# Patient Record
Sex: FEMALE | Race: WHITE | NOT HISPANIC OR LATINO | Employment: UNEMPLOYED | RURAL
[De-identification: names, ages, dates, MRNs, and addresses within clinical notes are randomized per-mention and may not be internally consistent; named-entity substitution may affect disease eponyms.]

---

## 2021-03-03 ENCOUNTER — HISTORICAL (OUTPATIENT)
Dept: ADMINISTRATIVE | Facility: HOSPITAL | Age: 86
End: 2021-03-03

## 2021-08-18 ENCOUNTER — LAB REQUISITION (OUTPATIENT)
Dept: LAB | Facility: HOSPITAL | Age: 86
End: 2021-08-18
Attending: NURSE PRACTITIONER
Payer: COMMERCIAL

## 2021-08-18 DIAGNOSIS — R41.0 DISORIENTATION, UNSPECIFIED: ICD-10-CM

## 2021-08-18 DIAGNOSIS — R53.1 WEAKNESS: ICD-10-CM
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
## 2021-08-18 LAB
ALBUMIN SERPL BCP-MCNC: 4.1 G/DL (ref 3.5–5)
ALBUMIN/GLOB SERPL: 1.2 {RATIO}
ALP SERPL-CCNC: 58 U/L (ref 55–142)
ALT SERPL W P-5'-P-CCNC: 19 U/L (ref 13–56)
ANION GAP SERPL CALCULATED.3IONS-SCNC: 13 MMOL/L (ref 7–16)
AST SERPL W P-5'-P-CCNC: 20 U/L (ref 15–37)
BASOPHILS # BLD AUTO: 0.02 K/UL (ref 0–0.2)
BASOPHILS NFR BLD AUTO: 0.3 % (ref 0–1)
BILIRUB SERPL-MCNC: 0.5 MG/DL (ref 0–1.2)
BUN SERPL-MCNC: 11 MG/DL (ref 7–18)
BUN/CREAT SERPL: 11 (ref 6–20)
CALCIUM SERPL-MCNC: 9.5 MG/DL (ref 8.5–10.1)
CHLORIDE SERPL-SCNC: 93 MMOL/L (ref 98–107)
CO2 SERPL-SCNC: 27 MMOL/L (ref 21–32)
CREAT SERPL-MCNC: 0.98 MG/DL (ref 0.55–1.02)
DIFFERENTIAL METHOD BLD: ABNORMAL
EOSINOPHIL # BLD AUTO: 0.14 K/UL (ref 0–0.5)
EOSINOPHIL NFR BLD AUTO: 2.1 % (ref 1–4)
ERYTHROCYTE [DISTWIDTH] IN BLOOD BY AUTOMATED COUNT: 12.8 % (ref 11.5–14.5)
GLOBULIN SER-MCNC: 3.5 G/DL (ref 2–4)
GLUCOSE SERPL-MCNC: 131 MG/DL (ref 74–106)
HCT VFR BLD AUTO: 37.3 % (ref 38–47)
HGB BLD-MCNC: 12.4 G/DL (ref 12–16)
LYMPHOCYTES # BLD AUTO: 1.95 K/UL (ref 1–4.8)
LYMPHOCYTES NFR BLD AUTO: 29.4 % (ref 27–41)
MCH RBC QN AUTO: 29.5 PG (ref 27–31)
MCHC RBC AUTO-ENTMCNC: 33.2 G/DL (ref 32–36)
MCV RBC AUTO: 88.8 FL (ref 80–96)
MONOCYTES # BLD AUTO: 0.61 K/UL (ref 0–0.8)
MONOCYTES NFR BLD AUTO: 9.2 % (ref 2–6)
MPC BLD CALC-MCNC: 10.4 FL (ref 9.4–12.4)
NEUTROPHILS # BLD AUTO: 3.92 K/UL (ref 1.8–7.7)
NEUTROPHILS NFR BLD AUTO: 59 % (ref 53–65)
PLATELET # BLD AUTO: 281 K/UL (ref 150–400)
POTASSIUM SERPL-SCNC: 3.7 MMOL/L (ref 3.5–5.1)
PROT SERPL-MCNC: 7.6 G/DL (ref 6.4–8.2)
RBC # BLD AUTO: 4.2 M/UL (ref 4.2–5.4)
SODIUM SERPL-SCNC: 129 MMOL/L (ref 136–145)
WBC # BLD AUTO: 6.64 K/UL (ref 4.5–11)

## 2021-08-18 PROCEDURE — 85025 COMPLETE CBC W/AUTO DIFF WBC: CPT | Performed by: NURSE PRACTITIONER

## 2021-08-18 PROCEDURE — 80053 COMPREHEN METABOLIC PANEL: CPT | Performed by: NURSE PRACTITIONER

## 2021-08-29 ENCOUNTER — LAB REQUISITION (OUTPATIENT)
Dept: LAB | Facility: HOSPITAL | Age: 86
End: 2021-08-29
Attending: NURSE PRACTITIONER
Payer: COMMERCIAL

## 2021-08-29 DIAGNOSIS — I10 ESSENTIAL (PRIMARY) HYPERTENSION: ICD-10-CM

## 2021-08-29 LAB
ANION GAP SERPL CALCULATED.3IONS-SCNC: 13 MMOL/L (ref 7–16)
BUN SERPL-MCNC: 9 MG/DL (ref 7–18)
BUN/CREAT SERPL: 8 (ref 6–20)
CALCIUM SERPL-MCNC: 9.5 MG/DL (ref 8.5–10.1)
CHLORIDE SERPL-SCNC: 99 MMOL/L (ref 98–107)
CO2 SERPL-SCNC: 28 MMOL/L (ref 21–32)
CREAT SERPL-MCNC: 1.09 MG/DL (ref 0.55–1.02)
GLUCOSE SERPL-MCNC: 133 MG/DL (ref 74–106)
POTASSIUM SERPL-SCNC: 3.5 MMOL/L (ref 3.5–5.1)
SODIUM SERPL-SCNC: 136 MMOL/L (ref 136–145)

## 2021-08-29 PROCEDURE — 80048 BASIC METABOLIC PNL TOTAL CA: CPT

## 2021-10-19 ENCOUNTER — HOSPITAL ENCOUNTER (OUTPATIENT)
Dept: RADIOLOGY | Facility: HOSPITAL | Age: 86
Discharge: HOME | End: 2021-10-19
Attending: NURSE PRACTITIONER
Payer: MEDICAID

## 2021-10-19 DIAGNOSIS — R05.9 COUGH: ICD-10-CM

## 2021-10-19 DIAGNOSIS — R06.02 SHORTNESS OF BREATH: ICD-10-CM

## 2021-10-19 PROCEDURE — 71046 X-RAY EXAM CHEST 2 VIEWS: CPT | Mod: TC

## 2022-03-18 ENCOUNTER — HOSPITAL ENCOUNTER (OUTPATIENT)
Dept: RADIOLOGY | Facility: HOSPITAL | Age: 87
Discharge: HOME | End: 2022-03-18
Attending: NURSE PRACTITIONER
Payer: MEDICARE

## 2022-03-18 DIAGNOSIS — Z02.2 ENCOUNTER FOR EXAMINATION FOR ADMISSION TO NURSING HOME: ICD-10-CM

## 2022-03-18 PROCEDURE — 71046 X-RAY EXAM CHEST 2 VIEWS: CPT | Mod: TC

## 2022-04-02 ENCOUNTER — LAB REQUISITION (OUTPATIENT)
Dept: LAB | Facility: HOSPITAL | Age: 87
End: 2022-04-02
Attending: INTERNAL MEDICINE
Payer: MEDICARE

## 2022-04-02 DIAGNOSIS — E11.8 TYPE 2 DIABETES MELLITUS WITH UNSPECIFIED COMPLICATIONS: ICD-10-CM

## 2022-04-02 DIAGNOSIS — I11.9 HYPERTENSIVE HEART DISEASE WITHOUT HEART FAILURE: ICD-10-CM

## 2022-04-02 LAB
ALBUMIN SERPL BCP-MCNC: 3.4 G/DL (ref 3.5–5)
ALBUMIN/GLOB SERPL: 1.3 {RATIO}
ALP SERPL-CCNC: 48 U/L (ref 55–142)
ALT SERPL W P-5'-P-CCNC: 13 U/L (ref 13–56)
ANION GAP SERPL CALCULATED.3IONS-SCNC: 10 MMOL/L (ref 7–16)
AST SERPL W P-5'-P-CCNC: 16 U/L (ref 15–37)
BASOPHILS # BLD AUTO: 0.03 K/UL (ref 0–0.2)
BASOPHILS NFR BLD AUTO: 0.7 % (ref 0–1)
BILIRUB SERPL-MCNC: 0.3 MG/DL (ref 0–1.2)
BUN SERPL-MCNC: 11 MG/DL (ref 7–18)
BUN/CREAT SERPL: 13 (ref 6–20)
CALCIUM SERPL-MCNC: 9.1 MG/DL (ref 8.5–10.1)
CHLORIDE SERPL-SCNC: 98 MMOL/L (ref 98–107)
CO2 SERPL-SCNC: 30 MMOL/L (ref 21–32)
CREAT SERPL-MCNC: 0.86 MG/DL (ref 0.55–1.02)
DIFFERENTIAL METHOD BLD: ABNORMAL
EOSINOPHIL # BLD AUTO: 0.08 K/UL (ref 0–0.5)
EOSINOPHIL NFR BLD AUTO: 1.8 % (ref 1–4)
ERYTHROCYTE [DISTWIDTH] IN BLOOD BY AUTOMATED COUNT: 15 % (ref 11.5–14.5)
GLOBULIN SER-MCNC: 2.7 G/DL (ref 2–4)
GLUCOSE SERPL-MCNC: 111 MG/DL (ref 74–106)
HCT VFR BLD AUTO: 35 % (ref 38–47)
HGB BLD-MCNC: 11.7 G/DL (ref 12–16)
LYMPHOCYTES # BLD AUTO: 1.68 K/UL (ref 1–4.8)
LYMPHOCYTES NFR BLD AUTO: 38.3 % (ref 27–41)
MCH RBC QN AUTO: 30.6 PG (ref 27–31)
MCHC RBC AUTO-ENTMCNC: 33.4 G/DL (ref 32–36)
MCV RBC AUTO: 91.6 FL (ref 80–96)
MONOCYTES # BLD AUTO: 0.49 K/UL (ref 0–0.8)
MONOCYTES NFR BLD AUTO: 11.2 % (ref 2–6)
MPC BLD CALC-MCNC: 9.6 FL (ref 9.4–12.4)
NEUTROPHILS # BLD AUTO: 2.11 K/UL (ref 1.8–7.7)
NEUTROPHILS NFR BLD AUTO: 48 % (ref 53–65)
PLATELET # BLD AUTO: 285 K/UL (ref 150–400)
POTASSIUM SERPL-SCNC: 4.5 MMOL/L (ref 3.5–5.1)
PROT SERPL-MCNC: 6.1 G/DL (ref 6.4–8.2)
RBC # BLD AUTO: 3.82 M/UL (ref 4.2–5.4)
SODIUM SERPL-SCNC: 133 MMOL/L (ref 136–145)
WBC # BLD AUTO: 4.39 K/UL (ref 4.5–11)

## 2022-04-02 PROCEDURE — 85025 COMPLETE CBC W/AUTO DIFF WBC: CPT

## 2022-04-02 PROCEDURE — 80053 COMPREHEN METABOLIC PANEL: CPT

## 2022-04-11 ENCOUNTER — LAB REQUISITION (OUTPATIENT)
Dept: LAB | Facility: HOSPITAL | Age: 87
End: 2022-04-11
Attending: INTERNAL MEDICINE
Payer: MEDICARE

## 2022-04-11 DIAGNOSIS — E11.8 TYPE 2 DIABETES MELLITUS WITH UNSPECIFIED COMPLICATIONS: ICD-10-CM

## 2022-04-11 DIAGNOSIS — Z79.01 LONG TERM (CURRENT) USE OF ANTICOAGULANTS: ICD-10-CM

## 2022-04-11 DIAGNOSIS — E03.9 HYPOTHYROIDISM, UNSPECIFIED: ICD-10-CM

## 2022-04-11 DIAGNOSIS — Z79.899 OTHER LONG TERM (CURRENT) DRUG THERAPY: ICD-10-CM

## 2022-04-12 LAB
ALBUMIN SERPL BCP-MCNC: 3.5 G/DL (ref 3.5–5)
ALBUMIN/GLOB SERPL: 1.2 {RATIO}
ALP SERPL-CCNC: 46 U/L (ref 55–142)
ALT SERPL W P-5'-P-CCNC: 12 U/L (ref 13–56)
ANION GAP SERPL CALCULATED.3IONS-SCNC: 9 MMOL/L (ref 7–16)
AST SERPL W P-5'-P-CCNC: 16 U/L (ref 15–37)
BASOPHILS # BLD AUTO: 0.01 K/UL (ref 0–0.2)
BASOPHILS NFR BLD AUTO: 0.2 % (ref 0–1)
BILIRUB SERPL-MCNC: 0.4 MG/DL (ref 0–1.2)
BUN SERPL-MCNC: 12 MG/DL (ref 7–18)
BUN/CREAT SERPL: 14 (ref 6–20)
CALCIUM SERPL-MCNC: 9.3 MG/DL (ref 8.5–10.1)
CHLORIDE SERPL-SCNC: 91 MMOL/L (ref 98–107)
CO2 SERPL-SCNC: 29 MMOL/L (ref 21–32)
CREAT SERPL-MCNC: 0.87 MG/DL (ref 0.55–1.02)
DIFFERENTIAL METHOD BLD: ABNORMAL
EOSINOPHIL # BLD AUTO: 0.11 K/UL (ref 0–0.5)
EOSINOPHIL NFR BLD AUTO: 2.4 % (ref 1–4)
ERYTHROCYTE [DISTWIDTH] IN BLOOD BY AUTOMATED COUNT: 14.3 % (ref 11.5–14.5)
EST. AVERAGE GLUCOSE BLD GHB EST-MCNC: 137 MG/DL
GLOBULIN SER-MCNC: 3 G/DL (ref 2–4)
GLUCOSE SERPL-MCNC: 85 MG/DL (ref 74–106)
HBA1C MFR BLD HPLC: 6.7 % (ref 4.5–6.6)
HCT VFR BLD AUTO: 34.1 % (ref 38–47)
HGB BLD-MCNC: 11.4 G/DL (ref 12–16)
LYMPHOCYTES # BLD AUTO: 2.27 K/UL (ref 1–4.8)
LYMPHOCYTES NFR BLD AUTO: 50 % (ref 27–41)
MCH RBC QN AUTO: 30.6 PG (ref 27–31)
MCHC RBC AUTO-ENTMCNC: 33.4 G/DL (ref 32–36)
MCV RBC AUTO: 91.4 FL (ref 80–96)
MONOCYTES # BLD AUTO: 0.62 K/UL (ref 0–0.8)
MONOCYTES NFR BLD AUTO: 13.7 % (ref 2–6)
MPC BLD CALC-MCNC: 10.1 FL (ref 9.4–12.4)
NEUTROPHILS # BLD AUTO: 1.53 K/UL (ref 1.8–7.7)
NEUTROPHILS NFR BLD AUTO: 33.7 % (ref 53–65)
PLATELET # BLD AUTO: 313 K/UL (ref 150–400)
POTASSIUM SERPL-SCNC: 4.4 MMOL/L (ref 3.5–5.1)
PROT SERPL-MCNC: 6.5 G/DL (ref 6.4–8.2)
RBC # BLD AUTO: 3.73 M/UL (ref 4.2–5.4)
SODIUM SERPL-SCNC: 125 MMOL/L (ref 136–145)
TSH SERPL DL<=0.005 MIU/L-ACNC: 2.3 UIU/ML (ref 0.36–3.74)
WBC # BLD AUTO: 4.54 K/UL (ref 4.5–11)

## 2022-04-12 PROCEDURE — 80053 COMPREHEN METABOLIC PANEL: CPT | Performed by: INTERNAL MEDICINE

## 2022-04-12 PROCEDURE — 83036 HEMOGLOBIN GLYCOSYLATED A1C: CPT | Performed by: INTERNAL MEDICINE

## 2022-04-12 PROCEDURE — 84443 ASSAY THYROID STIM HORMONE: CPT | Performed by: INTERNAL MEDICINE

## 2022-04-12 PROCEDURE — 85025 COMPLETE CBC W/AUTO DIFF WBC: CPT | Performed by: INTERNAL MEDICINE

## 2022-05-16 ENCOUNTER — LAB REQUISITION (OUTPATIENT)
Dept: LAB | Facility: HOSPITAL | Age: 87
End: 2022-05-16
Attending: INTERNAL MEDICINE
Payer: MEDICARE

## 2022-05-16 DIAGNOSIS — E11.8 TYPE 2 DIABETES MELLITUS WITH UNSPECIFIED COMPLICATIONS: ICD-10-CM

## 2022-05-17 LAB — GLUCOSE SERPL-MCNC: 80 MG/DL (ref 74–106)

## 2022-05-17 PROCEDURE — 82947 ASSAY GLUCOSE BLOOD QUANT: CPT | Performed by: INTERNAL MEDICINE

## 2022-06-17 ENCOUNTER — LAB REQUISITION (OUTPATIENT)
Dept: LAB | Facility: HOSPITAL | Age: 87
End: 2022-06-17
Attending: INTERNAL MEDICINE
Payer: MEDICARE

## 2022-06-17 DIAGNOSIS — E11.8 TYPE 2 DIABETES MELLITUS WITH UNSPECIFIED COMPLICATIONS: ICD-10-CM

## 2022-06-21 LAB — GLUCOSE SERPL-MCNC: 81 MG/DL (ref 74–106)

## 2022-06-21 PROCEDURE — 82947 ASSAY GLUCOSE BLOOD QUANT: CPT | Performed by: INTERNAL MEDICINE

## 2022-07-18 ENCOUNTER — LAB REQUISITION (OUTPATIENT)
Dept: LAB | Facility: HOSPITAL | Age: 87
End: 2022-07-18
Attending: INTERNAL MEDICINE
Payer: MEDICARE

## 2022-07-18 DIAGNOSIS — Z79.01 LONG TERM (CURRENT) USE OF ANTICOAGULANTS: ICD-10-CM

## 2022-07-18 DIAGNOSIS — E11.8 TYPE 2 DIABETES MELLITUS WITH UNSPECIFIED COMPLICATIONS: ICD-10-CM

## 2022-07-20 LAB
ALBUMIN SERPL BCP-MCNC: 2.8 G/DL (ref 3.5–5)
ALBUMIN/GLOB SERPL: 0.9 {RATIO}
ALP SERPL-CCNC: 55 U/L (ref 55–142)
ALT SERPL W P-5'-P-CCNC: 6 U/L (ref 13–56)
ANION GAP SERPL CALCULATED.3IONS-SCNC: 11 MMOL/L (ref 7–16)
AST SERPL W P-5'-P-CCNC: 13 U/L (ref 15–37)
BASOPHILS # BLD AUTO: 0.02 K/UL (ref 0–0.2)
BASOPHILS NFR BLD AUTO: 0.4 % (ref 0–1)
BILIRUB SERPL-MCNC: 0.3 MG/DL (ref 0–1.2)
BUN SERPL-MCNC: 17 MG/DL (ref 7–18)
BUN/CREAT SERPL: 17 (ref 6–20)
CALCIUM SERPL-MCNC: 8.8 MG/DL (ref 8.5–10.1)
CHLORIDE SERPL-SCNC: 100 MMOL/L (ref 98–107)
CO2 SERPL-SCNC: 29 MMOL/L (ref 21–32)
CREAT SERPL-MCNC: 1.02 MG/DL (ref 0.55–1.02)
DIFFERENTIAL METHOD BLD: ABNORMAL
EOSINOPHIL # BLD AUTO: 0.58 K/UL (ref 0–0.5)
EOSINOPHIL NFR BLD AUTO: 11.2 % (ref 1–4)
ERYTHROCYTE [DISTWIDTH] IN BLOOD BY AUTOMATED COUNT: 13.6 % (ref 11.5–14.5)
EST. AVERAGE GLUCOSE BLD GHB EST-MCNC: 107 MG/DL
GLOBULIN SER-MCNC: 3.2 G/DL (ref 2–4)
GLUCOSE SERPL-MCNC: 79 MG/DL (ref 74–106)
HBA1C MFR BLD HPLC: 5.8 % (ref 4.5–6.6)
HCT VFR BLD AUTO: 29.7 % (ref 38–47)
HGB BLD-MCNC: 9.5 G/DL (ref 12–16)
LYMPHOCYTES # BLD AUTO: 2.39 K/UL (ref 1–4.8)
LYMPHOCYTES NFR BLD AUTO: 46.3 % (ref 27–41)
MCH RBC QN AUTO: 30.9 PG (ref 27–31)
MCHC RBC AUTO-ENTMCNC: 32 G/DL (ref 32–36)
MCV RBC AUTO: 96.7 FL (ref 80–96)
MONOCYTES # BLD AUTO: 0.44 K/UL (ref 0–0.8)
MONOCYTES NFR BLD AUTO: 8.5 % (ref 2–6)
MPC BLD CALC-MCNC: 10.5 FL (ref 9.4–12.4)
NEUTROPHILS # BLD AUTO: 1.73 K/UL (ref 1.8–7.7)
NEUTROPHILS NFR BLD AUTO: 33.6 % (ref 53–65)
PLATELET # BLD AUTO: 218 K/UL (ref 150–400)
POTASSIUM SERPL-SCNC: 4.1 MMOL/L (ref 3.5–5.1)
PROT SERPL-MCNC: 6 G/DL (ref 6.4–8.2)
RBC # BLD AUTO: 3.07 M/UL (ref 4.2–5.4)
SODIUM SERPL-SCNC: 136 MMOL/L (ref 136–145)
WBC # BLD AUTO: 5.16 K/UL (ref 4.5–11)

## 2022-07-20 PROCEDURE — 83036 HEMOGLOBIN GLYCOSYLATED A1C: CPT | Performed by: INTERNAL MEDICINE

## 2022-07-20 PROCEDURE — 80053 COMPREHEN METABOLIC PANEL: CPT | Performed by: INTERNAL MEDICINE

## 2022-07-20 PROCEDURE — 85025 COMPLETE CBC W/AUTO DIFF WBC: CPT | Performed by: INTERNAL MEDICINE

## 2022-08-17 ENCOUNTER — LAB REQUISITION (OUTPATIENT)
Dept: LAB | Facility: HOSPITAL | Age: 87
End: 2022-08-17
Attending: NURSE PRACTITIONER
Payer: MEDICARE

## 2022-08-17 DIAGNOSIS — N39.0 URINARY TRACT INFECTION, SITE NOT SPECIFIED: ICD-10-CM

## 2022-08-17 DIAGNOSIS — R30.0 DYSURIA: ICD-10-CM

## 2022-08-17 LAB
BACTERIA #/AREA URNS HPF: ABNORMAL /HPF
BILIRUB UR QL STRIP: NEGATIVE
CLARITY UR: CLEAR
COLOR UR: YELLOW
GLUCOSE UR STRIP-MCNC: NEGATIVE MG/DL
KETONES UR STRIP-SCNC: NEGATIVE MG/DL
LEUKOCYTE ESTERASE UR QL STRIP: ABNORMAL
NITRITE UR QL STRIP: POSITIVE
PH UR STRIP: 6 PH UNITS
PROT UR QL STRIP: 30
RBC # UR STRIP: ABNORMAL /UL
SP GR UR STRIP: 1.01
UROBILINOGEN UR STRIP-ACNC: 0.2 MG/DL
WBC #/AREA URNS HPF: ABNORMAL /HPF

## 2022-08-17 PROCEDURE — 87077 CULTURE AEROBIC IDENTIFY: CPT | Performed by: NURSE PRACTITIONER

## 2022-08-17 PROCEDURE — 81001 URINALYSIS AUTO W/SCOPE: CPT | Performed by: NURSE PRACTITIONER

## 2022-08-17 PROCEDURE — 87186 SC STD MICRODIL/AGAR DIL: CPT | Performed by: NURSE PRACTITIONER

## 2022-08-19 LAB — UA COMPLETE W REFLEX CULTURE PNL UR: ABNORMAL

## 2022-09-16 ENCOUNTER — LAB REQUISITION (OUTPATIENT)
Dept: LAB | Facility: HOSPITAL | Age: 87
End: 2022-09-16
Attending: STUDENT IN AN ORGANIZED HEALTH CARE EDUCATION/TRAINING PROGRAM
Payer: MEDICARE

## 2022-09-16 DIAGNOSIS — E11.8 TYPE 2 DIABETES MELLITUS WITH UNSPECIFIED COMPLICATIONS: ICD-10-CM

## 2022-09-21 LAB — GLUCOSE SERPL-MCNC: 81 MG/DL (ref 74–106)

## 2022-09-21 PROCEDURE — 82947 ASSAY GLUCOSE BLOOD QUANT: CPT | Performed by: INTERNAL MEDICINE

## 2022-10-17 ENCOUNTER — LAB REQUISITION (OUTPATIENT)
Dept: LAB | Facility: HOSPITAL | Age: 87
End: 2022-10-17
Attending: INTERNAL MEDICINE
Payer: MEDICARE

## 2022-10-17 DIAGNOSIS — E03.9 HYPOTHYROIDISM, UNSPECIFIED: ICD-10-CM

## 2022-10-17 DIAGNOSIS — Z79.01 LONG TERM (CURRENT) USE OF ANTICOAGULANTS: ICD-10-CM

## 2022-10-17 DIAGNOSIS — E11.8 TYPE 2 DIABETES MELLITUS WITH UNSPECIFIED COMPLICATIONS: ICD-10-CM

## 2022-10-19 LAB
ALBUMIN SERPL BCP-MCNC: 3 G/DL (ref 3.5–5)
ALBUMIN/GLOB SERPL: 1.1 {RATIO}
ALP SERPL-CCNC: 50 U/L (ref 55–142)
ALT SERPL W P-5'-P-CCNC: 11 U/L (ref 13–56)
ANION GAP SERPL CALCULATED.3IONS-SCNC: 10 MMOL/L (ref 7–16)
AST SERPL W P-5'-P-CCNC: 12 U/L (ref 15–37)
BASOPHILS # BLD AUTO: 0.03 K/UL (ref 0–0.2)
BASOPHILS NFR BLD AUTO: 0.6 % (ref 0–1)
BILIRUB SERPL-MCNC: 0.3 MG/DL (ref ?–1.2)
BUN SERPL-MCNC: 16 MG/DL (ref 7–18)
BUN/CREAT SERPL: 16 (ref 6–20)
CALCIUM SERPL-MCNC: 8.6 MG/DL (ref 8.5–10.1)
CHLORIDE SERPL-SCNC: 100 MMOL/L (ref 98–107)
CO2 SERPL-SCNC: 29 MMOL/L (ref 21–32)
CREAT SERPL-MCNC: 0.98 MG/DL (ref 0.55–1.02)
DIFFERENTIAL METHOD BLD: ABNORMAL
EGFR (NO RACE VARIABLE) (RUSH/TITUS): 55 ML/MIN/1.73M²
EOSINOPHIL # BLD AUTO: 0.22 K/UL (ref 0–0.5)
EOSINOPHIL NFR BLD AUTO: 4.6 % (ref 1–4)
ERYTHROCYTE [DISTWIDTH] IN BLOOD BY AUTOMATED COUNT: 12.8 % (ref 11.5–14.5)
EST. AVERAGE GLUCOSE BLD GHB EST-MCNC: 100 MG/DL
GLOBULIN SER-MCNC: 2.8 G/DL (ref 2–4)
GLUCOSE SERPL-MCNC: 76 MG/DL (ref 74–106)
HBA1C MFR BLD HPLC: 5.6 % (ref 4.5–6.6)
HCT VFR BLD AUTO: 31 % (ref 38–47)
HGB BLD-MCNC: 9.7 G/DL (ref 12–16)
LYMPHOCYTES # BLD AUTO: 2.9 K/UL (ref 1–4.8)
LYMPHOCYTES NFR BLD AUTO: 60.7 % (ref 27–41)
MCH RBC QN AUTO: 30.5 PG (ref 27–31)
MCHC RBC AUTO-ENTMCNC: 31.3 G/DL (ref 32–36)
MCV RBC AUTO: 97.5 FL (ref 80–96)
MONOCYTES # BLD AUTO: 0.38 K/UL (ref 0–0.8)
MONOCYTES NFR BLD AUTO: 7.9 % (ref 2–6)
MPC BLD CALC-MCNC: 10 FL (ref 9.4–12.4)
NEUTROPHILS # BLD AUTO: 1.25 K/UL (ref 1.8–7.7)
NEUTROPHILS NFR BLD AUTO: 26.2 % (ref 53–65)
PLATELET # BLD AUTO: 232 K/UL (ref 150–400)
POTASSIUM SERPL-SCNC: 4.2 MMOL/L (ref 3.5–5.1)
PROT SERPL-MCNC: 5.8 G/DL (ref 6.4–8.2)
RBC # BLD AUTO: 3.18 M/UL (ref 4.2–5.4)
SODIUM SERPL-SCNC: 135 MMOL/L (ref 136–145)
TSH SERPL DL<=0.005 MIU/L-ACNC: 2.29 UIU/ML (ref 0.36–3.74)
WBC # BLD AUTO: 4.78 K/UL (ref 4.5–11)

## 2022-10-19 PROCEDURE — 84443 ASSAY THYROID STIM HORMONE: CPT

## 2022-10-19 PROCEDURE — 83036 HEMOGLOBIN GLYCOSYLATED A1C: CPT

## 2022-10-19 PROCEDURE — 85025 COMPLETE CBC W/AUTO DIFF WBC: CPT

## 2022-10-19 PROCEDURE — 80053 COMPREHEN METABOLIC PANEL: CPT

## 2022-11-11 ENCOUNTER — LAB REQUISITION (OUTPATIENT)
Dept: LAB | Facility: HOSPITAL | Age: 87
End: 2022-11-11
Attending: INTERNAL MEDICINE
Payer: MEDICARE

## 2022-11-11 DIAGNOSIS — E11.8 TYPE 2 DIABETES MELLITUS WITH UNSPECIFIED COMPLICATIONS: ICD-10-CM

## 2022-11-16 LAB — GLUCOSE SERPL-MCNC: 75 MG/DL (ref 74–106)

## 2022-11-16 PROCEDURE — 82947 ASSAY GLUCOSE BLOOD QUANT: CPT | Performed by: INTERNAL MEDICINE

## 2022-12-19 ENCOUNTER — LAB REQUISITION (OUTPATIENT)
Dept: LAB | Facility: HOSPITAL | Age: 87
End: 2022-12-19
Attending: INTERNAL MEDICINE
Payer: MEDICARE

## 2022-12-19 DIAGNOSIS — E11.8 TYPE 2 DIABETES MELLITUS WITH UNSPECIFIED COMPLICATIONS: ICD-10-CM

## 2022-12-21 LAB — GLUCOSE SERPL-MCNC: 87 MG/DL (ref 74–106)

## 2022-12-21 PROCEDURE — 82947 ASSAY GLUCOSE BLOOD QUANT: CPT | Performed by: INTERNAL MEDICINE

## 2023-01-15 ENCOUNTER — LAB REQUISITION (OUTPATIENT)
Dept: LAB | Facility: HOSPITAL | Age: 88
End: 2023-01-15
Attending: INTERNAL MEDICINE
Payer: MEDICARE

## 2023-01-15 DIAGNOSIS — Z79.01 LONG TERM (CURRENT) USE OF ANTICOAGULANTS: ICD-10-CM

## 2023-01-15 DIAGNOSIS — E11.8 TYPE 2 DIABETES MELLITUS WITH UNSPECIFIED COMPLICATIONS: ICD-10-CM

## 2023-01-18 LAB
ALBUMIN SERPL BCP-MCNC: 3.8 G/DL (ref 3.5–5)
ALBUMIN/GLOB SERPL: 1.2 {RATIO}
ALP SERPL-CCNC: 56 U/L (ref 55–142)
ALT SERPL W P-5'-P-CCNC: 15 U/L (ref 13–56)
ANION GAP SERPL CALCULATED.3IONS-SCNC: 13 MMOL/L (ref 7–16)
AST SERPL W P-5'-P-CCNC: 16 U/L (ref 15–37)
BASOPHILS # BLD AUTO: 0.03 K/UL (ref 0–0.2)
BASOPHILS NFR BLD AUTO: 0.6 % (ref 0–1)
BILIRUB SERPL-MCNC: 0.4 MG/DL (ref ?–1.2)
BUN SERPL-MCNC: 13 MG/DL (ref 7–18)
BUN/CREAT SERPL: 17 (ref 6–20)
CALCIUM SERPL-MCNC: 9.4 MG/DL (ref 8.5–10.1)
CHLORIDE SERPL-SCNC: 97 MMOL/L (ref 98–107)
CO2 SERPL-SCNC: 29 MMOL/L (ref 21–32)
CREAT SERPL-MCNC: 0.75 MG/DL (ref 0.55–1.02)
DIFFERENTIAL METHOD BLD: ABNORMAL
EGFR (NO RACE VARIABLE) (RUSH/TITUS): 76 ML/MIN/1.73M²
EOSINOPHIL # BLD AUTO: 0.13 K/UL (ref 0–0.5)
EOSINOPHIL NFR BLD AUTO: 2.5 % (ref 1–4)
ERYTHROCYTE [DISTWIDTH] IN BLOOD BY AUTOMATED COUNT: 12.5 % (ref 11.5–14.5)
EST. AVERAGE GLUCOSE BLD GHB EST-MCNC: 330 MG/DL
GLOBULIN SER-MCNC: 3.2 G/DL (ref 2–4)
GLUCOSE SERPL-MCNC: 92 MG/DL (ref 74–106)
HBA1C MFR BLD HPLC: 12.5 % (ref 4.5–6.6)
HCT VFR BLD AUTO: 35.7 % (ref 38–47)
HGB BLD-MCNC: 11.6 G/DL (ref 12–16)
LYMPHOCYTES # BLD AUTO: 2.91 K/UL (ref 1–4.8)
LYMPHOCYTES NFR BLD AUTO: 55.6 % (ref 27–41)
MCH RBC QN AUTO: 30.9 PG (ref 27–31)
MCHC RBC AUTO-ENTMCNC: 32.5 G/DL (ref 32–36)
MCV RBC AUTO: 95.2 FL (ref 80–96)
MONOCYTES # BLD AUTO: 0.41 K/UL (ref 0–0.8)
MONOCYTES NFR BLD AUTO: 7.8 % (ref 2–6)
MPC BLD CALC-MCNC: 9.5 FL (ref 9.4–12.4)
NEUTROPHILS # BLD AUTO: 1.75 K/UL (ref 1.8–7.7)
NEUTROPHILS NFR BLD AUTO: 33.5 % (ref 53–65)
PLATELET # BLD AUTO: 299 K/UL (ref 150–400)
POTASSIUM SERPL-SCNC: 4.7 MMOL/L (ref 3.5–5.1)
PROT SERPL-MCNC: 7 G/DL (ref 6.4–8.2)
RBC # BLD AUTO: 3.75 M/UL (ref 4.2–5.4)
SODIUM SERPL-SCNC: 134 MMOL/L (ref 136–145)
WBC # BLD AUTO: 5.23 K/UL (ref 4.5–11)

## 2023-01-18 PROCEDURE — 83036 HEMOGLOBIN GLYCOSYLATED A1C: CPT | Performed by: INTERNAL MEDICINE

## 2023-01-18 PROCEDURE — 85025 COMPLETE CBC W/AUTO DIFF WBC: CPT | Performed by: INTERNAL MEDICINE

## 2023-01-18 PROCEDURE — 80053 COMPREHEN METABOLIC PANEL: CPT | Performed by: INTERNAL MEDICINE

## 2023-02-07 ENCOUNTER — LAB REQUISITION (OUTPATIENT)
Dept: LAB | Facility: HOSPITAL | Age: 88
End: 2023-02-07
Attending: NURSE PRACTITIONER
Payer: MEDICARE

## 2023-02-07 DIAGNOSIS — Z79.01 LONG TERM (CURRENT) USE OF ANTICOAGULANTS: ICD-10-CM

## 2023-02-07 DIAGNOSIS — E11.8 TYPE 2 DIABETES MELLITUS WITH UNSPECIFIED COMPLICATIONS: ICD-10-CM

## 2023-02-07 DIAGNOSIS — G47.51 CONFUSIONAL AROUSALS: ICD-10-CM

## 2023-02-07 LAB
ALBUMIN SERPL BCP-MCNC: 3.5 G/DL (ref 3.5–5)
ALBUMIN/GLOB SERPL: 0.9 {RATIO}
ALP SERPL-CCNC: 67 U/L (ref 55–142)
ALT SERPL W P-5'-P-CCNC: 13 U/L (ref 13–56)
ANION GAP SERPL CALCULATED.3IONS-SCNC: 11 MMOL/L (ref 7–16)
AST SERPL W P-5'-P-CCNC: 17 U/L (ref 15–37)
BASOPHILS # BLD AUTO: 0.03 K/UL (ref 0–0.2)
BASOPHILS NFR BLD AUTO: 0.4 % (ref 0–1)
BILIRUB SERPL-MCNC: 0.3 MG/DL (ref ?–1.2)
BILIRUB UR QL STRIP: NEGATIVE
BUN SERPL-MCNC: 24 MG/DL (ref 7–18)
BUN/CREAT SERPL: 18 (ref 6–20)
CALCIUM SERPL-MCNC: 9.1 MG/DL (ref 8.5–10.1)
CHLORIDE SERPL-SCNC: 94 MMOL/L (ref 98–107)
CLARITY UR: CLEAR
CO2 SERPL-SCNC: 29 MMOL/L (ref 21–32)
COLOR UR: YELLOW
CREAT SERPL-MCNC: 1.3 MG/DL (ref 0.55–1.02)
DIFFERENTIAL METHOD BLD: ABNORMAL
EGFR (NO RACE VARIABLE) (RUSH/TITUS): 39 ML/MIN/1.73M²
EOSINOPHIL # BLD AUTO: 0.21 K/UL (ref 0–0.5)
EOSINOPHIL NFR BLD AUTO: 3.1 % (ref 1–4)
ERYTHROCYTE [DISTWIDTH] IN BLOOD BY AUTOMATED COUNT: 12.9 % (ref 11.5–14.5)
GLOBULIN SER-MCNC: 3.8 G/DL (ref 2–4)
GLUCOSE SERPL-MCNC: 148 MG/DL (ref 74–106)
GLUCOSE UR STRIP-MCNC: NEGATIVE MG/DL
HCT VFR BLD AUTO: 34.7 % (ref 38–47)
HGB BLD-MCNC: 10.7 G/DL (ref 12–16)
KETONES UR STRIP-SCNC: NEGATIVE MG/DL
LEUKOCYTE ESTERASE UR QL STRIP: NEGATIVE
LYMPHOCYTES # BLD AUTO: 2.88 K/UL (ref 1–4.8)
LYMPHOCYTES NFR BLD AUTO: 42.9 % (ref 27–41)
MCH RBC QN AUTO: 30.4 PG (ref 27–31)
MCHC RBC AUTO-ENTMCNC: 30.8 G/DL (ref 32–36)
MCV RBC AUTO: 98.6 FL (ref 80–96)
MONOCYTES # BLD AUTO: 0.59 K/UL (ref 0–0.8)
MONOCYTES NFR BLD AUTO: 8.8 % (ref 2–6)
MPC BLD CALC-MCNC: 10.3 FL (ref 9.4–12.4)
NEUTROPHILS # BLD AUTO: 3.01 K/UL (ref 1.8–7.7)
NEUTROPHILS NFR BLD AUTO: 44.8 % (ref 53–65)
NITRITE UR QL STRIP: NEGATIVE
PH UR STRIP: 5 PH UNITS
PLATELET # BLD AUTO: 284 K/UL (ref 150–400)
POTASSIUM SERPL-SCNC: 5.1 MMOL/L (ref 3.5–5.1)
PROT SERPL-MCNC: 7.3 G/DL (ref 6.4–8.2)
PROT UR QL STRIP: NEGATIVE
RBC # BLD AUTO: 3.52 M/UL (ref 4.2–5.4)
RBC # UR STRIP: NEGATIVE /UL
SODIUM SERPL-SCNC: 129 MMOL/L (ref 136–145)
SP GR UR STRIP: 1.01
UROBILINOGEN UR STRIP-ACNC: 0.2 MG/DL
WBC # BLD AUTO: 6.72 K/UL (ref 4.5–11)

## 2023-02-07 PROCEDURE — 80053 COMPREHEN METABOLIC PANEL: CPT | Performed by: NURSE PRACTITIONER

## 2023-02-07 PROCEDURE — 81003 URINALYSIS AUTO W/O SCOPE: CPT | Performed by: NURSE PRACTITIONER

## 2023-02-07 PROCEDURE — 85025 COMPLETE CBC W/AUTO DIFF WBC: CPT | Performed by: NURSE PRACTITIONER

## 2023-02-08 ENCOUNTER — LAB REQUISITION (OUTPATIENT)
Dept: LAB | Facility: HOSPITAL | Age: 88
End: 2023-02-08
Attending: NURSE PRACTITIONER
Payer: MEDICARE

## 2023-02-08 DIAGNOSIS — G47.51 CONFUSIONAL AROUSALS: ICD-10-CM

## 2023-02-08 LAB
AMMONIA PLAS-SCNC: 14 ΜMOL/L (ref 11–32)
MAGNESIUM SERPL-MCNC: 1.2 MG/DL (ref 1.7–2.3)

## 2023-02-08 PROCEDURE — 83735 ASSAY OF MAGNESIUM: CPT

## 2023-02-08 PROCEDURE — 82140 ASSAY OF AMMONIA: CPT

## 2023-02-10 ENCOUNTER — LAB REQUISITION (OUTPATIENT)
Dept: LAB | Facility: HOSPITAL | Age: 88
End: 2023-02-10
Attending: NURSE PRACTITIONER
Payer: MEDICARE

## 2023-02-10 ENCOUNTER — LAB REQUISITION (OUTPATIENT)
Dept: LAB | Facility: HOSPITAL | Age: 88
End: 2023-02-10
Attending: INTERNAL MEDICINE
Payer: MEDICARE

## 2023-02-10 DIAGNOSIS — E11.8 TYPE 2 DIABETES MELLITUS WITH UNSPECIFIED COMPLICATIONS: ICD-10-CM

## 2023-02-10 DIAGNOSIS — S80.829S: ICD-10-CM

## 2023-02-10 PROCEDURE — 87070 CULTURE OTHR SPECIMN AEROBIC: CPT

## 2023-02-12 LAB — MICROORGANISM SPEC CULT: NORMAL

## 2023-02-15 LAB — GLUCOSE SERPL-MCNC: 95 MG/DL (ref 74–106)

## 2023-02-15 PROCEDURE — 82947 ASSAY GLUCOSE BLOOD QUANT: CPT | Performed by: INTERNAL MEDICINE

## 2023-03-08 ENCOUNTER — LAB REQUISITION (OUTPATIENT)
Dept: LAB | Facility: HOSPITAL | Age: 88
End: 2023-03-08
Attending: INTERNAL MEDICINE
Payer: MEDICARE

## 2023-03-08 DIAGNOSIS — E83.42 HYPOMAGNESEMIA: ICD-10-CM

## 2023-03-08 LAB — MAGNESIUM SERPL-MCNC: 1.9 MG/DL (ref 1.7–2.3)

## 2023-03-08 PROCEDURE — 83735 ASSAY OF MAGNESIUM: CPT

## 2023-03-14 ENCOUNTER — LAB REQUISITION (OUTPATIENT)
Dept: LAB | Facility: HOSPITAL | Age: 88
End: 2023-03-14
Attending: INTERNAL MEDICINE
Payer: MEDICARE

## 2023-03-14 DIAGNOSIS — E11.8 TYPE 2 DIABETES MELLITUS WITH UNSPECIFIED COMPLICATIONS: ICD-10-CM

## 2023-03-15 LAB — GLUCOSE SERPL-MCNC: 82 MG/DL (ref 74–106)

## 2023-03-15 PROCEDURE — 82947 ASSAY GLUCOSE BLOOD QUANT: CPT | Performed by: INTERNAL MEDICINE

## 2023-04-18 ENCOUNTER — LAB REQUISITION (OUTPATIENT)
Dept: LAB | Facility: HOSPITAL | Age: 88
End: 2023-04-18
Attending: INTERNAL MEDICINE
Payer: MEDICARE

## 2023-04-18 DIAGNOSIS — E11.8 TYPE 2 DIABETES MELLITUS WITH UNSPECIFIED COMPLICATIONS: ICD-10-CM

## 2023-04-18 DIAGNOSIS — Z79.01 LONG TERM (CURRENT) USE OF ANTICOAGULANTS: ICD-10-CM

## 2023-04-18 DIAGNOSIS — E03.9 HYPOTHYROIDISM, UNSPECIFIED: ICD-10-CM

## 2023-04-19 LAB
ALBUMIN SERPL BCP-MCNC: 3.1 G/DL (ref 3.5–5)
ALBUMIN/GLOB SERPL: 1.3 {RATIO}
ALP SERPL-CCNC: 68 U/L (ref 55–142)
ALT SERPL W P-5'-P-CCNC: 16 U/L (ref 13–56)
ANION GAP SERPL CALCULATED.3IONS-SCNC: 7 MMOL/L (ref 7–16)
AST SERPL W P-5'-P-CCNC: 13 U/L (ref 15–37)
BASOPHILS # BLD AUTO: 0.02 K/UL (ref 0–0.2)
BASOPHILS NFR BLD AUTO: 0.5 % (ref 0–1)
BILIRUB SERPL-MCNC: 0.3 MG/DL (ref ?–1.2)
BUN SERPL-MCNC: 14 MG/DL (ref 7–18)
BUN/CREAT SERPL: 18 (ref 6–20)
CALCIUM SERPL-MCNC: 8.6 MG/DL (ref 8.5–10.1)
CHLORIDE SERPL-SCNC: 96 MMOL/L (ref 98–107)
CO2 SERPL-SCNC: 32 MMOL/L (ref 21–32)
CREAT SERPL-MCNC: 0.77 MG/DL (ref 0.55–1.02)
DIFFERENTIAL METHOD BLD: ABNORMAL
EGFR (NO RACE VARIABLE) (RUSH/TITUS): 74 ML/MIN/1.73M²
EOSINOPHIL # BLD AUTO: 0.12 K/UL (ref 0–0.5)
EOSINOPHIL NFR BLD AUTO: 2.8 % (ref 1–4)
ERYTHROCYTE [DISTWIDTH] IN BLOOD BY AUTOMATED COUNT: 12.7 % (ref 11.5–14.5)
EST. AVERAGE GLUCOSE BLD GHB EST-MCNC: 104 MG/DL
GLOBULIN SER-MCNC: 2.4 G/DL (ref 2–4)
GLUCOSE SERPL-MCNC: 89 MG/DL (ref 74–106)
HBA1C MFR BLD HPLC: 5.7 % (ref 4.5–6.6)
HCT VFR BLD AUTO: 30.6 % (ref 38–47)
HGB BLD-MCNC: 9.9 G/DL (ref 12–16)
LYMPHOCYTES # BLD AUTO: 1.95 K/UL (ref 1–4.8)
LYMPHOCYTES NFR BLD AUTO: 45.7 % (ref 27–41)
MCH RBC QN AUTO: 31.3 PG (ref 27–31)
MCHC RBC AUTO-ENTMCNC: 32.4 G/DL (ref 32–36)
MCV RBC AUTO: 96.8 FL (ref 80–96)
MONOCYTES # BLD AUTO: 0.43 K/UL (ref 0–0.8)
MONOCYTES NFR BLD AUTO: 10.1 % (ref 2–6)
MPC BLD CALC-MCNC: 9.2 FL (ref 9.4–12.4)
NEUTROPHILS # BLD AUTO: 1.75 K/UL (ref 1.8–7.7)
NEUTROPHILS NFR BLD AUTO: 40.9 % (ref 53–65)
PLATELET # BLD AUTO: 281 K/UL (ref 150–400)
POTASSIUM SERPL-SCNC: 4.4 MMOL/L (ref 3.5–5.1)
PROT SERPL-MCNC: 5.5 G/DL (ref 6.4–8.2)
RBC # BLD AUTO: 3.16 M/UL (ref 4.2–5.4)
SODIUM SERPL-SCNC: 131 MMOL/L (ref 136–145)
TSH SERPL DL<=0.005 MIU/L-ACNC: 1.56 UIU/ML (ref 0.36–3.74)
WBC # BLD AUTO: 4.27 K/UL (ref 4.5–11)

## 2023-04-19 PROCEDURE — 80053 COMPREHEN METABOLIC PANEL: CPT | Performed by: INTERNAL MEDICINE

## 2023-04-19 PROCEDURE — 83036 HEMOGLOBIN GLYCOSYLATED A1C: CPT | Performed by: INTERNAL MEDICINE

## 2023-04-19 PROCEDURE — 85025 COMPLETE CBC W/AUTO DIFF WBC: CPT | Performed by: INTERNAL MEDICINE

## 2023-04-19 PROCEDURE — 84443 ASSAY THYROID STIM HORMONE: CPT | Performed by: INTERNAL MEDICINE

## 2023-05-08 ENCOUNTER — LAB REQUISITION (OUTPATIENT)
Dept: LAB | Facility: HOSPITAL | Age: 88
End: 2023-05-08
Attending: INTERNAL MEDICINE
Payer: MEDICARE

## 2023-05-08 DIAGNOSIS — Z79.01 LONG TERM (CURRENT) USE OF ANTICOAGULANTS: ICD-10-CM

## 2023-05-08 LAB
ANION GAP SERPL CALCULATED.3IONS-SCNC: 10 MMOL/L (ref 7–16)
BACTERIA #/AREA URNS HPF: ABNORMAL /HPF
BASOPHILS # BLD AUTO: 0.02 K/UL (ref 0–0.2)
BASOPHILS NFR BLD AUTO: 0.2 % (ref 0–1)
BILIRUB UR QL STRIP: NEGATIVE
BUN SERPL-MCNC: 23 MG/DL (ref 7–18)
BUN/CREAT SERPL: 21 (ref 6–20)
CALCIUM SERPL-MCNC: 8.6 MG/DL (ref 8.5–10.1)
CHLORIDE SERPL-SCNC: 91 MMOL/L (ref 98–107)
CLARITY UR: ABNORMAL
CO2 SERPL-SCNC: 30 MMOL/L (ref 21–32)
COLOR UR: YELLOW
CREAT SERPL-MCNC: 1.09 MG/DL (ref 0.55–1.02)
DIFFERENTIAL METHOD BLD: ABNORMAL
EGFR (NO RACE VARIABLE) (RUSH/TITUS): 49 ML/MIN/1.73M2
EOSINOPHIL # BLD AUTO: 0.02 K/UL (ref 0–0.5)
EOSINOPHIL NFR BLD AUTO: 0.2 % (ref 1–4)
ERYTHROCYTE [DISTWIDTH] IN BLOOD BY AUTOMATED COUNT: 12.6 % (ref 11.5–14.5)
GLUCOSE SERPL-MCNC: 120 MG/DL (ref 74–106)
GLUCOSE UR STRIP-MCNC: NEGATIVE MG/DL
HCT VFR BLD AUTO: 30.9 % (ref 38–47)
HGB BLD-MCNC: 10 G/DL (ref 12–16)
KETONES UR STRIP-SCNC: NEGATIVE MG/DL
LEUKOCYTE ESTERASE UR QL STRIP: ABNORMAL
LYMPHOCYTES # BLD AUTO: 1 K/UL (ref 1–4.8)
LYMPHOCYTES NFR BLD AUTO: 11.7 % (ref 27–41)
MCH RBC QN AUTO: 31.3 PG (ref 27–31)
MCHC RBC AUTO-ENTMCNC: 32.4 G/DL (ref 32–36)
MCV RBC AUTO: 96.6 FL (ref 80–96)
MONOCYTES # BLD AUTO: 0.97 K/UL (ref 0–0.8)
MONOCYTES NFR BLD AUTO: 11.4 % (ref 2–6)
MPC BLD CALC-MCNC: 9.6 FL (ref 9.4–12.4)
NEUTROPHILS # BLD AUTO: 6.52 K/UL (ref 1.8–7.7)
NEUTROPHILS NFR BLD AUTO: 76.5 % (ref 53–65)
NITRITE UR QL STRIP: NEGATIVE
PH UR STRIP: 7 PH UNITS
PLATELET # BLD AUTO: 249 K/UL (ref 150–400)
POTASSIUM SERPL-SCNC: 5.3 MMOL/L (ref 3.5–5.1)
PROT UR QL STRIP: NEGATIVE
RBC # BLD AUTO: 3.2 M/UL (ref 4.2–5.4)
RBC # UR STRIP: ABNORMAL /UL
RBC #/AREA URNS HPF: ABNORMAL /HPF
RENAL EPI CELLS #/AREA URNS LPF: ABNORMAL /LPF
SODIUM SERPL-SCNC: 126 MMOL/L (ref 136–145)
SP GR UR STRIP: 1.01
SQUAMOUS #/AREA URNS LPF: ABNORMAL /LPF
UROBILINOGEN UR STRIP-ACNC: 0.2 MG/DL
WBC # BLD AUTO: 8.53 K/UL (ref 4.5–11)
WBC #/AREA URNS HPF: ABNORMAL /HPF

## 2023-05-08 PROCEDURE — 87186 SC STD MICRODIL/AGAR DIL: CPT

## 2023-05-08 PROCEDURE — 87077 CULTURE AEROBIC IDENTIFY: CPT

## 2023-05-08 PROCEDURE — 80048 BASIC METABOLIC PNL TOTAL CA: CPT

## 2023-05-08 PROCEDURE — 81001 URINALYSIS AUTO W/SCOPE: CPT

## 2023-05-08 PROCEDURE — 85025 COMPLETE CBC W/AUTO DIFF WBC: CPT

## 2023-05-09 ENCOUNTER — HOSPITAL ENCOUNTER (INPATIENT)
Facility: HOSPITAL | Age: 88
LOS: 3 days | Discharge: NURSING, REHAB OR LONG-TERM CARE | DRG: 690 | End: 2023-05-12
Attending: HOSPITALIST | Admitting: HOSPITALIST
Payer: MEDICARE

## 2023-05-09 DIAGNOSIS — N17.9 AKI (ACUTE KIDNEY INJURY): ICD-10-CM

## 2023-05-09 DIAGNOSIS — E87.1 HYPONATREMIA: ICD-10-CM

## 2023-05-09 DIAGNOSIS — R11.2 NAUSEA AND VOMITING, UNSPECIFIED VOMITING TYPE: ICD-10-CM

## 2023-05-09 DIAGNOSIS — N30.01 ACUTE CYSTITIS WITH HEMATURIA: Primary | ICD-10-CM

## 2023-05-09 LAB
ALBUMIN SERPL BCP-MCNC: 3.1 G/DL (ref 3.5–5)
ALBUMIN/GLOB SERPL: 0.7 {RATIO}
ALP SERPL-CCNC: 100 U/L (ref 55–142)
ALT SERPL W P-5'-P-CCNC: 21 U/L (ref 13–56)
ANION GAP SERPL CALCULATED.3IONS-SCNC: 10 MMOL/L (ref 7–16)
AST SERPL W P-5'-P-CCNC: 26 U/L (ref 15–37)
BACTERIA #/AREA URNS HPF: ABNORMAL /HPF
BASOPHILS # BLD AUTO: 0.01 K/UL (ref 0–0.2)
BASOPHILS NFR BLD AUTO: 0.2 % (ref 0–1)
BILIRUB SERPL-MCNC: 0.2 MG/DL (ref ?–1.2)
BILIRUB UR QL STRIP: NEGATIVE
BUN SERPL-MCNC: 22 MG/DL (ref 7–18)
BUN/CREAT SERPL: 21 (ref 6–20)
CALCIUM SERPL-MCNC: 9.1 MG/DL (ref 8.5–10.1)
CHLORIDE SERPL-SCNC: 90 MMOL/L (ref 98–107)
CLARITY UR: CLEAR
CO2 SERPL-SCNC: 30 MMOL/L (ref 21–32)
COLOR UR: YELLOW
CREAT SERPL-MCNC: 1.06 MG/DL (ref 0.55–1.02)
DIFFERENTIAL METHOD BLD: ABNORMAL
EGFR (NO RACE VARIABLE) (RUSH/TITUS): 50 ML/MIN/1.73M2
EOSINOPHIL # BLD AUTO: 0.01 K/UL (ref 0–0.5)
EOSINOPHIL NFR BLD AUTO: 0.2 % (ref 1–4)
ERYTHROCYTE [DISTWIDTH] IN BLOOD BY AUTOMATED COUNT: 12.8 % (ref 11.5–14.5)
GLOBULIN SER-MCNC: 4.3 G/DL (ref 2–4)
GLUCOSE SERPL-MCNC: 130 MG/DL (ref 74–106)
GLUCOSE UR STRIP-MCNC: NEGATIVE MG/DL
HCT VFR BLD AUTO: 34.4 % (ref 38–47)
HGB BLD-MCNC: 11.3 G/DL (ref 12–16)
KETONES UR STRIP-SCNC: NEGATIVE MG/DL
LACTATE SERPL-SCNC: 0.5 MMOL/L (ref 0.4–2)
LEUKOCYTE ESTERASE UR QL STRIP: ABNORMAL
LYMPHOCYTES # BLD AUTO: 0.94 K/UL (ref 1–4.8)
LYMPHOCYTES NFR BLD AUTO: 16.5 % (ref 27–41)
MCH RBC QN AUTO: 31.5 PG (ref 27–31)
MCHC RBC AUTO-ENTMCNC: 32.8 G/DL (ref 32–36)
MCV RBC AUTO: 95.8 FL (ref 80–96)
MONOCYTES # BLD AUTO: 0.55 K/UL (ref 0–0.8)
MONOCYTES NFR BLD AUTO: 9.7 % (ref 2–6)
MPC BLD CALC-MCNC: 8.8 FL (ref 9.4–12.4)
NEUTROPHILS # BLD AUTO: 4.17 K/UL (ref 1.8–7.7)
NEUTROPHILS NFR BLD AUTO: 73.4 % (ref 53–65)
NITRITE UR QL STRIP: NEGATIVE
PH UR STRIP: 7 PH UNITS
PLATELET # BLD AUTO: 270 K/UL (ref 150–400)
POTASSIUM SERPL-SCNC: 4.5 MMOL/L (ref 3.5–5.1)
PROT SERPL-MCNC: 7.4 G/DL (ref 6.4–8.2)
PROT UR QL STRIP: 100
RBC # BLD AUTO: 3.59 M/UL (ref 4.2–5.4)
RBC # UR STRIP: ABNORMAL /UL
RBC #/AREA URNS HPF: ABNORMAL /HPF
SODIUM SERPL-SCNC: 125 MMOL/L (ref 136–145)
SP GR UR STRIP: 1.02
UROBILINOGEN UR STRIP-ACNC: 0.2 MG/DL
WBC # BLD AUTO: 5.68 K/UL (ref 4.5–11)
WBC #/AREA URNS HPF: ABNORMAL /HPF

## 2023-05-09 PROCEDURE — 25000003 PHARM REV CODE 250: Performed by: NURSE PRACTITIONER

## 2023-05-09 PROCEDURE — 80053 COMPREHEN METABOLIC PANEL: CPT | Performed by: NURSE PRACTITIONER

## 2023-05-09 PROCEDURE — 81001 URINALYSIS AUTO W/SCOPE: CPT | Performed by: NURSE PRACTITIONER

## 2023-05-09 PROCEDURE — 96361 HYDRATE IV INFUSION ADD-ON: CPT

## 2023-05-09 PROCEDURE — 99285 EMERGENCY DEPT VISIT HI MDM: CPT | Mod: 25

## 2023-05-09 PROCEDURE — 11000001 HC ACUTE MED/SURG PRIVATE ROOM

## 2023-05-09 PROCEDURE — 63600175 PHARM REV CODE 636 W HCPCS: Performed by: NURSE PRACTITIONER

## 2023-05-09 PROCEDURE — 87040 BLOOD CULTURE FOR BACTERIA: CPT | Performed by: NURSE PRACTITIONER

## 2023-05-09 PROCEDURE — 85025 COMPLETE CBC W/AUTO DIFF WBC: CPT | Performed by: NURSE PRACTITIONER

## 2023-05-09 PROCEDURE — 83605 ASSAY OF LACTIC ACID: CPT | Performed by: NURSE PRACTITIONER

## 2023-05-09 PROCEDURE — 99285 EMERGENCY DEPT VISIT HI MDM: CPT | Mod: GF

## 2023-05-09 PROCEDURE — 96365 THER/PROPH/DIAG IV INF INIT: CPT

## 2023-05-09 PROCEDURE — 87186 SC STD MICRODIL/AGAR DIL: CPT | Performed by: NURSE PRACTITIONER

## 2023-05-09 PROCEDURE — 87077 CULTURE AEROBIC IDENTIFY: CPT | Performed by: NURSE PRACTITIONER

## 2023-05-09 RX ORDER — ONDANSETRON 4 MG/1
4 TABLET, FILM COATED ORAL EVERY 4 HOURS PRN
COMMUNITY

## 2023-05-09 RX ORDER — ACETAMINOPHEN 500 MG
1000 TABLET ORAL EVERY 4 HOURS PRN
COMMUNITY

## 2023-05-09 RX ORDER — GABAPENTIN 100 MG/1
100 CAPSULE ORAL DAILY
COMMUNITY

## 2023-05-09 RX ORDER — ACETAMINOPHEN 325 MG/1
650 TABLET ORAL EVERY 8 HOURS PRN
Status: DISCONTINUED | OUTPATIENT
Start: 2023-05-09 | End: 2023-05-12 | Stop reason: HOSPADM

## 2023-05-09 RX ORDER — DEXTROSE 40 %
15 GEL (GRAM) ORAL
Status: DISCONTINUED | OUTPATIENT
Start: 2023-05-09 | End: 2023-05-12 | Stop reason: HOSPADM

## 2023-05-09 RX ORDER — ACETAMINOPHEN 650 MG/1
SUPPOSITORY RECTAL EVERY 4 HOURS PRN
COMMUNITY

## 2023-05-09 RX ORDER — MAGNESIUM 250 MG
500 TABLET ORAL 2 TIMES DAILY
COMMUNITY

## 2023-05-09 RX ORDER — METFORMIN HYDROCHLORIDE EXTENDED-RELEASE TABLETS 500 MG/1
1000 TABLET, FILM COATED, EXTENDED RELEASE ORAL
COMMUNITY

## 2023-05-09 RX ORDER — SIMETHICONE 125 MG
125 TABLET,CHEWABLE ORAL 4 TIMES DAILY
Status: DISCONTINUED | OUTPATIENT
Start: 2023-05-10 | End: 2023-05-12 | Stop reason: HOSPADM

## 2023-05-09 RX ORDER — ONDANSETRON 2 MG/ML
4 INJECTION INTRAMUSCULAR; INTRAVENOUS EVERY 8 HOURS PRN
Status: DISCONTINUED | OUTPATIENT
Start: 2023-05-09 | End: 2023-05-12 | Stop reason: HOSPADM

## 2023-05-09 RX ORDER — DULOXETIN HYDROCHLORIDE 30 MG/1
30 CAPSULE, DELAYED RELEASE ORAL DAILY
Status: DISCONTINUED | OUTPATIENT
Start: 2023-05-10 | End: 2023-05-12 | Stop reason: HOSPADM

## 2023-05-09 RX ORDER — SENNOSIDES 8.8 MG/5ML
10 LIQUID ORAL DAILY PRN
COMMUNITY

## 2023-05-09 RX ORDER — VALSARTAN 160 MG/1
320 TABLET ORAL DAILY
Status: DISCONTINUED | OUTPATIENT
Start: 2023-05-10 | End: 2023-05-12 | Stop reason: HOSPADM

## 2023-05-09 RX ORDER — SODIUM CHLORIDE 0.9 % (FLUSH) 0.9 %
10 SYRINGE (ML) INJECTION
Status: DISCONTINUED | OUTPATIENT
Start: 2023-05-09 | End: 2023-05-12 | Stop reason: HOSPADM

## 2023-05-09 RX ORDER — ADHESIVE BANDAGE
60 BANDAGE TOPICAL DAILY PRN
COMMUNITY

## 2023-05-09 RX ORDER — POLYETHYLENE GLYCOL 3350 17 G/17G
17 POWDER, FOR SOLUTION ORAL DAILY
COMMUNITY

## 2023-05-09 RX ORDER — PANTOPRAZOLE SODIUM 40 MG/1
40 TABLET, DELAYED RELEASE ORAL DAILY
COMMUNITY

## 2023-05-09 RX ORDER — SODIUM CHLORIDE 9 MG/ML
1000 INJECTION, SOLUTION INTRAVENOUS
Status: COMPLETED | OUTPATIENT
Start: 2023-05-09 | End: 2023-05-09

## 2023-05-09 RX ORDER — GLUCAGON 1 MG
1 KIT INJECTION
Status: DISCONTINUED | OUTPATIENT
Start: 2023-05-09 | End: 2023-05-12 | Stop reason: HOSPADM

## 2023-05-09 RX ORDER — OLMESARTAN MEDOXOMIL 40 MG/1
40 TABLET ORAL DAILY
COMMUNITY

## 2023-05-09 RX ORDER — MAG HYDROX/ALUMINUM HYD/SIMETH 200-200-20
10 SUSPENSION, ORAL (FINAL DOSE FORM) ORAL EVERY 4 HOURS PRN
Status: DISCONTINUED | OUTPATIENT
Start: 2023-05-09 | End: 2023-05-12 | Stop reason: HOSPADM

## 2023-05-09 RX ORDER — PANTOPRAZOLE SODIUM 40 MG/1
40 TABLET, DELAYED RELEASE ORAL DAILY
Status: DISCONTINUED | OUTPATIENT
Start: 2023-05-10 | End: 2023-05-12 | Stop reason: HOSPADM

## 2023-05-09 RX ORDER — INFANT FORMULA WITH IRON
POWDER (GRAM) ORAL
COMMUNITY

## 2023-05-09 RX ORDER — METFORMIN HYDROCHLORIDE 500 MG/1
500 TABLET ORAL NIGHTLY
COMMUNITY

## 2023-05-09 RX ORDER — SOD.CHLORID/POTASSIUM CHLORIDE 287-180-15
TABLET ORAL 3 TIMES DAILY
COMMUNITY

## 2023-05-09 RX ORDER — CETIRIZINE HYDROCHLORIDE 10 MG/1
10 TABLET ORAL DAILY
Status: DISCONTINUED | OUTPATIENT
Start: 2023-05-10 | End: 2023-05-12 | Stop reason: HOSPADM

## 2023-05-09 RX ORDER — ATORVASTATIN CALCIUM 20 MG/1
20 TABLET, FILM COATED ORAL DAILY
Status: DISCONTINUED | OUTPATIENT
Start: 2023-05-10 | End: 2023-05-10

## 2023-05-09 RX ORDER — BISACODYL 10 MG
10 SUPPOSITORY, RECTAL RECTAL DAILY PRN
Status: DISCONTINUED | OUTPATIENT
Start: 2023-05-09 | End: 2023-05-12 | Stop reason: HOSPADM

## 2023-05-09 RX ORDER — LANOLIN ALCOHOL/MO/W.PET/CERES
400 CREAM (GRAM) TOPICAL 2 TIMES DAILY
Status: DISCONTINUED | OUTPATIENT
Start: 2023-05-09 | End: 2023-05-12 | Stop reason: HOSPADM

## 2023-05-09 RX ORDER — MELOXICAM 7.5 MG/1
7.5 TABLET ORAL DAILY
COMMUNITY

## 2023-05-09 RX ORDER — TALC
6 POWDER (GRAM) TOPICAL NIGHTLY PRN
Status: DISCONTINUED | OUTPATIENT
Start: 2023-05-09 | End: 2023-05-12 | Stop reason: HOSPADM

## 2023-05-09 RX ORDER — ENOXAPARIN SODIUM 100 MG/ML
40 INJECTION SUBCUTANEOUS EVERY 24 HOURS
Status: DISCONTINUED | OUTPATIENT
Start: 2023-05-09 | End: 2023-05-12 | Stop reason: HOSPADM

## 2023-05-09 RX ORDER — GABAPENTIN 100 MG/1
100 CAPSULE ORAL DAILY
Status: DISCONTINUED | OUTPATIENT
Start: 2023-05-10 | End: 2023-05-12 | Stop reason: HOSPADM

## 2023-05-09 RX ORDER — CETIRIZINE HYDROCHLORIDE 10 MG/1
10 TABLET ORAL DAILY
COMMUNITY

## 2023-05-09 RX ORDER — SIMETHICONE 80 MG
160 TABLET,CHEWABLE ORAL 4 TIMES DAILY
Status: DISCONTINUED | OUTPATIENT
Start: 2023-05-10 | End: 2023-05-09

## 2023-05-09 RX ORDER — SODIUM CHLORIDE 9 MG/ML
INJECTION, SOLUTION INTRAVENOUS CONTINUOUS
Status: DISCONTINUED | OUTPATIENT
Start: 2023-05-09 | End: 2023-05-12

## 2023-05-09 RX ORDER — LEVOTHYROXINE SODIUM 50 UG/1
50 TABLET ORAL
Status: DISCONTINUED | OUTPATIENT
Start: 2023-05-10 | End: 2023-05-12 | Stop reason: HOSPADM

## 2023-05-09 RX ORDER — SIMETHICONE 80 MG
160 TABLET,CHEWABLE ORAL 4 TIMES DAILY
COMMUNITY

## 2023-05-09 RX ORDER — SERTRALINE HYDROCHLORIDE 25 MG/1
25 TABLET, FILM COATED ORAL DAILY
COMMUNITY

## 2023-05-09 RX ORDER — FLUTICASONE PROPIONATE 50 MCG
1 SPRAY, SUSPENSION (ML) NASAL DAILY
COMMUNITY

## 2023-05-09 RX ORDER — LEVOTHYROXINE SODIUM 50 UG/1
50 TABLET ORAL
COMMUNITY

## 2023-05-09 RX ORDER — DULOXETIN HYDROCHLORIDE 30 MG/1
30 CAPSULE, DELAYED RELEASE ORAL DAILY
COMMUNITY

## 2023-05-09 RX ORDER — SERTRALINE HYDROCHLORIDE 25 MG/1
25 TABLET, FILM COATED ORAL DAILY
Status: DISCONTINUED | OUTPATIENT
Start: 2023-05-10 | End: 2023-05-12 | Stop reason: HOSPADM

## 2023-05-09 RX ORDER — SIMVASTATIN 40 MG/1
40 TABLET, FILM COATED ORAL NIGHTLY
COMMUNITY

## 2023-05-09 RX ORDER — INSULIN ASPART 100 [IU]/ML
0-5 INJECTION, SOLUTION INTRAVENOUS; SUBCUTANEOUS
Status: DISCONTINUED | OUTPATIENT
Start: 2023-05-09 | End: 2023-05-12 | Stop reason: HOSPADM

## 2023-05-09 RX ORDER — DEXTROSE 40 %
30 GEL (GRAM) ORAL
Status: DISCONTINUED | OUTPATIENT
Start: 2023-05-09 | End: 2023-05-12 | Stop reason: HOSPADM

## 2023-05-09 RX ADMIN — ENOXAPARIN SODIUM 40 MG: 100 INJECTION SUBCUTANEOUS at 11:05

## 2023-05-09 RX ADMIN — SODIUM CHLORIDE 500 ML: 9 INJECTION, SOLUTION INTRAVENOUS at 08:05

## 2023-05-09 RX ADMIN — CEFTRIAXONE SODIUM 1 G: 1 INJECTION, POWDER, FOR SOLUTION INTRAMUSCULAR; INTRAVENOUS at 08:05

## 2023-05-09 RX ADMIN — Medication 400 MG: at 11:05

## 2023-05-09 RX ADMIN — SODIUM CHLORIDE: 9 INJECTION, SOLUTION INTRAVENOUS at 11:05

## 2023-05-09 RX ADMIN — MELATONIN 6 MG: at 11:05

## 2023-05-10 PROBLEM — K21.9 GERD (GASTROESOPHAGEAL REFLUX DISEASE): Status: ACTIVE | Noted: 2023-05-10

## 2023-05-10 PROBLEM — F34.1 PERSISTENT DEPRESSIVE DISORDER: Status: ACTIVE | Noted: 2023-05-10

## 2023-05-10 PROBLEM — I10 HTN (HYPERTENSION): Status: ACTIVE | Noted: 2023-05-10

## 2023-05-10 LAB
ANION GAP SERPL CALCULATED.3IONS-SCNC: 10 MMOL/L (ref 7–16)
BUN SERPL-MCNC: 18 MG/DL (ref 7–18)
BUN/CREAT SERPL: 20 (ref 6–20)
CALCIUM SERPL-MCNC: 8.5 MG/DL (ref 8.5–10.1)
CHLORIDE SERPL-SCNC: 92 MMOL/L (ref 98–107)
CO2 SERPL-SCNC: 29 MMOL/L (ref 21–32)
CREAT SERPL-MCNC: 0.91 MG/DL (ref 0.55–1.02)
EGFR (NO RACE VARIABLE) (RUSH/TITUS): 60 ML/MIN/1.73M2
GLUCOSE SERPL-MCNC: 112 MG/DL (ref 74–106)
GLUCOSE SERPL-MCNC: 113 MG/DL (ref 70–105)
GLUCOSE SERPL-MCNC: 116 MG/DL (ref 70–105)
GLUCOSE SERPL-MCNC: 138 MG/DL (ref 70–105)
GLUCOSE SERPL-MCNC: 230 MG/DL (ref 70–105)
POTASSIUM SERPL-SCNC: 4.4 MMOL/L (ref 3.5–5.1)
SODIUM SERPL-SCNC: 127 MMOL/L (ref 136–145)
UA COMPLETE W REFLEX CULTURE PNL UR: ABNORMAL

## 2023-05-10 PROCEDURE — 25000003 PHARM REV CODE 250: Performed by: NURSE PRACTITIONER

## 2023-05-10 PROCEDURE — 99222 1ST HOSP IP/OBS MODERATE 55: CPT | Mod: AI,,, | Performed by: HOSPITALIST

## 2023-05-10 PROCEDURE — 99222 PR INITIAL HOSPITAL CARE,LEVL II: ICD-10-PCS | Mod: AI,,, | Performed by: HOSPITALIST

## 2023-05-10 PROCEDURE — 82962 GLUCOSE BLOOD TEST: CPT

## 2023-05-10 PROCEDURE — 11000001 HC ACUTE MED/SURG PRIVATE ROOM

## 2023-05-10 PROCEDURE — 27000958

## 2023-05-10 PROCEDURE — 25000003 PHARM REV CODE 250: Performed by: HOSPITALIST

## 2023-05-10 PROCEDURE — 80048 BASIC METABOLIC PNL TOTAL CA: CPT | Performed by: NURSE PRACTITIONER

## 2023-05-10 PROCEDURE — 63600175 PHARM REV CODE 636 W HCPCS: Performed by: NURSE PRACTITIONER

## 2023-05-10 RX ADMIN — ONDANSETRON 4 MG: 2 INJECTION INTRAMUSCULAR; INTRAVENOUS at 08:05

## 2023-05-10 RX ADMIN — Medication 400 MG: at 08:05

## 2023-05-10 RX ADMIN — SIMETHICONE 125 MG: 125 TABLET, CHEWABLE ORAL at 08:05

## 2023-05-10 RX ADMIN — LEVOTHYROXINE SODIUM 50 MCG: 0.05 TABLET ORAL at 05:05

## 2023-05-10 RX ADMIN — PANTOPRAZOLE SODIUM 40 MG: 40 TABLET, DELAYED RELEASE ORAL at 08:05

## 2023-05-10 RX ADMIN — TRAZODONE HYDROCHLORIDE 25 MG: 50 TABLET ORAL at 08:05

## 2023-05-10 RX ADMIN — CEFTRIAXONE SODIUM 1 G: 1 INJECTION, POWDER, FOR SOLUTION INTRAMUSCULAR; INTRAVENOUS at 08:05

## 2023-05-10 RX ADMIN — DULOXETINE 30 MG: 30 CAPSULE, DELAYED RELEASE ORAL at 08:05

## 2023-05-10 RX ADMIN — SODIUM CHLORIDE: 9 INJECTION, SOLUTION INTRAVENOUS at 05:05

## 2023-05-10 RX ADMIN — SIMETHICONE 125 MG: 125 TABLET, CHEWABLE ORAL at 05:05

## 2023-05-10 RX ADMIN — GABAPENTIN 100 MG: 100 CAPSULE ORAL at 05:05

## 2023-05-10 RX ADMIN — SERTRALINE HYDROCHLORIDE 25 MG: 25 TABLET ORAL at 08:05

## 2023-05-10 RX ADMIN — CETIRIZINE HYDROCHLORIDE 10 MG: 10 TABLET, FILM COATED ORAL at 08:05

## 2023-05-10 RX ADMIN — INSULIN ASPART 1 UNITS: 100 INJECTION, SOLUTION INTRAVENOUS; SUBCUTANEOUS at 08:05

## 2023-05-10 RX ADMIN — VALSARTAN 320 MG: 160 TABLET, FILM COATED ORAL at 08:05

## 2023-05-10 RX ADMIN — ATORVASTATIN CALCIUM 20 MG: 20 TABLET, FILM COATED ORAL at 08:05

## 2023-05-10 RX ADMIN — ENOXAPARIN SODIUM 40 MG: 100 INJECTION SUBCUTANEOUS at 05:05

## 2023-05-10 NOTE — PLAN OF CARE
Problem: Skin Injury Risk Increased  Goal: Skin Health and Integrity  Outcome: Ongoing, Progressing     Problem: Adult Inpatient Plan of Care  Goal: Plan of Care Review  Outcome: Ongoing, Progressing  Goal: Patient-Specific Goal (Individualized)  Outcome: Ongoing, Progressing  Goal: Absence of Hospital-Acquired Illness or Injury  Outcome: Ongoing, Progressing  Goal: Optimal Comfort and Wellbeing  Outcome: Ongoing, Progressing  Goal: Readiness for Transition of Care  Outcome: Ongoing, Progressing     Problem: Fall Injury Risk  Goal: Absence of Fall and Fall-Related Injury  Outcome: Ongoing, Progressing     Problem: Fatigue  Goal: Improved Activity Tolerance  Outcome: Ongoing, Progressing     Problem: UTI (Urinary Tract Infection)  Goal: Improved Infection Symptoms  Outcome: Ongoing, Progressing     Problem: Diabetes Comorbidity  Goal: Blood Glucose Level Within Targeted Range  Outcome: Ongoing, Progressing

## 2023-05-10 NOTE — HPI
89 yo WF, NH patient, admitted from ED for UTI, hyponatremia and FARHAT. NH reported patient had temp just over 100 F and AMS yesterday. Today has had continued nausea and vomiting and unable to tolerate PO abx. Patient awake and alert and answers questions appropriately regarding her medical care.      Hx: Hypothyroidism  Hypomagnesemia  Hyponatremia  Hypertension  Hyperlipidemia  DM  Arthritis    Code status: DNR

## 2023-05-10 NOTE — ASSESSMENT & PLAN NOTE
Prelim culture growing 100k gram neg bacteria, continue Rocephin.  Follow up final.  Was unable to keep down PO at NH.

## 2023-05-10 NOTE — PLAN OF CARE
Problem: Skin Injury Risk Increased  Goal: Skin Health and Integrity  Outcome: Ongoing, Progressing     Problem: Adult Inpatient Plan of Care  Goal: Plan of Care Review  Outcome: Ongoing, Progressing  Goal: Patient-Specific Goal (Individualized)  Outcome: Ongoing, Progressing  Goal: Absence of Hospital-Acquired Illness or Injury  Outcome: Ongoing, Progressing  Goal: Optimal Comfort and Wellbeing  Outcome: Ongoing, Progressing  Goal: Readiness for Transition of Care  Outcome: Ongoing, Progressing     Problem: Fall Injury Risk  Goal: Absence of Fall and Fall-Related Injury  Outcome: Ongoing, Progressing     Problem: Fatigue  Goal: Improved Activity Tolerance  Outcome: Ongoing, Progressing     Problem: UTI (Urinary Tract Infection)  Goal: Improved Infection Symptoms  Outcome: Ongoing, Progressing     Problem: Diabetes Comorbidity  Goal: Blood Glucose Level Within Targeted Range  Outcome: Ongoing, Progressing     Problem: Fluid and Electrolyte Imbalance (Acute Kidney Injury/Impairment)  Goal: Fluid and Electrolyte Balance  Outcome: Ongoing, Progressing     Problem: Oral Intake Inadequate (Acute Kidney Injury/Impairment)  Goal: Optimal Nutrition Intake  Outcome: Ongoing, Progressing     Problem: Renal Function Impairment (Acute Kidney Injury/Impairment)  Goal: Effective Renal Function  Outcome: Ongoing, Progressing     Problem: Impaired Wound Healing  Goal: Optimal Wound Healing  Outcome: Ongoing, Progressing     Problem: Infection  Goal: Absence of Infection Signs and Symptoms  Outcome: Ongoing, Progressing

## 2023-05-10 NOTE — NURSING
Nurses Note -- 4 Eyes      5/9/2023   10:24 PM      Skin assessed during: Admit      [] No Altered Skin Integrity Present    []Prevention Measures Documented      [x] Yes- Altered Skin Integrity Present or Discovered   [] LDA Added if Not in Epic (Describe Wound)   [x] New Altered Skin Integrity was Present on Admit and Documented in LDA   [x] Wound Image Taken    Wound Care Consulted? No    Attending Nurse:  Gavin Kidd LPN     Second RN/Staff Member:  Larry Dixon RN

## 2023-05-10 NOTE — SUBJECTIVE & OBJECTIVE
Review of Systems   Constitutional:  Positive for appetite change, chills and fever.   Respiratory:  Negative for cough, shortness of breath and wheezing.    Gastrointestinal:  Positive for nausea and vomiting. Negative for constipation and diarrhea.   Genitourinary:  Positive for dysuria.   Musculoskeletal:  Positive for arthralgias and myalgias.   All other systems reviewed and are negative.  Objective:     Vital Signs (Most Recent):  Temp: 98.2 °F (36.8 °C) (05/09/23 2200)  Pulse: 100 (05/09/23 2200)  Resp: 18 (05/09/23 2200)  BP: (!) 162/77 (05/09/23 2200)  SpO2: (!) 94 % (05/09/23 2200) Vital Signs (24h Range):  Temp:  [98.2 °F (36.8 °C)-99.2 °F (37.3 °C)] 98.2 °F (36.8 °C)  Pulse:  [] 100  Resp:  [18] 18  SpO2:  [93 %-96 %] 94 %  BP: (116-162)/(68-77) 162/77     Weight: 67 kg (147 lb 11.3 oz)  Body mass index is 22.46 kg/m².  No intake or output data in the 24 hours ending 05/09/23 2251      Physical Exam  Vitals and nursing note reviewed.   Constitutional:       Appearance: Normal appearance.   HENT:      Head: Normocephalic and atraumatic.      Right Ear: Tympanic membrane, ear canal and external ear normal.      Left Ear: Tympanic membrane, ear canal and external ear normal.      Nose: Nose normal.      Mouth/Throat:      Mouth: Mucous membranes are dry.   Eyes:      Extraocular Movements: Extraocular movements intact.      Pupils: Pupils are equal, round, and reactive to light.   Cardiovascular:      Rate and Rhythm: Normal rate and regular rhythm.      Pulses: Normal pulses.      Heart sounds: Murmur heard.   Pulmonary:      Effort: Pulmonary effort is normal.      Breath sounds: Normal breath sounds.   Abdominal:      General: Abdomen is flat.   Musculoskeletal:      Cervical back: Normal range of motion and neck supple.   Skin:     General: Skin is warm.      Capillary Refill: Capillary refill takes 2 to 3 seconds.   Neurological:      General: No focal deficit present.      Mental Status: She  is alert.   Psychiatric:         Mood and Affect: Mood normal.         Behavior: Behavior normal.         Thought Content: Thought content normal.         Judgment: Judgment normal.           Significant Labs: All pertinent labs within the past 24 hours have been reviewed.  Recent Lab Results         05/09/23 1943        Albumin/Globulin Ratio 0.7       Albumin 3.1       Alkaline Phosphatase 100       ALT 21       Anion Gap 10       Appearance, UA Clear       AST 26       Bacteria, UA Loaded       Baso # 0.01       Basophil % 0.2       Bilirubin (UA) Negative       BILIRUBIN TOTAL 0.2       BUN 22       BUN/CREAT RATIO 21       Calcium 9.1       Chloride 90       CO2 30       Color, UA Yellow       Creatinine 1.06       Differential Type Auto       eGFR 50       Eos # 0.01       Eosinophil % 0.2       Globulin, Total 4.3       Glucose 130       Glucose, UA Negative       Hematocrit 34.4       Hemoglobin 11.3       Ketones, UA Negative       Lactate, Raphael 0.5       Leukocytes, UA Large       Lymph # 0.94       Lymph % 16.5       MCH 31.5       MCHC 32.8       MCV 95.8       Mono # 0.55       Mono % 9.7       MPV 8.8       Neutrophils, Abs 4.17       Neutrophils Relative 73.4       NITRITE UA Negative       Occult Blood UA Large       pH, UA 7.0       Platelets 270       Potassium 4.5       PROTEIN TOTAL 7.4       Protein,        RBC 3.59       RBC, UA 25-50       RDW 12.8       Sodium 125       Specific Gravity, UA 1.025       UROBILINOGEN UA 0.2       WBC, UA Too Numerous To Count       WBC 5.68               Significant Imaging: I have reviewed all pertinent imaging results/findings within the past 24 hours.

## 2023-05-10 NOTE — H&P
[facility] Medicine  History & Physical    Patient Name: Joyce Dow  MRN: [id number]  Patient Class: IP- Inpatient  Admission Date: 5/9/2023  Attending Physician: Darinel Kwok DO   Primary Care Provider: Walker Watkins MD         Patient information was obtained from patient, past medical records and ER records.     Subjective:     Principal Problem:Acute cystitis with hematuria    Chief Complaint:   Chief Complaint   Patient presents with    Urinary Tract Infection        HPI: 89 yo WF, NH patient, admitted from ED for UTI, hyponatremia and FARHAT. NH reported patient had temp just over 100 F and AMS yesterday. Today has had continued nausea and vomiting and unable to tolerate PO abx. Patient awake and alert and answers questions appropriately regarding her medical care.      Hx: Hypothyroidism  Hypomagnesemia  Hyponatremia  Hypertension  Hyperlipidemia  DM  Arthritis    Code status: DNR      Past Medical History:   Diagnosis Date    Allergic rhinitis     Anticoagulant long-term use     Anxiety disorder, unspecified     Arthritis     Chronic pain     Depression     GERD (gastroesophageal reflux disease)     hyperlipidemia     Hypertension     Hyponatremia     Hypoxemia     Insomnia     Neuropathy     Thyroid disease     Type 2 diabetes mellitus     Urinary tract infection, site not specified        History reviewed. No pertinent surgical history.    Review of patient's allergies indicates:   Allergen Reactions    Sulfa (sulfonamide antibiotics)        No current facility-administered medications on file prior to encounter.     Current Outpatient Medications on File Prior to Encounter   Medication Sig    acetaminophen (TYLENOL EXTRA STRENGTH) 500 MG tablet Take 1,000 mg by mouth every 4 (four) hours as needed for Pain. Temp or pain    acetaminophen (TYLENOL) 650 MG Supp Place rectally every 4 (four) hours as needed.    alpha-D-galactosidase  (BEANO ORAL) Take 2 tablets by mouth 3 (three) times daily.    calcium carb/magnesium hydrox (MYLANTA ORAL) Take 10 mLs by mouth every 4 (four) hours as needed.    cetirizine (ZYRTEC) 10 MG tablet Take 10 mg by mouth once daily.    DULCOLAX, BISACODYL, RECT Place rectally daily as needed. constipation    DULoxetine (CYMBALTA) 30 MG capsule Take 30 mg by mouth once daily. 9am    fluticasone propionate (FLONASE) 50 mcg/actuation nasal spray 1 spray by Each Nostril route once daily. 9am    gabapentin (NEURONTIN) 100 MG capsule Take 100 mg by mouth Daily.    levothyroxine (SYNTHROID) 50 MCG tablet Take 50 mcg by mouth before breakfast.    magnesium 250 mg Tab Take 500 mg by mouth 2 (two) times a day.    magnesium hydroxide 400 mg/5 ml (MILK OF MAGNESIA) 400 mg/5 mL Susp Take 60 mLs by mouth daily as needed.    meloxicam (MOBIC) 7.5 MG tablet Take 7.5 mg by mouth once daily.    metFORMIN (FORTAMET) 500 mg 24hr tablet Take 1,000 mg by mouth daily with breakfast.    metFORMIN (GLUCOPHAGE) 500 MG tablet Take 500 mg by mouth every evening.    olmesartan (BENICAR) 40 MG tablet Take 40 mg by mouth once daily.    ondansetron (ZOFRAN) 4 MG tablet Take 4 mg by mouth every 4 (four) hours as needed for Nausea.    pantoprazole (PROTONIX) 40 MG tablet Take 40 mg by mouth once daily.    polyethylene glycol (GLYCOLAX) 17 gram PwPk Take 17 g by mouth once daily. Mix in 4 oz H20    sennosides 8.8 mg/5 ml (SENOKOT) 8.8 mg/5 mL syrup Take 10 mLs by mouth daily as needed.    sertraline (ZOLOFT) 25 MG tablet Take 25 mg by mouth once daily. morning    simethicone (MYLICON) 80 MG chewable tablet Take 160 mg by mouth 4 (four) times daily. After meals and at HS     give 2 80 mg tabs= 160mg    simvastatin (ZOCOR) 40 MG tablet Take 40 mg by mouth every evening.    sod.chlorid-potassium chloride (THERMOTABS) 287-180-15 mg Tab Take by mouth 3 (three) times daily.    trazodone (DESYREL) 25 MG tablet Take by mouth every evening.     vits A and D-white pet-lanolin ointment Apply topically as needed for Dry Skin.    zinc oxide/cod liver oil (DESITIN TOP) Apply topically daily as needed. Desitin ointment     Family History    None       Tobacco Use    Smoking status: Unknown    Smokeless tobacco: Not on file   Substance and Sexual Activity    Alcohol use: Not Currently    Drug use: Not Currently    Sexual activity: Not Currently     Review of Systems   Reason unable to perform ROS: baseline confusion.   Objective:     Vital Signs (Most Recent):  Temp: 99.8 °F (37.7 °C) (05/10/23 0702)  Pulse: 74 (05/10/23 0702)  Resp: 18 (05/10/23 0702)  BP: 131/60 (05/10/23 0702)  SpO2: 96 % (05/10/23 0702) Vital Signs (24h Range):  Temp:  [98.1 °F (36.7 °C)-99.8 °F (37.7 °C)] 99.8 °F (37.7 °C)  Pulse:  [] 74  Resp:  [18-20] 18  SpO2:  [93 %-96 %] 96 %  BP: (116-162)/(59-77) 131/60     Weight: 67 kg (147 lb 11.3 oz)  Body mass index is 22.46 kg/m².     Physical Exam  Vitals reviewed.   Constitutional:       General: She is not in acute distress.     Appearance: Normal appearance.   HENT:      Head: Normocephalic and atraumatic.   Eyes:      General: No scleral icterus.     Extraocular Movements: Extraocular movements intact.      Conjunctiva/sclera: Conjunctivae normal.      Pupils: Pupils are equal, round, and reactive to light.   Cardiovascular:      Rate and Rhythm: Normal rate and regular rhythm.      Heart sounds: No murmur heard.    No friction rub. No gallop.   Pulmonary:      Effort: Pulmonary effort is normal. No respiratory distress.      Breath sounds: Normal breath sounds. No wheezing or rales.   Abdominal:      General: Abdomen is flat. Bowel sounds are normal. There is no distension.      Palpations: Abdomen is soft.      Tenderness: There is no abdominal tenderness. There is no guarding.   Musculoskeletal:         General: No swelling.      Right lower leg: No edema.      Left lower leg: No edema.   Skin:     General: Skin is warm  and dry.      Coloration: Skin is not jaundiced.      Findings: No rash.   Neurological:      General: No focal deficit present.      Mental Status: She is alert and oriented to person, place, and time.      Sensory: No sensory deficit.      Motor: Weakness present.   Psychiatric:         Mood and Affect: Mood normal.         Behavior: Behavior normal.            CRANIAL NERVES     CN III, IV, VI   Pupils are equal, round, and reactive to light.     Significant Labs: All pertinent labs within the past 24 hours have been reviewed.  Recent Lab Results         05/10/23  0527   05/10/23  0521 05/09/23 1943        Albumin/Globulin Ratio     0.7       Albumin     3.1       Alkaline Phosphatase     100       ALT     21       Anion Gap   10   10       Appearance, UA     Clear       AST     26       Bacteria, UA     Loaded       Baso #     0.01       Basophil %     0.2       Bilirubin (UA)     Negative       BILIRUBIN TOTAL     0.2       BUN   18   22       BUN/CREAT RATIO   20   21       Calcium   8.5   9.1       Chloride   92   90       CO2   29   30       Color, UA     Yellow       Creatinine   0.91   1.06       Differential Type     Auto       eGFR   60   50       Eos #     0.01       Eosinophil %     0.2       Globulin, Total     4.3       Glucose   112   130       Glucose, UA     Negative       Hematocrit     34.4       Hemoglobin     11.3       Ketones, UA     Negative       Lactate, Raphael     0.5       Leukocytes, UA     Large       Lymph #     0.94       Lymph %     16.5       MCH     31.5       MCHC     32.8       MCV     95.8       Mono #     0.55       Mono %     9.7       MPV     8.8       Neutrophils, Abs     4.17       Neutrophils Relative     73.4       NITRITE UA     Negative       Occult Blood UA     Large       pH, UA     7.0       Platelets     270       POC Glucose 116           Potassium   4.4   4.5       PROTEIN TOTAL     7.4       Protein, UA     100       RBC     3.59       RBC, UA     25-50        RDW     12.8       Sodium   127   125       Specific [place], UA     1.025       UROBILINOGEN UA     0.2       WBC, UA     Too Numerous To Count       WBC     5.68               Significant Imaging: I have reviewed all pertinent imaging results/findings within the past 24 hours.    Assessment/Plan:     * Acute cystitis with hematuria  Prelim culture growing 100k gram neg bacteria, continue Rocephin.  Follow up final.  Was unable to keep down PO at NH.       GERD (gastroesophageal reflux disease)  Stable no issues.       Persistent depressive disorder  Patient has persistent depression which is mild and is currently controlled. Will Continue anti-depressant medications. We will not consult psychiatry at this time. Patient does not display psychosis at this time. Continue to monitor closely and adjust plan of care as needed.        HTN (hypertension)  Continue home meds adjust as needed.      Hyponatremia  Continue IVFs, regular diet.  Follow labs.         VTE Risk Mitigation (From admission, onward)         Ordered     enoxaparin injection 40 mg  Daily         05/09/23 2210     IP VTE HIGH RISK PATIENT  Once         05/09/23 2210     Place MEHREEN hose  Until discontinued         05/09/23 2210                           Darinel Kwok DO  Department of Hospital Medicine  [facility]

## 2023-05-10 NOTE — ED TRIAGE NOTES
Pt presents to the ED via EMS from [facility] and EMS reports pt has a UTI and started on Cipro yesterday and today she started vomiting. Pt did receive zofran pta. [facility] reports pt did have some decreased loc yesterday and was running a temp. >100. Pt voided during triage for urine specimen and urine is a creamy white color with a foul odor noted. Temp at triage was 99.2 oral.

## 2023-05-10 NOTE — PROGRESS NOTES
[facility] Medicine  Progress Note    Patient Name: Joyce Dow  MRN: [id number]  Patient Class: IP- Inpatient   Admission Date: 5/9/2023  Length of Stay: 0 days  Attending Physician: Darinel Kwok DO  Primary Care Provider: Walker Watkins MD        Subjective:     Principal Problem:Acute cystitis with hematuria        HPI:  88 yo WF, NH patient, admitted from ED for UTI, hyponatremia and FARHAT. NH reported patient had temp just over 100 F and AMS yesterday. Today has had continued nausea and vomiting and unable to tolerate PO abx. Patient awake and alert and answers questions appropriately regarding her medical care.      Hx: Hypothyroidism  Hypomagnesemia  Hyponatremia  Hypertension  Hyperlipidemia  DM  Arthritis    Code status: DNR      Overview/Hospital Course:  No notes on file        Review of Systems   Constitutional:  Positive for appetite change, chills and fever.   Respiratory:  Negative for cough, shortness of breath and wheezing.    Gastrointestinal:  Positive for nausea and vomiting. Negative for constipation and diarrhea.   Genitourinary:  Positive for dysuria.   Musculoskeletal:  Positive for arthralgias and myalgias.   All other systems reviewed and are negative.  Objective:     Vital Signs (Most Recent):  Temp: 98.2 °F (36.8 °C) (05/09/23 2200)  Pulse: 100 (05/09/23 2200)  Resp: 18 (05/09/23 2200)  BP: (!) 162/77 (05/09/23 2200)  SpO2: (!) 94 % (05/09/23 2200) Vital Signs (24h Range):  Temp:  [98.2 °F (36.8 °C)-99.2 °F (37.3 °C)] 98.2 °F (36.8 °C)  Pulse:  [] 100  Resp:  [18] 18  SpO2:  [93 %-96 %] 94 %  BP: (116-162)/(68-77) 162/77     Weight: 67 kg (147 lb 11.3 oz)  Body mass index is 22.46 kg/m².  No intake or output data in the 24 hours ending 05/09/23 2251      Physical Exam  Vitals and nursing note reviewed.   Constitutional:       Appearance: Normal appearance.   HENT:      Head: Normocephalic and atraumatic.      Right Ear: Tympanic  membrane, ear canal and external ear normal.      Left Ear: Tympanic membrane, ear canal and external ear normal.      Nose: Nose normal.      Mouth/Throat:      Mouth: Mucous membranes are dry.   Eyes:      Extraocular Movements: Extraocular movements intact.      Pupils: Pupils are equal, round, and reactive to light.   Cardiovascular:      Rate and Rhythm: Normal rate and regular rhythm.      Pulses: Normal pulses.      Heart sounds: Murmur heard.   Pulmonary:      Effort: Pulmonary effort is normal.      Breath sounds: Normal breath sounds.   Abdominal:      General: Abdomen is flat.   Musculoskeletal:      Cervical back: Normal range of motion and neck supple.   Skin:     General: Skin is warm.      Capillary Refill: Capillary refill takes 2 to 3 seconds.   Neurological:      General: No focal deficit present.      Mental Status: She is alert.   Psychiatric:         Mood and Affect: Mood normal.         Behavior: Behavior normal.         Thought Content: Thought content normal.         Judgment: Judgment normal.           Significant Labs: All pertinent labs within the past 24 hours have been reviewed.  Recent Lab Results         05/09/23 1943        Albumin/Globulin Ratio 0.7       Albumin 3.1       Alkaline Phosphatase 100       ALT 21       Anion Gap 10       Appearance, UA Clear       AST 26       Bacteria, UA Loaded       Baso # 0.01       Basophil % 0.2       Bilirubin (UA) Negative       BILIRUBIN TOTAL 0.2       BUN 22       BUN/CREAT RATIO 21       Calcium 9.1       Chloride 90       CO2 30       Color, UA Yellow       Creatinine 1.06       Differential Type Auto       eGFR 50       Eos # 0.01       Eosinophil % 0.2       Globulin, Total 4.3       Glucose 130       Glucose, UA Negative       Hematocrit 34.4       Hemoglobin 11.3       Ketones, UA Negative       Lactate, Raphael 0.5       Leukocytes, UA Large       Lymph # 0.94       Lymph % 16.5       MCH 31.5       MCHC 32.8       MCV 95.8       Mono #  0.55       Mono % 9.7       MPV 8.8       Neutrophils, Abs 4.17       Neutrophils Relative 73.4       NITRITE UA Negative       Occult Blood UA Large       pH, UA 7.0       Platelets 270       Potassium 4.5       PROTEIN TOTAL 7.4       Protein,        RBC 3.59       RBC, UA 25-50       RDW 12.8       Sodium 125       Specific Gravity, UA 1.025       UROBILINOGEN UA 0.2       WBC, UA Too Numerous To Count       WBC 5.68               Significant Imaging: I have reviewed all pertinent imaging results/findings within the past 24 hours.      Assessment/Plan:      No notes have been filed under this hospital service.  Service: Hospital Medicine    VTE Risk Mitigation (From admission, onward)         Ordered     enoxaparin injection 40 mg  Daily         05/09/23 2210     IP VTE HIGH RISK PATIENT  Once         05/09/23 2210     Place MEHREEN hose  Until discontinued         05/09/23 2210                Discharge Planning   JUANPABLO:      Code Status: DNR   Is the patient medically ready for discharge?: No    Reason for patient still in hospital (select all that apply): Patient new problem, Patient trending condition, Laboratory test and Treatment           ROSEMARY Rodgers  Department of Hospital Medicine   [facility]

## 2023-05-10 NOTE — SUBJECTIVE & OBJECTIVE
Past Medical History:   Diagnosis Date    Allergic rhinitis     Anticoagulant long-term use     Anxiety disorder, unspecified     Arthritis     Chronic pain     Depression     GERD (gastroesophageal reflux disease)     hyperlipidemia     Hypertension     Hyponatremia     Hypoxemia     Insomnia     Neuropathy     Thyroid disease     Type 2 diabetes mellitus     Urinary tract infection, site not specified        History reviewed. No pertinent surgical history.    Review of patient's allergies indicates:   Allergen Reactions    Sulfa (sulfonamide antibiotics)        No current facility-administered medications on file prior to encounter.     Current Outpatient Medications on File Prior to Encounter   Medication Sig    acetaminophen (TYLENOL EXTRA STRENGTH) 500 MG tablet Take 1,000 mg by mouth every 4 (four) hours as needed for Pain. Temp or pain    acetaminophen (TYLENOL) 650 MG Supp Place rectally every 4 (four) hours as needed.    alpha-D-galactosidase (BEANO ORAL) Take 2 tablets by mouth 3 (three) times daily.    calcium carb/magnesium hydrox (MYLANTA ORAL) Take 10 mLs by mouth every 4 (four) hours as needed.    cetirizine (ZYRTEC) 10 MG tablet Take 10 mg by mouth once daily.    DULCOLAX, BISACODYL, RECT Place rectally daily as needed. constipation    DULoxetine (CYMBALTA) 30 MG capsule Take 30 mg by mouth once daily. 9am    fluticasone propionate (FLONASE) 50 mcg/actuation nasal spray 1 spray by Each Nostril route once daily. 9am    gabapentin (NEURONTIN) 100 MG capsule Take 100 mg by mouth Daily.    levothyroxine (SYNTHROID) 50 MCG tablet Take 50 mcg by mouth before breakfast.    magnesium 250 mg Tab Take 500 mg by mouth 2 (two) times a day.    magnesium hydroxide 400 mg/5 ml (MILK OF MAGNESIA) 400 mg/5 mL Susp Take 60 mLs by mouth daily as needed.    meloxicam (MOBIC) 7.5 MG tablet Take 7.5 mg by mouth once daily.    metFORMIN (FORTAMET) 500 mg 24hr tablet Take 1,000 mg by mouth daily with breakfast.    metFORMIN  (GLUCOPHAGE) 500 MG tablet Take 500 mg by mouth every evening.    olmesartan (BENICAR) 40 MG tablet Take 40 mg by mouth once daily.    ondansetron (ZOFRAN) 4 MG tablet Take 4 mg by mouth every 4 (four) hours as needed for Nausea.    pantoprazole (PROTONIX) 40 MG tablet Take 40 mg by mouth once daily.    polyethylene glycol (GLYCOLAX) 17 gram PwPk Take 17 g by mouth once daily. Mix in 4 oz H20    sennosides 8.8 mg/5 ml (SENOKOT) 8.8 mg/5 mL syrup Take 10 mLs by mouth daily as needed.    sertraline (ZOLOFT) 25 MG tablet Take 25 mg by mouth once daily. morning    simethicone (MYLICON) 80 MG chewable tablet Take 160 mg by mouth 4 (four) times daily. After meals and at HS     give 2 80 mg tabs= 160mg    simvastatin (ZOCOR) 40 MG tablet Take 40 mg by mouth every evening.    sod.chlorid-potassium chloride (THERMOTABS) 287-180-15 mg Tab Take by mouth 3 (three) times daily.    trazodone (DESYREL) 25 MG tablet Take by mouth every evening.    vits A and D-white pet-lanolin ointment Apply topically as needed for Dry Skin.    zinc oxide/cod liver oil (DESITIN TOP) Apply topically daily as needed. Desitin ointment     Family History    None       Tobacco Use    Smoking status: Unknown    Smokeless tobacco: Not on file   Substance and Sexual Activity    Alcohol use: Not Currently    Drug use: Not Currently    Sexual activity: Not Currently     Review of Systems   Reason unable to perform ROS: baseline confusion.   Objective:     Vital Signs (Most Recent):  Temp: 99.8 °F (37.7 °C) (05/10/23 0702)  Pulse: 74 (05/10/23 0702)  Resp: 18 (05/10/23 0702)  BP: 131/60 (05/10/23 0702)  SpO2: 96 % (05/10/23 0702) Vital Signs (24h Range):  Temp:  [98.1 °F (36.7 °C)-99.8 °F (37.7 °C)] 99.8 °F (37.7 °C)  Pulse:  [] 74  Resp:  [18-20] 18  SpO2:  [93 %-96 %] 96 %  BP: (116-162)/(59-77) 131/60     Weight: 67 kg (147 lb 11.3 oz)  Body mass index is 22.46 kg/m².     Physical Exam  Vitals reviewed.   Constitutional:       General: She is not in  acute distress.     Appearance: Normal appearance.   HENT:      Head: Normocephalic and atraumatic.   Eyes:      General: No scleral icterus.     Extraocular Movements: Extraocular movements intact.      Conjunctiva/sclera: Conjunctivae normal.      Pupils: Pupils are equal, round, and reactive to light.   Cardiovascular:      Rate and Rhythm: Normal rate and regular rhythm.      Heart sounds: No murmur heard.    No friction rub. No gallop.   Pulmonary:      Effort: Pulmonary effort is normal. No respiratory distress.      Breath sounds: Normal breath sounds. No wheezing or rales.   Abdominal:      General: Abdomen is flat. Bowel sounds are normal. There is no distension.      Palpations: Abdomen is soft.      Tenderness: There is no abdominal tenderness. There is no guarding.   Musculoskeletal:         General: No swelling.      Right lower leg: No edema.      Left lower leg: No edema.   Skin:     General: Skin is warm and dry.      Coloration: Skin is not jaundiced.      Findings: No rash.   Neurological:      General: No focal deficit present.      Mental Status: She is alert and oriented to person, place, and time.      Sensory: No sensory deficit.      Motor: Weakness present.   Psychiatric:         Mood and Affect: Mood normal.         Behavior: Behavior normal.            CRANIAL NERVES     CN III, IV, VI   Pupils are equal, round, and reactive to light.     Significant Labs: All pertinent labs within the past 24 hours have been reviewed.  Recent Lab Results         05/10/23  0527   05/10/23  0521   05/09/23 1943        Albumin/Globulin Ratio     0.7       Albumin     3.1       Alkaline Phosphatase     100       ALT     21       Anion Gap   10   10       Appearance, UA     Clear       AST     26       Bacteria, UA     Loaded       Baso #     0.01       Basophil %     0.2       Bilirubin (UA)     Negative       BILIRUBIN TOTAL     0.2       BUN   18   22       BUN/CREAT RATIO   20   21       Calcium   8.5    9.1       Chloride   92   90       CO2   29   30       Color, UA     Yellow       Creatinine   0.91   1.06       Differential Type     Auto       eGFR   60   50       Eos #     0.01       Eosinophil %     0.2       Globulin, Total     4.3       Glucose   112   130       Glucose, UA     Negative       Hematocrit     34.4       Hemoglobin     11.3       Ketones, UA     Negative       Lactate, Raphael     0.5       Leukocytes, UA     Large       Lymph #     0.94       Lymph %     16.5       MCH     31.5       MCHC     32.8       MCV     95.8       Mono #     0.55       Mono %     9.7       MPV     8.8       Neutrophils, Abs     4.17       Neutrophils Relative     73.4       NITRITE UA     Negative       Occult Blood UA     Large       pH, UA     7.0       Platelets     270       POC Glucose 116           Potassium   4.4   4.5       PROTEIN TOTAL     7.4       Protein, UA     100       RBC     3.59       RBC, UA     25-50       RDW     12.8       Sodium   127   125       Specific [place], UA     1.025       UROBILINOGEN UA     0.2       WBC, UA     Too Numerous To Count       WBC     5.68               Significant Imaging: I have reviewed all pertinent imaging results/findings within the past 24 hours.

## 2023-05-10 NOTE — ED PROVIDER NOTES
Encounter Date: 5/9/2023       History     Chief Complaint   Patient presents with    Urinary Tract Infection     89 yo WF presents via EMS from nursing home for UTI, FARHAT and hyponatremia. Temp yesterday just over 100 with altered mental status yesterday. Has had nausea and vomiting all day today and given Zofran, has been unable to keep down Cipro that was prescribed. Patient awake and alert and answers questions appropriately regarding her medical care.     Hx: Hypothyroidism  Hypomagnesemia  Hyponatremia  Hypertension  Hyperlipidemia  DM  Arthritis    The history is provided by the EMS personnel and the patient.   Review of patient's allergies indicates:   Allergen Reactions    Sulfa (sulfonamide antibiotics)      Past Medical History:   Diagnosis Date    Allergic rhinitis     Anticoagulant long-term use     Anxiety disorder, unspecified     Arthritis     Chronic pain     Depression     GERD (gastroesophageal reflux disease)     hyperlipidemia     Hypertension     Hyponatremia     Hypoxemia     Insomnia     Neuropathy     Thyroid disease     Type 2 diabetes mellitus     Urinary tract infection, site not specified      History reviewed. No pertinent surgical history.  History reviewed. No pertinent family history.  Social History     Tobacco Use    Smoking status: Unknown   Substance Use Topics    Alcohol use: Not Currently    Drug use: Not Currently     Review of Systems   Constitutional:  Positive for appetite change, chills and fever.   Respiratory:  Negative for cough, shortness of breath and wheezing.    Cardiovascular:  Negative for chest pain, palpitations and leg swelling.   Gastrointestinal:  Positive for nausea and vomiting. Negative for abdominal pain, constipation and diarrhea.   Genitourinary:  Positive for dysuria.   Musculoskeletal:  Positive for arthralgias and myalgias. Negative for neck pain and neck stiffness.   Neurological:  Negative for dizziness, weakness, light-headedness and headaches.    Psychiatric/Behavioral:  Negative for confusion.    All other systems reviewed and are negative.    Physical Exam     Initial Vitals [05/09/23 1906]   BP Pulse Resp Temp SpO2   116/68 103 18 99.2 °F (37.3 °C) (!) 93 %      MAP       --         Physical Exam    Constitutional: She appears well-developed and well-nourished.   HENT:   Head: Normocephalic and atraumatic.   Right Ear: External ear normal.   Left Ear: External ear normal.   Nose: Nose normal.   Mouth/Throat: Oropharynx is clear and moist.   Eyes: EOM are normal. Pupils are equal, round, and reactive to light.   Neck: Neck supple.   Normal range of motion.  Cardiovascular:  Normal rate and regular rhythm.           Murmur heard.  Pulmonary/Chest: Breath sounds normal.   Abdominal: Abdomen is soft. Bowel sounds are normal. She exhibits no distension. There is no abdominal tenderness.   Musculoskeletal:         General: Normal range of motion.      Cervical back: Normal range of motion and neck supple.     Neurological: She is alert. She has normal strength.   Skin: Skin is warm. Capillary refill takes 2 to 3 seconds.   Psychiatric: She has a normal mood and affect. Her behavior is normal. Judgment and thought content normal.       Medical Screening Exam   See Full Note    ED Course   Procedures  Labs Reviewed   URINALYSIS, REFLEX TO URINE CULTURE - Abnormal; Notable for the following components:       Result Value    Leukocytes, UA Large (*)     Protein,  (*)     Blood, UA Large (*)     All other components within normal limits   COMPREHENSIVE METABOLIC PANEL - Abnormal; Notable for the following components:    Sodium 125 (*)     Chloride 90 (*)     Glucose 130 (*)     BUN 22 (*)     Creatinine 1.06 (*)     BUN/Creatinine Ratio 21 (*)     Albumin 3.1 (*)     Globulin 4.3 (*)     eGFR 50 (*)     All other components within normal limits   CBC WITH DIFFERENTIAL - Abnormal; Notable for the following components:    RBC 3.59 (*)     Hemoglobin 11.3 (*)      Hematocrit 34.4 (*)     MCH 31.5 (*)     MPV 8.8 (*)     Neutrophils % 73.4 (*)     Lymphocytes % 16.5 (*)     Lymphocytes, Absolute 0.94 (*)     Monocytes % 9.7 (*)     Eosinophils % 0.2 (*)     All other components within normal limits   URINALYSIS, MICROSCOPIC - Abnormal; Notable for the following components:    WBC, UA Too Numerous To Count (*)     RBC, UA 25-50 (*)     Bacteria, UA Loaded (*)     All other components within normal limits   LACTIC ACID, PLASMA - Normal   CULTURE, BLOOD   CULTURE, BLOOD   CULTURE, URINE   CBC W/ AUTO DIFFERENTIAL    Narrative:     The following orders were created for panel order CBC auto differential.  Procedure                               Abnormality         Status                     ---------                               -----------         ------                     CBC with Differential[[id number]]        Abnormal            Final result                 Please view results for these tests on the individual orders.          Imaging Results    None          Medications   cefTRIAXone (ROCEPHIN) 1 g in dextrose 5 % in water (D5W) 5 % 50 mL IVPB (MB+) (0 g Intravenous Stopped 5/9/23 2035)   0.9%  NaCl infusion (500 mLs Intravenous New Bag 5/9/23 2006)     Medical Decision Making:   Initial Assessment:   88 yo WF presents via EMS from nursing home for UTI, FARHAT and hyponatremia. Temp yesterday just over 100 with altered mental status yesterday. Has had nausea and vomiting all day today and given Zofran. Has been unable to tolerate PO abx. Patient awake and alert and answers questions appropriately regarding her medical care.   Differential Diagnosis:   UTI  FARHAT  Urosepsis    Clinical Tests:   Lab Tests: Ordered and Reviewed       <> Summary of Lab:   WBC, UA(!): Too Numerous To Count   RBC, UA(!): 25-50   Bacteria, UA(!): Loaded   Leukocytes, UA(!): Large   Protein, UA(!): 100   Occult Blood UA(!): Large   Sodium(!): 125   Chloride(!): 90   Glucose(!): 130   BUN(!): 22    Creatinine(!): 1.06   BUN/CREAT RATIO(!): 21   ED Management:  Labs 5/8 from nursing home:  Urine with WBC TNTC, RBC 25-50, many bacteria, moderate renal epithelial cells, large leukocytes, large blood. No culture available.   Na+ 126, K+ 5.3, Chloride 91, BUN 23, Cr 1.09. No WBC noted in lab work    Labs repeated in ED, noted above.   Blood cultures obtained d/t reported fever - concern for urosepsis but could prove otherwise. Sepsis score low.   CrCl 36 mL/min      Other:   I have discussed this case with another health care provider.       <> Summary of the Discussion: Dr. Archer [facility] Hospitalist. Agreeable with acute care admission for IV fluids, abx, lab monitroing.            ED Course as of 05/09/23 2110   Tue May 09, 2023   2002 WBC, UA(!): Too Numerous To Count [MJ]   2002 RBC, UA(!): 25-50 [MJ]   2002 Bacteria, UA(!): Loaded [MJ]   2002 Leukocytes, UA(!): Large [MJ]   2002 Protein, UA(!): 100 [MJ]   2002 Occult Blood UA(!): Large [MJ]   2014 Sodium(!): 125 [MJ]   2014 Chloride(!): 90 [MJ]   2014 Glucose(!): 130 [MJ]   2014 BUN(!): 22 [MJ]   2014 Creatinine(!): 1.06 [MJ]   2014 BUN/CREAT RATIO(!): 21 [MJ]   2015 Discussed patient with Dr. Archer [facility] Hospitalist. Agreed with patient admission for IV fluids and abx.  [MJ]      ED Course User Index  [MJ] ROSEMARY Rodgers                Clinical Impression:   Final diagnoses:  [N30.01] Acute cystitis with hematuria (Primary)  [E87.1] Hyponatremia  [R11.2] Nausea and vomiting, unspecified vomiting type  [N17.9] FARHAT (acute kidney injury)        ED Disposition Condition    Admit Stable                ROSEMARY Rodgers  05/09/23 2110

## 2023-05-11 LAB
ANION GAP SERPL CALCULATED.3IONS-SCNC: 9 MMOL/L (ref 7–16)
BUN SERPL-MCNC: 13 MG/DL (ref 7–18)
BUN/CREAT SERPL: 17 (ref 6–20)
CALCIUM SERPL-MCNC: 8.3 MG/DL (ref 8.5–10.1)
CHLORIDE SERPL-SCNC: 91 MMOL/L (ref 98–107)
CO2 SERPL-SCNC: 30 MMOL/L (ref 21–32)
CREAT SERPL-MCNC: 0.77 MG/DL (ref 0.55–1.02)
EGFR (NO RACE VARIABLE) (RUSH/TITUS): 74 ML/MIN/1.73M2
GLUCOSE SERPL-MCNC: 118 MG/DL (ref 74–106)
GLUCOSE SERPL-MCNC: 123 MG/DL (ref 70–105)
GLUCOSE SERPL-MCNC: 136 MG/DL (ref 70–105)
GLUCOSE SERPL-MCNC: 154 MG/DL (ref 70–105)
GLUCOSE SERPL-MCNC: 213 MG/DL (ref 70–105)
POTASSIUM SERPL-SCNC: 4.3 MMOL/L (ref 3.5–5.1)
SODIUM SERPL-SCNC: 126 MMOL/L (ref 136–145)

## 2023-05-11 PROCEDURE — 27000958

## 2023-05-11 PROCEDURE — 63600175 PHARM REV CODE 636 W HCPCS: Performed by: HOSPITALIST

## 2023-05-11 PROCEDURE — 11000001 HC ACUTE MED/SURG PRIVATE ROOM

## 2023-05-11 PROCEDURE — 25000003 PHARM REV CODE 250: Performed by: NURSE PRACTITIONER

## 2023-05-11 PROCEDURE — 80048 BASIC METABOLIC PNL TOTAL CA: CPT | Performed by: NURSE PRACTITIONER

## 2023-05-11 PROCEDURE — 82962 GLUCOSE BLOOD TEST: CPT

## 2023-05-11 PROCEDURE — 25000003 PHARM REV CODE 250: Performed by: HOSPITALIST

## 2023-05-11 PROCEDURE — 63600175 PHARM REV CODE 636 W HCPCS: Performed by: NURSE PRACTITIONER

## 2023-05-11 PROCEDURE — 99232 PR SUBSEQUENT HOSPITAL CARE,LEVL II: ICD-10-PCS | Mod: ,,, | Performed by: HOSPITALIST

## 2023-05-11 PROCEDURE — 99232 SBSQ HOSP IP/OBS MODERATE 35: CPT | Mod: ,,, | Performed by: HOSPITALIST

## 2023-05-11 RX ADMIN — PANTOPRAZOLE SODIUM 40 MG: 40 TABLET, DELAYED RELEASE ORAL at 08:05

## 2023-05-11 RX ADMIN — SIMETHICONE 125 MG: 125 TABLET, CHEWABLE ORAL at 05:05

## 2023-05-11 RX ADMIN — Medication 400 MG: at 08:05

## 2023-05-11 RX ADMIN — SODIUM CHLORIDE: 9 INJECTION, SOLUTION INTRAVENOUS at 02:05

## 2023-05-11 RX ADMIN — PIPERACILLIN AND TAZOBACTAM 3.38 G: 3; .375 INJECTION, POWDER, LYOPHILIZED, FOR SOLUTION INTRAVENOUS at 02:05

## 2023-05-11 RX ADMIN — DULOXETINE 30 MG: 30 CAPSULE, DELAYED RELEASE ORAL at 08:05

## 2023-05-11 RX ADMIN — SIMETHICONE 125 MG: 125 TABLET, CHEWABLE ORAL at 08:05

## 2023-05-11 RX ADMIN — SIMETHICONE 125 MG: 125 TABLET, CHEWABLE ORAL at 02:05

## 2023-05-11 RX ADMIN — GABAPENTIN 100 MG: 100 CAPSULE ORAL at 05:05

## 2023-05-11 RX ADMIN — SERTRALINE HYDROCHLORIDE 25 MG: 25 TABLET ORAL at 08:05

## 2023-05-11 RX ADMIN — ACETAMINOPHEN 650 MG: 325 TABLET ORAL at 08:05

## 2023-05-11 RX ADMIN — TRAZODONE HYDROCHLORIDE 25 MG: 50 TABLET ORAL at 08:05

## 2023-05-11 RX ADMIN — CETIRIZINE HYDROCHLORIDE 10 MG: 10 TABLET, FILM COATED ORAL at 08:05

## 2023-05-11 RX ADMIN — VALSARTAN 320 MG: 160 TABLET, FILM COATED ORAL at 08:05

## 2023-05-11 RX ADMIN — LEVOTHYROXINE SODIUM 50 MCG: 0.05 TABLET ORAL at 05:05

## 2023-05-11 RX ADMIN — ENOXAPARIN SODIUM 40 MG: 100 INJECTION SUBCUTANEOUS at 05:05

## 2023-05-11 RX ADMIN — INSULIN ASPART 2 UNITS: 100 INJECTION, SOLUTION INTRAVENOUS; SUBCUTANEOUS at 05:05

## 2023-05-11 NOTE — SUBJECTIVE & OBJECTIVE
Interval History: seems to be doing well, Na level still at 126-127. Check TSH.  Changed ABX to Zosyn.     Review of Systems   Respiratory:  Negative for shortness of breath.    Cardiovascular:  Negative for chest pain.   Gastrointestinal:  Negative for abdominal pain, nausea and vomiting.   Psychiatric/Behavioral:  Positive for confusion. Negative for agitation.    All other systems reviewed and are negative.  Objective:     Vital Signs (Most Recent):  Temp: 97.6 °F (36.4 °C) (05/11/23 1215)  Pulse: 74 (05/11/23 1215)  Resp: 18 (05/11/23 1215)  BP: (!) 120/52 (nurse notify Azra Lin RN) (05/11/23 1215)  SpO2: 95 % (05/11/23 1215) Vital Signs (24h Range):  Temp:  [97.6 °F (36.4 °C)-99 °F (37.2 °C)] 97.6 °F (36.4 °C)  Pulse:  [74-95] 74  Resp:  [18-21] 18  SpO2:  [95 %-97 %] 95 %  BP: (120-139)/(52-67) 120/52     Weight: 67 kg (147 lb 11.3 oz)  Body mass index is 22.46 kg/m².    Intake/Output Summary (Last 24 hours) at 5/11/2023 1304  Last data filed at 5/11/2023 0525  Gross per 24 hour   Intake 2014.17 ml   Output 4 ml   Net 2010.17 ml         Physical Exam  Vitals reviewed.   Constitutional:       General: She is not in acute distress.     Appearance: Normal appearance.   HENT:      Head: Normocephalic and atraumatic.   Eyes:      General: No scleral icterus.     Extraocular Movements: Extraocular movements intact.      Conjunctiva/sclera: Conjunctivae normal.      Pupils: Pupils are equal, round, and reactive to light.   Cardiovascular:      Rate and Rhythm: Normal rate and regular rhythm.      Heart sounds: No murmur heard.    No friction rub. No gallop.   Pulmonary:      Effort: Pulmonary effort is normal. No respiratory distress.      Breath sounds: Normal breath sounds. No wheezing or rales.   Abdominal:      General: Abdomen is flat. Bowel sounds are normal. There is no distension.      Palpations: Abdomen is soft.      Tenderness: There is no abdominal tenderness. There is no guarding.   Musculoskeletal:          General: No swelling.      Right lower leg: No edema.      Left lower leg: No edema.   Skin:     General: Skin is warm and dry.      Coloration: Skin is not jaundiced.      Findings: No rash.   Neurological:      General: No focal deficit present.      Mental Status: She is alert and oriented to person, place, and time.      Sensory: No sensory deficit.      Motor: Weakness present.   Psychiatric:         Mood and Affect: Mood normal.         Behavior: Behavior normal.           Significant Labs: All pertinent labs within the past 24 hours have been reviewed.  Recent Lab Results  (Last 5 results in the past 24 hours)        05/11/23  1110   05/11/23  0644   05/11/23  0515   05/10/23  1956   05/10/23  1657        Anion Gap   9             BUN   13             BUN/CREAT RATIO   17             Calcium   8.3             Chloride   91             CO2   30             Creatinine   0.77             eGFR   74             Glucose   118             POC Glucose 123     136   230   138       Potassium   4.3             Sodium   126                                    Significant Imaging: I have reviewed all pertinent imaging results/findings within the past 24 hours.

## 2023-05-11 NOTE — PROGRESS NOTES
[facility] Medicine  Progress Note    Patient Name: Joyce Dow  MRN: [id number]  Patient Class: IP- Inpatient   Admission Date: 5/9/2023  Length of Stay: 2 days  Attending Physician: Darinel Kwok DO  Primary Care Provider: Walker Watkins MD        Subjective:     Principal Problem:Acute cystitis with hematuria        HPI:  89 yo WF, NH patient, admitted from ED for UTI, hyponatremia and FARHAT. NH reported patient had temp just over 100 F and AMS yesterday. Today has had continued nausea and vomiting and unable to tolerate PO abx. Patient awake and alert and answers questions appropriately regarding her medical care.      Hx: Hypothyroidism  Hypomagnesemia  Hyponatremia  Hypertension  Hyperlipidemia  DM  Arthritis    Code status: DNR      Overview/Hospital Course:  5/11 Urine culture back, ESBL with a lot of resistance.  S to Zosyn.  Changed to that.  She is eating good per nurses.  She also says she is eating good.  Will need at least a week of IV ABX for ESBL UTI.  May need SWB for that.       Interval History: seems to be doing well, Na level still at 126-127. Check TSH.  Changed ABX to Zosyn.     Review of Systems   Respiratory:  Negative for shortness of breath.    Cardiovascular:  Negative for chest pain.   Gastrointestinal:  Negative for abdominal pain, nausea and vomiting.   Psychiatric/Behavioral:  Positive for confusion. Negative for agitation.    All other systems reviewed and are negative.  Objective:     Vital Signs (Most Recent):  Temp: 97.6 °F (36.4 °C) (05/11/23 1215)  Pulse: 74 (05/11/23 1215)  Resp: 18 (05/11/23 1215)  BP: (!) 120/52 (nurse notify Azra Lin RN) (05/11/23 1215)  SpO2: 95 % (05/11/23 1215) Vital Signs (24h Range):  Temp:  [97.6 °F (36.4 °C)-99 °F (37.2 °C)] 97.6 °F (36.4 °C)  Pulse:  [74-95] 74  Resp:  [18-21] 18  SpO2:  [95 %-97 %] 95 %  BP: (120-139)/(52-67) 120/52     Weight: 67 kg (147 lb 11.3 oz)  Body mass index is 22.46  kg/m².    Intake/Output Summary (Last 24 hours) at 5/11/2023 1304  Last data filed at 5/11/2023 0525  Gross per 24 hour   Intake 2014.17 ml   Output 4 ml   Net 2010.17 ml         Physical Exam  Vitals reviewed.   Constitutional:       General: She is not in acute distress.     Appearance: Normal appearance.   HENT:      Head: Normocephalic and atraumatic.   Eyes:      General: No scleral icterus.     Extraocular Movements: Extraocular movements intact.      Conjunctiva/sclera: Conjunctivae normal.      Pupils: Pupils are equal, round, and reactive to light.   Cardiovascular:      Rate and Rhythm: Normal rate and regular rhythm.      Heart sounds: No murmur heard.    No friction rub. No gallop.   Pulmonary:      Effort: Pulmonary effort is normal. No respiratory distress.      Breath sounds: Normal breath sounds. No wheezing or rales.   Abdominal:      General: Abdomen is flat. Bowel sounds are normal. There is no distension.      Palpations: Abdomen is soft.      Tenderness: There is no abdominal tenderness. There is no guarding.   Musculoskeletal:         General: No swelling.      Right lower leg: No edema.      Left lower leg: No edema.   Skin:     General: Skin is warm and dry.      Coloration: Skin is not jaundiced.      Findings: No rash.   Neurological:      General: No focal deficit present.      Mental Status: She is alert and oriented to person, place, and time.      Sensory: No sensory deficit.      Motor: Weakness present.   Psychiatric:         Mood and Affect: Mood normal.         Behavior: Behavior normal.           Significant Labs: All pertinent labs within the past 24 hours have been reviewed.  Recent Lab Results  (Last 5 results in the past 24 hours)        05/11/23  1110   05/11/23  0644   05/11/23  0515   05/10/23  1956   05/10/23  1657        Anion Gap   9             BUN   13             BUN/CREAT RATIO   17             Calcium   8.3             Chloride   91             CO2   30              Creatinine   0.77             eGFR   74             Glucose   118             POC Glucose 123     136   230   138       Potassium   4.3             Sodium   126                                    Significant Imaging: I have reviewed all pertinent imaging results/findings within the past 24 hours.      Assessment/Plan:      * Acute cystitis with hematuria  Prelim culture growing 100k gram neg bacteria, continue Rocephin.  Follow up final.  Was unable to keep down PO at NH.       GERD (gastroesophageal reflux disease)  Stable no issues.       Persistent depressive disorder  Patient has persistent depression which is mild and is currently controlled. Will Continue anti-depressant medications. We will not consult psychiatry at this time. Patient does not display psychosis at this time. Continue to monitor closely and adjust plan of care as needed.        HTN (hypertension)  Continue home meds adjust as needed.      Hyponatremia  Continue IVFs, regular diet.  Follow labs. Add TSH to am labs.         VTE Risk Mitigation (From admission, onward)         Ordered     enoxaparin injection 40 mg  Daily         05/09/23 2210     IP VTE HIGH RISK PATIENT  Once         05/09/23 2210     Place MEHREEN hose  Until discontinued         05/09/23 2210                Discharge Planning   JUANPABLO:      Code Status: DNR   Is the patient medically ready for discharge?: No    Reason for patient still in hospital (select all that apply): Patient trending condition, Laboratory test and Treatment                     Darinel Kwok DO  Department of Hospital Medicine   [facility]

## 2023-05-11 NOTE — HOSPITAL COURSE
5/11 Urine culture back, ESBL with a lot of resistance.  S to Zosyn.  Changed to that.  She is eating good per nurses.  She also says she is eating good.  Will need at least a week of IV ABX for ESBL UTI.  May need SWB for that.     5/12 Plan was to swing today for IV ABX for ESBL UTI but have to get precert from Insurance.  Once approved will swing.  She has no complaints.  Doing well.     Approved for SWB will DC re admit today.

## 2023-05-11 NOTE — PROGRESS NOTES
"[facility]    Clinical Nutrition Assessment          Date: 5/11/2023 10:11 AM    Joyce Dow is a 89 y.o. female admitted from ED for UTI, FARHAT, and hyponatremia. PMH includes persistent depression that is mild and controlled with medication.      Active Hospital Problems    Diagnosis  POA    *Acute cystitis with hematuria [N30.01]  Yes     Rocephin Q24  Waiting on culture to result        HTN (hypertension) [I10]  Yes    Persistent depressive disorder [F34.1]  Yes    GERD (gastroesophageal reflux disease) [K21.9]  Yes    Hyponatremia [E87.1]  Yes     Chronic     IV fluids  Daily BMP        FARHAT (acute kidney injury) [N17.9]  Yes     IV fluids  Monitor labs          Resolved Hospital Problems   No resolved problems to display.       PMH:  has a past medical history of Allergic rhinitis, Anticoagulant long-term use, Anxiety disorder, unspecified, Arthritis, Chronic pain, Depression, GERD (gastroesophageal reflux disease), hyperlipidemia, Hypertension, Hyponatremia, Hypoxemia, Insomnia, Neuropathy, Thyroid disease, Type 2 diabetes mellitus, and Urinary tract infection, site not specified.    Interval History:   5/9: Nausea and vomiting, administered Zofran. Unable to tolerate Abx.   5/10: IVFs for hyponatremia, following labs      Diet Order: Diet Dysphagia Mechanical Soft  Oral Supplements: none     5/11: Independent with eating with tray set-up. Poor appetite per RN documentation. Eating ~25% at meals.     Anthropometrics:   Height: 5' 8" (172.7 cm)  Weight: 67 kg (147 lb 11.3 oz)  IBW: 140 lbs  BMI (Calculated): 22.5  BMI Classification: Borderline underweight per geriatric guidelines for BMI     Labs:   Recent Labs   Lab 05/09/23  1943 05/10/23  0521 05/11/23  0644   *   < > 126*   K 4.5   < > 4.3   BUN 22*   < > 13   CREATININE 1.06*   < > 0.77   *   < > 118*   CALCIUM 9.1   < > 8.3*   ALBUMIN 3.1*  --   --    CL 90*   < > 91*   ALT 21  --   --    AST 26  --   " "--     < > = values in this interval not displayed.     Last A1c:   Lab Results   Component Value Date    HGBA1C 5.7 2023     Lab Results   Component Value Date    RBC 3.59 (L) 2023    HGB 11.3 (L) 2023    HCT 34.4 (L) 2023    MCV 95.8 2023    MCH 31.5 (H) 2023    MCHC 32.8 2023       Meds:   Scheduled Meds:   cefTRIAXone (ROCEPHIN) IVPB  1 g Intravenous Q24H    cetirizine  10 mg Oral Daily    DULoxetine  30 mg Oral Daily    enoxaparin  40 mg Subcutaneous Daily    gabapentin  100 mg Oral Daily    levothyroxine  50 mcg Oral Before breakfast    magnesium oxide  400 mg Oral BID    pantoprazole  40 mg Oral Daily    sertraline  25 mg Oral Daily    simethicone  125 mg Oral QID    traZODone  25 mg Oral QHS    valsartan  320 mg Oral Daily     Continuous Infusions:   sodium chloride 0.9% 50 mL/hr at 05/10/23 1736     PRN Meds:.acetaminophen, acetaminophen, aluminum-magnesium hydroxide-simethicone, bisacodyL, dextrose, dextrose, dextrose 50%, dextrose 50%, glucagon (human recombinant), insulin aspart U-100, melatonin, ondansetron, sodium chloride 0.9%, zinc oxide-cod liver oil    Physical Review:  General: alert/ disoriented to place, noted "tooth/teeth missing"   Abd/GI: non-distended, +BSx4Q's   Last Bowel Movement: 05/10/23  Ext:  no edema noted   Skin:   Altered skin integrity to lower coccyx (incontinence associated dermatitis - skin intact)    Curtis Score:   Curtis Risk Assessment  Sensory Perception: 3-->slightly limited  Moisture: 3-->occasionally moist  Activity: 2-->chairfast  Mobility: 3-->slightly limited  Nutrition: 3-->adequate  Friction and Shear: 3-->no apparent problem  Curtis Score: 17    Malnutrition Screening Tool  Have you recently lost weight without trying?: No  Weight loss score: 0  Have you been eating poorly because of a decreased appetite?: No  Appetite score: 0  MST Score: 0    Estimated Nutrition Needs:   25-30 kcal/[id number] kcal/day  1-1.3 g pro/kg: " 67-80 g protein/day   0010-4776 ml fluid/day    Nutrition Diagnosis:  Altered nutrition-related lab values RT hyponatremia, FARHAT AEB increased BUN/ creatinine and decreased sodium   (FARHAT resolving per 5/11 labs)     Recommendations/ Intervention:  Continue current diet as tolerated    Offer Ensure or Boost (flavor of pt's choice) when <50% meal consumed    Encourage fluids    Weekly weights    RD to monitor po intake, wt, nutrition-related labs, skin changes, and meds   \  Nutrition Risk: moderate    Signed  Katerina Wynn MS, RD, LD  Available via [employer]

## 2023-05-11 NOTE — PLAN OF CARE
Problem: Skin Injury Risk Increased  Goal: Skin Health and Integrity  Outcome: Ongoing, Progressing  Intervention: Optimize Skin Protection  Flowsheets (Taken 5/11/2023 1750)  Pressure Reduction Techniques: frequent weight shift encouraged  Skin Protection: incontinence pads utilized  Head of Bed (HOB) Positioning: HOB elevated     Problem: UTI (Urinary Tract Infection)  Goal: Improved Infection Symptoms  Intervention: Prevent Infection Progression  Flowsheets (Taken 5/11/2023 1750)  Fever Reduction/Comfort Measures: fluid intake increased  Infection Management: aseptic technique maintained     Problem: Diabetes Comorbidity  Goal: Blood Glucose Level Within Targeted Range  Outcome: Ongoing, Progressing  Intervention: Monitor and Manage Glycemia  Flowsheets (Taken 5/11/2023 1750)  Glycemic Management:   blood glucose monitored   oral hydration promoted   supplemental insulin given     Problem: Infection  Goal: Absence of Infection Signs and Symptoms  Outcome: Ongoing, Progressing  Intervention: Prevent or Manage Infection  Flowsheets (Taken 5/11/2023 1750)  Fever Reduction/Comfort Measures: fluid intake increased  Infection Management: aseptic technique maintained  Isolation Precautions:   precautions maintained   contact

## 2023-05-12 ENCOUNTER — HOSPITAL ENCOUNTER (INPATIENT)
Facility: HOSPITAL | Age: 88
LOS: 7 days | Discharge: NURSING, REHAB OR LONG-TERM CARE | DRG: 690 | End: 2023-05-19
Attending: HOSPITALIST | Admitting: HOSPITALIST
Payer: MEDICARE

## 2023-05-12 VITALS
DIASTOLIC BLOOD PRESSURE: 61 MMHG | HEART RATE: 78 BPM | TEMPERATURE: 98 F | WEIGHT: 150.13 LBS | BODY MASS INDEX: 22.75 KG/M2 | HEIGHT: 68 IN | RESPIRATION RATE: 20 BRPM | SYSTOLIC BLOOD PRESSURE: 121 MMHG | OXYGEN SATURATION: 95 %

## 2023-05-12 DIAGNOSIS — N30.01 ACUTE CYSTITIS WITH HEMATURIA: ICD-10-CM

## 2023-05-12 DIAGNOSIS — B96.29 URINARY TRACT INFECTION DUE TO EXTENDED-SPECTRUM BETA LACTAMASE (ESBL) PRODUCING ESCHERICHIA COLI: Primary | ICD-10-CM

## 2023-05-12 DIAGNOSIS — Z16.12 URINARY TRACT INFECTION DUE TO EXTENDED-SPECTRUM BETA LACTAMASE (ESBL) PRODUCING ESCHERICHIA COLI: Primary | ICD-10-CM

## 2023-05-12 DIAGNOSIS — N39.0 URINARY TRACT INFECTION DUE TO EXTENDED-SPECTRUM BETA LACTAMASE (ESBL) PRODUCING ESCHERICHIA COLI: Primary | ICD-10-CM

## 2023-05-12 PROBLEM — N17.9 AKI (ACUTE KIDNEY INJURY): Status: RESOLVED | Noted: 2023-05-09 | Resolved: 2023-05-12

## 2023-05-12 LAB
ANION GAP SERPL CALCULATED.3IONS-SCNC: 14 MMOL/L (ref 7–16)
BUN SERPL-MCNC: 10 MG/DL (ref 7–18)
BUN/CREAT SERPL: 13 (ref 6–20)
CALCIUM SERPL-MCNC: 8.6 MG/DL (ref 8.5–10.1)
CHLORIDE SERPL-SCNC: 95 MMOL/L (ref 98–107)
CO2 SERPL-SCNC: 23 MMOL/L (ref 21–32)
CREAT SERPL-MCNC: 0.76 MG/DL (ref 0.55–1.02)
EGFR (NO RACE VARIABLE) (RUSH/TITUS): 75 ML/MIN/1.73M2
GLUCOSE SERPL-MCNC: 115 MG/DL (ref 70–105)
GLUCOSE SERPL-MCNC: 117 MG/DL (ref 70–105)
GLUCOSE SERPL-MCNC: 123 MG/DL (ref 74–106)
GLUCOSE SERPL-MCNC: 131 MG/DL (ref 70–105)
GLUCOSE SERPL-MCNC: 217 MG/DL (ref 70–105)
POTASSIUM SERPL-SCNC: 4.5 MMOL/L (ref 3.5–5.1)
SODIUM SERPL-SCNC: 127 MMOL/L (ref 136–145)
TSH SERPL DL<=0.005 MIU/L-ACNC: 1.96 UIU/ML (ref 0.36–3.74)
UA COMPLETE W REFLEX CULTURE PNL UR: ABNORMAL

## 2023-05-12 PROCEDURE — 80048 BASIC METABOLIC PNL TOTAL CA: CPT | Performed by: HOSPITALIST

## 2023-05-12 PROCEDURE — 25000003 PHARM REV CODE 250: Performed by: HOSPITALIST

## 2023-05-12 PROCEDURE — 27000958

## 2023-05-12 PROCEDURE — 84443 ASSAY THYROID STIM HORMONE: CPT | Performed by: HOSPITALIST

## 2023-05-12 PROCEDURE — 82962 GLUCOSE BLOOD TEST: CPT

## 2023-05-12 PROCEDURE — 1111F DSCHRG MED/CURRENT MED MERGE: CPT | Mod: CPTII,,, | Performed by: HOSPITALIST

## 2023-05-12 PROCEDURE — 1111F PR DISCHARGE MEDS RECONCILED W/ CURRENT OUTPATIENT MED LIST: ICD-10-PCS | Mod: CPTII,,, | Performed by: HOSPITALIST

## 2023-05-12 PROCEDURE — 99239 HOSP IP/OBS DSCHRG MGMT >30: CPT | Mod: ,,, | Performed by: HOSPITALIST

## 2023-05-12 PROCEDURE — 63600175 PHARM REV CODE 636 W HCPCS: Performed by: HOSPITALIST

## 2023-05-12 PROCEDURE — 25000003 PHARM REV CODE 250: Performed by: NURSE PRACTITIONER

## 2023-05-12 PROCEDURE — 11000004 HC SNF PRIVATE

## 2023-05-12 PROCEDURE — 97166 OT EVAL MOD COMPLEX 45 MIN: CPT

## 2023-05-12 PROCEDURE — 99239 PR HOSPITAL DISCHARGE DAY,>30 MIN: ICD-10-PCS | Mod: ,,, | Performed by: HOSPITALIST

## 2023-05-12 RX ORDER — GABAPENTIN 100 MG/1
100 CAPSULE ORAL DAILY
Status: DISCONTINUED | OUTPATIENT
Start: 2023-05-12 | End: 2023-05-19 | Stop reason: HOSPADM

## 2023-05-12 RX ORDER — ENOXAPARIN SODIUM 100 MG/ML
40 INJECTION SUBCUTANEOUS EVERY 24 HOURS
Status: CANCELLED | OUTPATIENT
Start: 2023-05-12

## 2023-05-12 RX ORDER — ACETAMINOPHEN 325 MG/1
650 TABLET ORAL EVERY 8 HOURS PRN
Status: CANCELLED | OUTPATIENT
Start: 2023-05-12

## 2023-05-12 RX ORDER — DEXTROSE 40 %
15 GEL (GRAM) ORAL
Status: DISCONTINUED | OUTPATIENT
Start: 2023-05-12 | End: 2023-05-19 | Stop reason: HOSPADM

## 2023-05-12 RX ORDER — TALC
6 POWDER (GRAM) TOPICAL NIGHTLY PRN
Status: CANCELLED | OUTPATIENT
Start: 2023-05-12

## 2023-05-12 RX ORDER — DEXTROSE 40 %
15 GEL (GRAM) ORAL
Status: CANCELLED | OUTPATIENT
Start: 2023-05-12

## 2023-05-12 RX ORDER — VALSARTAN 160 MG/1
320 TABLET ORAL DAILY
Status: CANCELLED | OUTPATIENT
Start: 2023-05-13

## 2023-05-12 RX ORDER — TALC
6 POWDER (GRAM) TOPICAL NIGHTLY PRN
Status: DISCONTINUED | OUTPATIENT
Start: 2023-05-12 | End: 2023-05-19 | Stop reason: HOSPADM

## 2023-05-12 RX ORDER — GLUCAGON 1 MG
1 KIT INJECTION
Status: DISCONTINUED | OUTPATIENT
Start: 2023-05-12 | End: 2023-05-19 | Stop reason: HOSPADM

## 2023-05-12 RX ORDER — CALCIUM CARBONATE 200(500)MG
500 TABLET,CHEWABLE ORAL 2 TIMES DAILY PRN
Status: DISCONTINUED | OUTPATIENT
Start: 2023-05-12 | End: 2023-05-19 | Stop reason: HOSPADM

## 2023-05-12 RX ORDER — ACETAMINOPHEN 325 MG/1
650 TABLET ORAL EVERY 6 HOURS PRN
Status: CANCELLED | OUTPATIENT
Start: 2023-05-12

## 2023-05-12 RX ORDER — BISACODYL 10 MG
10 SUPPOSITORY, RECTAL RECTAL DAILY PRN
Status: DISCONTINUED | OUTPATIENT
Start: 2023-05-12 | End: 2023-05-19 | Stop reason: HOSPADM

## 2023-05-12 RX ORDER — DULOXETIN HYDROCHLORIDE 30 MG/1
30 CAPSULE, DELAYED RELEASE ORAL DAILY
Status: CANCELLED | OUTPATIENT
Start: 2023-05-13

## 2023-05-12 RX ORDER — LANOLIN ALCOHOL/MO/W.PET/CERES
400 CREAM (GRAM) TOPICAL 2 TIMES DAILY
Status: CANCELLED | OUTPATIENT
Start: 2023-05-12

## 2023-05-12 RX ORDER — CETIRIZINE HYDROCHLORIDE 10 MG/1
10 TABLET ORAL DAILY
Status: DISCONTINUED | OUTPATIENT
Start: 2023-05-13 | End: 2023-05-19 | Stop reason: HOSPADM

## 2023-05-12 RX ORDER — PANTOPRAZOLE SODIUM 40 MG/1
40 TABLET, DELAYED RELEASE ORAL DAILY
Status: CANCELLED | OUTPATIENT
Start: 2023-05-13

## 2023-05-12 RX ORDER — LEVOTHYROXINE SODIUM 50 UG/1
50 TABLET ORAL
Status: DISCONTINUED | OUTPATIENT
Start: 2023-05-13 | End: 2023-05-19 | Stop reason: HOSPADM

## 2023-05-12 RX ORDER — SODIUM CHLORIDE 0.9 % (FLUSH) 0.9 %
10 SYRINGE (ML) INJECTION
Status: CANCELLED | OUTPATIENT
Start: 2023-05-12

## 2023-05-12 RX ORDER — INSULIN ASPART 100 [IU]/ML
0-5 INJECTION, SOLUTION INTRAVENOUS; SUBCUTANEOUS
Status: DISCONTINUED | OUTPATIENT
Start: 2023-05-12 | End: 2023-05-19 | Stop reason: HOSPADM

## 2023-05-12 RX ORDER — CETIRIZINE HYDROCHLORIDE 10 MG/1
10 TABLET ORAL DAILY
Status: CANCELLED | OUTPATIENT
Start: 2023-05-13

## 2023-05-12 RX ORDER — SIMETHICONE 125 MG
125 TABLET,CHEWABLE ORAL 4 TIMES DAILY
Status: DISCONTINUED | OUTPATIENT
Start: 2023-05-12 | End: 2023-05-19 | Stop reason: HOSPADM

## 2023-05-12 RX ORDER — SERTRALINE HYDROCHLORIDE 25 MG/1
25 TABLET, FILM COATED ORAL DAILY
Status: CANCELLED | OUTPATIENT
Start: 2023-05-13

## 2023-05-12 RX ORDER — SIMETHICONE 125 MG
125 TABLET,CHEWABLE ORAL 4 TIMES DAILY
Status: CANCELLED | OUTPATIENT
Start: 2023-05-12

## 2023-05-12 RX ORDER — VALSARTAN 160 MG/1
320 TABLET ORAL DAILY
Status: DISCONTINUED | OUTPATIENT
Start: 2023-05-13 | End: 2023-05-19 | Stop reason: HOSPADM

## 2023-05-12 RX ORDER — MAG HYDROX/ALUMINUM HYD/SIMETH 200-200-20
10 SUSPENSION, ORAL (FINAL DOSE FORM) ORAL EVERY 4 HOURS PRN
Status: DISCONTINUED | OUTPATIENT
Start: 2023-05-12 | End: 2023-05-19 | Stop reason: HOSPADM

## 2023-05-12 RX ORDER — SERTRALINE HYDROCHLORIDE 25 MG/1
25 TABLET, FILM COATED ORAL DAILY
Status: DISCONTINUED | OUTPATIENT
Start: 2023-05-13 | End: 2023-05-19 | Stop reason: HOSPADM

## 2023-05-12 RX ORDER — AMOXICILLIN 250 MG
1 CAPSULE ORAL 2 TIMES DAILY PRN
Status: DISCONTINUED | OUTPATIENT
Start: 2023-05-12 | End: 2023-05-19 | Stop reason: HOSPADM

## 2023-05-12 RX ORDER — GABAPENTIN 100 MG/1
100 CAPSULE ORAL DAILY
Status: CANCELLED | OUTPATIENT
Start: 2023-05-12

## 2023-05-12 RX ORDER — GLUCAGON 1 MG
1 KIT INJECTION
Status: CANCELLED | OUTPATIENT
Start: 2023-05-12

## 2023-05-12 RX ORDER — LANOLIN ALCOHOL/MO/W.PET/CERES
400 CREAM (GRAM) TOPICAL 2 TIMES DAILY
Status: DISCONTINUED | OUTPATIENT
Start: 2023-05-12 | End: 2023-05-19 | Stop reason: HOSPADM

## 2023-05-12 RX ORDER — ACETAMINOPHEN 325 MG/1
650 TABLET ORAL EVERY 6 HOURS PRN
Status: DISCONTINUED | OUTPATIENT
Start: 2023-05-12 | End: 2023-05-19 | Stop reason: HOSPADM

## 2023-05-12 RX ORDER — DULOXETIN HYDROCHLORIDE 30 MG/1
30 CAPSULE, DELAYED RELEASE ORAL DAILY
Status: DISCONTINUED | OUTPATIENT
Start: 2023-05-13 | End: 2023-05-19 | Stop reason: HOSPADM

## 2023-05-12 RX ORDER — MAG HYDROX/ALUMINUM HYD/SIMETH 200-200-20
10 SUSPENSION, ORAL (FINAL DOSE FORM) ORAL EVERY 4 HOURS PRN
Status: CANCELLED | OUTPATIENT
Start: 2023-05-12

## 2023-05-12 RX ORDER — PANTOPRAZOLE SODIUM 40 MG/1
40 TABLET, DELAYED RELEASE ORAL DAILY
Status: DISCONTINUED | OUTPATIENT
Start: 2023-05-13 | End: 2023-05-19 | Stop reason: HOSPADM

## 2023-05-12 RX ORDER — BISACODYL 10 MG
10 SUPPOSITORY, RECTAL RECTAL DAILY PRN
Status: CANCELLED | OUTPATIENT
Start: 2023-05-12

## 2023-05-12 RX ORDER — AMOXICILLIN 250 MG
1 CAPSULE ORAL 2 TIMES DAILY PRN
Status: CANCELLED | OUTPATIENT
Start: 2023-05-12

## 2023-05-12 RX ORDER — ONDANSETRON 2 MG/ML
4 INJECTION INTRAMUSCULAR; INTRAVENOUS EVERY 8 HOURS PRN
Status: CANCELLED | OUTPATIENT
Start: 2023-05-12

## 2023-05-12 RX ORDER — INSULIN ASPART 100 [IU]/ML
0-5 INJECTION, SOLUTION INTRAVENOUS; SUBCUTANEOUS
Status: CANCELLED | OUTPATIENT
Start: 2023-05-12

## 2023-05-12 RX ORDER — CALCIUM CARBONATE 200(500)MG
500 TABLET,CHEWABLE ORAL 2 TIMES DAILY PRN
Status: CANCELLED | OUTPATIENT
Start: 2023-05-12

## 2023-05-12 RX ORDER — LEVOTHYROXINE SODIUM 50 UG/1
50 TABLET ORAL
Status: CANCELLED | OUTPATIENT
Start: 2023-05-13

## 2023-05-12 RX ADMIN — ACETAMINOPHEN 650 MG: 325 TABLET ORAL at 09:05

## 2023-05-12 RX ADMIN — SERTRALINE HYDROCHLORIDE 25 MG: 25 TABLET ORAL at 09:05

## 2023-05-12 RX ADMIN — PANTOPRAZOLE SODIUM 40 MG: 40 TABLET, DELAYED RELEASE ORAL at 09:05

## 2023-05-12 RX ADMIN — SIMETHICONE 125 MG: 125 TABLET, CHEWABLE ORAL at 09:05

## 2023-05-12 RX ADMIN — GABAPENTIN 100 MG: 100 CAPSULE ORAL at 05:05

## 2023-05-12 RX ADMIN — INSULIN ASPART 2 UNITS: 100 INJECTION, SOLUTION INTRAVENOUS; SUBCUTANEOUS at 05:05

## 2023-05-12 RX ADMIN — DULOXETINE 30 MG: 30 CAPSULE, DELAYED RELEASE ORAL at 09:05

## 2023-05-12 RX ADMIN — TRAZODONE HYDROCHLORIDE 25 MG: 50 TABLET ORAL at 08:05

## 2023-05-12 RX ADMIN — Medication 400 MG: at 09:05

## 2023-05-12 RX ADMIN — VALSARTAN 320 MG: 160 TABLET, FILM COATED ORAL at 09:05

## 2023-05-12 RX ADMIN — CETIRIZINE HYDROCHLORIDE 10 MG: 10 TABLET, FILM COATED ORAL at 09:05

## 2023-05-12 RX ADMIN — PIPERACILLIN AND TAZOBACTAM 3.38 G: 3; .375 INJECTION, POWDER, LYOPHILIZED, FOR SOLUTION INTRAVENOUS at 02:05

## 2023-05-12 RX ADMIN — Medication 400 MG: at 08:05

## 2023-05-12 RX ADMIN — SIMETHICONE 125 MG: 125 TABLET, CHEWABLE ORAL at 05:05

## 2023-05-12 RX ADMIN — SIMETHICONE 125 MG: 125 TABLET, CHEWABLE ORAL at 01:05

## 2023-05-12 RX ADMIN — LEVOTHYROXINE SODIUM 50 MCG: 0.05 TABLET ORAL at 05:05

## 2023-05-12 NOTE — PLAN OF CARE
Problem: Occupational Therapy  Goal: Occupational Therapy Goal  Description: STG: within 1 week  Pt will perform grooming with setup at sinkside  Pt will bathe with min a at sinkside  Pt will perform UE dressing with svn at sinkside  Pt will perform LE dressing with min a bedside  Pt will sit EOB x 30 min with svn assistance  Pt will transfer bed/chair/bsc with sba  Pt will perform standing task x 3 min with svn assistance  Pt will tolerate 30 minutes of tx without fatigue      LT.Restore to max I with self care and mobility.     Outcome: Ongoing, Progressing

## 2023-05-12 NOTE — PROGRESS NOTES
[facility] Medicine  Progress Note    Patient Name: Joyce Dow  MRN: [id number]  Patient Class: IP- Inpatient   Admission Date: 5/9/2023  Length of Stay: 3 days  Attending Physician: Darinel Kwok DO  Primary Care Provider: Walker Watkins MD        Subjective:     Principal Problem:Acute cystitis with hematuria        HPI:  88 yo WF, NH patient, admitted from ED for UTI, hyponatremia and FARHAT. NH reported patient had temp just over 100 F and AMS yesterday. Today has had continued nausea and vomiting and unable to tolerate PO abx. Patient awake and alert and answers questions appropriately regarding her medical care.      Hx: Hypothyroidism  Hypomagnesemia  Hyponatremia  Hypertension  Hyperlipidemia  DM  Arthritis    Code status: DNR      Overview/Hospital Course:  5/11 Urine culture back, ESBL with a lot of resistance.  S to Zosyn.  Changed to that.  She is eating good per nurses.  She also says she is eating good.  Will need at least a week of IV ABX for ESBL UTI.  May need SWB for that.     5/12 Plan was to swing today for IV ABX for ESBL UTI but have to get precert from Insurance.  Once approved will swing.  She has no complaints.  Doing well.       Interval History: still getting IV ABX, changed due to resistance on culture.  No major issues.     Review of Systems   Respiratory:  Negative for shortness of breath.    Cardiovascular:  Negative for chest pain.   Gastrointestinal:  Negative for abdominal pain, nausea and vomiting.   Neurological:  Positive for weakness.   Psychiatric/Behavioral:  Positive for confusion. Negative for agitation.    All other systems reviewed and are negative.  Objective:     Vital Signs (Most Recent):  Temp: 98 °F (36.7 °C) (05/12/23 0751)  Pulse: 81 (05/12/23 0751)  Resp: 20 (05/12/23 0751)  BP: (!) 159/70 (05/12/23 0751)  SpO2: 95 % (05/12/23 0751) Vital Signs (24h Range):  Temp:  [97.6 °F (36.4 °C)-98.4 °F (36.9 °C)] 98 °F  (36.7 °C)  Pulse:  [74-81] 81  Resp:  [18-22] 20  SpO2:  [94 %-97 %] 95 %  BP: (112-159)/(52-70) 159/70     Weight: 68.1 kg (150 lb 2.1 oz)  Body mass index is 22.83 kg/m².    Intake/Output Summary (Last 24 hours) at 5/12/2023 1103  Last data filed at 5/12/2023 0514  Gross per 24 hour   Intake 877 ml   Output --   Net 877 ml         Physical Exam  Vitals reviewed.   Constitutional:       General: She is not in acute distress.     Appearance: Normal appearance.   HENT:      Head: Normocephalic and atraumatic.   Eyes:      General: No scleral icterus.     Extraocular Movements: Extraocular movements intact.      Conjunctiva/sclera: Conjunctivae normal.      Pupils: Pupils are equal, round, and reactive to light.   Cardiovascular:      Rate and Rhythm: Normal rate and regular rhythm.      Heart sounds: No murmur heard.    No friction rub. No gallop.   Pulmonary:      Effort: Pulmonary effort is normal. No respiratory distress.      Breath sounds: Normal breath sounds. No wheezing or rales.   Abdominal:      General: Abdomen is flat. Bowel sounds are normal. There is no distension.      Palpations: Abdomen is soft.      Tenderness: There is no abdominal tenderness. There is no guarding.   Musculoskeletal:         General: No swelling.      Right lower leg: No edema.      Left lower leg: No edema.   Skin:     General: Skin is warm and dry.      Coloration: Skin is not jaundiced.      Findings: No rash.   Neurological:      General: No focal deficit present.      Mental Status: She is alert and oriented to person, place, and time.      Sensory: No sensory deficit.      Motor: Weakness present.   Psychiatric:         Mood and Affect: Mood normal.         Behavior: Behavior normal.           Significant Labs: All pertinent labs within the past 24 hours have been reviewed.  Recent Lab Results  (Last 5 results in the past 24 hours)        05/12/23  0600   05/12/23  0559   05/11/23  2036   05/11/23  1702   05/11/23  1110         Anion Gap 14               BUN 10               BUN/CREAT RATIO 13               Calcium 8.6               Chloride 95               CO2 23               Creatinine 0.76               eGFR 75               Glucose 123               POC Glucose   131   154   213   123       Potassium 4.5               Sodium 127               TSH 1.956                                      Significant Imaging: I have reviewed all pertinent imaging results/findings within the past 24 hours.      Assessment/Plan:      * Acute cystitis with hematuria  ESBL + only S to IV ABX.  Changed to Zosyn.  Will need SWB for IV ABX.       GERD (gastroesophageal reflux disease)  Stable no issues.       Persistent depressive disorder  Patient has persistent depression which is mild and is currently controlled. Will Continue anti-depressant medications. We will not consult psychiatry at this time. Patient does not display psychosis at this time. Continue to monitor closely and adjust plan of care as needed.        HTN (hypertension)  Continue home meds adjust as needed.      Hyponatremia  Continue IVFs, regular diet.  Follow labs. Add TSH to am labs.         VTE Risk Mitigation (From admission, onward)         Ordered     enoxaparin injection 40 mg  Daily         05/09/23 2210     IP VTE HIGH RISK PATIENT  Once         05/09/23 2210     Place MEHREEN hose  Until discontinued         05/09/23 2210                Discharge Planning   JUANPABLO:      Code Status: DNR   Is the patient medically ready for discharge?: No    Reason for patient still in hospital (select all that apply): Patient trending condition, Laboratory test and Treatment                     Darinel Kwok DO  Department of Hospital Medicine   [facility]

## 2023-05-12 NOTE — PLAN OF CARE
Problem: Adult Inpatient Plan of Care  Goal: Plan of Care Review  Outcome: Ongoing, Progressing  Flowsheets (Taken 5/12/2023 1554)  Plan of Care Reviewed With: patient  Goal: Optimal Comfort and Wellbeing  Outcome: Ongoing, Progressing  Intervention: Monitor Pain and Promote Comfort  Flowsheets (Taken 5/12/2023 1554)  Pain Management Interventions:   medication offered   position adjusted   care clustered     Problem: Skin Injury Risk Increased  Goal: Skin Health and Integrity  Outcome: Ongoing, Progressing  Intervention: Optimize Skin Protection  Flowsheets (Taken 5/12/2023 1554)  Pressure Reduction Techniques: frequent weight shift encouraged  Skin Protection: incontinence pads utilized  Head of Bed (HOB) Positioning: HOB elevated     Problem: Fall Injury Risk  Goal: Absence of Fall and Fall-Related Injury  Outcome: Ongoing, Progressing  Intervention: Identify and Manage Contributors  Flowsheets (Taken 5/12/2023 1554)  Self-Care Promotion: BADL personal objects within reach  Medication Review/Management: medications reviewed

## 2023-05-12 NOTE — PT/OT/SLP EVAL
Occupational Therapy   Evaluation    Name: Joyce Dow  MRN: [id number]  Admitting Diagnosis: <principal problem not specified>  Recent Surgery: * No surgery found *      Recommendations:     Discharge Recommendations: nursing facility, skilled  Discharge Equipment Recommendations:  none  Barriers to discharge:  None    Assessment:     Joyce Dow is a 89 y.o. female with a medical diagnosis of UTI.  She presents with eagerness to get up and out of bed. Performance deficits affecting function: weakness, impaired endurance, impaired self care skills, impaired functional mobility, impaired balance.      Rehab Prognosis: Good; patient would benefit from acute skilled OT services to address these deficits and reach maximum level of function.       Plan:     Patient to be seen 5 x/week to address the above listed problems via self-care/home management, therapeutic activities, therapeutic exercises, wheelchair management/training  Plan of Care Expires: 06/02/23  Plan of Care Reviewed with: patient, other (see comments) (CNA and nursing; OT indicated to the nurses that pt needs assist to transfer but can sit up as tolerated this weekend to improve strength and attending to environment more effectively)    Subjective     Chief Complaint: weakness and pain when urinating  Patient/Family Comments/goals: return to nursing home where she resides to PLOF of what she reports to be mod I with RW and assist as needed with self care    Occupational Profile:  Living Environment: Nursing facility, skilled  Previous level of function: frequent falls in the past, uses RW and w/c as needed there in nursing facility, little information on previous PLOF on chart  Roles and Routines: retired, active in nursing home activities from w/c or with RW as needed  Equipment Used at Home: 3-in-1 commode, walker, rolling, wheelchair  Assistance upon Discharge: min a to svn overall with w/c or RW    Pain/Comfort:  Pain Rating 1:  0/10    Patients cultural, spiritual, Denominational conflicts given the current situation:      Objective:     Communicated with: pt, nursing, and CNA prior to session.  Patient found HOB elevated with peripheral IV upon OT entry to room.    General Precautions: Standard, fall  Orthopedic Precautions: Full weight bearing  Braces: N/A  Respiratory Status: Room air    Occupational Performance:    Bed Mobility:    Patient completed Rolling/Turning to Right with minimum assistance  Patient completed Scooting/Bridging with minimum assistance  Patient completed Supine to Sit with minimum assistance    Functional Mobility/Transfers:  Patient completed Sit <> Stand Transfer with minimum assistance  with  hand-held assist   Patient completed Bed <> Chair Transfer using Step Transfer technique with minimum assistance with hand-held assist  Functional Mobility: not tested today    Activities of Daily Living:  Feeding:  minimum assistance occasionally, setup mainly at tabletop  Grooming: minimum assistance bedside or sinkside  Bathing: moderate assistance overall from spongebath/sinkside  Upper Body Dressing: minimum assistance bedside  Lower Body Dressing: moderate assistance bedside  Toileting: minimum assistance from BSC    Cognitive/Visual Perceptual:  Cognitive/Psychosocial Skills:     -       Oriented to: Person, Place, and Situation   -       Follows Commands/attention:Follows two-step commands  -       Communication: clear/fluent  -       Memory: No Deficits noted and pt relays that she lived in the local nursing home; no family here to confirm other PLOF history but pt aware of these recent health changes and home environment  -       Safety awareness/insight to disability: intact   -       Mood/Affect/Coping skills/emotional control: Appropriate to situation, Cooperative, and Pleasant    Physical Exam:  Balance:    -       sitting upright on soft surface nonsupported=good, static standing=fair+, dynamic  standing=fair-  Dominant hand:    -       right  Upper Extremity Range of Motion:     -       Right Upper Extremity: WFL  -       Left Upper Extremity: WFL  Upper Extremity Strength:    -       Right Upper Extremity: WFL  -       Left Upper Extremity: WFL   Strength:    -       Right Upper Extremity: WFL  -       Left Upper Extremity: WFL  Fine Motor Coordination:    -       Intact  Gross motor coordination:   WFL    AMPAC 6 Click ADL:  AMPAC Total Score: 15    Treatment & Education:  Evaluation completed today, begin therapy this coming Monday for tx to progress to PLOF    Patient left up in chair with call button in reach, CNA notified, and CNA present    GOALS:   Multidisciplinary Problems       Occupational Therapy Goals          Problem: Occupational Therapy    Goal Priority Disciplines Outcome Interventions   Occupational Therapy Goal     OT, PT/OT Ongoing, Progressing    Description: STG: within 1 week  Pt will perform grooming with setup at sinkside  Pt will bathe with min a at sinkside  Pt will perform UE dressing with svn at sinkside  Pt will perform LE dressing with min a bedside  Pt will sit EOB x 30 min with svn assistance  Pt will transfer bed/chair/bsc with sba  Pt will perform standing task x 3 min with svn assistance  Pt will tolerate 30 minutes of tx without fatigue      LT.Restore to max I with self care and mobility.                          History:     Past Medical History:   Diagnosis Date    Allergic rhinitis     Anticoagulant long-term use     Anxiety disorder, unspecified     Arthritis     Chronic pain     Depression     GERD (gastroesophageal reflux disease)     hyperlipidemia     Hypertension     Hyponatremia     Hypoxemia     Insomnia     Neuropathy     Thyroid disease     Type 2 diabetes mellitus     Urinary tract infection, site not specified        No past surgical history on file.    Time Tracking:     OT Date of Treatment: 23  OT Start Time:   OT Stop Time:  1601  OT Total Time (min): 16 min    Billable Minutes:Evaluation 16    5/12/2023

## 2023-05-12 NOTE — DISCHARGE SUMMARY
[facility]  Hospital Medicine  Discharge Summary      Patient Name: Joyce Dow  MRN: [id number]  GUY: [id number]  Patient Class: IP- Inpatient  Admission Date: 5/9/2023  Hospital Length of Stay: 3 days  Discharge Date and Time:  05/12/2023 1:31 PM  Attending Physician: Darinel Kwok DO   Discharging Provider: Darinel Kwok DO  Primary Care Provider: Walker Watkins MD    Primary Care Team: Networked reference to record PCT     HPI:   88 yo WF, NH patient, admitted from ED for UTI, hyponatremia and FARHAT. NH reported patient had temp just over 100 F and AMS yesterday. Today has had continued nausea and vomiting and unable to tolerate PO abx. Patient awake and alert and answers questions appropriately regarding her medical care.      Hx: Hypothyroidism  Hypomagnesemia  Hyponatremia  Hypertension  Hyperlipidemia  DM  Arthritis    Code status: DNR      * No surgery found *      Hospital Course:   5/11 Urine culture back, ESBL with a lot of resistance.  S to Zosyn.  Changed to that.  She is eating good per nurses.  She also says she is eating good.  Will need at least a week of IV ABX for ESBL UTI.  May need SWB for that.     5/12 Plan was to swing today for IV ABX for ESBL UTI but have to get precert from Insurance.  Once approved will swing.  She has no complaints.  Doing well.     Approved for SWB will DC re admit today.       Goals of Care Treatment Preferences:  Code Status: DNR      Consults:     No new Assessment & Plan notes have been filed under this hospital service since the last note was generated.  Service: Hospital Medicine    Final Active Diagnoses:    Diagnosis Date Noted POA    PRINCIPAL PROBLEM:  Acute cystitis with hematuria [N30.01] 05/09/2023 Yes    HTN (hypertension) [I10] 05/10/2023 Yes    Persistent depressive disorder [F34.1] 05/10/2023 Yes    GERD (gastroesophageal reflux disease) [K21.9] 05/10/2023 Yes    Hyponatremia [E87.1] 05/09/2023 Yes      Chronic      Problems Resolved During this Admission:    Diagnosis Date Noted Date Resolved POA    FARHAT (acute kidney injury) [N17.9] 05/09/2023 05/12/2023 Yes       Discharged Condition: good    Disposition:     Follow Up:    Patient Instructions:   No discharge procedures on file.    Significant Diagnostic Studies: Labs:   BMP:   Recent Labs   Lab 05/11/23  0644 05/12/23  0600   * 123*   * 127*   K 4.3 4.5   CL 91* 95*   CO2 30 23   BUN 13 10   CREATININE 0.77 0.76   CALCIUM 8.3* 8.6       Pending Diagnostic Studies:     None         Medications:  Transfer Medications (for Discharge Readmit only):   Current Facility-Administered Medications   Medication Dose Route Frequency Provider Last Rate Last Admin    acetaminophen tablet 650 mg  650 mg Oral Q8H PRN Bertha BOB Cunningham, FNP        acetaminophen tablet 650 mg  650 mg Oral Q8H PRN Bertha BOB Cunningham, FNP   650 mg at 05/12/23 0939    aluminum-magnesium hydroxide-simethicone 200-200-20 mg/5 mL suspension 10 mL  10 mL Oral Q4H PRN Bertha Cunningham, FNP        bisacodyL suppository 10 mg  10 mg Rectal Daily PRN Bertha BOB Cunningham, FNP        cetirizine tablet 10 mg  10 mg Oral Daily Bertha BOB Octavio, FNP   10 mg at 05/12/23 0934    dextrose 40 % gel 15,000 mg  15 g Oral PRN Bertha ROJAS Octavio, FNP        dextrose 40 % gel 30,000 mg  30 g Oral PRN Bertha BOB Cunningham, FNP        dextrose 50% injection 12.5 g  12.5 g Intravenous PRN Darinel Kwok DO        dextrose 50% injection 25 g  25 g Intravenous PRN Darinel Kwok DO        DULoxetine DR capsule 30 mg  30 mg Oral Daily Bertha BOB Cunningham, FNP   30 mg at 05/12/23 0934    enoxaparin injection 40 mg  40 mg Subcutaneous Daily Bertha BOB Cunningham, FNP   40 mg at 05/11/23 1711    gabapentin capsule 100 mg  100 mg Oral Daily Bertha B Octavio, FNP   100 mg at 05/11/23 1711    glucagon (human recombinant) injection 1 mg  1 mg Intramuscular PRN Bertha BOB Cunningham, FNP        insulin aspart U-100  injection 0-5 Units  0-5 Units Subcutaneous QID (AC + HS) PRN Bertha Cunningham FNP   2 Units at 05/11/23 1718    levothyroxine tablet 50 mcg  50 mcg Oral Before breakfast Bertha Cunningham FNP   50 mcg at 05/12/23 0559    magnesium oxide tablet 400 mg  400 mg Oral BID Bertha Cunningham FNP   400 mg at 05/12/23 0934    melatonin tablet 6 mg  6 mg Oral Nightly PRN Bertha Cunningham FNP   6 mg at 05/09/23 2300    ondansetron injection 4 mg  4 mg Intravenous Q8H PRN Bertha Cunningham FNP   4 mg at 05/10/23 0839    pantoprazole EC tablet 40 mg  40 mg Oral Daily Bertha Cunningham FNP   40 mg at 05/12/23 0934    piperacillin-tazobactam (ZOSYN) 3.375 g in dextrose 5 % in water (D5W) 5 % 50 mL IVPB (MB+)  3.375 g Intravenous Q12H Darinel Kwok DO   Stopped at 05/12/23 0608    sertraline tablet 25 mg  25 mg Oral Daily Bertha Cunningham, FNP   25 mg at 05/12/23 0934    simethicone chewable tablet 125 mg  125 mg Oral QID Darinel Kwok DO   125 mg at 05/12/23 1323    sodium chloride 0.9% flush 10 mL  10 mL Intravenous PRN Bertha Cunningham, FNP        trazodone split tablet 25 mg  25 mg Oral QHS Bertha Cunningham FNP   25 mg at 05/11/23 2033    valsartan tablet 320 mg  320 mg Oral Daily Bertha Cunningham, FNP   320 mg at 05/12/23 0934    zinc oxide-cod liver oil 40 % paste   Topical (Top) Daily PRN Bertha Cunningham, FNP           Indwelling Lines/Drains at time of discharge:   Lines/Drains/Airways     None                 Time spent on the discharge of patient: 34 minutes         Darinel Kwok DO  Department of Hospital Medicine  [facility]

## 2023-05-12 NOTE — SUBJECTIVE & OBJECTIVE
Interval History: still getting IV ABX, changed due to resistance on culture.  No major issues.     Review of Systems   Respiratory:  Negative for shortness of breath.    Cardiovascular:  Negative for chest pain.   Gastrointestinal:  Negative for abdominal pain, nausea and vomiting.   Neurological:  Positive for weakness.   Psychiatric/Behavioral:  Positive for confusion. Negative for agitation.    All other systems reviewed and are negative.  Objective:     Vital Signs (Most Recent):  Temp: 98 °F (36.7 °C) (05/12/23 0751)  Pulse: 81 (05/12/23 0751)  Resp: 20 (05/12/23 0751)  BP: (!) 159/70 (05/12/23 0751)  SpO2: 95 % (05/12/23 0751) Vital Signs (24h Range):  Temp:  [97.6 °F (36.4 °C)-98.4 °F (36.9 °C)] 98 °F (36.7 °C)  Pulse:  [74-81] 81  Resp:  [18-22] 20  SpO2:  [94 %-97 %] 95 %  BP: (112-159)/(52-70) 159/70     Weight: 68.1 kg (150 lb 2.1 oz)  Body mass index is 22.83 kg/m².    Intake/Output Summary (Last 24 hours) at 5/12/2023 1103  Last data filed at 5/12/2023 0514  Gross per 24 hour   Intake 877 ml   Output --   Net 877 ml         Physical Exam  Vitals reviewed.   Constitutional:       General: She is not in acute distress.     Appearance: Normal appearance.   HENT:      Head: Normocephalic and atraumatic.   Eyes:      General: No scleral icterus.     Extraocular Movements: Extraocular movements intact.      Conjunctiva/sclera: Conjunctivae normal.      Pupils: Pupils are equal, round, and reactive to light.   Cardiovascular:      Rate and Rhythm: Normal rate and regular rhythm.      Heart sounds: No murmur heard.    No friction rub. No gallop.   Pulmonary:      Effort: Pulmonary effort is normal. No respiratory distress.      Breath sounds: Normal breath sounds. No wheezing or rales.   Abdominal:      General: Abdomen is flat. Bowel sounds are normal. There is no distension.      Palpations: Abdomen is soft.      Tenderness: There is no abdominal tenderness. There is no guarding.   Musculoskeletal:          General: No swelling.      Right lower leg: No edema.      Left lower leg: No edema.   Skin:     General: Skin is warm and dry.      Coloration: Skin is not jaundiced.      Findings: No rash.   Neurological:      General: No focal deficit present.      Mental Status: She is alert and oriented to person, place, and time.      Sensory: No sensory deficit.      Motor: Weakness present.   Psychiatric:         Mood and Affect: Mood normal.         Behavior: Behavior normal.           Significant Labs: All pertinent labs within the past 24 hours have been reviewed.  Recent Lab Results  (Last 5 results in the past 24 hours)        05/12/23  0600   05/12/23  0559   05/11/23  2036   05/11/23  1702   05/11/23  1110        Anion Gap 14               BUN 10               BUN/CREAT RATIO 13               Calcium 8.6               Chloride 95               CO2 23               Creatinine 0.76               eGFR 75               Glucose 123               POC Glucose   131   154   213   123       Potassium 4.5               Sodium 127               TSH 1.956                                      Significant Imaging: I have reviewed all pertinent imaging results/findings within the past 24 hours.

## 2023-05-13 LAB
ANION GAP SERPL CALCULATED.3IONS-SCNC: 10 MMOL/L (ref 7–16)
BASOPHILS # BLD AUTO: 0.02 K/UL (ref 0–0.2)
BASOPHILS NFR BLD AUTO: 0.4 % (ref 0–1)
BUN SERPL-MCNC: 10 MG/DL (ref 7–18)
BUN/CREAT SERPL: 11 (ref 6–20)
CALCIUM SERPL-MCNC: 8.7 MG/DL (ref 8.5–10.1)
CHLORIDE SERPL-SCNC: 95 MMOL/L (ref 98–107)
CO2 SERPL-SCNC: 29 MMOL/L (ref 21–32)
CREAT SERPL-MCNC: 0.89 MG/DL (ref 0.55–1.02)
DIFFERENTIAL METHOD BLD: ABNORMAL
EGFR (NO RACE VARIABLE) (RUSH/TITUS): 62 ML/MIN/1.73M2
EOSINOPHIL # BLD AUTO: 0.06 K/UL (ref 0–0.5)
EOSINOPHIL NFR BLD AUTO: 1.3 % (ref 1–4)
ERYTHROCYTE [DISTWIDTH] IN BLOOD BY AUTOMATED COUNT: 12.8 % (ref 11.5–14.5)
GLUCOSE SERPL-MCNC: 103 MG/DL (ref 74–106)
GLUCOSE SERPL-MCNC: 123 MG/DL (ref 70–105)
GLUCOSE SERPL-MCNC: 179 MG/DL (ref 70–105)
HCT VFR BLD AUTO: 30 % (ref 38–47)
HGB BLD-MCNC: 9.6 G/DL (ref 12–16)
LYMPHOCYTES # BLD AUTO: 1.68 K/UL (ref 1–4.8)
LYMPHOCYTES NFR BLD AUTO: 35.4 % (ref 27–41)
MCH RBC QN AUTO: 30.6 PG (ref 27–31)
MCHC RBC AUTO-ENTMCNC: 32 G/DL (ref 32–36)
MCV RBC AUTO: 95.5 FL (ref 80–96)
MONOCYTES # BLD AUTO: 0.43 K/UL (ref 0–0.8)
MONOCYTES NFR BLD AUTO: 9.1 % (ref 2–6)
MPC BLD CALC-MCNC: 8.8 FL (ref 9.4–12.4)
NEUTROPHILS # BLD AUTO: 2.56 K/UL (ref 1.8–7.7)
NEUTROPHILS NFR BLD AUTO: 53.8 % (ref 53–65)
PLATELET # BLD AUTO: 306 K/UL (ref 150–400)
POTASSIUM SERPL-SCNC: 4.3 MMOL/L (ref 3.5–5.1)
RBC # BLD AUTO: 3.14 M/UL (ref 4.2–5.4)
SODIUM SERPL-SCNC: 130 MMOL/L (ref 136–145)
WBC # BLD AUTO: 4.75 K/UL (ref 4.5–11)

## 2023-05-13 PROCEDURE — 80048 BASIC METABOLIC PNL TOTAL CA: CPT | Performed by: HOSPITALIST

## 2023-05-13 PROCEDURE — 11000004 HC SNF PRIVATE

## 2023-05-13 PROCEDURE — 25000003 PHARM REV CODE 250: Performed by: HOSPITALIST

## 2023-05-13 PROCEDURE — 82962 GLUCOSE BLOOD TEST: CPT

## 2023-05-13 PROCEDURE — 63600175 PHARM REV CODE 636 W HCPCS: Performed by: HOSPITALIST

## 2023-05-13 PROCEDURE — 27000958

## 2023-05-13 PROCEDURE — 85025 COMPLETE CBC W/AUTO DIFF WBC: CPT | Performed by: HOSPITALIST

## 2023-05-13 RX ADMIN — SIMETHICONE 125 MG: 125 TABLET, CHEWABLE ORAL at 05:05

## 2023-05-13 RX ADMIN — Medication 400 MG: at 09:05

## 2023-05-13 RX ADMIN — DULOXETINE 30 MG: 30 CAPSULE, DELAYED RELEASE ORAL at 09:05

## 2023-05-13 RX ADMIN — MELATONIN 6 MG: at 08:05

## 2023-05-13 RX ADMIN — PANTOPRAZOLE SODIUM 40 MG: 40 TABLET, DELAYED RELEASE ORAL at 09:05

## 2023-05-13 RX ADMIN — Medication 400 MG: at 08:05

## 2023-05-13 RX ADMIN — SIMETHICONE 125 MG: 125 TABLET, CHEWABLE ORAL at 08:05

## 2023-05-13 RX ADMIN — PIPERACILLIN AND TAZOBACTAM 3.38 G: 3; .375 INJECTION, POWDER, LYOPHILIZED, FOR SOLUTION INTRAVENOUS at 01:05

## 2023-05-13 RX ADMIN — GABAPENTIN 100 MG: 100 CAPSULE ORAL at 05:05

## 2023-05-13 RX ADMIN — ACETAMINOPHEN 650 MG: 325 TABLET ORAL at 09:05

## 2023-05-13 RX ADMIN — SIMETHICONE 125 MG: 125 TABLET, CHEWABLE ORAL at 09:05

## 2023-05-13 RX ADMIN — VALSARTAN 320 MG: 160 TABLET, FILM COATED ORAL at 09:05

## 2023-05-13 RX ADMIN — SIMETHICONE 125 MG: 125 TABLET, CHEWABLE ORAL at 01:05

## 2023-05-13 RX ADMIN — CETIRIZINE HYDROCHLORIDE 10 MG: 10 TABLET, FILM COATED ORAL at 09:05

## 2023-05-13 RX ADMIN — TRAZODONE HYDROCHLORIDE 25 MG: 50 TABLET ORAL at 08:05

## 2023-05-13 RX ADMIN — PIPERACILLIN AND TAZOBACTAM 3.38 G: 3; .375 INJECTION, POWDER, LYOPHILIZED, FOR SOLUTION INTRAVENOUS at 02:05

## 2023-05-13 RX ADMIN — LEVOTHYROXINE SODIUM 50 MCG: 0.05 TABLET ORAL at 05:05

## 2023-05-13 RX ADMIN — SERTRALINE HYDROCHLORIDE 25 MG: 25 TABLET ORAL at 09:05

## 2023-05-14 LAB
GLUCOSE SERPL-MCNC: 112 MG/DL (ref 70–105)
GLUCOSE SERPL-MCNC: 121 MG/DL (ref 70–105)
GLUCOSE SERPL-MCNC: 159 MG/DL (ref 70–105)

## 2023-05-14 PROCEDURE — 82962 GLUCOSE BLOOD TEST: CPT

## 2023-05-14 PROCEDURE — 63600175 PHARM REV CODE 636 W HCPCS: Performed by: HOSPITALIST

## 2023-05-14 PROCEDURE — 11000004 HC SNF PRIVATE

## 2023-05-14 PROCEDURE — 25000003 PHARM REV CODE 250: Performed by: HOSPITALIST

## 2023-05-14 PROCEDURE — 27000958

## 2023-05-14 RX ADMIN — SERTRALINE HYDROCHLORIDE 25 MG: 25 TABLET ORAL at 09:05

## 2023-05-14 RX ADMIN — Medication 400 MG: at 08:05

## 2023-05-14 RX ADMIN — PIPERACILLIN AND TAZOBACTAM 3.38 G: 3; .375 INJECTION, POWDER, LYOPHILIZED, FOR SOLUTION INTRAVENOUS at 02:05

## 2023-05-14 RX ADMIN — Medication 400 MG: at 09:05

## 2023-05-14 RX ADMIN — PANTOPRAZOLE SODIUM 40 MG: 40 TABLET, DELAYED RELEASE ORAL at 09:05

## 2023-05-14 RX ADMIN — DULOXETINE 30 MG: 30 CAPSULE, DELAYED RELEASE ORAL at 09:05

## 2023-05-14 RX ADMIN — VALSARTAN 320 MG: 160 TABLET, FILM COATED ORAL at 09:05

## 2023-05-14 RX ADMIN — SIMETHICONE 125 MG: 125 TABLET, CHEWABLE ORAL at 02:05

## 2023-05-14 RX ADMIN — GABAPENTIN 100 MG: 100 CAPSULE ORAL at 06:05

## 2023-05-14 RX ADMIN — LEVOTHYROXINE SODIUM 50 MCG: 0.05 TABLET ORAL at 05:05

## 2023-05-14 RX ADMIN — MELATONIN 6 MG: at 08:05

## 2023-05-14 RX ADMIN — SIMETHICONE 125 MG: 125 TABLET, CHEWABLE ORAL at 06:05

## 2023-05-14 RX ADMIN — SIMETHICONE 125 MG: 125 TABLET, CHEWABLE ORAL at 09:05

## 2023-05-14 RX ADMIN — PIPERACILLIN AND TAZOBACTAM 3.38 G: 3; .375 INJECTION, POWDER, LYOPHILIZED, FOR SOLUTION INTRAVENOUS at 01:05

## 2023-05-14 RX ADMIN — CETIRIZINE HYDROCHLORIDE 10 MG: 10 TABLET, FILM COATED ORAL at 09:05

## 2023-05-14 RX ADMIN — ACETAMINOPHEN 650 MG: 325 TABLET ORAL at 09:05

## 2023-05-14 RX ADMIN — SIMETHICONE 125 MG: 125 TABLET, CHEWABLE ORAL at 08:05

## 2023-05-14 RX ADMIN — TRAZODONE HYDROCHLORIDE 25 MG: 50 TABLET ORAL at 08:05

## 2023-05-14 NOTE — PLAN OF CARE
Problem: Adult Inpatient Plan of Care  Goal: Optimal Comfort and Wellbeing  5/14/2023 0446 by Jovita Pennington, RN  Outcome: Ongoing, Progressing  5/14/2023 0156 by Jovita Pennington, RN  Outcome: Ongoing, Progressing  Goal: Readiness for Transition of Care  Outcome: Ongoing, Progressing     Problem: Fall Injury Risk  Goal: Absence of Fall and Fall-Related Injury  Outcome: Ongoing, Progressing   Remind to call for assistance

## 2023-05-15 PROBLEM — N39.0 URINARY TRACT INFECTION DUE TO EXTENDED-SPECTRUM BETA LACTAMASE (ESBL) PRODUCING ESCHERICHIA COLI: Status: ACTIVE | Noted: 2023-05-15

## 2023-05-15 PROBLEM — Z16.12 INFECTION DUE TO EXTENDED SPECTRUM BETA LACTAMASE (ESBL) PRODUCING BACTERIA: Status: ACTIVE | Noted: 2023-05-15

## 2023-05-15 PROBLEM — A49.9 INFECTION DUE TO EXTENDED SPECTRUM BETA LACTAMASE (ESBL) PRODUCING BACTERIA: Status: ACTIVE | Noted: 2023-05-15

## 2023-05-15 PROBLEM — B96.29 URINARY TRACT INFECTION DUE TO EXTENDED-SPECTRUM BETA LACTAMASE (ESBL) PRODUCING ESCHERICHIA COLI: Status: ACTIVE | Noted: 2023-05-15

## 2023-05-15 LAB
GLUCOSE SERPL-MCNC: 103 MG/DL (ref 70–105)
GLUCOSE SERPL-MCNC: 105 MG/DL (ref 70–105)
GLUCOSE SERPL-MCNC: 160 MG/DL (ref 70–105)
GLUCOSE SERPL-MCNC: 168 MG/DL (ref 70–105)

## 2023-05-15 PROCEDURE — 63600175 PHARM REV CODE 636 W HCPCS: Performed by: HOSPITALIST

## 2023-05-15 PROCEDURE — 97163 PT EVAL HIGH COMPLEX 45 MIN: CPT

## 2023-05-15 PROCEDURE — 11000004 HC SNF PRIVATE

## 2023-05-15 PROCEDURE — 97110 THERAPEUTIC EXERCISES: CPT

## 2023-05-15 PROCEDURE — 97530 THERAPEUTIC ACTIVITIES: CPT

## 2023-05-15 PROCEDURE — 99305 PR NURSING FACILITY CARE, INIT, MOD SEVERITY: ICD-10-PCS | Mod: AI,,, | Performed by: HOSPITALIST

## 2023-05-15 PROCEDURE — 27000958

## 2023-05-15 PROCEDURE — 82962 GLUCOSE BLOOD TEST: CPT

## 2023-05-15 PROCEDURE — 25000003 PHARM REV CODE 250: Performed by: HOSPITALIST

## 2023-05-15 PROCEDURE — 99305 1ST NF CARE MODERATE MDM 35: CPT | Mod: AI,,, | Performed by: HOSPITALIST

## 2023-05-15 RX ADMIN — SERTRALINE HYDROCHLORIDE 25 MG: 25 TABLET ORAL at 08:05

## 2023-05-15 RX ADMIN — PIPERACILLIN AND TAZOBACTAM 3.38 G: 3; .375 INJECTION, POWDER, LYOPHILIZED, FOR SOLUTION INTRAVENOUS at 02:05

## 2023-05-15 RX ADMIN — Medication 400 MG: at 08:05

## 2023-05-15 RX ADMIN — PIPERACILLIN AND TAZOBACTAM 4.5 G: 4; .5 INJECTION, POWDER, LYOPHILIZED, FOR SOLUTION INTRAVENOUS at 01:05

## 2023-05-15 RX ADMIN — CETIRIZINE HYDROCHLORIDE 10 MG: 10 TABLET, FILM COATED ORAL at 08:05

## 2023-05-15 RX ADMIN — GABAPENTIN 100 MG: 100 CAPSULE ORAL at 04:05

## 2023-05-15 RX ADMIN — SIMETHICONE 125 MG: 125 TABLET, CHEWABLE ORAL at 08:05

## 2023-05-15 RX ADMIN — SIMETHICONE 125 MG: 125 TABLET, CHEWABLE ORAL at 01:05

## 2023-05-15 RX ADMIN — VALSARTAN 320 MG: 160 TABLET, FILM COATED ORAL at 08:05

## 2023-05-15 RX ADMIN — PIPERACILLIN AND TAZOBACTAM 4.5 G: 4; .5 INJECTION, POWDER, LYOPHILIZED, FOR SOLUTION INTRAVENOUS at 08:05

## 2023-05-15 RX ADMIN — ACETAMINOPHEN 650 MG: 325 TABLET ORAL at 08:05

## 2023-05-15 RX ADMIN — LEVOTHYROXINE SODIUM 50 MCG: 0.05 TABLET ORAL at 05:05

## 2023-05-15 RX ADMIN — SIMETHICONE 125 MG: 125 TABLET, CHEWABLE ORAL at 04:05

## 2023-05-15 RX ADMIN — MELATONIN 6 MG: at 08:05

## 2023-05-15 RX ADMIN — TRAZODONE HYDROCHLORIDE 25 MG: 50 TABLET ORAL at 08:05

## 2023-05-15 RX ADMIN — PANTOPRAZOLE SODIUM 40 MG: 40 TABLET, DELAYED RELEASE ORAL at 08:05

## 2023-05-15 RX ADMIN — DULOXETINE 30 MG: 30 CAPSULE, DELAYED RELEASE ORAL at 08:05

## 2023-05-15 NOTE — HPI
89yo WF with hx of Dementia, Depression, HTN, GERD admit from NH initially with UTI.  Culture grew out ESBL and S to Zosyn.  She is admitted to [facility] to continue with IV ABX and will finish on late Thursday.  She has no complaints today.  Eating her lunch. No SOB or CP.

## 2023-05-15 NOTE — PT/OT/SLP EVAL
"Physical Therapy Evaluation    Patient Name:  Joyce Dow   MRN:  [id number]    Recommendations:     Discharge Recommendations: intermediate care facility/nursing home   Discharge Equipment Recommendations: none   Barriers to discharge:  urinary tract infection    Assessment:     Joyce Dow is a 89 y.o. female admitted with a medical diagnosis of Urinary tract infection due to extended-spectrum beta lactamase (ESBL) producing Escherichia coli.  She presents with the following impairments/functional limitations: weakness, impaired endurance, impaired functional mobility, gait instability, impaired balance, decreased lower extremity function, pain, decreased ROM .    Pt admitted to Swing Bed for ABT for UTI. Pt is schedule to finished ABT late Thurs.        Rehab Prognosis: Fair; patient would benefit from acute skilled PT services to address these deficits and reach maximum level of function.    Recent Surgery: * No surgery found *      Plan:     During this hospitalization, patient to be seen 5 x/week to address the identified rehab impairments via gait training, therapeutic activities, therapeutic exercises and progress toward the following goals:    Plan of Care Expires:  05/26/23    Subjective     Chief Complaint: Pt c/o left knee buckling during amb. States left hip only hurts "when I move it."  Patient/Family Comments/goals: Pt to dc back to Nursing home when ABT completed which is scheduled to finish late Thurs 5/18/23.  Pain/Comfort:  Pain Rating 1: 10/10  Location - Side 1: Left  Location - Orientation 1: lower  Location 1: hip  Pain Addressed 1: Cessation of Activity  Pain Rating Post-Intervention 1: 0/10    Patients cultural, spiritual, Sabianism conflicts given the current situation: no    Living Environment:  Pt has lived at a local nursing home for the past two years.  Prior to admission, patients level of function was Modif indep/svn using RW. Pt will intermittently use MWC for mobility " when experiencing pain.  Equipment used at home: walker, rolling, wheelchair, bedside commode, rollator, hospital bed, shower chair.  DME owned (not currently used): none.  Upon discharge, patient will have assistance from nursing home staff.    Objective:     Communicated with Sangeeta Kaur RN prior to session. Wear gloves if around pt's urine. No need to use gown. Patient found HOB elevated with bed alarm, peripheral IV  upon PT entry to room.    General Precautions: Standard, fall, diabetic, vision impaired  Orthopedic Precautions:    Braces:    Respiratory Status: Room air    Exams:  RLE ROM: Deficits: tight calf  RLE Strength: Deficits: 3-/5 hip flex, 3/5 hip abd, 3+/5 knee ext, 4+/5 PF  LLE ROM: Deficits: tight calf  LLE Strength: Deficits: 3-/5 hip flex, 3/5 hip abd, 3/5 knee ext, 4+/5 PF    Functional Mobility:  Bed Mobility:     Rolling Right: stand by assistance  Scooting: stand by assistance  Supine to Sit: stand by assistance  Sit to Supine: stand by assistance  Transfers:     Sit to Stand:  moderate assistance with rolling walker  Bed to Chair: moderate assistance with  no AD  using  Squat Pivot  Gait: Pt amb'd x 2 ft using RW, but had to sit down due to left knee buckled. Pt was already fatigued having stood several minutes to don diaper after toileting      AM-PAC 6 CLICK MOBILITY  Total Score:15       Treatment & Education:  Eval completed. PT discussed with pt POC and treatment options.    Patient left HOB elevated with all lines intact, call button in reach, and bed alarm on.    GOALS:   Multidisciplinary Problems       Physical Therapy Goals          Problem: Physical Therapy    Goal Priority Disciplines Outcome Goal Variances Interventions   Physical Therapy Goal     PT, PT/OT Ongoing, Progressing     Description: LTG - 2 weeks  1. Pt's ROM kaveh ankle DF will be WFL.  2. Pt will transfer with CGA.  3. Pt will amb using RW x 50 ft with CGA.                       History:     Past Medical History:    Diagnosis Date    Allergic rhinitis     Anticoagulant long-term use     Anxiety disorder, unspecified     Arthritis     Chronic pain     Depression     GERD (gastroesophageal reflux disease)     hyperlipidemia     Hypertension     Hyponatremia     Hypoxemia     Insomnia     Neuropathy     Thyroid disease     Type 2 diabetes mellitus     Urinary tract infection, site not specified        No past surgical history on file.    Time Tracking:     PT Received On: 05/15/23  PT Start Time: 1351     PT Stop Time: 1414  PT Total Time (min): 23 min     Billable Minutes: Evaluation 23 min      05/15/2023

## 2023-05-15 NOTE — ASSESSMENT & PLAN NOTE
Patient has single episode of depression which is mild and is currently controlled. Will Continue anti-depressant medications. We will not consult psychiatry at this time. Patient does not display psychosis at this time. Continue to monitor closely and adjust plan of care as needed.

## 2023-05-15 NOTE — CONSULTS
"[facility]    Clinical Nutrition Assessment          Date: 5/15/2023 10:11 AM    MD consult received re: Assess/educate regarding nutritional needs     Joyce Dow is a 89 y.o. female admitted from ED for UTI, FARHAT, and hyponatremia. PMH includes persistent depression that is mild and controlled with medication.      There are no hospital problems to display for this patient.      PMH:  has a past medical history of Allergic rhinitis, Anticoagulant long-term use, Anxiety disorder, unspecified, Arthritis, Chronic pain, Depression, GERD (gastroesophageal reflux disease), hyperlipidemia, Hypertension, Hyponatremia, Hypoxemia, Insomnia, Neuropathy, Thyroid disease, Type 2 diabetes mellitus, and Urinary tract infection, site not specified.    Interval History:   5/9: Nausea and vomiting, administered Zofran. Unable to tolerate Abx.   5/10: IVFs for hyponatremia, following labs  5/12: Discharged from acute services and readmitted to swing bed for IV abx for ESBL UTI.   5/15: Noted plans to return to NH after completion of IV abx therapy    Diet Order: Diet Dysphagia Mechanical Soft  Oral Supplements: none     5/11: Independent with eating with tray set-up. Poor appetite per RN documentation. Eating ~25% at meals.   5/15: Good appetite, eating ~75% meals     Anthropometrics:   Height: 5' 8" (172.7 cm)  Weight: 65.9 kg (145 lb 4.5 oz)  IBW: 140 lbs  BMI (Calculated): 22.1  BMI Classification: Borderline underweight per geriatric guidelines for BMI     Labs:   Recent Labs   Lab 05/13/23  0515   *   K 4.3   BUN 10   CREATININE 0.89      CALCIUM 8.7   CL 95*       Last A1c:   Lab Results   Component Value Date    HGBA1C 5.7 04/19/2023     Lab Results   Component Value Date    RBC 3.14 (L) 05/13/2023    HGB 9.6 (L) 05/13/2023    HCT 30.0 (L) 05/13/2023    MCV 95.5 05/13/2023    MCH 30.6 05/13/2023    MCHC 32.0 05/13/2023       Meds:   Scheduled Meds:   cetirizine  10 mg " "Oral Daily    DULoxetine  30 mg Oral Daily    gabapentin  100 mg Oral Daily    levothyroxine  50 mcg Oral Before breakfast    magnesium oxide  400 mg Oral BID    pantoprazole  40 mg Oral Daily    piperacillin-tazobactam (ZOSYN) IVPB  3.375 g Intravenous Q12H    sertraline  25 mg Oral Daily    simethicone  125 mg Oral QID    traZODone  25 mg Oral QHS    valsartan  320 mg Oral Daily       PRN Meds:.acetaminophen, aluminum-magnesium hydroxide-simethicone, bisacodyL, calcium carbonate, dextrose, glucagon (human recombinant), insulin aspart U-100, melatonin, senna-docusate 8.6-50 mg, zinc oxide-cod liver oil    Physical Review:  General: alert/ disoriented to place, noted "tooth/teeth missing"   Abd/GI: non-distended, +BSx4Q's   Last Bowel Movement: 23  Ext:  no edema noted   Skin:   Altered skin integrity to lower coccyx (incontinence associated dermatitis - skin intact)    Curtis Score:   Curtis Risk Assessment  Sensory Perception: 4-->no impairment  Moisture: 3-->occasionally moist  Activity: 3-->walks occasionally  Mobility: 3-->slightly limited  Nutrition: 3-->adequate  Friction and Shear: 3-->no apparent problem  Curtis Score: 19    Malnutrition Screening Tool  Have you recently lost weight without trying?: Unsure  Weight loss score: 2  Have you been eating poorly because of a decreased appetite?: No  Appetite score: 0  MST Score: 2    Estimated Nutrition Needs:   25-30 kcal/[id number] kcal/day  1-1.3 g pro/k-80 g protein/day   0560-4194 ml fluid/day    Nutrition Diagnosis:  Altered nutrition-related lab values RT hyponatremia AEB decreased sodium (improving)       Recommendations/ Intervention:  Continue current diet as tolerated    Offer Ensure or Boost (flavor of pt's choice) when <50% meal consumed    Encourage fluids    Weekly weights    RD to monitor po intake, wt, nutrition-related labs, skin changes, and meds     Nutrition Risk: moderate    Signed  Katerina Wynn, MS, RD, LD  Available via " SecureChat

## 2023-05-15 NOTE — PT/OT/SLP EVAL
SLP Screen    Date:5/15/23    SLP Screen completed this date. No skilled ST services warranted at this time. Reconsult ST upon change in status.     Ranjana Becerra M.S. [facility]-SLP

## 2023-05-15 NOTE — PLAN OF CARE
05/15/23 0817   Discharge Assessment   Assessment Type Discharge Planning Assessment   Confirmed/corrected address, phone number and insurance Yes   Source of Information patient   Reason For Admission IV abx   Facility Arrived From: [facility]   Do you expect to return to your current living situation? Yes   Discharge Plan A Return to nursing home     Patient admitted to [facility] for IV abx therapy due to ESBL in urine and NH is unable to provide IV medication. Plan to return to NH after completion. I have spoke with Fidelia at the NH, dc plan explained. She reports NH may dc patient but 'she will have to see' will continue to monitor for dc needs

## 2023-05-15 NOTE — PT/OT/SLP PROGRESS
"Occupational Therapy  Treatment    Joyce Dow   MRN: [id number]   Admitting Diagnosis: <principal problem not specified>    OT Date of Treatment: 05/15/23   OT Start Time: 1018  OT Stop Time: 1045  OT Total Time (min): 27 min    Billable Minutes:  Therapeutic Activity 10 and Therapeutic Exercise 17               General Precautions: Standard, fall  Orthopedic Precautions: Full weight bearing  Braces: N/A  Respiratory Status: Room air         Subjective:  Communicated with pt and nursing prior to session.  No new complaints; new precautions for urine bacteria, CONTACT PRECAUTIONS in place       Objective:        Functional Mobility:  Bed Mobility: MIN A       Transfers: MOD/JORGE hand held assist from bed to w/c, modified stand  pivot        Functional Ambulation: see PT    Activities of Daily Living:     Feeding adaptive equipment:  none needed     UE adaptive equipment: none needed     LE adaptive equipment: TBD                    Bathing adaptive equipment: TBD    Balance:   Static Sit: FAIR+: Able to take MINIMAL challenges from all directions  Dynamic Sit: FAIR+: Maintains balance through MINIMAL excursions of active trunk motion  Static Stand: POOR+: Needs MINIMAL assist to maintain  Dynamic stand: POOR: Needs MOD (moderate) assist during gait    Therapeutic Activities and Exercises:  OT engaged pt in UBE x 10 min at low resist for endurance training with good results with encouragement to come to completion.  Then, pt completed tabletop towel scrub x 6 min in multi direction nonweighted.  She performed this well, reaching side to side and forward for engaging trunk to promote strength for transfers.  Gripper also completed by pt at level 2 resist x 10 reps x 3 sets each hand before pt returning to her room for rest.      Pt asked OT to call her son on her phone for her because "I don't know how to use that thing."  OT called son and phone ringing for pt as OT left room.    AM-PAC 6 CLICK ADL   How much " "help from another person does this patient currently need?   1 = Unable, Total/Dependent Assistance  2 = A lot, Maximum/Moderate Assistance  3 = A little, Minimum/Contact Guard/Supervision  4 = None, Modified [place]/Independent    Putting on and taking off regular lower body clothing? : 2  Bathing (including washing, rinsing, drying)?: 2  Toileting, which includes using toilet, bedpan, or urinal? : 2  Putting on and taking off regular upper body clothing?: 3  Taking care of personal grooming such as brushing teeth?: 3  Eating meals?: 3  Daily Activity Total Score: 15     AM-PAC Raw Score CMS "G-Code Modifier Level of Impairment Assistance   6 % Total / Unable   7 - 8 CM 80 - 100% Maximal Assist   9-13 CL 60 - 80% Moderate Assist   14 - 19 CK 40 - 60% Moderate Assist   20 - 22 CJ 20 - 40% Minimal Assist   23 CI 1-20% SBA / CGA   24 CH 0% Independent/ Mod I       Patient left up in chair with call button in reach, chair alarm on, and nursing notified    ASSESSMENT:  Joyce Dow is a 89 y.o. female with a medical diagnosis of UTI and presents with no new complaints.    Rehab identified problem list/impairments:  weakness, impaired endurance, impaired self care skills, impaired functional mobility, impaired balance    Rehab potential is good.    Activity tolerance: Good    Discharge recommendations: nursing facility, skilled   Barriers to discharge: Barriers to Discharge: None    Equipment recommendations: none    GOALS:   Multidisciplinary Problems       Occupational Therapy Goals          Problem: Occupational Therapy    Goal Priority Disciplines Outcome Interventions   Occupational Therapy Goal     OT, PT/OT Ongoing, Progressing    Description: STG: within 1 week  Pt will perform grooming with setup at sinkside MET  Pt will bathe with min a at sinkside ONGOING/PROGRESSING  Pt will perform UE dressing with svn at sinkside ONGOING/PROGRESSING  Pt will perform LE dressing with min a bedside " ONGOING/PROGRESSING  Pt will sit EOB x 30 min with svn assistance ONGOING/PROGRESSING  Pt will transfer bed/chair/bsc with sba ONGOING/PROGRESSING  Pt will perform standing task x 3 min with svn assistance ONGOING/PROGRESSING  Pt will tolerate 30 minutes of tx without fatigue MET      LT.Restore to max I with self care and mobility.                          Plan:  Patient to be seen 5 x/week to address the above listed problems via self-care/home management, therapeutic activities, therapeutic exercises, wheelchair management/training  Plan of Care expires: 23  Plan of Care reviewed with: patient, other (see comments) (CNA and nursing; OT indicated to the nurses that pt needs assist to transfer but can sit up as tolerated this weekend to improve strength and attending to environment more effectively)         05/15/2023

## 2023-05-15 NOTE — PLAN OF CARE
Problem: Physical Therapy  Goal: Physical Therapy Goal  Description: LTG - 2 weeks  1. Pt's ROM kaveh ankle DF will be WFL.  2. Pt will transfer with CGA.  3. Pt will amb using RW x 50 ft with CGA.  Outcome: Ongoing, Progressing

## 2023-05-15 NOTE — PLAN OF CARE
Problem: Adult Inpatient Plan of Care  Goal: Plan of Care Review  Outcome: Ongoing, Progressing  Flowsheets (Taken 5/15/2023 1755)  Plan of Care Reviewed With: patient  Goal: Optimal Comfort and Wellbeing  Outcome: Ongoing, Progressing  Intervention: Monitor Pain and Promote Comfort  Flowsheets (Taken 5/15/2023 1755)  Pain Management Interventions:   care clustered   pain management plan reviewed with patient/caregiver   position adjusted   warm blanket provided  Intervention: Provide Person-Centered Care  Flowsheets (Taken 5/15/2023 1755)  Trust Relationship/Rapport:   care explained   emotional support provided   empathic listening provided   questions answered   thoughts/feelings acknowledged   reassurance provided     Problem: Fall Injury Risk  Goal: Absence of Fall and Fall-Related Injury  Outcome: Ongoing, Progressing  Intervention: Identify and Manage Contributors  Flowsheets (Taken 5/15/2023 1755)  Self-Care Promotion:   BADL personal objects within reach   meal set-up provided  Medication Review/Management: medications reviewed  Intervention: Promote Injury-Free Environment  Flowsheets (Taken 5/15/2023 1755)  Safety Promotion/Fall Prevention:   side rails raised x 2   room near unit station   nonskid shoes/socks when out of bed   assistive device/personal item within reach   bed alarm set   chair alarm set   Fall Risk signage in place   Fall Risk reviewed with patient/family   high risk medications identified     Problem: Infection  Goal: Absence of Infection Signs and Symptoms  Outcome: Ongoing, Progressing  Intervention: Prevent or Manage Infection  Flowsheets (Taken 5/15/2023 1755)  Infection Management: aseptic technique maintained     Problem: Fall Injury Risk  Goal: Absence of Fall and Fall-Related Injury  Outcome: Ongoing, Progressing  Intervention: Identify and Manage Contributors  Flowsheets (Taken 5/15/2023 1755)  Self-Care Promotion:   BADL personal objects within reach   meal set-up  provided  Medication Review/Management: medications reviewed  Intervention: Promote Injury-Free Environment  Flowsheets (Taken 5/15/2023 1755)  Safety Promotion/Fall Prevention:   side rails raised x 2   room near unit station   nonskid shoes/socks when out of bed   assistive device/personal item within reach   bed alarm set   chair alarm set   Fall Risk signage in place   Fall Risk reviewed with patient/family   high risk medications identified     Problem: Infection  Goal: Absence of Infection Signs and Symptoms  Outcome: Ongoing, Progressing  Intervention: Prevent or Manage Infection  Flowsheets (Taken 5/15/2023 1755)  Infection Management: aseptic technique maintained

## 2023-05-15 NOTE — PLAN OF CARE
Problem: Occupational Therapy  Goal: Occupational Therapy Goal  Description: STG: within 1 week  Pt will perform grooming with setup at sinkside MET  Pt will bathe with min a at sinkside ONGOING/PROGRESSING  Pt will perform UE dressing with svn at sinkside ONGOING/PROGRESSING  Pt will perform LE dressing with min a bedside ONGOING/PROGRESSING  Pt will sit EOB x 30 min with svn assistance ONGOING/PROGRESSING  Pt will transfer bed/chair/bsc with sba ONGOING/PROGRESSING  Pt will perform standing task x 3 min with svn assistance ONGOING/PROGRESSING  Pt will tolerate 30 minutes of tx without fatigue MET      LT.Restore to max I with self care and mobility.     Outcome: Ongoing, Progressing

## 2023-05-15 NOTE — H&P
[facility] Medicine  History & Physical    Patient Name: Joyce Dow  MRN: [id number]  Patient Class: IP- Swing  Admission Date: 5/12/2023  Attending Physician: Darinel Kwok DO   Primary Care Provider: Walker Watkins MD         Patient information was obtained from patient, past medical records and ER records.     Subjective:     Principal Problem:Urinary tract infection due to extended-spectrum beta lactamase (ESBL) producing Escherichia coli    Chief Complaint: No chief complaint on file.       HPI: 88yo WF with hx of Dementia, Depression, HTN, GERD admit from NH initially with UTI.  Culture grew out ESBL and S to Zosyn.  She is admitted to [facility] to continue with IV ABX and will finish on late Thursday.  She has no complaints today.  Eating her lunch. No SOB or CP.      Past Medical History:   Diagnosis Date    Allergic rhinitis     Anticoagulant long-term use     Anxiety disorder, unspecified     Arthritis     Chronic pain     Depression     GERD (gastroesophageal reflux disease)     hyperlipidemia     Hypertension     Hyponatremia     Hypoxemia     Insomnia     Neuropathy     Thyroid disease     Type 2 diabetes mellitus     Urinary tract infection, site not specified        No past surgical history on file.    Review of patient's allergies indicates:   Allergen Reactions    Sulfa (sulfonamide antibiotics)        No current facility-administered medications on file prior to encounter.     Current Outpatient Medications on File Prior to Encounter   Medication Sig    acetaminophen (TYLENOL EXTRA STRENGTH) 500 MG tablet Take 1,000 mg by mouth every 4 (four) hours as needed for Pain. Temp or pain    acetaminophen (TYLENOL) 650 MG Supp Place rectally every 4 (four) hours as needed.    alpha-D-galactosidase (BEANO ORAL) Take 2 tablets by mouth 3 (three) times daily.    calcium carb/magnesium hydrox (MYLANTA ORAL) Take 10 mLs by mouth every 4  (four) hours as needed.    cetirizine (ZYRTEC) 10 MG tablet Take 10 mg by mouth once daily.    DULCOLAX, BISACODYL, RECT Place rectally daily as needed. constipation    DULoxetine (CYMBALTA) 30 MG capsule Take 30 mg by mouth once daily. 9am    fluticasone propionate (FLONASE) 50 mcg/actuation nasal spray 1 spray by Each Nostril route once daily. 9am    gabapentin (NEURONTIN) 100 MG capsule Take 100 mg by mouth Daily.    levothyroxine (SYNTHROID) 50 MCG tablet Take 50 mcg by mouth before breakfast.    magnesium 250 mg Tab Take 500 mg by mouth 2 (two) times a day.    magnesium hydroxide 400 mg/5 ml (MILK OF MAGNESIA) 400 mg/5 mL Susp Take 60 mLs by mouth daily as needed.    meloxicam (MOBIC) 7.5 MG tablet Take 7.5 mg by mouth once daily.    metFORMIN (FORTAMET) 500 mg 24hr tablet Take 1,000 mg by mouth daily with breakfast.    metFORMIN (GLUCOPHAGE) 500 MG tablet Take 500 mg by mouth every evening.    olmesartan (BENICAR) 40 MG tablet Take 40 mg by mouth once daily.    ondansetron (ZOFRAN) 4 MG tablet Take 4 mg by mouth every 4 (four) hours as needed for Nausea.    pantoprazole (PROTONIX) 40 MG tablet Take 40 mg by mouth once daily.    polyethylene glycol (GLYCOLAX) 17 gram PwPk Take 17 g by mouth once daily. Mix in 4 oz H20    sennosides 8.8 mg/5 ml (SENOKOT) 8.8 mg/5 mL syrup Take 10 mLs by mouth daily as needed.    sertraline (ZOLOFT) 25 MG tablet Take 25 mg by mouth once daily. morning    simethicone (MYLICON) 80 MG chewable tablet Take 160 mg by mouth 4 (four) times daily. After meals and at HS     give 2 80 mg tabs= 160mg    simvastatin (ZOCOR) 40 MG tablet Take 40 mg by mouth every evening.    sod.chlorid-potassium chloride (THERMOTABS) 287-180-15 mg Tab Take by mouth 3 (three) times daily.    trazodone (DESYREL) 25 MG tablet Take by mouth every evening.    vits A and D-white pet-lanolin ointment Apply topically as needed for Dry Skin.    zinc oxide/cod liver oil (DESITIN TOP) Apply  topically daily as needed. Desitin ointment     Family History    None       Tobacco Use    Smoking status: Unknown    Smokeless tobacco: Not on file   Substance and Sexual Activity    Alcohol use: Not Currently    Drug use: Not Currently    Sexual activity: Not Currently     Review of Systems   Constitutional:  Negative for appetite change, chills and fever.   Respiratory:  Negative for cough, shortness of breath and wheezing.    Cardiovascular:  Negative for chest pain, palpitations and leg swelling.   Gastrointestinal:  Negative for abdominal distention, diarrhea, nausea and vomiting.   Genitourinary:  Negative for dysuria.   Skin:  Negative for rash.   Neurological:  Positive for weakness. Negative for dizziness, seizures and syncope.   Psychiatric/Behavioral:  Negative for agitation, behavioral problems and confusion.         Confusion at times but good this am.   All other systems reviewed and are negative.  Objective:     Vital Signs (Most Recent):  Temp: 97.8 °F (36.6 °C) (05/15/23 0702)  Pulse: 65 (05/15/23 0702)  Resp: 18 (05/15/23 0702)  BP: (!) 144/61 (05/15/23 0702)  SpO2: 96 % (05/15/23 0702) Vital Signs (24h Range):  Temp:  [97.8 °F (36.6 °C)-98 °F (36.7 °C)] 97.8 °F (36.6 °C)  Pulse:  [65-91] 65  Resp:  [18-20] 18  SpO2:  [94 %-96 %] 96 %  BP: (142-144)/(61-79) 144/61     Weight: 65.9 kg (145 lb 4.5 oz)  Body mass index is 22.09 kg/m².     Physical Exam  Vitals reviewed.   Constitutional:       General: She is not in acute distress.     Appearance: Normal appearance.   HENT:      Head: Normocephalic and atraumatic.   Eyes:      General: No scleral icterus.     Extraocular Movements: Extraocular movements intact.      Conjunctiva/sclera: Conjunctivae normal.      Pupils: Pupils are equal, round, and reactive to light.   Cardiovascular:      Rate and Rhythm: Normal rate and regular rhythm.      Heart sounds: No murmur heard.    No friction rub. No gallop.   Pulmonary:      Effort: Pulmonary effort  is normal. No respiratory distress.      Breath sounds: Normal breath sounds. No wheezing or rales.   Abdominal:      General: Abdomen is flat. Bowel sounds are normal. There is no distension.      Palpations: Abdomen is soft.      Tenderness: There is no abdominal tenderness. There is no guarding.   Musculoskeletal:         General: No swelling.      Right lower leg: No edema.      Left lower leg: No edema.   Skin:     General: Skin is warm and dry.      Coloration: Skin is not jaundiced.      Findings: No rash.   Neurological:      General: No focal deficit present.      Mental Status: She is alert and oriented to person, place, and time.      Sensory: No sensory deficit.      Motor: Weakness present.   Psychiatric:         Mood and Affect: Mood normal.         Behavior: Behavior normal.            CRANIAL NERVES     CN III, IV, VI   Pupils are equal, round, and reactive to light.     Significant Labs: All pertinent labs within the past 24 hours have been reviewed.  Recent Lab Results         05/15/23  1119   05/15/23  0552   05/14/23 2027        POC Glucose 105   103   159               Significant Imaging: I have reviewed all pertinent imaging results/findings within the past 24 hours.    Assessment/Plan:     * Urinary tract infection due to extended-spectrum beta lactamase (ESBL) producing Escherichia coli  Zosyn for a 7 day course.  Then back to NH.      GERD (gastroesophageal reflux disease)  Stable      Persistent depressive disorder  Patient has single episode of depression which is mild and is currently controlled. Will Continue anti-depressant medications. We will not consult psychiatry at this time. Patient does not display psychosis at this time. Continue to monitor closely and adjust plan of care as needed.        HTN (hypertension)  Resume home meds.       VTE Risk Mitigation (From admission, onward)    None                     Darinel Kwok DO  Department of Hospital Medicine  [facility]

## 2023-05-15 NOTE — SUBJECTIVE & OBJECTIVE
Past Medical History:   Diagnosis Date    Allergic rhinitis     Anticoagulant long-term use     Anxiety disorder, unspecified     Arthritis     Chronic pain     Depression     GERD (gastroesophageal reflux disease)     hyperlipidemia     Hypertension     Hyponatremia     Hypoxemia     Insomnia     Neuropathy     Thyroid disease     Type 2 diabetes mellitus     Urinary tract infection, site not specified        No past surgical history on file.    Review of patient's allergies indicates:   Allergen Reactions    Sulfa (sulfonamide antibiotics)        No current facility-administered medications on file prior to encounter.     Current Outpatient Medications on File Prior to Encounter   Medication Sig    acetaminophen (TYLENOL EXTRA STRENGTH) 500 MG tablet Take 1,000 mg by mouth every 4 (four) hours as needed for Pain. Temp or pain    acetaminophen (TYLENOL) 650 MG Supp Place rectally every 4 (four) hours as needed.    alpha-D-galactosidase (BEANO ORAL) Take 2 tablets by mouth 3 (three) times daily.    calcium carb/magnesium hydrox (MYLANTA ORAL) Take 10 mLs by mouth every 4 (four) hours as needed.    cetirizine (ZYRTEC) 10 MG tablet Take 10 mg by mouth once daily.    DULCOLAX, BISACODYL, RECT Place rectally daily as needed. constipation    DULoxetine (CYMBALTA) 30 MG capsule Take 30 mg by mouth once daily. 9am    fluticasone propionate (FLONASE) 50 mcg/actuation nasal spray 1 spray by Each Nostril route once daily. 9am    gabapentin (NEURONTIN) 100 MG capsule Take 100 mg by mouth Daily.    levothyroxine (SYNTHROID) 50 MCG tablet Take 50 mcg by mouth before breakfast.    magnesium 250 mg Tab Take 500 mg by mouth 2 (two) times a day.    magnesium hydroxide 400 mg/5 ml (MILK OF MAGNESIA) 400 mg/5 mL Susp Take 60 mLs by mouth daily as needed.    meloxicam (MOBIC) 7.5 MG tablet Take 7.5 mg by mouth once daily.    metFORMIN (FORTAMET) 500 mg 24hr tablet Take 1,000 mg by mouth daily with breakfast.    metFORMIN (GLUCOPHAGE)  500 MG tablet Take 500 mg by mouth every evening.    olmesartan (BENICAR) 40 MG tablet Take 40 mg by mouth once daily.    ondansetron (ZOFRAN) 4 MG tablet Take 4 mg by mouth every 4 (four) hours as needed for Nausea.    pantoprazole (PROTONIX) 40 MG tablet Take 40 mg by mouth once daily.    polyethylene glycol (GLYCOLAX) 17 gram PwPk Take 17 g by mouth once daily. Mix in 4 oz H20    sennosides 8.8 mg/5 ml (SENOKOT) 8.8 mg/5 mL syrup Take 10 mLs by mouth daily as needed.    sertraline (ZOLOFT) 25 MG tablet Take 25 mg by mouth once daily. morning    simethicone (MYLICON) 80 MG chewable tablet Take 160 mg by mouth 4 (four) times daily. After meals and at HS     give 2 80 mg tabs= 160mg    simvastatin (ZOCOR) 40 MG tablet Take 40 mg by mouth every evening.    sod.chlorid-potassium chloride (THERMOTABS) 287-180-15 mg Tab Take by mouth 3 (three) times daily.    trazodone (DESYREL) 25 MG tablet Take by mouth every evening.    vits A and D-white pet-lanolin ointment Apply topically as needed for Dry Skin.    zinc oxide/cod liver oil (DESITIN TOP) Apply topically daily as needed. Desitin ointment     Family History    None       Tobacco Use    Smoking status: Unknown    Smokeless tobacco: Not on file   Substance and Sexual Activity    Alcohol use: Not Currently    Drug use: Not Currently    Sexual activity: Not Currently     Review of Systems   Constitutional:  Negative for appetite change, chills and fever.   Respiratory:  Negative for cough, shortness of breath and wheezing.    Cardiovascular:  Negative for chest pain, palpitations and leg swelling.   Gastrointestinal:  Negative for abdominal distention, diarrhea, nausea and vomiting.   Genitourinary:  Negative for dysuria.   Skin:  Negative for rash.   Neurological:  Positive for weakness. Negative for dizziness, seizures and syncope.   Psychiatric/Behavioral:  Negative for agitation, behavioral problems and confusion.         Confusion at times but good this am.   All  other systems reviewed and are negative.  Objective:     Vital Signs (Most Recent):  Temp: 97.8 °F (36.6 °C) (05/15/23 0702)  Pulse: 65 (05/15/23 0702)  Resp: 18 (05/15/23 0702)  BP: (!) 144/61 (05/15/23 0702)  SpO2: 96 % (05/15/23 0702) Vital Signs (24h Range):  Temp:  [97.8 °F (36.6 °C)-98 °F (36.7 °C)] 97.8 °F (36.6 °C)  Pulse:  [65-91] 65  Resp:  [18-20] 18  SpO2:  [94 %-96 %] 96 %  BP: (142-144)/(61-79) 144/61     Weight: 65.9 kg (145 lb 4.5 oz)  Body mass index is 22.09 kg/m².     Physical Exam  Vitals reviewed.   Constitutional:       General: She is not in acute distress.     Appearance: Normal appearance.   HENT:      Head: Normocephalic and atraumatic.   Eyes:      General: No scleral icterus.     Extraocular Movements: Extraocular movements intact.      Conjunctiva/sclera: Conjunctivae normal.      Pupils: Pupils are equal, round, and reactive to light.   Cardiovascular:      Rate and Rhythm: Normal rate and regular rhythm.      Heart sounds: No murmur heard.    No friction rub. No gallop.   Pulmonary:      Effort: Pulmonary effort is normal. No respiratory distress.      Breath sounds: Normal breath sounds. No wheezing or rales.   Abdominal:      General: Abdomen is flat. Bowel sounds are normal. There is no distension.      Palpations: Abdomen is soft.      Tenderness: There is no abdominal tenderness. There is no guarding.   Musculoskeletal:         General: No swelling.      Right lower leg: No edema.      Left lower leg: No edema.   Skin:     General: Skin is warm and dry.      Coloration: Skin is not jaundiced.      Findings: No rash.   Neurological:      General: No focal deficit present.      Mental Status: She is alert and oriented to person, place, and time.      Sensory: No sensory deficit.      Motor: Weakness present.   Psychiatric:         Mood and Affect: Mood normal.         Behavior: Behavior normal.            CRANIAL NERVES     CN III, IV, VI   Pupils are equal, round, and reactive to  light.     Significant Labs: All pertinent labs within the past 24 hours have been reviewed.  Recent Lab Results         05/15/23  1119   05/15/23  0552   05/14/23 2027        POC Glucose 105   103   159               Significant Imaging: I have reviewed all pertinent imaging results/findings within the past 24 hours.

## 2023-05-16 LAB
BACTERIA BLD CULT: NORMAL
BACTERIA BLD CULT: NORMAL
GLUCOSE SERPL-MCNC: 100 MG/DL (ref 70–105)
GLUCOSE SERPL-MCNC: 117 MG/DL (ref 70–105)
GLUCOSE SERPL-MCNC: 129 MG/DL (ref 70–105)
GLUCOSE SERPL-MCNC: 169 MG/DL (ref 70–105)

## 2023-05-16 PROCEDURE — 27000958

## 2023-05-16 PROCEDURE — 11000004 HC SNF PRIVATE

## 2023-05-16 PROCEDURE — 25000003 PHARM REV CODE 250: Performed by: HOSPITALIST

## 2023-05-16 PROCEDURE — 63600175 PHARM REV CODE 636 W HCPCS: Performed by: HOSPITALIST

## 2023-05-16 PROCEDURE — 82962 GLUCOSE BLOOD TEST: CPT

## 2023-05-16 RX ADMIN — VALSARTAN 320 MG: 160 TABLET, FILM COATED ORAL at 08:05

## 2023-05-16 RX ADMIN — SERTRALINE HYDROCHLORIDE 25 MG: 25 TABLET ORAL at 08:05

## 2023-05-16 RX ADMIN — SIMETHICONE 125 MG: 125 TABLET, CHEWABLE ORAL at 01:05

## 2023-05-16 RX ADMIN — SIMETHICONE 125 MG: 125 TABLET, CHEWABLE ORAL at 08:05

## 2023-05-16 RX ADMIN — LEVOTHYROXINE SODIUM 50 MCG: 0.05 TABLET ORAL at 06:05

## 2023-05-16 RX ADMIN — TRAZODONE HYDROCHLORIDE 25 MG: 50 TABLET ORAL at 08:05

## 2023-05-16 RX ADMIN — PIPERACILLIN AND TAZOBACTAM 4.5 G: 4; .5 INJECTION, POWDER, LYOPHILIZED, FOR SOLUTION INTRAVENOUS at 01:05

## 2023-05-16 RX ADMIN — Medication 400 MG: at 08:05

## 2023-05-16 RX ADMIN — ALUMINUM HYDROXIDE, MAGNESIUM HYDROXIDE, AND SIMETHICONE 10 ML: 200; 200; 20 SUSPENSION ORAL at 02:05

## 2023-05-16 RX ADMIN — MELATONIN 6 MG: at 08:05

## 2023-05-16 RX ADMIN — PANTOPRAZOLE SODIUM 40 MG: 40 TABLET, DELAYED RELEASE ORAL at 08:05

## 2023-05-16 RX ADMIN — SIMETHICONE 125 MG: 125 TABLET, CHEWABLE ORAL at 04:05

## 2023-05-16 RX ADMIN — CETIRIZINE HYDROCHLORIDE 10 MG: 10 TABLET, FILM COATED ORAL at 08:05

## 2023-05-16 RX ADMIN — DULOXETINE 30 MG: 30 CAPSULE, DELAYED RELEASE ORAL at 08:05

## 2023-05-16 RX ADMIN — PIPERACILLIN AND TAZOBACTAM 4.5 G: 4; .5 INJECTION, POWDER, LYOPHILIZED, FOR SOLUTION INTRAVENOUS at 05:05

## 2023-05-16 RX ADMIN — ANTACID TABLETS 500 MG: 500 TABLET, CHEWABLE ORAL at 10:05

## 2023-05-16 RX ADMIN — ACETAMINOPHEN 650 MG: 325 TABLET ORAL at 09:05

## 2023-05-16 RX ADMIN — PIPERACILLIN AND TAZOBACTAM 4.5 G: 4; .5 INJECTION, POWDER, LYOPHILIZED, FOR SOLUTION INTRAVENOUS at 08:05

## 2023-05-16 RX ADMIN — GABAPENTIN 100 MG: 100 CAPSULE ORAL at 04:05

## 2023-05-16 NOTE — PLAN OF CARE
05/16/23 1216   Discharge Reassessment   Assessment Type Discharge Planning Reassessment   Discharge Plan A Return to nursing home     Patient discussed at multidisciplinary team meeting. Patient admitted for IV abx therapy. Plan to dc home to NH 5/19

## 2023-05-16 NOTE — PT/OT/SLP PROGRESS
The patient is discharged today from Physical Therapy per her request. Pt c/o left hip being 10/10 and she refuses to perform bed ex's, transfer training or gait training. Pt was very agitated stating that she didn't understand why she was ever referred to PT and requested to be discharged. PT explained that she was referred by Dr. Kwok to progress her to PLOF. PT informed pt's nurse of her request for discharge. No goals met as pt was only seen for initial evaluation.

## 2023-05-16 NOTE — PLAN OF CARE
Problem: Physical Therapy  Goal: Physical Therapy Goal  Description: LTG - 2 weeks  1. Pt's ROM kaveh ankle DF will be WFL.-NOT MET  2. Pt will transfer with CGA.-NOT MET  3. Pt will amb using RW x 50 ft with CGA.-NOT MET  Outcome: Unable to Meet, Plan Revised

## 2023-05-16 NOTE — PT/OT/SLP DISCHARGE
"Occupational Therapy Discharge Summary    Joyce Dow  MRN: [id number]   Principal Problem: Urinary tract infection due to extended-spectrum beta lactamase (ESBL) producing Escherichia coli      Patient Discharged from acute Occupational Therapy on today.  Please refer to prior OT note dated today for functional status.    Assessment:       Pt requests to d/c therapy due to "I just can't do all that.  My hips and knees are bum, and I am almost 89 years old."    Objective:     GOALS:   Multidisciplinary Problems       Occupational Therapy Goals          Problem: Occupational Therapy    Goal Priority Disciplines Outcome Interventions   Occupational Therapy Goal     OT, PT/OT Ongoing, Progressing    Description: STG: within 1 week  Pt will perform grooming with setup at sinkside MET  Pt will bathe with min a at sinkside ONGOING/PROGRESSING  Pt will perform UE dressing with svn at sinkside ONGOING/PROGRESSING  Pt will perform LE dressing with min a bedside ONGOING/PROGRESSING  Pt will sit EOB x 30 min with svn assistance ONGOING/PROGRESSING  Pt will transfer bed/chair/bsc with sba ONGOING/PROGRESSING  Pt will perform standing task x 3 min with svn assistance ONGOING/PROGRESSING  Pt will tolerate 30 minutes of tx without fatigue MET      LT.Restore to max I with self care and mobility.                          Reasons for Discontinuation of Therapy Services  Patient declines to continue care.      Plan:     Patient Discharged to: In house with independent home exercise with nursing assisting pt with self care and out of bed activity until IV ABT completed.    2023    "

## 2023-05-16 NOTE — PT/OT/SLP PROGRESS
Occupational Therapy      Patient Name:  Joyce Dow   MRN:  [id number]    Patient not seen today secondary to Patient unwilling to participate (I'm almost 89 years old, and I just can't do that.). Pt d/c plans are set for Thursday after ABT completed; d/c today to nursing to assist pt with all self care and functional mobility at min a to SBA level until d/c due to pt does not wish to cont therapy.  See PT notes as well.    5/16/2023

## 2023-05-16 NOTE — PLAN OF CARE
Problem: Adult Inpatient Plan of Care  Goal: Plan of Care Review  Outcome: Ongoing, Progressing  Goal: Patient-Specific Goal (Individualized)  Outcome: Ongoing, Progressing  Goal: Absence of Hospital-Acquired Illness or Injury  Outcome: Ongoing, Progressing  Goal: Optimal Comfort and Wellbeing  Outcome: Ongoing, Progressing  Goal: Readiness for Transition of Care  Outcome: Ongoing, Progressing     Problem: Skin Injury Risk Increased  Goal: Skin Health and Integrity  Outcome: Ongoing, Progressing     Problem: Fall Injury Risk  Goal: Absence of Fall and Fall-Related Injury  Outcome: Ongoing, Progressing     Problem: UTI (Urinary Tract Infection)  Goal: Improved Infection Symptoms  Outcome: Ongoing, Progressing

## 2023-05-17 LAB
GLUCOSE SERPL-MCNC: 103 MG/DL (ref 70–105)
GLUCOSE SERPL-MCNC: 127 MG/DL (ref 70–105)
GLUCOSE SERPL-MCNC: 141 MG/DL (ref 70–105)
GLUCOSE SERPL-MCNC: 151 MG/DL (ref 70–105)

## 2023-05-17 PROCEDURE — 27000958

## 2023-05-17 PROCEDURE — 25000003 PHARM REV CODE 250: Performed by: HOSPITALIST

## 2023-05-17 PROCEDURE — 82962 GLUCOSE BLOOD TEST: CPT

## 2023-05-17 PROCEDURE — 11000004 HC SNF PRIVATE

## 2023-05-17 PROCEDURE — 63600175 PHARM REV CODE 636 W HCPCS: Performed by: HOSPITALIST

## 2023-05-17 RX ADMIN — VALSARTAN 320 MG: 160 TABLET, FILM COATED ORAL at 08:05

## 2023-05-17 RX ADMIN — CETIRIZINE HYDROCHLORIDE 10 MG: 10 TABLET, FILM COATED ORAL at 08:05

## 2023-05-17 RX ADMIN — SIMETHICONE 125 MG: 125 TABLET, CHEWABLE ORAL at 08:05

## 2023-05-17 RX ADMIN — Medication 400 MG: at 08:05

## 2023-05-17 RX ADMIN — SERTRALINE HYDROCHLORIDE 25 MG: 25 TABLET ORAL at 08:05

## 2023-05-17 RX ADMIN — PIPERACILLIN AND TAZOBACTAM 4.5 G: 4; .5 INJECTION, POWDER, LYOPHILIZED, FOR SOLUTION INTRAVENOUS at 12:05

## 2023-05-17 RX ADMIN — PIPERACILLIN AND TAZOBACTAM 4.5 G: 4; .5 INJECTION, POWDER, LYOPHILIZED, FOR SOLUTION INTRAVENOUS at 05:05

## 2023-05-17 RX ADMIN — GABAPENTIN 100 MG: 100 CAPSULE ORAL at 04:05

## 2023-05-17 RX ADMIN — PIPERACILLIN AND TAZOBACTAM 4.5 G: 4; .5 INJECTION, POWDER, LYOPHILIZED, FOR SOLUTION INTRAVENOUS at 08:05

## 2023-05-17 RX ADMIN — SIMETHICONE 125 MG: 125 TABLET, CHEWABLE ORAL at 12:05

## 2023-05-17 RX ADMIN — TRAZODONE HYDROCHLORIDE 25 MG: 50 TABLET ORAL at 08:05

## 2023-05-17 RX ADMIN — PANTOPRAZOLE SODIUM 40 MG: 40 TABLET, DELAYED RELEASE ORAL at 08:05

## 2023-05-17 RX ADMIN — SIMETHICONE 125 MG: 125 TABLET, CHEWABLE ORAL at 04:05

## 2023-05-17 RX ADMIN — LEVOTHYROXINE SODIUM 50 MCG: 0.05 TABLET ORAL at 05:05

## 2023-05-17 RX ADMIN — ACETAMINOPHEN 650 MG: 325 TABLET ORAL at 04:05

## 2023-05-17 RX ADMIN — DULOXETINE 30 MG: 30 CAPSULE, DELAYED RELEASE ORAL at 08:05

## 2023-05-17 NOTE — PLAN OF CARE
Problem: Fall Injury Risk  Goal: Absence of Fall and Fall-Related Injury  Outcome: Ongoing, Progressing  Intervention: Identify and Manage Contributors  Flowsheets (Taken 5/17/2023 5628)  Medication Review/Management: medications reviewed   Plan of care reviewed with patient. Patients status ongoing progressing.

## 2023-05-17 NOTE — NURSING
Patient hand off given by ROMINA Marie . Patient received resting in bed chest rise even non labored no sign of pain or distress. Acquiring patient care for 07:00 -1900.  Nurses Note -- 4 Eyes      5/17/2023   7:30 AM      Skin assessed during: Q Shift Change      [x] No Altered Skin Integrity Present    []Prevention Measures Documented      [] Yes- Altered Skin Integrity Present or Discovered   [] LDA Added if Not in Epic (Describe Wound)   [] New Altered Skin Integrity was Present on Admit and Documented in LDA   [] Wound Image Taken    Wound Care Consulted? No    Attending Nurse:  BOBBY MARSH, RN     Second RN/Staff Member:  romina Marie

## 2023-05-17 NOTE — PROGRESS NOTES
Clinical Pharmacy Chart Review Note      Admit Date: 5/12/2023   LOS: 5 days       Joyce Dow is a 89 y.o. female admitted to SNF for PT/OT after hospitalization for uti.    Active Hospital Problems    Diagnosis  POA    *Urinary tract infection due to extended-spectrum beta lactamase (ESBL) producing Escherichia coli [N39.0, B96.29, Z16.12]  Yes    GERD (gastroesophageal reflux disease) [K21.9]  Yes    HTN (hypertension) [I10]  Yes    Persistent depressive disorder [F34.1]  Yes      Resolved Hospital Problems   No resolved problems to display.     Review of patient's allergies indicates:   Allergen Reactions    Sulfa (sulfonamide antibiotics)      Patient Active Problem List    Diagnosis Date Noted    Urinary tract infection due to extended-spectrum beta lactamase (ESBL) producing Escherichia coli 05/15/2023    HTN (hypertension) 05/10/2023    Persistent depressive disorder 05/10/2023    GERD (gastroesophageal reflux disease) 05/10/2023    Acute cystitis with hematuria 05/09/2023    Hyponatremia 05/09/2023       Scheduled Meds:    cetirizine  10 mg Oral Daily    DULoxetine  30 mg Oral Daily    gabapentin  100 mg Oral Daily    levothyroxine  50 mcg Oral Before breakfast    magnesium oxide  400 mg Oral BID    pantoprazole  40 mg Oral Daily    piperacillin-tazobactam (ZOSYN) IVPB  4.5 g Intravenous Q8H    sertraline  25 mg Oral Daily    simethicone  125 mg Oral QID    traZODone  25 mg Oral QHS    valsartan  320 mg Oral Daily     Continuous Infusions:   PRN Meds: acetaminophen, aluminum-magnesium hydroxide-simethicone, bisacodyL, calcium carbonate, dextrose, glucagon (human recombinant), insulin aspart U-100, melatonin, senna-docusate 8.6-50 mg, zinc oxide-cod liver oil    OBJECTIVE:     Vital Signs (Last 24H)  Temp:  [97.7 °F (36.5 °C)-98.4 °F (36.9 °C)]   Pulse:  [74-84]   Resp:  [18-20]   BP: (104-137)/(50-59)   SpO2:  [95 %-97 %]     Laboratory:  CBC:   Recent Labs   Lab 05/13/23  0515   WBC 4.75   RBC 3.14*    HGB 9.6*   HCT 30.0*      MCV 95.5   MCH 30.6   MCHC 32.0     BMP:   Recent Labs   Lab 05/11/23  0644 05/12/23  0600 05/13/23  0515   * 123* 103   * 127* 130*   K 4.3 4.5 4.3   CL 91* 95* 95*   CO2 30 23 29   BUN 13 10 10   CREATININE 0.77 0.76 0.89   CALCIUM 8.3* 8.6 8.7     CMP:   Recent Labs   Lab 05/11/23  0644 05/12/23  0600 05/13/23  0515   * 123* 103   CALCIUM 8.3* 8.6 8.7   * 127* 130*   K 4.3 4.5 4.3   CO2 30 23 29   CL 91* 95* 95*   BUN 13 10 10   CREATININE 0.77 0.76 0.89     Others:   Recent Labs   Lab 05/12/23  0600   TSH 1.956         ASSESSMENT/PLAN:     Estimated Creatinine Clearance: 43.2 mL/min (based on SCr of 0.89 mg/dL).Medications reviewed, no dose adjustments needed. Weekly swing bed medication regimen review complete.  Will continue to monitor.  Osmar Suarez, Pharm. D.  Director of Pharmacy  [facility]  [phone]  [email or web address]

## 2023-05-18 LAB
GLUCOSE SERPL-MCNC: 105 MG/DL (ref 70–105)
GLUCOSE SERPL-MCNC: 129 MG/DL (ref 70–105)
GLUCOSE SERPL-MCNC: 138 MG/DL (ref 70–105)
SARS-COV+SARS-COV-2 AG RESP QL IA.RAPID: NEGATIVE

## 2023-05-18 PROCEDURE — 63600175 PHARM REV CODE 636 W HCPCS: Performed by: HOSPITALIST

## 2023-05-18 PROCEDURE — 25000003 PHARM REV CODE 250: Performed by: HOSPITALIST

## 2023-05-18 PROCEDURE — 82962 GLUCOSE BLOOD TEST: CPT

## 2023-05-18 PROCEDURE — 27000958

## 2023-05-18 PROCEDURE — 87426 SARSCOV CORONAVIRUS AG IA: CPT | Performed by: HOSPITALIST

## 2023-05-18 PROCEDURE — 11000004 HC SNF PRIVATE

## 2023-05-18 RX ADMIN — PIPERACILLIN AND TAZOBACTAM 4.5 G: 4; .5 INJECTION, POWDER, LYOPHILIZED, FOR SOLUTION INTRAVENOUS at 05:05

## 2023-05-18 RX ADMIN — SIMETHICONE 125 MG: 125 TABLET, CHEWABLE ORAL at 01:05

## 2023-05-18 RX ADMIN — PIPERACILLIN AND TAZOBACTAM 4.5 G: 4; .5 INJECTION, POWDER, LYOPHILIZED, FOR SOLUTION INTRAVENOUS at 01:05

## 2023-05-18 RX ADMIN — SERTRALINE HYDROCHLORIDE 25 MG: 25 TABLET ORAL at 08:05

## 2023-05-18 RX ADMIN — Medication 400 MG: at 08:05

## 2023-05-18 RX ADMIN — PANTOPRAZOLE SODIUM 40 MG: 40 TABLET, DELAYED RELEASE ORAL at 08:05

## 2023-05-18 RX ADMIN — LEVOTHYROXINE SODIUM 50 MCG: 0.05 TABLET ORAL at 05:05

## 2023-05-18 RX ADMIN — ACETAMINOPHEN 650 MG: 325 TABLET ORAL at 05:05

## 2023-05-18 RX ADMIN — SIMETHICONE 125 MG: 125 TABLET, CHEWABLE ORAL at 05:05

## 2023-05-18 RX ADMIN — Medication 400 MG: at 09:05

## 2023-05-18 RX ADMIN — TRAZODONE HYDROCHLORIDE 25 MG: 50 TABLET ORAL at 08:05

## 2023-05-18 RX ADMIN — SIMETHICONE 125 MG: 125 TABLET, CHEWABLE ORAL at 08:05

## 2023-05-18 RX ADMIN — GABAPENTIN 100 MG: 100 CAPSULE ORAL at 05:05

## 2023-05-18 RX ADMIN — PIPERACILLIN AND TAZOBACTAM 4.5 G: 4; .5 INJECTION, POWDER, LYOPHILIZED, FOR SOLUTION INTRAVENOUS at 08:05

## 2023-05-18 RX ADMIN — DULOXETINE 30 MG: 30 CAPSULE, DELAYED RELEASE ORAL at 08:05

## 2023-05-18 RX ADMIN — MELATONIN 6 MG: at 08:05

## 2023-05-18 RX ADMIN — VALSARTAN 320 MG: 160 TABLET, FILM COATED ORAL at 08:05

## 2023-05-18 RX ADMIN — CETIRIZINE HYDROCHLORIDE 10 MG: 10 TABLET, FILM COATED ORAL at 08:05

## 2023-05-18 NOTE — PLAN OF CARE
Problem: Fall Injury Risk  Goal: Absence of Fall and Fall-Related Injury  Outcome: Ongoing, Progressing  Intervention: Promote Injury-Free Environment  Flowsheets (Taken 5/18/2023 7373)  Safety Promotion/Fall Prevention: assistive device/personal item within reach   Plan of care reviewed with patient. Patients status ongoing progressing.

## 2023-05-18 NOTE — NURSING
Patient hand off given by VENECIA Pennington  . Patient received resting in bed chest rise even non labored no sign of pain or distress. Acquiring patient care for 07:00 -1900   Nurses Note -- 4 Eyes      5/18/2023   7:37 AM      Skin assessed during: Q Shift Change      [x] No Altered Skin Integrity Present    []Prevention Measures Documented      [] Yes- Altered Skin Integrity Present or Discovered   [] LDA Added if Not in Epic (Describe Wound)   [] New Altered Skin Integrity was Present on Admit and Documented in LDA   [] Wound Image Taken    Wound Care Consulted? No    Attending Nurse:  BOBBY MARSH, RN     Second RN/Staff Member:  VENECIA Pennington

## 2023-05-18 NOTE — PLAN OF CARE
Problem: Adult Inpatient Plan of Care  Goal: Readiness for Transition of Care  Outcome: Ongoing, Progressing     Problem: Skin Injury Risk Increased  Goal: Skin Health and Integrity  Outcome: Ongoing, Progressing

## 2023-05-19 VITALS
DIASTOLIC BLOOD PRESSURE: 81 MMHG | HEIGHT: 68 IN | RESPIRATION RATE: 18 BRPM | HEART RATE: 73 BPM | OXYGEN SATURATION: 96 % | WEIGHT: 145.75 LBS | SYSTOLIC BLOOD PRESSURE: 128 MMHG | TEMPERATURE: 98 F | BODY MASS INDEX: 22.09 KG/M2

## 2023-05-19 PROBLEM — Z16.12 URINARY TRACT INFECTION DUE TO EXTENDED-SPECTRUM BETA LACTAMASE (ESBL) PRODUCING ESCHERICHIA COLI: Status: RESOLVED | Noted: 2023-05-15 | Resolved: 2023-05-19

## 2023-05-19 PROBLEM — B96.29 URINARY TRACT INFECTION DUE TO EXTENDED-SPECTRUM BETA LACTAMASE (ESBL) PRODUCING ESCHERICHIA COLI: Status: RESOLVED | Noted: 2023-05-15 | Resolved: 2023-05-19

## 2023-05-19 PROBLEM — N39.0 URINARY TRACT INFECTION DUE TO EXTENDED-SPECTRUM BETA LACTAMASE (ESBL) PRODUCING ESCHERICHIA COLI: Status: RESOLVED | Noted: 2023-05-15 | Resolved: 2023-05-19

## 2023-05-19 LAB
GLUCOSE SERPL-MCNC: 109 MG/DL (ref 70–105)
GLUCOSE SERPL-MCNC: 195 MG/DL (ref 70–105)

## 2023-05-19 PROCEDURE — 30200315 PPD INTRADERMAL TEST REV CODE 302: Performed by: HOSPITALIST

## 2023-05-19 PROCEDURE — 99316 PR NURSING FAC DISCHRGE DAY,MORE 30 MIN: ICD-10-PCS | Mod: ,,, | Performed by: HOSPITALIST

## 2023-05-19 PROCEDURE — 63600175 PHARM REV CODE 636 W HCPCS: Performed by: HOSPITALIST

## 2023-05-19 PROCEDURE — 82962 GLUCOSE BLOOD TEST: CPT

## 2023-05-19 PROCEDURE — 99316 NF DSCHRG MGMT 30 MIN+: CPT | Mod: ,,, | Performed by: HOSPITALIST

## 2023-05-19 PROCEDURE — 25000003 PHARM REV CODE 250: Performed by: HOSPITALIST

## 2023-05-19 PROCEDURE — 86580 TB INTRADERMAL TEST: CPT | Performed by: HOSPITALIST

## 2023-05-19 PROCEDURE — 27000958

## 2023-05-19 RX ADMIN — SERTRALINE HYDROCHLORIDE 25 MG: 25 TABLET ORAL at 08:05

## 2023-05-19 RX ADMIN — VALSARTAN 320 MG: 160 TABLET, FILM COATED ORAL at 08:05

## 2023-05-19 RX ADMIN — SIMETHICONE 125 MG: 125 TABLET, CHEWABLE ORAL at 08:05

## 2023-05-19 RX ADMIN — Medication 400 MG: at 08:05

## 2023-05-19 RX ADMIN — CETIRIZINE HYDROCHLORIDE 10 MG: 10 TABLET, FILM COATED ORAL at 08:05

## 2023-05-19 RX ADMIN — DULOXETINE 30 MG: 30 CAPSULE, DELAYED RELEASE ORAL at 08:05

## 2023-05-19 RX ADMIN — LEVOTHYROXINE SODIUM 50 MCG: 0.05 TABLET ORAL at 05:05

## 2023-05-19 RX ADMIN — PANTOPRAZOLE SODIUM 40 MG: 40 TABLET, DELAYED RELEASE ORAL at 08:05

## 2023-05-19 RX ADMIN — PIPERACILLIN AND TAZOBACTAM 4.5 G: 4; .5 INJECTION, POWDER, LYOPHILIZED, FOR SOLUTION INTRAVENOUS at 05:05

## 2023-05-19 RX ADMIN — TUBERCULIN PURIFIED PROTEIN DERIVATIVE 5 UNITS: 5 INJECTION INTRADERMAL at 09:05

## 2023-05-19 NOTE — NURSING
Patient discharged to [facility], patient to leave via [facility] .

## 2023-05-19 NOTE — NURSING
Patient hand off given by VENECIA Pennington   . Patient received resting in bed chest rise even non labored no sign of pain or distress. Acquiring patient care for 07:00 -1900.  Nurses Note -- 4 Eyes      5/19/2023   7:33 AM      Skin assessed during: Q Shift Change      [x] No Altered Skin Integrity Present    []Prevention Measures Documented      [] Yes- Altered Skin Integrity Present or Discovered   [] LDA Added if Not in Epic (Describe Wound)   [] New Altered Skin Integrity was Present on Admit and Documented in LDA   [] Wound Image Taken    Wound Care Consulted? No    Attending Nurse:  BOBBY MARSH, RN     Second RN/Staff Member:  VENECIA Pennington

## 2023-05-19 NOTE — DISCHARGE SUMMARY
[facility]  Hospital Medicine  Discharge Summary      Patient Name: Joyce Dow  MRN: [id number]  GUY: [id number]  Patient Class: IP- Swing  Admission Date: 5/12/2023  Hospital Length of Stay: 7 days  Discharge Date and Time:  05/19/2023 11:12 AM  Attending Physician: Johann Kwok DO   Discharging Provider: Johann Kwok DO  Primary Care Provider: Walker Watkins MD    Primary Care Team: Networked reference to record PCT     HPI:   88yo WF with hx of Dementia, Depression, HTN, GERD admit from NH initially with UTI.  Culture grew out ESBL and S to Zosyn.  She is admitted to [facility] to continue with IV ABX and will finish on late Thursday.  She has no complaints today.  Eating her lunch. No SOB or CP.      * No surgery found *      Hospital Course:   Admit to [facility] for IV ABX for ESBL UTI.  Finished a 7 day course.  DC back to the NH today.         Goals of Care Treatment Preferences:  Code Status: DNR      Consults:   Consults (From admission, onward)        Status Ordering Provider     Inpatient consult to Registered Dietitian/Nutritionist  Once        Provider:  (Not yet assigned)    Completed JOHANN KWOK          No new Assessment & Plan notes have been filed under this hospital service since the last note was generated.  Service: Hospital Medicine    Final Active Diagnoses:    Diagnosis Date Noted POA    GERD (gastroesophageal reflux disease) [K21.9] 05/10/2023 Yes    HTN (hypertension) [I10] 05/10/2023 Yes    Persistent depressive disorder [F34.1] 05/10/2023 Yes      Problems Resolved During this Admission:    Diagnosis Date Noted Date Resolved POA    PRINCIPAL PROBLEM:  Urinary tract infection due to extended-spectrum beta lactamase (ESBL) producing Escherichia coli [N39.0, B96.29, Z16.12] 05/15/2023 05/19/2023 Yes       Discharged Condition: good    Disposition: Skilled Nursing Facility    Follow Up:    Patient Instructions:   No discharge procedures on  file.    Significant Diagnostic Studies: Labs: [facility] No results for input(s): NA, K, CL, CO2, GLU, BUN, CREATININE, CALCIUM, PROT, ALBUMIN, BILITOT, ALKPHOS, AST, ALT, ANIONGAP, ESTGFRAFRICA, EGFRNONAA in the last 48 hours.    Pending Diagnostic Studies:     None         Medications:  Reconciled Home Medications:      Medication List      CONTINUE taking these medications    * acetaminophen 650 MG Supp  Commonly known as: TYLENOL  Place rectally every 4 (four) hours as needed.     * TYLENOL EXTRA STRENGTH 500 MG tablet  Generic drug: acetaminophen  Take 1,000 mg by mouth every 4 (four) hours as needed for Pain. Temp or pain     BEANO ORAL  Take 2 tablets by mouth 3 (three) times daily.     cetirizine 10 MG tablet  Commonly known as: ZYRTEC  Take 10 mg by mouth once daily.     DESITIN TOP  Apply topically daily as needed. Desitin ointment     DULCOLAX (BISACODYL) RECT  Place rectally daily as needed. constipation     DULoxetine 30 MG capsule  Commonly known as: CYMBALTA  Take 30 mg by mouth once daily. 9am     fluticasone propionate 50 mcg/actuation nasal spray  Commonly known as: FLONASE  1 spray by Each Nostril route once daily. 9am     gabapentin 100 MG capsule  Commonly known as: NEURONTIN  Take 100 mg by mouth Daily.     levothyroxine 50 MCG tablet  Commonly known as: SYNTHROID  Take 50 mcg by mouth before breakfast.     magnesium 250 mg Tab  Take 500 mg by mouth 2 (two) times a day.     meloxicam 7.5 MG tablet  Commonly known as: MOBIC  Take 7.5 mg by mouth once daily.     * metFORMIN 500 mg 24hr tablet  Commonly known as: FORTAMET  Take 1,000 mg by mouth daily with breakfast.     * metFORMIN 500 MG tablet  Commonly known as: GLUCOPHAGE  Take 500 mg by mouth every evening.     MILK OF MAGNESIA 400 mg/5 mL Susp  Generic drug: magnesium hydroxide 400 mg/5 ml  Take 60 mLs by mouth daily as needed.     MYLANTA ORAL  Take 10 mLs by mouth every 4 (four) hours as needed.     olmesartan 40 MG tablet  Commonly known as:  BENICAR  Take 40 mg by mouth once daily.     ondansetron 4 MG tablet  Commonly known as: ZOFRAN  Take 4 mg by mouth every 4 (four) hours as needed for Nausea.     pantoprazole 40 MG tablet  Commonly known as: PROTONIX  Take 40 mg by mouth once daily.     polyethylene glycol 17 gram Pwpk  Commonly known as: GLYCOLAX  Take 17 g by mouth once daily. Mix in 4 oz H20     sennosides 8.8 mg/5 ml 8.8 mg/5 mL syrup  Commonly known as: SENOKOT  Take 10 mLs by mouth daily as needed.     sertraline 25 MG tablet  Commonly known as: ZOLOFT  Take 25 mg by mouth once daily. morning     simethicone 80 MG chewable tablet  Commonly known as: MYLICON  Take 160 mg by mouth 4 (four) times daily. After meals and at HS     give 2 80 mg tabs= 160mg     simvastatin 40 MG tablet  Commonly known as: ZOCOR  Take 40 mg by mouth every evening.     THERMOTABS 287-180-15 mg Tab  Generic drug: sod.chlorid-potassium chloride  Take by mouth 3 (three) times daily.     trazodone 25 MG tablet  Commonly known as: DESYREL  Take by mouth every evening.     vits A and D-white pet-lanolin ointment  Apply topically as needed for Dry Skin.         * This list has 4 medication(s) that are the same as other medications prescribed for you. Read the directions carefully, and ask your doctor or other care provider to review them with you.                Indwelling Lines/Drains at time of discharge:   Lines/Drains/Airways     None                 Time spent on the discharge of patient: 35 minutes         Darinel Kwok DO  Department of Hospital Medicine  [facility]

## 2023-05-19 NOTE — PLAN OF CARE
Problem: Adult Inpatient Plan of Care  Goal: Plan of Care Review  Outcome: Adequate for Care Transition  Goal: Patient-Specific Goal (Individualized)  Outcome: Adequate for Care Transition  Goal: Absence of Hospital-Acquired Illness or Injury  Outcome: Adequate for Care Transition  Goal: Optimal Comfort and Wellbeing  Outcome: Adequate for Care Transition  Goal: Readiness for Transition of Care  Outcome: Adequate for Care Transition     Problem: Impaired Wound Healing  Goal: Optimal Wound Healing  Outcome: Adequate for Care Transition     Problem: Skin Injury Risk Increased  Goal: Skin Health and Integrity  Outcome: Adequate for Care Transition     Problem: Fall Injury Risk  Goal: Absence of Fall and Fall-Related Injury  Outcome: Adequate for Care Transition     Problem: Infection  Goal: Absence of Infection Signs and Symptoms  Outcome: Adequate for Care Transition     Problem: UTI (Urinary Tract Infection)  Goal: Improved Infection Symptoms  Outcome: Adequate for Care Transition

## 2023-06-16 ENCOUNTER — LAB REQUISITION (OUTPATIENT)
Dept: LAB | Facility: HOSPITAL | Age: 88
End: 2023-06-16
Attending: INTERNAL MEDICINE
Payer: MEDICARE

## 2023-06-16 DIAGNOSIS — E11.8 TYPE 2 DIABETES MELLITUS WITH UNSPECIFIED COMPLICATIONS: ICD-10-CM

## 2023-06-21 LAB — GLUCOSE SERPL-MCNC: 82 MG/DL (ref 74–106)

## 2023-06-21 PROCEDURE — 82947 ASSAY GLUCOSE BLOOD QUANT: CPT

## 2023-07-12 ENCOUNTER — LAB REQUISITION (OUTPATIENT)
Dept: LAB | Facility: HOSPITAL | Age: 88
End: 2023-07-12
Attending: INTERNAL MEDICINE
Payer: MEDICARE

## 2023-07-12 DIAGNOSIS — E87.1 HYPO-OSMOLALITY AND HYPONATREMIA: ICD-10-CM

## 2023-07-12 DIAGNOSIS — Z79.01 LONG TERM (CURRENT) USE OF ANTICOAGULANTS: ICD-10-CM

## 2023-07-12 LAB
ALBUMIN SERPL BCP-MCNC: 3.4 G/DL (ref 3.5–5)
ALBUMIN/GLOB SERPL: 1.1 {RATIO}
ALP SERPL-CCNC: 69 U/L (ref 55–142)
ALT SERPL W P-5'-P-CCNC: 10 U/L (ref 13–56)
ANION GAP SERPL CALCULATED.3IONS-SCNC: 12 MMOL/L (ref 7–16)
AST SERPL W P-5'-P-CCNC: 16 U/L (ref 15–37)
BASOPHILS # BLD AUTO: 0.02 K/UL (ref 0–0.2)
BASOPHILS NFR BLD AUTO: 0.2 % (ref 0–1)
BILIRUB SERPL-MCNC: 0.6 MG/DL (ref ?–1.2)
BUN SERPL-MCNC: 23 MG/DL (ref 7–18)
BUN/CREAT SERPL: 20 (ref 6–20)
CALCIUM SERPL-MCNC: 9.4 MG/DL (ref 8.5–10.1)
CHLORIDE SERPL-SCNC: 99 MMOL/L (ref 98–107)
CO2 SERPL-SCNC: 28 MMOL/L (ref 21–32)
CREAT SERPL-MCNC: 1.15 MG/DL (ref 0.55–1.02)
DIFFERENTIAL METHOD BLD: ABNORMAL
EGFR (NO RACE VARIABLE) (RUSH/TITUS): 45 ML/MIN/1.73M2
EOSINOPHIL # BLD AUTO: 0.05 K/UL (ref 0–0.5)
EOSINOPHIL NFR BLD AUTO: 0.5 % (ref 1–4)
ERYTHROCYTE [DISTWIDTH] IN BLOOD BY AUTOMATED COUNT: 13.7 % (ref 11.5–14.5)
GLOBULIN SER-MCNC: 3.2 G/DL (ref 2–4)
GLUCOSE SERPL-MCNC: 128 MG/DL (ref 74–106)
HCT VFR BLD AUTO: 38.7 % (ref 38–47)
HGB BLD-MCNC: 12.2 G/DL (ref 12–16)
LYMPHOCYTES # BLD AUTO: 2.27 K/UL (ref 1–4.8)
LYMPHOCYTES NFR BLD AUTO: 21.6 % (ref 27–41)
MCH RBC QN AUTO: 30.3 PG (ref 27–31)
MCHC RBC AUTO-ENTMCNC: 31.5 G/DL (ref 32–36)
MCV RBC AUTO: 96.3 FL (ref 80–96)
MONOCYTES # BLD AUTO: 0.71 K/UL (ref 0–0.8)
MONOCYTES NFR BLD AUTO: 6.8 % (ref 2–6)
MPC BLD CALC-MCNC: 10.3 FL (ref 9.4–12.4)
NEUTROPHILS # BLD AUTO: 7.46 K/UL (ref 1.8–7.7)
NEUTROPHILS NFR BLD AUTO: 70.9 % (ref 53–65)
PLATELET # BLD AUTO: 240 K/UL (ref 150–400)
POTASSIUM SERPL-SCNC: 4.1 MMOL/L (ref 3.5–5.1)
PROT SERPL-MCNC: 6.6 G/DL (ref 6.4–8.2)
RBC # BLD AUTO: 4.02 M/UL (ref 4.2–5.4)
SODIUM SERPL-SCNC: 135 MMOL/L (ref 136–145)
WBC # BLD AUTO: 10.51 K/UL (ref 4.5–11)

## 2023-07-12 PROCEDURE — 80053 COMPREHEN METABOLIC PANEL: CPT

## 2023-07-12 PROCEDURE — 85025 COMPLETE CBC W/AUTO DIFF WBC: CPT

## 2023-07-14 ENCOUNTER — HOSPITAL ENCOUNTER (OUTPATIENT)
Facility: HOSPITAL | Age: 88
Discharge: NURSING, REHAB OR LONG-TERM CARE | End: 2023-07-17
Attending: HOSPITALIST | Admitting: HOSPITALIST
Payer: MEDICARE

## 2023-07-14 ENCOUNTER — LAB REQUISITION (OUTPATIENT)
Dept: LAB | Facility: HOSPITAL | Age: 88
End: 2023-07-14
Payer: MEDICARE

## 2023-07-14 DIAGNOSIS — R09.02 HYPOXEMIA: ICD-10-CM

## 2023-07-14 DIAGNOSIS — I48.91 ATRIAL FIBRILLATION WITH RAPID VENTRICULAR RESPONSE: ICD-10-CM

## 2023-07-14 DIAGNOSIS — E11.8 TYPE 2 DIABETES MELLITUS WITH UNSPECIFIED COMPLICATIONS: ICD-10-CM

## 2023-07-14 DIAGNOSIS — N39.0 ACUTE URINARY TRACT INFECTION: Primary | ICD-10-CM

## 2023-07-14 DIAGNOSIS — R00.0 TACHYCARDIA: ICD-10-CM

## 2023-07-14 DIAGNOSIS — Z79.01 LONG TERM (CURRENT) USE OF ANTICOAGULANTS: ICD-10-CM

## 2023-07-14 DIAGNOSIS — N30.01 ACUTE CYSTITIS WITH HEMATURIA: ICD-10-CM

## 2023-07-14 DIAGNOSIS — E87.1 HYPO-OSMOLALITY AND HYPONATREMIA: ICD-10-CM

## 2023-07-14 LAB
ALBUMIN SERPL BCP-MCNC: 3 G/DL (ref 3.5–5)
ALBUMIN/GLOB SERPL: 0.9 {RATIO}
ALP SERPL-CCNC: 73 U/L (ref 55–142)
ALT SERPL W P-5'-P-CCNC: 16 U/L (ref 13–56)
ANION GAP SERPL CALCULATED.3IONS-SCNC: 12 MMOL/L (ref 7–16)
AST SERPL W P-5'-P-CCNC: 26 U/L (ref 15–37)
BACTERIA #/AREA URNS HPF: ABNORMAL /HPF
BASOPHILS # BLD AUTO: 0.01 K/UL (ref 0–0.2)
BASOPHILS NFR BLD AUTO: 0.1 % (ref 0–1)
BILIRUB SERPL-MCNC: 0.5 MG/DL (ref ?–1.2)
BILIRUB UR QL STRIP: NEGATIVE
BUN SERPL-MCNC: 26 MG/DL (ref 7–18)
BUN/CREAT SERPL: 25 (ref 6–20)
CALCIUM SERPL-MCNC: 9 MG/DL (ref 8.5–10.1)
CHLORIDE SERPL-SCNC: 99 MMOL/L (ref 98–107)
CLARITY UR: ABNORMAL
CO2 SERPL-SCNC: 29 MMOL/L (ref 21–32)
COLOR UR: YELLOW
CREAT SERPL-MCNC: 1.02 MG/DL (ref 0.55–1.02)
DIFFERENTIAL METHOD BLD: ABNORMAL
EGFR (NO RACE VARIABLE) (RUSH/TITUS): 52 ML/MIN/1.73M2
EOSINOPHIL # BLD AUTO: 0.07 K/UL (ref 0–0.5)
EOSINOPHIL NFR BLD AUTO: 0.9 % (ref 1–4)
ERYTHROCYTE [DISTWIDTH] IN BLOOD BY AUTOMATED COUNT: 13.9 % (ref 11.5–14.5)
FLUAV AG UPPER RESP QL IA.RAPID: NEGATIVE
FLUBV AG UPPER RESP QL IA.RAPID: NEGATIVE
GLOBULIN SER-MCNC: 3.5 G/DL (ref 2–4)
GLUCOSE SERPL-MCNC: 142 MG/DL (ref 70–105)
GLUCOSE SERPL-MCNC: 182 MG/DL (ref 74–106)
GLUCOSE UR STRIP-MCNC: NEGATIVE MG/DL
HCT VFR BLD AUTO: 35.8 % (ref 38–47)
HGB BLD-MCNC: 11.3 G/DL (ref 12–16)
KETONES UR STRIP-SCNC: NEGATIVE MG/DL
LACTATE SERPL-SCNC: 1.5 MMOL/L (ref 0.4–2)
LEUKOCYTE ESTERASE UR QL STRIP: ABNORMAL
LYMPHOCYTES # BLD AUTO: 1.42 K/UL (ref 1–4.8)
LYMPHOCYTES NFR BLD AUTO: 18.5 % (ref 27–41)
MAGNESIUM SERPL-MCNC: 1.8 MG/DL (ref 1.7–2.3)
MCH RBC QN AUTO: 30.4 PG (ref 27–31)
MCHC RBC AUTO-ENTMCNC: 31.6 G/DL (ref 32–36)
MCV RBC AUTO: 96.2 FL (ref 80–96)
MONOCYTES # BLD AUTO: 0.68 K/UL (ref 0–0.8)
MONOCYTES NFR BLD AUTO: 8.9 % (ref 2–6)
MPC BLD CALC-MCNC: 9.4 FL (ref 9.4–12.4)
NEUTROPHILS # BLD AUTO: 5.5 K/UL (ref 1.8–7.7)
NEUTROPHILS NFR BLD AUTO: 71.6 % (ref 53–65)
NITRITE UR QL STRIP: POSITIVE
NT-PROBNP SERPL-MCNC: 1319 PG/ML (ref 1–450)
PH UR STRIP: 5.5 PH UNITS
PLATELET # BLD AUTO: 263 K/UL (ref 150–400)
POTASSIUM SERPL-SCNC: 4 MMOL/L (ref 3.5–5.1)
PROT SERPL-MCNC: 6.5 G/DL (ref 6.4–8.2)
PROT UR QL STRIP: 100
RBC # BLD AUTO: 3.72 M/UL (ref 4.2–5.4)
RBC # UR STRIP: ABNORMAL /UL
SARS-COV+SARS-COV-2 AG RESP QL IA.RAPID: NEGATIVE
SODIUM SERPL-SCNC: 136 MMOL/L (ref 136–145)
SP GR UR STRIP: 1.02
TROPONIN I SERPL DL<=0.01 NG/ML-MCNC: 31 PG/ML
TSH SERPL DL<=0.005 MIU/L-ACNC: 0.54 UIU/ML (ref 0.36–3.74)
UROBILINOGEN UR STRIP-ACNC: NORMAL MG/DL
WBC # BLD AUTO: 7.68 K/UL (ref 4.5–11)
WBC #/AREA URNS HPF: ABNORMAL /HPF

## 2023-07-14 PROCEDURE — 84443 ASSAY THYROID STIM HORMONE: CPT | Performed by: NURSE PRACTITIONER

## 2023-07-14 PROCEDURE — 85025 COMPLETE CBC W/AUTO DIFF WBC: CPT | Performed by: NURSE PRACTITIONER

## 2023-07-14 PROCEDURE — 84484 ASSAY OF TROPONIN QUANT: CPT | Performed by: NURSE PRACTITIONER

## 2023-07-14 PROCEDURE — 83605 ASSAY OF LACTIC ACID: CPT | Performed by: NURSE PRACTITIONER

## 2023-07-14 PROCEDURE — 87428 SARSCOV & INF VIR A&B AG IA: CPT | Performed by: NURSE PRACTITIONER

## 2023-07-14 PROCEDURE — 96372 THER/PROPH/DIAG INJ SC/IM: CPT | Mod: 59 | Performed by: NURSE PRACTITIONER

## 2023-07-14 PROCEDURE — 93005 ELECTROCARDIOGRAM TRACING: CPT

## 2023-07-14 PROCEDURE — 63600175 PHARM REV CODE 636 W HCPCS: Performed by: NURSE PRACTITIONER

## 2023-07-14 PROCEDURE — 27000958

## 2023-07-14 PROCEDURE — 25000003 PHARM REV CODE 250: Performed by: HOSPITALIST

## 2023-07-14 PROCEDURE — 99285 EMERGENCY DEPT VISIT HI MDM: CPT | Mod: ,,, | Performed by: NURSE PRACTITIONER

## 2023-07-14 PROCEDURE — 83880 ASSAY OF NATRIURETIC PEPTIDE: CPT | Performed by: NURSE PRACTITIONER

## 2023-07-14 PROCEDURE — 82962 GLUCOSE BLOOD TEST: CPT

## 2023-07-14 PROCEDURE — 25000003 PHARM REV CODE 250: Performed by: NURSE PRACTITIONER

## 2023-07-14 PROCEDURE — G0378 HOSPITAL OBSERVATION PER HR: HCPCS

## 2023-07-14 PROCEDURE — 93010 EKG 12-LEAD: ICD-10-PCS | Mod: ,,, | Performed by: INTERNAL MEDICINE

## 2023-07-14 PROCEDURE — 81001 URINALYSIS AUTO W/SCOPE: CPT | Performed by: NURSE PRACTITIONER

## 2023-07-14 PROCEDURE — 96361 HYDRATE IV INFUSION ADD-ON: CPT

## 2023-07-14 PROCEDURE — 99285 EMERGENCY DEPT VISIT HI MDM: CPT | Mod: 25

## 2023-07-14 PROCEDURE — 87186 SC STD MICRODIL/AGAR DIL: CPT | Performed by: NURSE PRACTITIONER

## 2023-07-14 PROCEDURE — 93010 ELECTROCARDIOGRAM REPORT: CPT | Mod: ,,, | Performed by: INTERNAL MEDICINE

## 2023-07-14 PROCEDURE — 99285 PR EMERGENCY DEPT VISIT,LEVEL V: ICD-10-PCS | Mod: ,,, | Performed by: NURSE PRACTITIONER

## 2023-07-14 PROCEDURE — 96365 THER/PROPH/DIAG IV INF INIT: CPT

## 2023-07-14 PROCEDURE — 94761 N-INVAS EAR/PLS OXIMETRY MLT: CPT

## 2023-07-14 PROCEDURE — 80053 COMPREHEN METABOLIC PANEL: CPT | Performed by: NURSE PRACTITIONER

## 2023-07-14 PROCEDURE — 27000221 HC OXYGEN, UP TO 24 HOURS

## 2023-07-14 PROCEDURE — 83735 ASSAY OF MAGNESIUM: CPT | Performed by: NURSE PRACTITIONER

## 2023-07-14 PROCEDURE — 87077 CULTURE AEROBIC IDENTIFY: CPT | Performed by: NURSE PRACTITIONER

## 2023-07-14 RX ORDER — METFORMIN HYDROCHLORIDE 500 MG/1
1000 TABLET, EXTENDED RELEASE ORAL
Status: DISCONTINUED | OUTPATIENT
Start: 2023-07-15 | End: 2023-07-17 | Stop reason: HOSPADM

## 2023-07-14 RX ORDER — LANOLIN ALCOHOL/MO/W.PET/CERES
400 CREAM (GRAM) TOPICAL 2 TIMES DAILY
Status: DISCONTINUED | OUTPATIENT
Start: 2023-07-14 | End: 2023-07-17 | Stop reason: HOSPADM

## 2023-07-14 RX ORDER — METFORMIN HYDROCHLORIDE 500 MG/1
500 TABLET ORAL NIGHTLY
Status: DISCONTINUED | OUTPATIENT
Start: 2023-07-14 | End: 2023-07-17 | Stop reason: HOSPADM

## 2023-07-14 RX ORDER — DULOXETIN HYDROCHLORIDE 30 MG/1
30 CAPSULE, DELAYED RELEASE ORAL DAILY
Status: DISCONTINUED | OUTPATIENT
Start: 2023-07-15 | End: 2023-07-17 | Stop reason: HOSPADM

## 2023-07-14 RX ORDER — LEVOTHYROXINE SODIUM 50 UG/1
50 TABLET ORAL
Status: DISCONTINUED | OUTPATIENT
Start: 2023-07-15 | End: 2023-07-17 | Stop reason: HOSPADM

## 2023-07-14 RX ORDER — SENNOSIDES 8.8 MG/5ML
10 LIQUID ORAL DAILY PRN
Status: DISCONTINUED | OUTPATIENT
Start: 2023-07-14 | End: 2023-07-17 | Stop reason: HOSPADM

## 2023-07-14 RX ORDER — SIMETHICONE 80 MG
160 TABLET,CHEWABLE ORAL 4 TIMES DAILY
Status: DISCONTINUED | OUTPATIENT
Start: 2023-07-14 | End: 2023-07-14 | Stop reason: CLARIF

## 2023-07-14 RX ORDER — FLUTICASONE PROPIONATE 50 MCG
1 SPRAY, SUSPENSION (ML) NASAL DAILY
Status: DISCONTINUED | OUTPATIENT
Start: 2023-07-15 | End: 2023-07-17 | Stop reason: HOSPADM

## 2023-07-14 RX ORDER — SODIUM CHLORIDE 9 MG/ML
INJECTION, SOLUTION INTRAVENOUS CONTINUOUS
Status: DISCONTINUED | OUTPATIENT
Start: 2023-07-14 | End: 2023-07-17

## 2023-07-14 RX ORDER — PANTOPRAZOLE SODIUM 40 MG/1
40 TABLET, DELAYED RELEASE ORAL DAILY
Status: DISCONTINUED | OUTPATIENT
Start: 2023-07-15 | End: 2023-07-17 | Stop reason: HOSPADM

## 2023-07-14 RX ORDER — MAG HYDROX/ALUMINUM HYD/SIMETH 200-200-20
10 SUSPENSION, ORAL (FINAL DOSE FORM) ORAL EVERY 4 HOURS PRN
Status: DISCONTINUED | OUTPATIENT
Start: 2023-07-14 | End: 2023-07-17 | Stop reason: HOSPADM

## 2023-07-14 RX ORDER — ATORVASTATIN CALCIUM 20 MG/1
20 TABLET, FILM COATED ORAL DAILY
Status: DISCONTINUED | OUTPATIENT
Start: 2023-07-15 | End: 2023-07-17 | Stop reason: HOSPADM

## 2023-07-14 RX ORDER — ONDANSETRON HYDROCHLORIDE 4 MG/5ML
4 SOLUTION ORAL EVERY 6 HOURS PRN
Status: DISCONTINUED | OUTPATIENT
Start: 2023-07-14 | End: 2023-07-17 | Stop reason: HOSPADM

## 2023-07-14 RX ORDER — GABAPENTIN 100 MG/1
100 CAPSULE ORAL DAILY
Status: DISCONTINUED | OUTPATIENT
Start: 2023-07-15 | End: 2023-07-17 | Stop reason: HOSPADM

## 2023-07-14 RX ORDER — ENOXAPARIN SODIUM 100 MG/ML
30 INJECTION SUBCUTANEOUS EVERY 24 HOURS
Status: DISCONTINUED | OUTPATIENT
Start: 2023-07-14 | End: 2023-07-17 | Stop reason: HOSPADM

## 2023-07-14 RX ORDER — SERTRALINE HYDROCHLORIDE 25 MG/1
25 TABLET, FILM COATED ORAL DAILY
Status: DISCONTINUED | OUTPATIENT
Start: 2023-07-15 | End: 2023-07-17 | Stop reason: HOSPADM

## 2023-07-14 RX ORDER — MELOXICAM 7.5 MG/1
7.5 TABLET ORAL DAILY
Status: DISCONTINUED | OUTPATIENT
Start: 2023-07-15 | End: 2023-07-17 | Stop reason: HOSPADM

## 2023-07-14 RX ORDER — ACETAMINOPHEN 650 MG/1
650 SUPPOSITORY RECTAL EVERY 4 HOURS PRN
Status: DISCONTINUED | OUTPATIENT
Start: 2023-07-14 | End: 2023-07-17 | Stop reason: HOSPADM

## 2023-07-14 RX ORDER — CETIRIZINE HYDROCHLORIDE 10 MG/1
10 TABLET ORAL DAILY
Status: DISCONTINUED | OUTPATIENT
Start: 2023-07-14 | End: 2023-07-17 | Stop reason: HOSPADM

## 2023-07-14 RX ORDER — SODIUM CHLORIDE 0.9 % (FLUSH) 0.9 %
10 SYRINGE (ML) INJECTION
Status: DISCONTINUED | OUTPATIENT
Start: 2023-07-14 | End: 2023-07-17 | Stop reason: HOSPADM

## 2023-07-14 RX ORDER — VALSARTAN 160 MG/1
320 TABLET ORAL DAILY
Status: DISCONTINUED | OUTPATIENT
Start: 2023-07-14 | End: 2023-07-17 | Stop reason: HOSPADM

## 2023-07-14 RX ORDER — AZITHROMYCIN 250 MG/1
250 TABLET, FILM COATED ORAL DAILY
COMMUNITY
End: 2023-07-14 | Stop reason: ALTCHOICE

## 2023-07-14 RX ORDER — SIMETHICONE 125 MG
125 TABLET,CHEWABLE ORAL 4 TIMES DAILY
Status: DISCONTINUED | OUTPATIENT
Start: 2023-07-14 | End: 2023-07-17 | Stop reason: HOSPADM

## 2023-07-14 RX ORDER — BISACODYL 10 MG
10 SUPPOSITORY, RECTAL RECTAL DAILY PRN
Status: DISCONTINUED | OUTPATIENT
Start: 2023-07-14 | End: 2023-07-17 | Stop reason: HOSPADM

## 2023-07-14 RX ORDER — POLYETHYLENE GLYCOL 3350 17 G/17G
17 POWDER, FOR SOLUTION ORAL DAILY
Status: DISCONTINUED | OUTPATIENT
Start: 2023-07-15 | End: 2023-07-17 | Stop reason: HOSPADM

## 2023-07-14 RX ADMIN — SIMETHICONE 125 MG: 125 TABLET, CHEWABLE ORAL at 04:07

## 2023-07-14 RX ADMIN — DEXTROSE MONOHYDRATE 1 G: 5 INJECTION INTRAVENOUS at 01:07

## 2023-07-14 RX ADMIN — ENOXAPARIN SODIUM 30 MG: 100 INJECTION SUBCUTANEOUS at 04:07

## 2023-07-14 RX ADMIN — TRAZODONE HYDROCHLORIDE 25 MG: 50 TABLET ORAL at 08:07

## 2023-07-14 RX ADMIN — SODIUM CHLORIDE: 9 INJECTION, SOLUTION INTRAVENOUS at 03:07

## 2023-07-14 RX ADMIN — METFORMIN HYDROCHLORIDE 500 MG: 500 TABLET ORAL at 08:07

## 2023-07-14 RX ADMIN — Medication 400 MG: at 08:07

## 2023-07-14 RX ADMIN — CETIRIZINE HYDROCHLORIDE 10 MG: 10 TABLET, FILM COATED ORAL at 08:07

## 2023-07-14 RX ADMIN — SIMETHICONE 125 MG: 125 TABLET, CHEWABLE ORAL at 08:07

## 2023-07-14 NOTE — PLAN OF CARE
Problem: Infection  Goal: Absence of Infection Signs and Symptoms  Outcome: Ongoing, Progressing     Problem: Adult Inpatient Plan of Care  Goal: Plan of Care Review  Outcome: Ongoing, Progressing  Goal: Patient-Specific Goal (Individualized)  Outcome: Ongoing, Progressing  Goal: Absence of Hospital-Acquired Illness or Injury  Outcome: Ongoing, Progressing  Goal: Optimal Comfort and Wellbeing  Outcome: Ongoing, Progressing  Goal: Readiness for Transition of Care  Outcome: Ongoing, Progressing     Problem: Cognitive Impairment  Goal: Optimal Cognitive Function  Outcome: Ongoing, Progressing     Problem: Fall Injury Risk  Goal: Absence of Fall and Fall-Related Injury  Outcome: Ongoing, Progressing     Problem: Fatigue  Goal: Improved Activity Tolerance  Outcome: Ongoing, Progressing     Problem: Confusion Acute  Goal: Optimal Cognitive Function  Outcome: Ongoing, Progressing

## 2023-07-14 NOTE — Clinical Note
Diagnosis: Acute urinary tract infection [749924]   Future Attending Provider: JOHANN ORTIZ [[id number]]   Admitting Provider:: JOHANN ORTIZ [[id number]]   Special Needs:: No Special Needs [1]

## 2023-07-14 NOTE — ED PROVIDER NOTES
Encounter Date: 7/14/2023       History     Chief Complaint   Patient presents with    Shortness of Breath     C/o shortness of breath, low oxygen saturation on room air. EMS reports 92% on room air. Placed pt on 2liters nasal cannula.     Fatigue     Increased fatigue per SNF to EMS.     Cough     89 y/o WF with PMH of DM, HTN, Thyroid Dx, GERD presents per ems from [facility] with c/o hypoxemia, cough, congestion, and lethargy. Per ems patient able to ambulate with limited ADL at baseline. Room air O2 sat at NH 88% this AM. Per ems patient has been treated for URI with cough with Z-Pack and Ed A Hist initiated yesterday. Positive for low-grade fever. Negative for chest pain.      Review of patient's allergies indicates:   Allergen Reactions    Sulfa (sulfonamide antibiotics)      Past Medical History:   Diagnosis Date    Allergic rhinitis     Anticoagulant long-term use     Anxiety disorder, unspecified     Arthritis     Chronic pain     Depression     GERD (gastroesophageal reflux disease)     hyperlipidemia     Hypertension     Hyponatremia     Hypoxemia     Insomnia     Mood disorder     Neuropathy     Rhinitis     Thyroid disease     Type 2 diabetes mellitus     Urinary tract infection, site not specified      History reviewed. No pertinent surgical history.  History reviewed. No pertinent family history.  Social History     Tobacco Use    Smoking status: Unknown   Substance Use Topics    Alcohol use: Not Currently    Drug use: Not Currently     Review of Systems   Constitutional:  Positive for fever. Negative for chills.   Respiratory:  Positive for cough and shortness of breath. Negative for apnea, choking, chest tightness, wheezing and stridor.    Cardiovascular:  Negative for chest pain, palpitations and leg swelling.   Neurological:  Positive for weakness. Negative for dizziness, tremors, seizures, syncope, facial asymmetry, speech difficulty, light-headedness, numbness and headaches.   All other  systems reviewed and are negative.      Physical Exam     Initial Vitals [07/14/23 0926]   BP Pulse Resp Temp SpO2   (!) 146/70 (!) 117 13 100.3 °F (37.9 °C) (!) 92 %      MAP       --         Physical Exam    Nursing note and vitals reviewed.  Constitutional: She appears well-developed and well-nourished. No distress.   HENT:   Head: Normocephalic.   Eyes: Conjunctivae and EOM are normal. No scleral icterus.   Neck: Neck supple.   Normal range of motion.  Cardiovascular:  Normal rate, regular rhythm, normal heart sounds and intact distal pulses.           Pulmonary/Chest: Breath sounds normal.   Abdominal: Abdomen is soft. Bowel sounds are normal. She exhibits no distension. There is no abdominal tenderness.   Musculoskeletal:         General: Normal range of motion.      Cervical back: Normal range of motion and neck supple.     Lymphadenopathy:     She has no cervical adenopathy.   Neurological: She is alert and oriented to person, place, and time. She has normal strength.   Skin: Skin is warm and dry. Capillary refill takes 2 to 3 seconds.   Psychiatric: She has a normal mood and affect. Her behavior is normal. Judgment and thought content normal.         Medical Screening Exam   See Full Note    ED Course   Procedures  Labs Reviewed   COMPREHENSIVE METABOLIC PANEL - Abnormal; Notable for the following components:       Result Value    Glucose 182 (*)     BUN 26 (*)     BUN/Creatinine Ratio 25 (*)     Albumin 3.0 (*)     eGFR 52 (*)     All other components within normal limits   NT-PRO NATRIURETIC PEPTIDE - Abnormal; Notable for the following components:    ProBNP 1,319 (*)     All other components within normal limits   CBC WITH DIFFERENTIAL - Abnormal; Notable for the following components:    RBC 3.72 (*)     Hemoglobin 11.3 (*)     Hematocrit 35.8 (*)     MCV 96.2 (*)     MCHC 31.6 (*)     Neutrophils % 71.6 (*)     Lymphocytes % 18.5 (*)     Monocytes % 8.9 (*)     Eosinophils % 0.9 (*)     All other  components within normal limits   URINALYSIS, REFLEX TO URINE CULTURE - Abnormal; Notable for the following components:    Leukocytes, UA Large (*)     Nitrites, UA Positive (*)     Protein,  (*)     Blood, UA Large (*)     All other components within normal limits   URINALYSIS, MICROSCOPIC - Abnormal; Notable for the following components:    WBC, UA Too Numerous To Count (*)     Bacteria, UA Loaded (*)     All other components within normal limits   TSH - Normal   LACTIC ACID, PLASMA - Normal   MAGNESIUM - Normal   TROPONIN I - Normal   SARS-COV2 (COVID) W/ FLU ANTIGEN - Normal    Narrative:     Negative SARS-CoV results should not be used as the sole basis for treatment or patient management decisions; negative results should be considered in the context of a patient's recent exposures, history and the presene of clinical signs and symptoms consistent with COVID-19.  Negative results should be treated as presumptive and confirmed by molecular assay, if necessary for patient management.   CBC W/ AUTO DIFFERENTIAL    Narrative:     The following orders were created for panel order CBC auto differential.  Procedure                               Abnormality         Status                     ---------                               -----------         ------                     CBC with Differential[[id number]]        Abnormal            Final result                 Please view results for these tests on the individual orders.     EKG Readings: (Independently Interpreted)   Rhythm: Atrial Fibrillation. Heart Rate: 121. Conduction: Normal. ST Segments: Normal ST Segments. T Waves: Normal. Axis: Normal. Clinical Impression: Atrial Fibrillation with RVR     ECG Results              EKG 12-lead (Final result)  Result time 07/14/23 13:24:22      Final result by Interface, Lab In [facility] (07/14/23 13:24:22)                   Narrative:    Test Reason : R00.0,    Vent. Rate : 121 BPM     Atrial Rate : 121 BPM     P-R Int  : 000 ms          QRS Dur : 094 ms      QT Int : 316 ms       P-R-T Axes : 000 -15 082 degrees     QTc Int : 448 ms    Atrial fibrillation with rapid ventricular response  Abnormal ECG  No previous ECGs available  Confirmed by Doug Palacios MD ([id number]) on 7/14/2023 1:24:09 PM    Referred By: WIN   SELF           Confirmed By:Doug Palacios MD                                  Imaging Results              CT Head Without Contrast (Final result)  Result time 07/14/23 11:36:31      Final result by Casa Stone DO (07/14/23 11:36:31)                   Impression:      No acute intracranial findings. Mild chronic small vessel ischemic change. Mild global brain parenchymal atrophy.      Electronically signed by: Casa Stone  Date:    07/14/2023  Time:    11:36               Narrative:    EXAMINATION:  CT HEAD WITHOUT CONTRAST    CLINICAL HISTORY:  Mental status change, unknown cause;    TECHNIQUE:  Multiplanar CT imaging from the vertex to skull the skull base was performed without contrast.    COMPARISON:  None    FINDINGS:  Gray-white white differentiation is normal.    Mild chronic small vessel ischemic change.  Mild global brain parenchymal atrophy.    There is no CT evidence of acute intracranial hemorrhage or large territorial infarct. No epidural/subdural hematomas.    There is no evidence of hydrocephalus, midline shift or mass effect.    Scalp, paranasal sinuses, and mastoid air cells are normal.                                       X-Ray Chest 1 View (Final result)  Result time 07/14/23 10:08:23      Final result by Zaheer Mosquera II, MD (07/14/23 10:08:23)                   Impression:      No evidence of acute cardiopulmonary disease.      Electronically signed by: Zaheer Mosquera  Date:    07/14/2023  Time:    10:08               Narrative:    EXAMINATION:  XR CHEST 1 VIEW    CLINICAL HISTORY:  Hypoxemia    COMPARISON:  18 May 2023    TECHNIQUE:  XR CHEST 1 VIEW    FINDINGS:  The  heart and mediastinum are stable in size and configuration.  The pulmonary vascularity is normal in caliber.  No lung infiltrates, effusions, pneumothorax or other abnormality is demonstrated.                                       RADIOLOGY REPORT (Final result)  Result time 07/17/23 13:52:51      Final result by Unknown User (07/17/23 13:52:51)                                         Medications   cefTRIAXone (ROCEPHIN) 1 g in dextrose 5 % in water (D5W) 5 % 100 mL IVPB (MB+) (0 g Intravenous Stopped 7/14/23 1330)   sodium chloride 0.9% bolus 500 mL 500 mL (0 mLs Intravenous Stopped 7/15/23 1533)   piperacillin-tazobactam (ZOSYN) 4.5 g in dextrose 5 % in water (D5W) 5 % 100 mL IVPB (MB+) (0 g Intravenous Stopped 7/16/23 1626)     Medical Decision Making:   History:   I obtained history from: EMS provider.       <> Summary of History: 89 y/o WF with PMH of DM, HTN, Thyroid Dx, GERD presents per ems from [facility] with c/o hypoxemia, cough, congestion, and lethargy. Per ems patient able to ambulate with limited ADL at baseline. Room air O2 sat at NH 88% this AM. Per ems patient has been treated for URI with cough for several days. Positive for low-grade fever. Negative for chest pain.  Initial Assessment:   89 y/o WF with PMH of DM, HTN, Thyroid Dx, GERD presents per ems from [facility] with c/o sob, hypoxemia, AMS, cough, congestion, and lethargy. Per ems patient able to ambulate with limited ADL at baseline. Room air O2 sat at NH 88% this AM. Per ems patient has been treated for URI and congestion with cough with Z-Pack and Ed A Hist initiated yesterday. Positive for low-grade fever. Negative for chest pain.  Differential Diagnosis:   Pneumonia  CHF  ACS   Clinical Tests:   Lab Tests: Ordered and Reviewed  The following lab test(s) were unremarkable: CBC, CMP, Lactate, Troponin and Urinalysis       <> Summary of Lab: UA: Loaded bacteria, large leukocytes, and positive nitrites  BNP: 1319  Radiological  Study: Ordered and Reviewed  ED Management:  Patient supine on stretcher in Er # 3b in NAD. Patient lethargic and unable to give Hx. Bedside ECG/CXR performed. Blood drawn and urine obtained for lab. Oxygen 2 liters applied per NC. Consulted with Dr. Rivas ([facility] TE). Consulted with Dr. Kwok. Patient admitted to Obs for IV abx.             ED Course as of 08/03/23 1401   Fri Jul 14, 2023   1239 Consulted with Dr. Rivas ([facility] TE). Plan is to admit for IV abx to treat uti with outpatient cardiology referral. [[place]]   1250 Consulted with Dr. Kwok who approves admission. [[place]]      ED Course User Index  [[place]] Lv Sheikh FNP                Clinical Impression:   Final diagnoses:  [R00.0] Tachycardia  [R09.02] Hypoxemia  [N39.0] Acute urinary tract infection (Primary)  [I48.91] Atrial fibrillation with rapid ventricular response        ED Disposition Condition    Observation Stable                ROSEMARY Savage  07/14/23 1357       ROSEMARY Savage  07/14/23 1412       Lv Sheikh FNP  08/03/23 1401       Lv Sheikh FNP  08/03/23 1401

## 2023-07-14 NOTE — NURSING
Received report from JAYLEN Newman    Pt ripped IV out, blood all over pt, brief dirty. Pt disoriented x3, lethargic. Cardiac monitor placed on pt. Bed alarm set.

## 2023-07-14 NOTE — NURSING
Nurses Note -- 4 Eyes      7/14/2023   2:00 PM      Skin assessed during: Admit      [x] No Altered Skin Integrity Present    [x]Prevention Measures Documented      [] Yes- Altered Skin Integrity Present or Discovered   [] LDA Added if Not in Epic (Describe Wound)   [] New Altered Skin Integrity was Present on Admit and Documented in LDA   [] Wound Image Taken    Wound Care Consulted? No    Attending Nurse:  Renuka Rivero RN     Second RN/Staff Member:  Nori An RN

## 2023-07-15 LAB
ANION GAP SERPL CALCULATED.3IONS-SCNC: 11 MMOL/L (ref 7–16)
BASOPHILS # BLD AUTO: 0.02 K/UL (ref 0–0.2)
BASOPHILS NFR BLD AUTO: 0.2 % (ref 0–1)
BUN SERPL-MCNC: 24 MG/DL (ref 7–18)
BUN/CREAT SERPL: 28 (ref 6–20)
CALCIUM SERPL-MCNC: 9.2 MG/DL (ref 8.5–10.1)
CHLORIDE SERPL-SCNC: 102 MMOL/L (ref 98–107)
CO2 SERPL-SCNC: 27 MMOL/L (ref 21–32)
CREAT SERPL-MCNC: 0.87 MG/DL (ref 0.55–1.02)
DIFFERENTIAL METHOD BLD: ABNORMAL
EGFR (NO RACE VARIABLE) (RUSH/TITUS): 63 ML/MIN/1.73M2
EOSINOPHIL # BLD AUTO: 0.21 K/UL (ref 0–0.5)
EOSINOPHIL NFR BLD AUTO: 2.4 % (ref 1–4)
ERYTHROCYTE [DISTWIDTH] IN BLOOD BY AUTOMATED COUNT: 13.7 % (ref 11.5–14.5)
GLUCOSE SERPL-MCNC: 117 MG/DL (ref 74–106)
GLUCOSE SERPL-MCNC: 121 MG/DL (ref 70–105)
GLUCOSE SERPL-MCNC: 140 MG/DL (ref 70–105)
GLUCOSE SERPL-MCNC: 142 MG/DL (ref 70–105)
GLUCOSE SERPL-MCNC: 155 MG/DL (ref 70–105)
GLUCOSE SERPL-MCNC: 178 MG/DL (ref 70–105)
HCT VFR BLD AUTO: 32.5 % (ref 38–47)
HGB BLD-MCNC: 10 G/DL (ref 12–16)
LYMPHOCYTES # BLD AUTO: 2.08 K/UL (ref 1–4.8)
LYMPHOCYTES NFR BLD AUTO: 23.7 % (ref 27–41)
MCH RBC QN AUTO: 30 PG (ref 27–31)
MCHC RBC AUTO-ENTMCNC: 30.8 G/DL (ref 32–36)
MCV RBC AUTO: 97.6 FL (ref 80–96)
MONOCYTES # BLD AUTO: 0.77 K/UL (ref 0–0.8)
MONOCYTES NFR BLD AUTO: 8.8 % (ref 2–6)
MPC BLD CALC-MCNC: 9.7 FL (ref 9.4–12.4)
NEUTROPHILS # BLD AUTO: 5.7 K/UL (ref 1.8–7.7)
NEUTROPHILS NFR BLD AUTO: 64.9 % (ref 53–65)
PLATELET # BLD AUTO: 253 K/UL (ref 150–400)
POTASSIUM SERPL-SCNC: 4 MMOL/L (ref 3.5–5.1)
RBC # BLD AUTO: 3.33 M/UL (ref 4.2–5.4)
SODIUM SERPL-SCNC: 136 MMOL/L (ref 136–145)
WBC # BLD AUTO: 8.78 K/UL (ref 4.5–11)

## 2023-07-15 PROCEDURE — 63600175 PHARM REV CODE 636 W HCPCS: Performed by: HOSPITALIST

## 2023-07-15 PROCEDURE — 63600175 PHARM REV CODE 636 W HCPCS: Performed by: NURSE PRACTITIONER

## 2023-07-15 PROCEDURE — 85025 COMPLETE CBC W/AUTO DIFF WBC: CPT | Performed by: NURSE PRACTITIONER

## 2023-07-15 PROCEDURE — 96361 HYDRATE IV INFUSION ADD-ON: CPT

## 2023-07-15 PROCEDURE — G0378 HOSPITAL OBSERVATION PER HR: HCPCS

## 2023-07-15 PROCEDURE — 25000003 PHARM REV CODE 250

## 2023-07-15 PROCEDURE — 27000221 HC OXYGEN, UP TO 24 HOURS

## 2023-07-15 PROCEDURE — 96365 THER/PROPH/DIAG IV INF INIT: CPT | Mod: 59

## 2023-07-15 PROCEDURE — 25000242 PHARM REV CODE 250 ALT 637 W/ HCPCS: Performed by: NURSE PRACTITIONER

## 2023-07-15 PROCEDURE — 82962 GLUCOSE BLOOD TEST: CPT

## 2023-07-15 PROCEDURE — 25000003 PHARM REV CODE 250: Performed by: HOSPITALIST

## 2023-07-15 PROCEDURE — 94761 N-INVAS EAR/PLS OXIMETRY MLT: CPT

## 2023-07-15 PROCEDURE — 80048 BASIC METABOLIC PNL TOTAL CA: CPT | Performed by: NURSE PRACTITIONER

## 2023-07-15 PROCEDURE — 25000003 PHARM REV CODE 250: Performed by: NURSE PRACTITIONER

## 2023-07-15 PROCEDURE — 27000958

## 2023-07-15 PROCEDURE — 96372 THER/PROPH/DIAG INJ SC/IM: CPT | Performed by: NURSE PRACTITIONER

## 2023-07-15 RX ORDER — SODIUM CHLORIDE 9 MG/ML
INJECTION, SOLUTION INTRAVENOUS CONTINUOUS
Status: DISCONTINUED | OUTPATIENT
Start: 2023-07-15 | End: 2023-07-17 | Stop reason: HOSPADM

## 2023-07-15 RX ORDER — ACETAMINOPHEN 325 MG/1
650 TABLET ORAL EVERY 6 HOURS PRN
Status: DISCONTINUED | OUTPATIENT
Start: 2023-07-15 | End: 2023-07-17 | Stop reason: HOSPADM

## 2023-07-15 RX ADMIN — SERTRALINE HYDROCHLORIDE 25 MG: 25 TABLET ORAL at 08:07

## 2023-07-15 RX ADMIN — POLYETHYLENE GLYCOL 3350 17 G: 17 POWDER, FOR SOLUTION ORAL at 08:07

## 2023-07-15 RX ADMIN — DEXTROSE MONOHYDRATE 1 G: 5 INJECTION INTRAVENOUS at 08:07

## 2023-07-15 RX ADMIN — Medication 400 MG: at 08:07

## 2023-07-15 RX ADMIN — TRAZODONE HYDROCHLORIDE 25 MG: 50 TABLET ORAL at 08:07

## 2023-07-15 RX ADMIN — SODIUM CHLORIDE: 9 INJECTION, SOLUTION INTRAVENOUS at 03:07

## 2023-07-15 RX ADMIN — VALSARTAN 320 MG: 160 TABLET, FILM COATED ORAL at 08:07

## 2023-07-15 RX ADMIN — METFORMIN HYDROCHLORIDE 500 MG: 500 TABLET ORAL at 08:07

## 2023-07-15 RX ADMIN — SIMETHICONE 125 MG: 125 TABLET, CHEWABLE ORAL at 08:07

## 2023-07-15 RX ADMIN — SIMETHICONE 125 MG: 125 TABLET, CHEWABLE ORAL at 05:07

## 2023-07-15 RX ADMIN — LEVOTHYROXINE SODIUM 50 MCG: 0.05 TABLET ORAL at 06:07

## 2023-07-15 RX ADMIN — CETIRIZINE HYDROCHLORIDE 10 MG: 10 TABLET, FILM COATED ORAL at 08:07

## 2023-07-15 RX ADMIN — SODIUM CHLORIDE: 9 INJECTION, SOLUTION INTRAVENOUS at 10:07

## 2023-07-15 RX ADMIN — ENOXAPARIN SODIUM 30 MG: 100 INJECTION SUBCUTANEOUS at 05:07

## 2023-07-15 RX ADMIN — SODIUM CHLORIDE 500 ML: 9 INJECTION, SOLUTION INTRAVENOUS at 02:07

## 2023-07-15 RX ADMIN — FLUTICASONE PROPIONATE 50 MCG: 50 SPRAY, METERED NASAL at 08:07

## 2023-07-15 RX ADMIN — DULOXETINE 30 MG: 30 CAPSULE, DELAYED RELEASE ORAL at 08:07

## 2023-07-15 RX ADMIN — GABAPENTIN 100 MG: 100 CAPSULE ORAL at 08:07

## 2023-07-15 RX ADMIN — ATORVASTATIN CALCIUM 20 MG: 20 TABLET, FILM COATED ORAL at 08:07

## 2023-07-15 RX ADMIN — METFORMIN ER 500 MG 1000 MG: 500 TABLET ORAL at 06:07

## 2023-07-15 RX ADMIN — ACETAMINOPHEN 650 MG: 325 TABLET ORAL at 09:07

## 2023-07-15 RX ADMIN — SIMETHICONE 125 MG: 125 TABLET, CHEWABLE ORAL at 01:07

## 2023-07-15 RX ADMIN — MELOXICAM 7.5 MG: 7.5 TABLET ORAL at 08:07

## 2023-07-15 RX ADMIN — PANTOPRAZOLE SODIUM 40 MG: 40 TABLET, DELAYED RELEASE ORAL at 08:07

## 2023-07-15 NOTE — H&P
[facility]    History & Physical      Patient Name: Joyce Dow  MRN: [id number]  Admission Date: 7/14/2023  Attending Physician:  Sundar  Primary Care Provider: Walker Watkins MD         Patient information was obtained from EMS personnel, nursing home, and ER records.     Subjective:     Principal Problem:<principal problem not specified>    Chief Complaint:   Chief Complaint   Patient presents with    Shortness of Breath     C/o shortness of breath, low oxygen saturation on room air. EMS reports 92% on room air. Placed pt on 2liters nasal cannula.     Fatigue     Increased fatigue per SNF to EMS.     Cough        HPI:   Patient is a 89 y/o WF resident of [facility] per ems with cough,AMS, lethargy, and 88% RA sats. Patient oriented at time of arrival to ED but lethargic. RA sats 92%. O2 2 liters applied per NC with 96-97 % o2 sats.  Pertinent negatives: SOB, chest pain, fever. Stat ECG indicated new onset A fib with RVR. UA indicated significant UTI. Consulted with Dr. Rivas ([facility] TE) and Dr. Kwok. Patient was admitted for IV abx therapy and telemetry.    Past Medical History:   Diagnosis Date    Allergic rhinitis     Anticoagulant long-term use     Anxiety disorder, unspecified     Arthritis     Chronic pain     Depression     GERD (gastroesophageal reflux disease)     hyperlipidemia     Hypertension     Hyponatremia     Hypoxemia     Insomnia     Mood disorder     Neuropathy     Rhinitis     Thyroid disease     Type 2 diabetes mellitus     Urinary tract infection, site not specified        History reviewed. No pertinent surgical history.    Review of patient's allergies indicates:   Allergen Reactions    Sulfa (sulfonamide antibiotics)        No current facility-administered medications on file prior to encounter.     Current Outpatient Medications on File Prior to Encounter   Medication Sig    alpha-D-galactosidase (BEANO ORAL) Take 2 tablets by mouth 3 (three)  times daily.    cetirizine (ZYRTEC) 10 MG tablet Take 10 mg by mouth once daily.    DULoxetine (CYMBALTA) 30 MG capsule Take 30 mg by mouth once daily. 9am    fluticasone propionate (FLONASE) 50 mcg/actuation nasal spray 1 spray by Each Nostril route once daily. 9am    gabapentin (NEURONTIN) 100 MG capsule Take 100 mg by mouth Daily.    levothyroxine (SYNTHROID) 50 MCG tablet Take 50 mcg by mouth before breakfast.    magnesium 250 mg Tab Take 500 mg by mouth 2 (two) times a day.    meloxicam (MOBIC) 7.5 MG tablet Take 7.5 mg by mouth once daily.    metFORMIN (FORTAMET) 500 mg 24hr tablet Take 1,000 mg by mouth daily with breakfast.    metFORMIN (GLUCOPHAGE) 500 MG tablet Take 500 mg by mouth every evening.    olmesartan (BENICAR) 40 MG tablet Take 40 mg by mouth once daily.    pantoprazole (PROTONIX) 40 MG tablet Take 40 mg by mouth once daily.    sertraline (ZOLOFT) 25 MG tablet Take 25 mg by mouth once daily. morning    simethicone (MYLICON) 80 MG chewable tablet Take 160 mg by mouth 4 (four) times daily. After meals and at HS     give 2 80 mg tabs= 160mg    simvastatin (ZOCOR) 40 MG tablet Take 40 mg by mouth every evening.    sod.chlorid-potassium chloride (THERMOTABS) 287-180-15 mg Tab Take by mouth 3 (three) times daily.    trazodone (DESYREL) 25 MG tablet Take by mouth every evening.    [DISCONTINUED] azithromycin (Z-DENISE) 250 MG tablet Take 250 mg by mouth once daily.    acetaminophen (TYLENOL) 500 MG tablet Take 1,000 mg by mouth every 4 (four) hours as needed for Pain. Temp or pain    acetaminophen (TYLENOL) 650 MG Supp Place rectally every 4 (four) hours as needed.    calcium carb/magnesium hydrox (MYLANTA ORAL) Take 10 mLs by mouth every 4 (four) hours as needed.    DULCOLAX, BISACODYL, RECT Place rectally daily as needed. constipation    magnesium hydroxide 400 mg/5 ml (MILK OF MAGNESIA) 400 mg/5 mL Susp Take 60 mLs by mouth daily as needed.    ondansetron (ZOFRAN) 4 MG tablet Take 4 mg by mouth every 4  (four) hours as needed for Nausea.    polyethylene glycol (GLYCOLAX) 17 gram PwPk Take 17 g by mouth once daily. Mix in 4 oz H20    sennosides 8.8 mg/5 ml (SENOKOT) 8.8 mg/5 mL syrup Take 10 mLs by mouth daily as needed.    vits A and D-white pet-lanolin ointment Apply topically as needed for Dry Skin.    zinc oxide/cod liver oil (DESITIN TOP) Apply topically daily as needed. Desitin ointment     Family History    None       Tobacco Use    Smoking status: Unknown    Smokeless tobacco: Not on file   Substance and Sexual Activity    Alcohol use: Not Currently    Drug use: Not Currently    Sexual activity: Not Currently     Review of Systems   Constitutional:  Positive for activity change.   Respiratory:  Positive for cough. Negative for apnea, choking, chest tightness, shortness of breath, wheezing and stridor.    Cardiovascular:  Negative for chest pain, palpitations and leg swelling.   Gastrointestinal:  Negative for abdominal distention, abdominal pain, anal bleeding, blood in stool, constipation, diarrhea, nausea, rectal pain and vomiting.   Neurological:  Positive for weakness. Negative for dizziness, tremors, seizures, syncope, facial asymmetry, speech difficulty, light-headedness, numbness and headaches.   All other systems reviewed and are negative.  Objective:     Vital Signs (Most Recent):  Temp: 97.5 °F (36.4 °C) (07/14/23 2008)  Pulse: (!) 116 (07/14/23 2008)  Resp: (!) 24 (07/14/23 2008)  BP: (!) 149/71 (07/14/23 2008)  SpO2: 97 % (07/14/23 2008) Vital Signs (24h Range):  Temp:  [97.5 °F (36.4 °C)-100.3 °F (37.9 °C)] 97.5 °F (36.4 °C)  Pulse:  [] 116  Resp:  [13-28] 24  SpO2:  [92 %-98 %] 97 %  BP: (139-149)/(58-92) 149/71     Weight: 60.2 kg (132 lb 11.5 oz)  Body mass index is 20.18 kg/m².    Physical Exam  Constitutional:       General: She is awake.      Appearance: She is normal weight. She is not ill-appearing, toxic-appearing or diaphoretic.   HENT:      Head: Normocephalic and atraumatic.       Mouth/Throat:      Mouth: Mucous membranes are moist.   Eyes:      General: No scleral icterus.     Extraocular Movements: Extraocular movements intact.      Conjunctiva/sclera: Conjunctivae normal.   Cardiovascular:      Rate and Rhythm: Tachycardia present. Rhythm irregular.      Pulses: Normal pulses.      Heart sounds: Normal heart sounds.   Pulmonary:      Effort: Pulmonary effort is normal. No respiratory distress.      Breath sounds: Rhonchi present. No wheezing.   Chest:      Chest wall: No tenderness.   Abdominal:      General: Abdomen is flat. Bowel sounds are normal.      Palpations: Abdomen is soft.   Musculoskeletal:         General: Normal range of motion.      Cervical back: Normal range of motion and neck supple.      Right lower leg: No edema.      Left lower leg: No edema.   Skin:     General: Skin is warm and dry.      Capillary Refill: Capillary refill takes 2 to 3 seconds.   Neurological:      General: No focal deficit present.      Mental Status: She is lethargic.   Psychiatric:         Mood and Affect: Mood normal.         Behavior: Behavior normal. Behavior is cooperative.         Thought Content: Thought content normal.         Judgment: Judgment normal.          Significant Labs: All pertinent labs within the past 24 hours have been reviewed.  BMP:   Recent Labs   Lab 07/14/23  1012   *      K 4.0   CL 99   CO2 29   BUN 26*   CREATININE 1.02   CALCIUM 9.0   MG 1.8     CBC:   Recent Labs   Lab 07/14/23  1012   WBC 7.68   HGB 11.3*   HCT 35.8*          Significant Imaging: I have reviewed all pertinent imaging results/findings within the past 24 hours.    Assessment/Plan:     There are no hospital problems to display for this patient.    VTE Risk Mitigation (From admission, onward)           Ordered     enoxaparin injection 30 mg  Daily         07/14/23 1426     IP VTE HIGH RISK PATIENT  Once         07/14/23 1426                      ROSEMARY Savage  Department of  [facility] Medicine   [facility]

## 2023-07-15 NOTE — PLAN OF CARE
Problem: Infection  Goal: Absence of Infection Signs and Symptoms  Outcome: Ongoing, Progressing     Problem: Adult Inpatient Plan of Care  Goal: Plan of Care Review  Outcome: Ongoing, Progressing  Goal: Patient-Specific Goal (Individualized)  Outcome: Ongoing, Progressing  Goal: Absence of Hospital-Acquired Illness or Injury  Outcome: Ongoing, Progressing  Goal: Optimal Comfort and Wellbeing  Outcome: Ongoing, Progressing  Goal: Readiness for Transition of Care  Outcome: Ongoing, Progressing     Problem: Cognitive Impairment  Goal: Optimal Cognitive Function  Outcome: Ongoing, Progressing     Problem: Fall Injury Risk  Goal: Absence of Fall and Fall-Related Injury  Outcome: Ongoing, Progressing     Problem: Fatigue  Goal: Improved Activity Tolerance  Outcome: Ongoing, Progressing     Problem: Confusion Acute  Goal: Optimal Cognitive Function  Outcome: Ongoing, Progressing     Problem: Skin Injury Risk Increased  Goal: Skin Health and Integrity  Outcome: Ongoing, Progressing     Problem: Gas Exchange Impaired  Goal: Optimal Gas Exchange  Outcome: Ongoing, Progressing

## 2023-07-15 NOTE — PROGRESS NOTES
[facility] Medicine  Progress Note    Patient Name: Joyce Dow  MRN: [id number]  Patient Class: OP- Observation   Admission Date: 7/14/2023  Length of Stay: 0 days  Attending Physician: Darinel Kwok DO  Primary Care Provider: Walker Watkins MD        Subjective:     Principal Problem:  Acute cystitis without hematuria, lethargy, new onset AFib with RVR and hypoxia.    Interval History:  Patient admitted to observation for urinary tract infection, lethargy, and new onset of AFib with RVR.    Review of Systems   Constitutional:  Positive for activity change and fatigue.   HENT: Negative.     Eyes: Negative.    Respiratory: Negative.     Cardiovascular:  Positive for palpitations.   Gastrointestinal: Negative.    Endocrine: Negative.    Genitourinary:  Positive for decreased urine volume.   Musculoskeletal: Negative.    Skin: Negative.    Allergic/Immunologic: Negative.    Neurological: Negative.    Hematological: Negative.    Psychiatric/Behavioral: Negative.     Objective:     Vital Signs (Most Recent):  Temp: 97.6 °F (36.4 °C) (07/15/23 0811)  Pulse: 98 (07/15/23 0811)  Resp: 20 (07/15/23 0811)  BP: 135/66 (07/15/23 0811)  SpO2: 95 % (07/15/23 0811) Vital Signs (24h Range):  Temp:  [97.5 °F (36.4 °C)-99.1 °F (37.3 °C)] 97.6 °F (36.4 °C)  Pulse:  [] 98  Resp:  [16-28] 20  SpO2:  [94 %-97 %] 95 %  BP: (114-149)/(58-92) 135/66     Weight: 60.6 kg (133 lb 9.6 oz)  Body mass index is 20.31 kg/m².    Intake/Output Summary (Last 24 hours) at 7/15/2023 0949  Last data filed at 7/15/2023 0941  Gross per 24 hour   Intake 200 ml   Output --   Net 200 ml      Physical Exam  Vitals and nursing note reviewed.   Constitutional:       General: She is not in acute distress.     Appearance: Normal appearance. She is normal weight. She is ill-appearing. She is not toxic-appearing or diaphoretic.   HENT:      Head: Normocephalic and atraumatic.      Right Ear:  Tympanic membrane, ear canal and external ear normal. There is no impacted cerumen.      Left Ear: Tympanic membrane, ear canal and external ear normal. There is no impacted cerumen.      Nose: Nose normal. No congestion or rhinorrhea.      Mouth/Throat:      Mouth: Mucous membranes are moist.      Pharynx: Oropharynx is clear. No oropharyngeal exudate or posterior oropharyngeal erythema.   Eyes:      General: No scleral icterus.        Right eye: No discharge.         Left eye: No discharge.      Extraocular Movements: Extraocular movements intact.      Conjunctiva/sclera: Conjunctivae normal.      Pupils: Pupils are equal, round, and reactive to light.   Neck:      Vascular: No carotid bruit.   Cardiovascular:      Rate and Rhythm: Rhythm irregular.      Pulses: Normal pulses.      Heart sounds: Normal heart sounds. No murmur heard.    No friction rub. No gallop.      Comments: AFib noted on the monitor, regularly irregular heart rhythm with heart rate between 88 and 96 beats per minute   Pulmonary:      Effort: Pulmonary effort is normal. No respiratory distress.      Breath sounds: Normal breath sounds. No stridor. No wheezing, rhonchi or rales.   Chest:      Chest wall: No tenderness.   Abdominal:      General: Abdomen is flat. Bowel sounds are normal. There is no distension.      Palpations: Abdomen is soft. There is no mass.      Tenderness: There is no abdominal tenderness. There is no right CVA tenderness, left CVA tenderness, guarding or rebound.      Hernia: No hernia is present.   Musculoskeletal:         General: No swelling, tenderness, deformity or signs of injury. Normal range of motion.      Cervical back: Normal range of motion and neck supple. No rigidity or tenderness.      Right lower leg: No edema.      Left lower leg: No edema.   Lymphadenopathy:      Cervical: No cervical adenopathy.   Skin:     General: Skin is warm and dry.      Capillary Refill: Capillary refill takes less than 2 seconds.       Coloration: Skin is pale. Skin is not jaundiced.      Findings: No bruising, erythema, lesion or rash.   Neurological:      Mental Status: She is alert. Mental status is at baseline.      Cranial Nerves: No cranial nerve deficit.      Sensory: No sensory deficit.      Motor: Weakness present.      Coordination: Coordination normal.      Gait: Gait abnormal.      Deep Tendon Reflexes: Reflexes normal.   Psychiatric:         Mood and Affect: Mood normal.         Behavior: Behavior normal.         Thought Content: Thought content normal.         Judgment: Judgment normal.         Overview/Hospital Course:  Patient rehydrated with normal saline bolus in the emergency room, started on Rocephin 1 g daily, patient placed on 2 L of nasal cannula oxygen.    Significant Labs: All pertinent labs within the past 24 hours have been reviewed.    Significant Imaging: I have reviewed all pertinent imaging results/findings within the past 24 hours.    Assessment/Plan:  We will continue IV antibiotics as previously described, as well as supplemental O2.  We will continue to monitor patient on telemetry and daily labs.      There are no hospital problems to display for this patient.    VTE Risk Mitigation (From admission, onward)           Ordered     enoxaparin injection 30 mg  Daily         07/14/23 1429     IP VTE HIGH RISK PATIENT  Once         07/14/23 1423                       ROSEMARY Krishna  Department of Hospital Medicine   [facility]

## 2023-07-16 LAB
ANION GAP SERPL CALCULATED.3IONS-SCNC: 11 MMOL/L (ref 7–16)
BASOPHILS # BLD AUTO: 0.01 K/UL (ref 0–0.2)
BASOPHILS NFR BLD AUTO: 0.2 % (ref 0–1)
BUN SERPL-MCNC: 16 MG/DL (ref 7–18)
BUN/CREAT SERPL: 18 (ref 6–20)
CALCIUM SERPL-MCNC: 8.9 MG/DL (ref 8.5–10.1)
CHLORIDE SERPL-SCNC: 101 MMOL/L (ref 98–107)
CO2 SERPL-SCNC: 28 MMOL/L (ref 21–32)
CREAT SERPL-MCNC: 0.89 MG/DL (ref 0.55–1.02)
DIFFERENTIAL METHOD BLD: ABNORMAL
EGFR (NO RACE VARIABLE) (RUSH/TITUS): 62 ML/MIN/1.73M2
EOSINOPHIL # BLD AUTO: 0.2 K/UL (ref 0–0.5)
EOSINOPHIL NFR BLD AUTO: 3.1 % (ref 1–4)
ERYTHROCYTE [DISTWIDTH] IN BLOOD BY AUTOMATED COUNT: 13 % (ref 11.5–14.5)
GLUCOSE SERPL-MCNC: 109 MG/DL (ref 70–105)
GLUCOSE SERPL-MCNC: 116 MG/DL (ref 70–105)
GLUCOSE SERPL-MCNC: 117 MG/DL (ref 74–106)
GLUCOSE SERPL-MCNC: 132 MG/DL (ref 70–105)
GLUCOSE SERPL-MCNC: 136 MG/DL (ref 70–105)
HCT VFR BLD AUTO: 29.3 % (ref 38–47)
HGB BLD-MCNC: 9.1 G/DL (ref 12–16)
LYMPHOCYTES # BLD AUTO: 2.5 K/UL (ref 1–4.8)
LYMPHOCYTES NFR BLD AUTO: 38.8 % (ref 27–41)
MCH RBC QN AUTO: 30.1 PG (ref 27–31)
MCHC RBC AUTO-ENTMCNC: 31.1 G/DL (ref 32–36)
MCV RBC AUTO: 97 FL (ref 80–96)
MONOCYTES # BLD AUTO: 0.47 K/UL (ref 0–0.8)
MONOCYTES NFR BLD AUTO: 7.3 % (ref 2–6)
MPC BLD CALC-MCNC: 9.4 FL (ref 9.4–12.4)
NEUTROPHILS # BLD AUTO: 3.27 K/UL (ref 1.8–7.7)
NEUTROPHILS NFR BLD AUTO: 50.6 % (ref 53–65)
PLATELET # BLD AUTO: 251 K/UL (ref 150–400)
POTASSIUM SERPL-SCNC: 4 MMOL/L (ref 3.5–5.1)
RBC # BLD AUTO: 3.02 M/UL (ref 4.2–5.4)
SODIUM SERPL-SCNC: 136 MMOL/L (ref 136–145)
UA COMPLETE W REFLEX CULTURE PNL UR: ABNORMAL
WBC # BLD AUTO: 6.45 K/UL (ref 4.5–11)

## 2023-07-16 PROCEDURE — 96372 THER/PROPH/DIAG INJ SC/IM: CPT | Performed by: NURSE PRACTITIONER

## 2023-07-16 PROCEDURE — 96366 THER/PROPH/DIAG IV INF ADDON: CPT

## 2023-07-16 PROCEDURE — 94761 N-INVAS EAR/PLS OXIMETRY MLT: CPT

## 2023-07-16 PROCEDURE — 63600175 PHARM REV CODE 636 W HCPCS: Performed by: NURSE PRACTITIONER

## 2023-07-16 PROCEDURE — 80048 BASIC METABOLIC PNL TOTAL CA: CPT | Performed by: NURSE PRACTITIONER

## 2023-07-16 PROCEDURE — 85025 COMPLETE CBC W/AUTO DIFF WBC: CPT | Performed by: NURSE PRACTITIONER

## 2023-07-16 PROCEDURE — 25000003 PHARM REV CODE 250

## 2023-07-16 PROCEDURE — 82962 GLUCOSE BLOOD TEST: CPT | Mod: 91

## 2023-07-16 PROCEDURE — 63600175 PHARM REV CODE 636 W HCPCS: Performed by: HOSPITALIST

## 2023-07-16 PROCEDURE — 96367 TX/PROPH/DG ADDL SEQ IV INF: CPT

## 2023-07-16 PROCEDURE — 25000003 PHARM REV CODE 250: Performed by: NURSE PRACTITIONER

## 2023-07-16 PROCEDURE — 25000003 PHARM REV CODE 250: Performed by: HOSPITALIST

## 2023-07-16 PROCEDURE — 27000958

## 2023-07-16 PROCEDURE — G0378 HOSPITAL OBSERVATION PER HR: HCPCS

## 2023-07-16 RX ADMIN — VALSARTAN 320 MG: 160 TABLET, FILM COATED ORAL at 09:07

## 2023-07-16 RX ADMIN — PANTOPRAZOLE SODIUM 40 MG: 40 TABLET, DELAYED RELEASE ORAL at 09:07

## 2023-07-16 RX ADMIN — ATORVASTATIN CALCIUM 20 MG: 20 TABLET, FILM COATED ORAL at 09:07

## 2023-07-16 RX ADMIN — SIMETHICONE 125 MG: 125 TABLET, CHEWABLE ORAL at 08:07

## 2023-07-16 RX ADMIN — DULOXETINE 30 MG: 30 CAPSULE, DELAYED RELEASE ORAL at 09:07

## 2023-07-16 RX ADMIN — PIPERACILLIN AND TAZOBACTAM 4.5 G: 4; .5 INJECTION, POWDER, FOR SOLUTION INTRAVENOUS; PARENTERAL at 03:07

## 2023-07-16 RX ADMIN — ENOXAPARIN SODIUM 30 MG: 100 INJECTION SUBCUTANEOUS at 04:07

## 2023-07-16 RX ADMIN — POLYETHYLENE GLYCOL 3350 17 G: 17 POWDER, FOR SOLUTION ORAL at 09:07

## 2023-07-16 RX ADMIN — SIMETHICONE 125 MG: 125 TABLET, CHEWABLE ORAL at 12:07

## 2023-07-16 RX ADMIN — LEVOTHYROXINE SODIUM 50 MCG: 0.05 TABLET ORAL at 05:07

## 2023-07-16 RX ADMIN — ACETAMINOPHEN 650 MG: 325 TABLET ORAL at 04:07

## 2023-07-16 RX ADMIN — MELOXICAM 7.5 MG: 7.5 TABLET ORAL at 09:07

## 2023-07-16 RX ADMIN — FLUTICASONE PROPIONATE 50 MCG: 50 SPRAY, METERED NASAL at 09:07

## 2023-07-16 RX ADMIN — Medication 400 MG: at 08:07

## 2023-07-16 RX ADMIN — METFORMIN ER 500 MG 1000 MG: 500 TABLET ORAL at 05:07

## 2023-07-16 RX ADMIN — SERTRALINE HYDROCHLORIDE 25 MG: 25 TABLET ORAL at 09:07

## 2023-07-16 RX ADMIN — TRAZODONE HYDROCHLORIDE 25 MG: 50 TABLET ORAL at 08:07

## 2023-07-16 RX ADMIN — SIMETHICONE 125 MG: 125 TABLET, CHEWABLE ORAL at 04:07

## 2023-07-16 RX ADMIN — SIMETHICONE 125 MG: 125 TABLET, CHEWABLE ORAL at 09:07

## 2023-07-16 RX ADMIN — GABAPENTIN 100 MG: 100 CAPSULE ORAL at 04:07

## 2023-07-16 RX ADMIN — PIPERACILLIN AND TAZOBACTAM 4.5 G: 4; .5 INJECTION, POWDER, FOR SOLUTION INTRAVENOUS; PARENTERAL at 08:07

## 2023-07-16 RX ADMIN — ACETAMINOPHEN 650 MG: 325 TABLET ORAL at 07:07

## 2023-07-16 RX ADMIN — DEXTROSE MONOHYDRATE 1 G: 5 INJECTION INTRAVENOUS at 09:07

## 2023-07-16 RX ADMIN — CETIRIZINE HYDROCHLORIDE 10 MG: 10 TABLET, FILM COATED ORAL at 09:07

## 2023-07-16 RX ADMIN — METFORMIN HYDROCHLORIDE 500 MG: 500 TABLET ORAL at 08:07

## 2023-07-16 RX ADMIN — Medication 400 MG: at 09:07

## 2023-07-16 NOTE — RESPIRATORY THERAPY
Attempted to wean patient oxygen (NC at 2 lpm) Place on room air SATs 93% while awake. Went into room for follow up patient was asleep and oxygen saturation were at 88%. Placed oxygen (NC at 2 lpm) back on patient oxygen saturation increased to 93%. Will continue to monitor.

## 2023-07-16 NOTE — PLAN OF CARE
Problem: Infection  Goal: Absence of Infection Signs and Symptoms  Outcome: Ongoing, Progressing  Intervention: Prevent or Manage Infection  Flowsheets (Taken 7/16/2023 1658)  Isolation Precautions:   contact   precautions initiated     Problem: Fall Injury Risk  Goal: Absence of Fall and Fall-Related Injury  Outcome: Ongoing, Progressing  Intervention: Identify and Manage Contributors  Flowsheets (Taken 7/16/2023 1658)  Self-Care Promotion:   BADL personal objects within reach   meal set-up provided  Medication Review/Management: medications reviewed  Intervention: Promote Injury-Free Environment  Flowsheets (Taken 7/16/2023 1658)  Safety Promotion/Fall Prevention:   Fall Risk signage in place   high risk medications identified   lighting adjusted   medications reviewed   room near unit station   side rails raised x 2

## 2023-07-17 VITALS
WEIGHT: 138.25 LBS | OXYGEN SATURATION: 98 % | TEMPERATURE: 98 F | BODY MASS INDEX: 20.95 KG/M2 | HEART RATE: 95 BPM | HEIGHT: 68 IN | RESPIRATION RATE: 18 BRPM | SYSTOLIC BLOOD PRESSURE: 113 MMHG | DIASTOLIC BLOOD PRESSURE: 62 MMHG

## 2023-07-17 PROBLEM — N30.01 ACUTE CYSTITIS WITH HEMATURIA: Status: RESOLVED | Noted: 2023-05-09 | Resolved: 2023-07-17

## 2023-07-17 LAB
ANION GAP SERPL CALCULATED.3IONS-SCNC: 10 MMOL/L (ref 7–16)
BASOPHILS # BLD AUTO: 0.01 K/UL (ref 0–0.2)
BASOPHILS NFR BLD AUTO: 0.2 % (ref 0–1)
BUN SERPL-MCNC: 10 MG/DL (ref 7–18)
BUN/CREAT SERPL: 12 (ref 6–20)
CALCIUM SERPL-MCNC: 8.9 MG/DL (ref 8.5–10.1)
CHLORIDE SERPL-SCNC: 105 MMOL/L (ref 98–107)
CO2 SERPL-SCNC: 30 MMOL/L (ref 21–32)
CREAT SERPL-MCNC: 0.81 MG/DL (ref 0.55–1.02)
DIFFERENTIAL METHOD BLD: ABNORMAL
EGFR (NO RACE VARIABLE) (RUSH/TITUS): 69 ML/MIN/1.73M2
EOSINOPHIL # BLD AUTO: 0.2 K/UL (ref 0–0.5)
EOSINOPHIL NFR BLD AUTO: 4.3 % (ref 1–4)
ERYTHROCYTE [DISTWIDTH] IN BLOOD BY AUTOMATED COUNT: 13.2 % (ref 11.5–14.5)
GLUCOSE SERPL-MCNC: 115 MG/DL (ref 70–105)
GLUCOSE SERPL-MCNC: 89 MG/DL (ref 74–106)
GLUCOSE SERPL-MCNC: 97 MG/DL (ref 70–105)
HCT VFR BLD AUTO: 31.1 % (ref 38–47)
HGB BLD-MCNC: 9.9 G/DL (ref 12–16)
LYMPHOCYTES # BLD AUTO: 2.19 K/UL (ref 1–4.8)
LYMPHOCYTES NFR BLD AUTO: 47.1 % (ref 27–41)
MCH RBC QN AUTO: 30.6 PG (ref 27–31)
MCHC RBC AUTO-ENTMCNC: 31.8 G/DL (ref 32–36)
MCV RBC AUTO: 96 FL (ref 80–96)
MONOCYTES # BLD AUTO: 0.41 K/UL (ref 0–0.8)
MONOCYTES NFR BLD AUTO: 8.8 % (ref 2–6)
MPC BLD CALC-MCNC: 10.1 FL (ref 9.4–12.4)
NEUTROPHILS # BLD AUTO: 1.84 K/UL (ref 1.8–7.7)
NEUTROPHILS NFR BLD AUTO: 39.6 % (ref 53–65)
PLATELET # BLD AUTO: 310 K/UL (ref 150–400)
POTASSIUM SERPL-SCNC: 4.1 MMOL/L (ref 3.5–5.1)
RBC # BLD AUTO: 3.24 M/UL (ref 4.2–5.4)
SODIUM SERPL-SCNC: 141 MMOL/L (ref 136–145)
WBC # BLD AUTO: 4.65 K/UL (ref 4.5–11)

## 2023-07-17 PROCEDURE — 80048 BASIC METABOLIC PNL TOTAL CA: CPT | Performed by: NURSE PRACTITIONER

## 2023-07-17 PROCEDURE — 94761 N-INVAS EAR/PLS OXIMETRY MLT: CPT

## 2023-07-17 PROCEDURE — 85025 COMPLETE CBC W/AUTO DIFF WBC: CPT | Performed by: NURSE PRACTITIONER

## 2023-07-17 PROCEDURE — 25000003 PHARM REV CODE 250

## 2023-07-17 PROCEDURE — 63600175 PHARM REV CODE 636 W HCPCS: Performed by: HOSPITALIST

## 2023-07-17 PROCEDURE — 25000003 PHARM REV CODE 250: Performed by: HOSPITALIST

## 2023-07-17 PROCEDURE — 25000003 PHARM REV CODE 250: Performed by: NURSE PRACTITIONER

## 2023-07-17 PROCEDURE — 27000958

## 2023-07-17 PROCEDURE — G0378 HOSPITAL OBSERVATION PER HR: HCPCS

## 2023-07-17 PROCEDURE — 99239 HOSP IP/OBS DSCHRG MGMT >30: CPT | Mod: ,,, | Performed by: HOSPITALIST

## 2023-07-17 PROCEDURE — 82962 GLUCOSE BLOOD TEST: CPT | Mod: 91

## 2023-07-17 PROCEDURE — 96366 THER/PROPH/DIAG IV INF ADDON: CPT

## 2023-07-17 PROCEDURE — 99239 PR HOSPITAL DISCHARGE DAY,>30 MIN: ICD-10-PCS | Mod: ,,, | Performed by: HOSPITALIST

## 2023-07-17 PROCEDURE — 27000221 HC OXYGEN, UP TO 24 HOURS

## 2023-07-17 RX ORDER — AMOXICILLIN AND CLAVULANATE POTASSIUM 500; 125 MG/1; MG/1
1 TABLET, FILM COATED ORAL 3 TIMES DAILY
Qty: 12 TABLET | Refills: 0 | Status: SHIPPED | OUTPATIENT
Start: 2023-07-17 | End: 2023-07-21

## 2023-07-17 RX ADMIN — SIMETHICONE 125 MG: 125 TABLET, CHEWABLE ORAL at 09:07

## 2023-07-17 RX ADMIN — ATORVASTATIN CALCIUM 20 MG: 20 TABLET, FILM COATED ORAL at 09:07

## 2023-07-17 RX ADMIN — CETIRIZINE HYDROCHLORIDE 10 MG: 10 TABLET, FILM COATED ORAL at 09:07

## 2023-07-17 RX ADMIN — FLUTICASONE PROPIONATE 50 MCG: 50 SPRAY, METERED NASAL at 09:07

## 2023-07-17 RX ADMIN — PIPERACILLIN AND TAZOBACTAM 4.5 G: 4; .5 INJECTION, POWDER, FOR SOLUTION INTRAVENOUS; PARENTERAL at 05:07

## 2023-07-17 RX ADMIN — PANTOPRAZOLE SODIUM 40 MG: 40 TABLET, DELAYED RELEASE ORAL at 09:07

## 2023-07-17 RX ADMIN — SERTRALINE HYDROCHLORIDE 25 MG: 25 TABLET ORAL at 09:07

## 2023-07-17 RX ADMIN — VALSARTAN 320 MG: 160 TABLET, FILM COATED ORAL at 09:07

## 2023-07-17 RX ADMIN — METFORMIN ER 500 MG 1000 MG: 500 TABLET ORAL at 06:07

## 2023-07-17 RX ADMIN — LEVOTHYROXINE SODIUM 50 MCG: 0.05 TABLET ORAL at 06:07

## 2023-07-17 RX ADMIN — Medication 400 MG: at 09:07

## 2023-07-17 RX ADMIN — SODIUM CHLORIDE: 9 INJECTION, SOLUTION INTRAVENOUS at 05:07

## 2023-07-17 RX ADMIN — DULOXETINE 30 MG: 30 CAPSULE, DELAYED RELEASE ORAL at 09:07

## 2023-07-17 RX ADMIN — MELOXICAM 7.5 MG: 7.5 TABLET ORAL at 09:07

## 2023-07-17 NOTE — PLAN OF CARE
Problem: Infection  Goal: Absence of Infection Signs and Symptoms  7/17/2023 1216 by Renuka Rivero RN  Outcome: Met  7/17/2023 0746 by Renuka Rivero RN  Outcome: Ongoing, Progressing     Problem: Adult Inpatient Plan of Care  Goal: Plan of Care Review  7/17/2023 1216 by Renuka Rivero RN  Outcome: Met  7/17/2023 0746 by Renuka Rivero RN  Outcome: Ongoing, Progressing  Goal: Patient-Specific Goal (Individualized)  7/17/2023 1216 by Renuka Rivero RN  Outcome: Met  7/17/2023 0746 by Renuka Rivero RN  Outcome: Ongoing, Progressing  Goal: Absence of Hospital-Acquired Illness or Injury  7/17/2023 1216 by Renuka Rivero RN  Outcome: Met  7/17/2023 0746 by Renuka Rivero RN  Outcome: Ongoing, Progressing  Goal: Optimal Comfort and Wellbeing  7/17/2023 1216 by Renuka Rivero RN  Outcome: Met  7/17/2023 0746 by Renuka Rivero RN  Outcome: Ongoing, Progressing  Goal: Readiness for Transition of Care  7/17/2023 1216 by Renuka Rivero RN  Outcome: Met  7/17/2023 0746 by Renuka Rivero RN  Outcome: Ongoing, Progressing     Problem: Cognitive Impairment  Goal: Optimal Cognitive Function  Outcome: Met     Problem: Fall Injury Risk  Goal: Absence of Fall and Fall-Related Injury  7/17/2023 1216 by Renuka Rivero RN  Outcome: Met  7/17/2023 0746 by Renuka Rivero RN  Outcome: Ongoing, Progressing     Problem: Fatigue  Goal: Improved Activity Tolerance  7/17/2023 1216 by Renuka Rivero RN  Outcome: Met  7/17/2023 0746 by Renuka Rivero RN  Outcome: Ongoing, Progressing     Problem: Confusion Acute  Goal: Optimal Cognitive Function  7/17/2023 1216 by Renuka Rivero RN  Outcome: Met  7/17/2023 0746 by Renuka Rivero RN  Outcome: Ongoing, Progressing     Problem: Skin Injury Risk Increased  Goal: Skin Health and Integrity  7/17/2023 1216 by Renuka Rivero RN  Outcome: Met  7/17/2023 0746 by Renuka Rivero RN  Outcome: Ongoing, Progressing     Problem: Gas Exchange Impaired  Goal: Optimal Gas Exchange  Outcome: Met

## 2023-07-17 NOTE — SUBJECTIVE & OBJECTIVE
Review of Systems   Constitutional:  Positive for fatigue. Negative for appetite change, chills and fever.   Respiratory:  Negative for cough, shortness of breath and wheezing.    Cardiovascular:  Positive for palpitations. Negative for chest pain and leg swelling.   Gastrointestinal:  Negative for abdominal pain, constipation, diarrhea, nausea and vomiting.   Genitourinary:  Negative for dysuria.   Musculoskeletal:  Positive for arthralgias, back pain and myalgias. Negative for neck pain and neck stiffness.   Psychiatric/Behavioral:  Negative for confusion.    All other systems reviewed and are negative.  Objective:     Vital Signs (Most Recent):  Temp: 97.8 °F (36.6 °C) (07/16/23 2012)  Pulse: 96 (07/16/23 2012)  Resp: 20 (07/16/23 2012)  BP: 132/68 (07/16/23 2012)  SpO2: 96 % (07/16/23 2012) Vital Signs (24h Range):  Temp:  [97.2 °F (36.2 °C)-98.8 °F (37.1 °C)] 97.8 °F (36.6 °C)  Pulse:  [] 96  Resp:  [18-20] 20  SpO2:  [93 %-99 %] 96 %  BP: (120-140)/(67-70) 132/68     Weight: 62.3 kg (137 lb 5.6 oz)  Body mass index is 20.88 kg/m².    Intake/Output Summary (Last 24 hours) at 7/16/2023 2212  Last data filed at 7/16/2023 1909  Gross per 24 hour   Intake 1240 ml   Output 201 ml   Net 1039 ml         Physical Exam  Vitals and nursing note reviewed.   Constitutional:       Appearance: She is ill-appearing.   HENT:      Head: Normocephalic and atraumatic.      Right Ear: External ear normal.      Left Ear: External ear normal.      Nose: Nose normal.      Mouth/Throat:      Mouth: Mucous membranes are moist.   Eyes:      Extraocular Movements: Extraocular movements intact.      Pupils: Pupils are equal, round, and reactive to light.   Cardiovascular:      Rate and Rhythm: Rhythm irregular.      Pulses: Normal pulses.      Heart sounds: Normal heart sounds.   Pulmonary:      Effort: Pulmonary effort is normal.      Breath sounds: Normal breath sounds.   Abdominal:      General: There is no distension.       Palpations: Abdomen is soft.      Tenderness: There is no abdominal tenderness.   Musculoskeletal:         General: Normal range of motion.      Cervical back: Normal range of motion and neck supple.   Skin:     General: Skin is warm.      Capillary Refill: Capillary refill takes 2 to 3 seconds.      Coloration: Skin is pale.   Neurological:      General: No focal deficit present.      Mental Status: She is alert. Mental status is at baseline.   Psychiatric:         Mood and Affect: Mood normal.         Behavior: Behavior normal.         Thought Content: Thought content normal.         Judgment: Judgment normal.           Significant Labs: All pertinent labs within the past 24 hours have been reviewed.  Recent Lab Results  (Last 5 results in the past 24 hours)        07/16/23  2036   07/16/23  1628   07/16/23  1112   07/16/23  0633   07/16/23  0503        Anion Gap       11         Baso #       0.01         Basophil %       0.2         BUN       16         BUN/CREAT RATIO       18         Calcium       8.9         Chloride       101         CO2       28         Creatinine       0.89         Differential Type       Manual         eGFR       62         Eos #       0.20         Eosinophil %       3.1         Glucose       117         Hematocrit       29.3         Hemoglobin       9.1         Lymph #       2.50         Lymph %       38.8         MCH       30.1         MCHC       31.1         MCV       97.0         Mono #       0.47         Mono %       7.3         MPV       9.4         Neutrophils, Abs       3.27         Neutrophils Relative       50.6         Platelets       251         POC Glucose 116   136   132     109       Potassium       4.0         RBC       3.02         RDW       13.0         Sodium       136         WBC       6.45                                Significant Imaging: I have reviewed all pertinent imaging results/findings within the past 24 hours.

## 2023-07-17 NOTE — HOSPITAL COURSE
7/16/2023: 89 yo WF admitted from ED for UTI. Sitting in bed eating breakfast at time of exam. On 2L NC. No distress noted. States appetite is good. Complaining of some back pain r/t lying in bed. Encouraged her to ask for assistance sitting up in chair or WC as needed. Denies dysuria, n/v, fever over night. Discussed patient with Dr. Reyez, continue current plan of care.     7/17 Urine culture back, R ESBL.  It is Sens to PO Augmentin.  Will DC back to NH on PO Augmentin.  NH should be aware that she has ESBL hx with Cephalosporin resistance when selecting ABX for her next UTI.

## 2023-07-17 NOTE — PLAN OF CARE
Problem: Infection  Goal: Absence of Infection Signs and Symptoms  Outcome: Ongoing, Progressing     Problem: Adult Inpatient Plan of Care  Goal: Plan of Care Review  Outcome: Ongoing, Progressing  Goal: Patient-Specific Goal (Individualized)  Outcome: Ongoing, Progressing  Goal: Absence of Hospital-Acquired Illness or Injury  Outcome: Ongoing, Progressing  Goal: Optimal Comfort and Wellbeing  Outcome: Ongoing, Progressing  Goal: Readiness for Transition of Care  Outcome: Ongoing, Progressing     Problem: Fall Injury Risk  Goal: Absence of Fall and Fall-Related Injury  Outcome: Ongoing, Progressing     Problem: Fatigue  Goal: Improved Activity Tolerance  Outcome: Ongoing, Progressing     Problem: Confusion Acute  Goal: Optimal Cognitive Function  Outcome: Ongoing, Progressing     Problem: Skin Injury Risk Increased  Goal: Skin Health and Integrity  Outcome: Ongoing, Progressing

## 2023-07-17 NOTE — HPI
Patient admitted to observation for urinary tract infection, lethargy, and new onset of AFib with RVR.

## 2023-07-17 NOTE — DISCHARGE SUMMARY
[facility]  Hospital Medicine  Discharge Summary      Patient Name: Joyce Dow  MRN: [id number]  GUY: [id number]  Patient Class: OP- Observation  Admission Date: 7/14/2023  Hospital Length of Stay: 0 days  Discharge Date and Time:  07/17/2023 11:57 AM  Attending Physician: Darinel Kwok DO   Discharging Provider: Darinel Kwok DO  Primary Care Provider: Walker Watkins MD    Primary Care Team: Networked reference to record PCT     HPI:   Patient admitted to observation for urinary tract infection, lethargy, and new onset of AFib with RVR.      * No surgery found *      Hospital Course:   7/16/2023: 89 yo WF admitted from ED for UTI. Sitting in bed eating breakfast at time of exam. On 2L NC. No distress noted. States appetite is good. Complaining of some back pain r/t lying in bed. Encouraged her to ask for assistance sitting up in chair or WC as needed. Denies dysuria, n/v, fever over night. Discussed patient with Dr. Reyez, continue current plan of care.     7/17 Urine culture back, R ESBL.  It is Sens to PO Augmentin.  Will DC back to NH on PO Augmentin.  NH should be aware that she has ESBL hx with Cephalosporin resistance when selecting ABX for her next UTI.        Goals of Care Treatment Preferences:  Code Status: DNR      Consults:     No new Assessment & Plan notes have been filed under this hospital service since the last note was generated.  Service: Hospital Medicine    Final Active Diagnoses:      Problems Resolved During this Admission:    Diagnosis Date Noted Date Resolved POA    PRINCIPAL PROBLEM:  Acute cystitis with hematuria [N30.01] 05/09/2023 07/17/2023 Yes       Discharged Condition: good    Disposition: Skilled Nursing Facility    Follow Up:    Patient Instructions:   No discharge procedures on file.    Significant Diagnostic Studies: Labs:   BMP:   Recent Labs   Lab 07/16/23  0633 07/17/23  0537   * 89    141   K 4.0 4.1     105   CO2 28 30   BUN 16 10   CREATININE 0.89 0.81   CALCIUM 8.9 8.9     Microbiology:   Urine Culture    Lab Results   Component Value Date    LABURIN >100,000 Escherichia coli ESBL (A) 07/14/2023       Pending Diagnostic Studies:     None         Medications:  Reconciled Home Medications:      Medication List      START taking these medications    amoxicillin-clavulanate 500-125mg 500-125 mg Tab  Commonly known as: AUGMENTIN  Take 1 tablet (500 mg total) by mouth 3 (three) times daily. for 4 days        CONTINUE taking these medications    * acetaminophen 650 MG Supp  Commonly known as: TYLENOL  Place rectally every 4 (four) hours as needed.     * acetaminophen 500 MG tablet  Commonly known as: TYLENOL  Take 1,000 mg by mouth every 4 (four) hours as needed for Pain. Temp or pain     BEANO ORAL  Take 2 tablets by mouth 3 (three) times daily.     cetirizine 10 MG tablet  Commonly known as: ZYRTEC  Take 10 mg by mouth once daily.     DESITIN TOP  Apply topically daily as needed. Desitin ointment     DULCOLAX (BISACODYL) RECT  Place rectally daily as needed. constipation     DULoxetine 30 MG capsule  Commonly known as: CYMBALTA  Take 30 mg by mouth once daily. 9am     fluticasone propionate 50 mcg/actuation nasal spray  Commonly known as: FLONASE  1 spray by Each Nostril route once daily. 9am     gabapentin 100 MG capsule  Commonly known as: NEURONTIN  Take 100 mg by mouth Daily.     levothyroxine 50 MCG tablet  Commonly known as: SYNTHROID  Take 50 mcg by mouth before breakfast.     magnesium 250 mg Tab  Take 500 mg by mouth 2 (two) times a day.     magnesium hydroxide 400 mg/5 ml 400 mg/5 mL Susp  Commonly known as: MILK OF MAGNESIA  Take 60 mLs by mouth daily as needed.     meloxicam 7.5 MG tablet  Commonly known as: MOBIC  Take 7.5 mg by mouth once daily.     * metFORMIN 500 mg 24hr tablet  Commonly known as: FORTAMET  Take 1,000 mg by mouth daily with breakfast.     * metFORMIN 500 MG tablet  Commonly known as:  GLUCOPHAGE  Take 500 mg by mouth every evening.     MYLANTA ORAL  Take 10 mLs by mouth every 4 (four) hours as needed.     olmesartan 40 MG tablet  Commonly known as: BENICAR  Take 40 mg by mouth once daily.     ondansetron 4 MG tablet  Commonly known as: ZOFRAN  Take 4 mg by mouth every 4 (four) hours as needed for Nausea.     pantoprazole 40 MG tablet  Commonly known as: PROTONIX  Take 40 mg by mouth once daily.     polyethylene glycol 17 gram Pwpk  Commonly known as: GLYCOLAX  Take 17 g by mouth once daily. Mix in 4 oz H20     sennosides 8.8 mg/5 ml 8.8 mg/5 mL syrup  Commonly known as: SENOKOT  Take 10 mLs by mouth daily as needed.     sertraline 25 MG tablet  Commonly known as: ZOLOFT  Take 25 mg by mouth once daily. morning     simethicone 80 MG chewable tablet  Commonly known as: MYLICON  Take 160 mg by mouth 4 (four) times daily. After meals and at HS     give 2 80 mg tabs= 160mg     simvastatin 40 MG tablet  Commonly known as: ZOCOR  Take 40 mg by mouth every evening.     THERMOTABS 287-180-15 mg Tab  Generic drug: sod.chlorid-potassium chloride  Take by mouth 3 (three) times daily.     trazodone 25 MG tablet  Commonly known as: DESYREL  Take by mouth every evening.     vits A and D-white pet-lanolin ointment  Apply topically as needed for Dry Skin.         * This list has 4 medication(s) that are the same as other medications prescribed for you. Read the directions carefully, and ask your doctor or other care provider to review them with you.                Indwelling Lines/Drains at time of discharge:   Lines/Drains/Airways     None                 Time spent on the discharge of patient: 33 minutes         Darinel Kwok DO  Department of Hospital Medicine  [facility]

## 2023-07-17 NOTE — PROGRESS NOTES
[facility] Medicine  Progress Note    Patient Name: Joyce Dow  MRN: [id number]  Patient Class: OP- Observation   Admission Date: 7/14/2023  Length of Stay: 0 days  Attending Physician: Darinel Kwok DO  Primary Care Provider: Walker Watkins MD        Subjective:     Principal Problem:Acute cystitis with hematuria        HPI:  Patient admitted to observation for urinary tract infection, lethargy, and new onset of AFib with RVR.      Overview/Hospital Course:  7/16/2023: 89 yo WF admitted from ED for UTI. Sitting in bed eating breakfast at time of exam. On 2L NC. No distress noted. States appetite is good. Complaining of some back pain r/t lying in bed. Encouraged her to ask for assistance sitting up in chair or WC as needed. Denies dysuria, n/v, fever over night. Discussed patient with Dr. Reyez, continue current plan of care.         Review of Systems   Constitutional:  Positive for fatigue. Negative for appetite change, chills and fever.   Respiratory:  Negative for cough, shortness of breath and wheezing.    Cardiovascular:  Positive for palpitations. Negative for chest pain and leg swelling.   Gastrointestinal:  Negative for abdominal pain, constipation, diarrhea, nausea and vomiting.   Genitourinary:  Negative for dysuria.   Musculoskeletal:  Positive for arthralgias, back pain and myalgias. Negative for neck pain and neck stiffness.   Psychiatric/Behavioral:  Negative for confusion.    All other systems reviewed and are negative.  Objective:     Vital Signs (Most Recent):  Temp: 97.8 °F (36.6 °C) (07/16/23 2012)  Pulse: 96 (07/16/23 2012)  Resp: 20 (07/16/23 2012)  BP: 132/68 (07/16/23 2012)  SpO2: 96 % (07/16/23 2012) Vital Signs (24h Range):  Temp:  [97.2 °F (36.2 °C)-98.8 °F (37.1 °C)] 97.8 °F (36.6 °C)  Pulse:  [] 96  Resp:  [18-20] 20  SpO2:  [93 %-99 %] 96 %  BP: (120-140)/(67-70) 132/68     Weight: 62.3 kg (137 lb 5.6 oz)  Body mass index  is 20.88 kg/m².    Intake/Output Summary (Last 24 hours) at 7/16/2023 2212  Last data filed at 7/16/2023 1909  Gross per 24 hour   Intake 1240 ml   Output 201 ml   Net 1039 ml         Physical Exam  Vitals and nursing note reviewed.   Constitutional:       Appearance: She is ill-appearing.   HENT:      Head: Normocephalic and atraumatic.      Right Ear: External ear normal.      Left Ear: External ear normal.      Nose: Nose normal.      Mouth/Throat:      Mouth: Mucous membranes are moist.   Eyes:      Extraocular Movements: Extraocular movements intact.      Pupils: Pupils are equal, round, and reactive to light.   Cardiovascular:      Rate and Rhythm: Rhythm irregular.      Pulses: Normal pulses.      Heart sounds: Normal heart sounds.   Pulmonary:      Effort: Pulmonary effort is normal.      Breath sounds: Normal breath sounds.   Abdominal:      General: There is no distension.      Palpations: Abdomen is soft.      Tenderness: There is no abdominal tenderness.   Musculoskeletal:         General: Normal range of motion.      Cervical back: Normal range of motion and neck supple.   Skin:     General: Skin is warm.      Capillary Refill: Capillary refill takes 2 to 3 seconds.      Coloration: Skin is pale.   Neurological:      General: No focal deficit present.      Mental Status: She is alert. Mental status is at baseline.   Psychiatric:         Mood and Affect: Mood normal.         Behavior: Behavior normal.         Thought Content: Thought content normal.         Judgment: Judgment normal.           Significant Labs: All pertinent labs within the past 24 hours have been reviewed.  Recent Lab Results  (Last 5 results in the past 24 hours)        07/16/23  2036   07/16/23  1628   07/16/23  1112   07/16/23  0633   07/16/23  0503        Anion Gap       11         Baso #       0.01         Basophil %       0.2         BUN       16         BUN/CREAT RATIO       18         Calcium       8.9         Chloride        101         CO2       28         Creatinine       0.89         Differential Type       Manual         eGFR       62         Eos #       0.20         Eosinophil %       3.1         Glucose       117         Hematocrit       29.3         Hemoglobin       9.1         Lymph #       2.50         Lymph %       38.8         MCH       30.1         MCHC       31.1         MCV       97.0         Mono #       0.47         Mono %       7.3         MPV       9.4         Neutrophils, Abs       3.27         Neutrophils Relative       50.6         Platelets       251         POC Glucose 116   136   132     109       Potassium       4.0         RBC       3.02         RDW       13.0         Sodium       136         WBC       6.45                                Significant Imaging: I have reviewed all pertinent imaging results/findings within the past 24 hours.      Assessment/Plan:      No notes have been filed under this hospital service.  Service: Hospital Medicine    VTE Risk Mitigation (From admission, onward)         Ordered     enoxaparin injection 30 mg  Daily         07/14/23 1429     IP VTE HIGH RISK PATIENT  Once         07/14/23 1429                Discharge Planning   JUANPABLO:      Code Status: DNR   Is the patient medically ready for discharge?:     Reason for patient still in hospital (select all that apply): Patient trending condition, Laboratory test and Treatment           ROSEMARY Rodgers  Department of Hospital Medicine   [facility]

## 2023-07-19 LAB
ALBUMIN SERPL BCP-MCNC: 2.6 G/DL (ref 3.5–5)
ALBUMIN/GLOB SERPL: 0.9 {RATIO}
ALP SERPL-CCNC: 61 U/L (ref 55–142)
ALT SERPL W P-5'-P-CCNC: 16 U/L (ref 13–56)
ANION GAP SERPL CALCULATED.3IONS-SCNC: 11 MMOL/L (ref 7–16)
AST SERPL W P-5'-P-CCNC: 25 U/L (ref 15–37)
BASOPHILS # BLD AUTO: 0.01 K/UL (ref 0–0.2)
BASOPHILS NFR BLD AUTO: 0.2 % (ref 0–1)
BILIRUB SERPL-MCNC: 0.3 MG/DL (ref ?–1.2)
BUN SERPL-MCNC: 11 MG/DL (ref 7–18)
BUN/CREAT SERPL: 14 (ref 6–20)
CALCIUM SERPL-MCNC: 8.9 MG/DL (ref 8.5–10.1)
CHLORIDE SERPL-SCNC: 104 MMOL/L (ref 98–107)
CO2 SERPL-SCNC: 31 MMOL/L (ref 21–32)
CREAT SERPL-MCNC: 0.8 MG/DL (ref 0.55–1.02)
DIFFERENTIAL METHOD BLD: ABNORMAL
EGFR (NO RACE VARIABLE) (RUSH/TITUS): 70 ML/MIN/1.73M2
EOSINOPHIL # BLD AUTO: 0.16 K/UL (ref 0–0.5)
EOSINOPHIL NFR BLD AUTO: 2.9 % (ref 1–4)
ERYTHROCYTE [DISTWIDTH] IN BLOOD BY AUTOMATED COUNT: 13.5 % (ref 11.5–14.5)
EST. AVERAGE GLUCOSE BLD GHB EST-MCNC: 120 MG/DL
GLOBULIN SER-MCNC: 3 G/DL (ref 2–4)
GLUCOSE SERPL-MCNC: 92 MG/DL (ref 74–106)
HBA1C MFR BLD HPLC: 6.2 % (ref 4.5–6.6)
HCT VFR BLD AUTO: 30.4 % (ref 38–47)
HGB BLD-MCNC: 9.3 G/DL (ref 12–16)
LYMPHOCYTES # BLD AUTO: 2.65 K/UL (ref 1–4.8)
LYMPHOCYTES NFR BLD AUTO: 48.4 % (ref 27–41)
MCH RBC QN AUTO: 30 PG (ref 27–31)
MCHC RBC AUTO-ENTMCNC: 30.6 G/DL (ref 32–36)
MCV RBC AUTO: 98.1 FL (ref 80–96)
MONOCYTES # BLD AUTO: 0.51 K/UL (ref 0–0.8)
MONOCYTES NFR BLD AUTO: 9.3 % (ref 2–6)
MPC BLD CALC-MCNC: 9.9 FL (ref 9.4–12.4)
NEUTROPHILS # BLD AUTO: 2.14 K/UL (ref 1.8–7.7)
NEUTROPHILS NFR BLD AUTO: 39.2 % (ref 53–65)
PLATELET # BLD AUTO: 366 K/UL (ref 150–400)
POTASSIUM SERPL-SCNC: 4 MMOL/L (ref 3.5–5.1)
PROT SERPL-MCNC: 5.6 G/DL (ref 6.4–8.2)
RBC # BLD AUTO: 3.1 M/UL (ref 4.2–5.4)
SODIUM SERPL-SCNC: 142 MMOL/L (ref 136–145)
WBC # BLD AUTO: 5.47 K/UL (ref 4.5–11)

## 2023-07-19 PROCEDURE — 80053 COMPREHEN METABOLIC PANEL: CPT

## 2023-07-19 PROCEDURE — 85025 COMPLETE CBC W/AUTO DIFF WBC: CPT

## 2023-07-19 PROCEDURE — 83036 HEMOGLOBIN GLYCOSYLATED A1C: CPT

## 2023-08-11 ENCOUNTER — LAB REQUISITION (OUTPATIENT)
Dept: LAB | Facility: HOSPITAL | Age: 88
End: 2023-08-11
Attending: INTERNAL MEDICINE
Payer: MEDICARE

## 2023-08-11 DIAGNOSIS — E11.8 TYPE 2 DIABETES MELLITUS WITH UNSPECIFIED COMPLICATIONS: ICD-10-CM

## 2023-08-16 LAB — GLUCOSE SERPL-MCNC: 89 MG/DL (ref 74–106)

## 2023-08-16 PROCEDURE — 82947 ASSAY GLUCOSE BLOOD QUANT: CPT

## 2023-09-19 ENCOUNTER — LAB REQUISITION (OUTPATIENT)
Dept: LAB | Facility: HOSPITAL | Age: 88
End: 2023-09-19
Attending: INTERNAL MEDICINE
Payer: MEDICARE

## 2023-09-19 DIAGNOSIS — E11.8 TYPE 2 DIABETES MELLITUS WITH UNSPECIFIED COMPLICATIONS: ICD-10-CM

## 2023-09-20 LAB — GLUCOSE SERPL-MCNC: 97 MG/DL (ref 74–106)

## 2023-09-20 PROCEDURE — 82947 ASSAY GLUCOSE BLOOD QUANT: CPT

## 2023-10-16 ENCOUNTER — LAB REQUISITION (OUTPATIENT)
Dept: LAB | Facility: HOSPITAL | Age: 88
End: 2023-10-16
Attending: INTERNAL MEDICINE
Payer: MEDICARE

## 2023-10-16 DIAGNOSIS — E03.9 HYPOTHYROIDISM, UNSPECIFIED: ICD-10-CM

## 2023-10-16 DIAGNOSIS — E11.8 TYPE 2 DIABETES MELLITUS WITH UNSPECIFIED COMPLICATIONS: ICD-10-CM

## 2023-10-16 DIAGNOSIS — Z79.01 LONG TERM (CURRENT) USE OF ANTICOAGULANTS: ICD-10-CM

## 2023-10-18 LAB
ALBUMIN SERPL BCP-MCNC: 3.1 G/DL (ref 3.5–5)
ALBUMIN/GLOB SERPL: 1.2 {RATIO}
ALP SERPL-CCNC: 57 U/L (ref 55–142)
ALT SERPL W P-5'-P-CCNC: 9 U/L (ref 13–56)
ANION GAP SERPL CALCULATED.3IONS-SCNC: 11 MMOL/L (ref 7–16)
AST SERPL W P-5'-P-CCNC: 14 U/L (ref 15–37)
BASOPHILS # BLD AUTO: 0.02 K/UL (ref 0–0.2)
BASOPHILS NFR BLD AUTO: 0.4 % (ref 0–1)
BILIRUB SERPL-MCNC: 0.2 MG/DL (ref ?–1.2)
BUN SERPL-MCNC: 19 MG/DL (ref 7–18)
BUN/CREAT SERPL: 22 (ref 6–20)
CALCIUM SERPL-MCNC: 9.2 MG/DL (ref 8.5–10.1)
CHLORIDE SERPL-SCNC: 100 MMOL/L (ref 98–107)
CO2 SERPL-SCNC: 30 MMOL/L (ref 21–32)
CREAT SERPL-MCNC: 0.86 MG/DL (ref 0.55–1.02)
DIFFERENTIAL METHOD BLD: ABNORMAL
EGFR (NO RACE VARIABLE) (RUSH/TITUS): 64 ML/MIN/1.73M2
EOSINOPHIL # BLD AUTO: 0.24 K/UL (ref 0–0.5)
EOSINOPHIL NFR BLD AUTO: 4.7 % (ref 1–4)
ERYTHROCYTE [DISTWIDTH] IN BLOOD BY AUTOMATED COUNT: 12.9 % (ref 11.5–14.5)
EST. AVERAGE GLUCOSE BLD GHB EST-MCNC: 120 MG/DL
GLOBULIN SER-MCNC: 2.6 G/DL (ref 2–4)
GLUCOSE SERPL-MCNC: 87 MG/DL (ref 74–106)
HBA1C MFR BLD HPLC: 6.2 % (ref 4.5–6.6)
HCT VFR BLD AUTO: 35.3 % (ref 38–47)
HGB BLD-MCNC: 11.2 G/DL (ref 12–16)
LYMPHOCYTES # BLD AUTO: 2.31 K/UL (ref 1–4.8)
LYMPHOCYTES NFR BLD AUTO: 45.1 % (ref 27–41)
MCH RBC QN AUTO: 30 PG (ref 27–31)
MCHC RBC AUTO-ENTMCNC: 31.7 G/DL (ref 32–36)
MCV RBC AUTO: 94.6 FL (ref 80–96)
MONOCYTES # BLD AUTO: 0.57 K/UL (ref 0–0.8)
MONOCYTES NFR BLD AUTO: 11.1 % (ref 2–6)
MPC BLD CALC-MCNC: 10.2 FL (ref 9.4–12.4)
NEUTROPHILS # BLD AUTO: 1.98 K/UL (ref 1.8–7.7)
NEUTROPHILS NFR BLD AUTO: 38.7 % (ref 53–65)
PLATELET # BLD AUTO: 223 K/UL (ref 150–400)
POTASSIUM SERPL-SCNC: 4.3 MMOL/L (ref 3.5–5.1)
PROT SERPL-MCNC: 5.7 G/DL (ref 6.4–8.2)
RBC # BLD AUTO: 3.73 M/UL (ref 4.2–5.4)
SODIUM SERPL-SCNC: 137 MMOL/L (ref 136–145)
TSH SERPL DL<=0.005 MIU/L-ACNC: 2.08 UIU/ML (ref 0.36–3.74)
WBC # BLD AUTO: 5.12 K/UL (ref 4.5–11)

## 2023-10-18 PROCEDURE — 84443 ASSAY THYROID STIM HORMONE: CPT

## 2023-10-18 PROCEDURE — 83036 HEMOGLOBIN GLYCOSYLATED A1C: CPT

## 2023-10-18 PROCEDURE — 80053 COMPREHEN METABOLIC PANEL: CPT

## 2023-10-18 PROCEDURE — 85025 COMPLETE CBC W/AUTO DIFF WBC: CPT

## 2023-11-13 ENCOUNTER — LAB REQUISITION (OUTPATIENT)
Dept: LAB | Facility: HOSPITAL | Age: 88
End: 2023-11-13
Attending: INTERNAL MEDICINE
Payer: MEDICARE

## 2023-11-13 DIAGNOSIS — E11.8 TYPE 2 DIABETES MELLITUS WITH UNSPECIFIED COMPLICATIONS: ICD-10-CM

## 2023-11-15 LAB — GLUCOSE SERPL-MCNC: 119 MG/DL (ref 74–106)

## 2023-11-15 PROCEDURE — 82947 ASSAY GLUCOSE BLOOD QUANT: CPT

## 2023-11-17 ENCOUNTER — HOSPITAL ENCOUNTER (OUTPATIENT)
Dept: RADIOLOGY | Facility: HOSPITAL | Age: 88
Discharge: HOME | End: 2023-11-17
Attending: INTERNAL MEDICINE
Payer: MEDICARE

## 2023-11-17 DIAGNOSIS — S72.002A DISPLACED FRACTURE OF LEFT FEMORAL NECK: ICD-10-CM

## 2023-11-17 PROCEDURE — 73700 CT LOWER EXTREMITY W/O DYE: CPT | Mod: TC,LT

## 2023-12-13 ENCOUNTER — LAB REQUISITION (OUTPATIENT)
Dept: LAB | Facility: HOSPITAL | Age: 88
End: 2023-12-13
Attending: INTERNAL MEDICINE
Payer: MEDICARE

## 2023-12-13 DIAGNOSIS — E11.8 TYPE 2 DIABETES MELLITUS WITH UNSPECIFIED COMPLICATIONS: ICD-10-CM

## 2023-12-20 LAB — GLUCOSE SERPL-MCNC: 91 MG/DL (ref 74–106)

## 2023-12-20 PROCEDURE — 82947 ASSAY GLUCOSE BLOOD QUANT: CPT

## 2023-12-20 PROCEDURE — 82947 ASSAY GLUCOSE BLOOD QUANT: CPT | Performed by: INTERNAL MEDICINE

## 2024-01-10 ENCOUNTER — LAB REQUISITION (OUTPATIENT)
Dept: LAB | Facility: HOSPITAL | Age: 89
End: 2024-01-10
Attending: INTERNAL MEDICINE
Payer: MEDICARE

## 2024-01-10 DIAGNOSIS — Z79.01 LONG TERM (CURRENT) USE OF ANTICOAGULANTS: ICD-10-CM

## 2024-01-10 DIAGNOSIS — E11.8 TYPE 2 DIABETES MELLITUS WITH UNSPECIFIED COMPLICATIONS: ICD-10-CM

## 2024-01-17 LAB
ALBUMIN SERPL BCP-MCNC: 3 G/DL (ref 3.5–5)
ALBUMIN/GLOB SERPL: 0.9 {RATIO}
ALP SERPL-CCNC: 67 U/L (ref 55–142)
ALT SERPL W P-5'-P-CCNC: 9 U/L (ref 13–56)
ANION GAP SERPL CALCULATED.3IONS-SCNC: 13 MMOL/L (ref 7–16)
AST SERPL W P-5'-P-CCNC: 12 U/L (ref 15–37)
BASOPHILS # BLD AUTO: 0.02 K/UL (ref 0–0.2)
BASOPHILS NFR BLD AUTO: 0.4 % (ref 0–1)
BILIRUB SERPL-MCNC: 0.3 MG/DL (ref ?–1.2)
BUN SERPL-MCNC: 15 MG/DL (ref 7–18)
BUN/CREAT SERPL: 22 (ref 6–20)
CALCIUM SERPL-MCNC: 9.5 MG/DL (ref 8.5–10.1)
CHLORIDE SERPL-SCNC: 98 MMOL/L (ref 98–107)
CO2 SERPL-SCNC: 28 MMOL/L (ref 21–32)
CREAT SERPL-MCNC: 0.69 MG/DL (ref 0.55–1.02)
DIFFERENTIAL METHOD BLD: ABNORMAL
EGFR (NO RACE VARIABLE) (RUSH/TITUS): 83 ML/MIN/1.73M2
EOSINOPHIL # BLD AUTO: 0.2 K/UL (ref 0–0.5)
EOSINOPHIL NFR BLD AUTO: 3.8 % (ref 1–4)
ERYTHROCYTE [DISTWIDTH] IN BLOOD BY AUTOMATED COUNT: 13.6 % (ref 11.5–14.5)
EST. AVERAGE GLUCOSE BLD GHB EST-MCNC: 123 MG/DL
GLOBULIN SER-MCNC: 3.4 G/DL (ref 2–4)
GLUCOSE SERPL-MCNC: 98 MG/DL (ref 74–106)
HBA1C MFR BLD HPLC: 5.9 % (ref 4.5–6.6)
HCT VFR BLD AUTO: 32.6 % (ref 38–47)
HGB BLD-MCNC: 10.2 G/DL (ref 12–16)
LYMPHOCYTES # BLD AUTO: 1.96 K/UL (ref 1–4.8)
LYMPHOCYTES NFR BLD AUTO: 37.2 % (ref 27–41)
MCH RBC QN AUTO: 30.4 PG (ref 27–31)
MCHC RBC AUTO-ENTMCNC: 31.3 G/DL (ref 32–36)
MCV RBC AUTO: 97.3 FL (ref 80–96)
MONOCYTES # BLD AUTO: 0.45 K/UL (ref 0–0.8)
MONOCYTES NFR BLD AUTO: 8.5 % (ref 2–6)
MPC BLD CALC-MCNC: 10.7 FL (ref 9.4–12.4)
NEUTROPHILS # BLD AUTO: 2.64 K/UL (ref 1.8–7.7)
NEUTROPHILS NFR BLD AUTO: 50.1 % (ref 53–65)
PLATELET # BLD AUTO: 294 K/UL (ref 150–400)
POTASSIUM SERPL-SCNC: 3.7 MMOL/L (ref 3.5–5.1)
PROT SERPL-MCNC: 6.4 G/DL (ref 6.4–8.2)
RBC # BLD AUTO: 3.35 M/UL (ref 4.2–5.4)
SODIUM SERPL-SCNC: 135 MMOL/L (ref 136–145)
WBC # BLD AUTO: 5.27 K/UL (ref 4.5–11)

## 2024-01-17 PROCEDURE — 80053 COMPREHEN METABOLIC PANEL: CPT | Performed by: INTERNAL MEDICINE

## 2024-01-17 PROCEDURE — 85025 COMPLETE CBC W/AUTO DIFF WBC: CPT | Performed by: INTERNAL MEDICINE

## 2024-01-17 PROCEDURE — 83036 HEMOGLOBIN GLYCOSYLATED A1C: CPT | Performed by: INTERNAL MEDICINE

## 2024-02-05 ENCOUNTER — HOSPITAL ENCOUNTER (EMERGENCY)
Facility: HOSPITAL | Age: 89
Discharge: HOME | End: 2024-02-05
Payer: MEDICARE

## 2024-02-05 VITALS
RESPIRATION RATE: 18 BRPM | DIASTOLIC BLOOD PRESSURE: 73 MMHG | WEIGHT: 130 LBS | HEART RATE: 104 BPM | BODY MASS INDEX: 19.7 KG/M2 | SYSTOLIC BLOOD PRESSURE: 135 MMHG | HEIGHT: 68 IN | TEMPERATURE: 98 F | OXYGEN SATURATION: 97 %

## 2024-02-05 DIAGNOSIS — S00.03XA CONTUSION OF SCALP, INITIAL ENCOUNTER: ICD-10-CM

## 2024-02-05 DIAGNOSIS — S70.01XA CONTUSION OF RIGHT HIP, INITIAL ENCOUNTER: ICD-10-CM
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
## 2024-02-05 DIAGNOSIS — R00.0 TACHYCARDIA: ICD-10-CM

## 2024-02-05 DIAGNOSIS — S09.90XA INJURY OF HEAD, INITIAL ENCOUNTER: ICD-10-CM

## 2024-02-05 LAB
ALBUMIN SERPL BCP-MCNC: 3.5 G/DL (ref 3.5–5)
ALBUMIN/GLOB SERPL: 0.9 {RATIO}
ALP SERPL-CCNC: 82 U/L (ref 55–142)
ALT SERPL W P-5'-P-CCNC: 13 U/L (ref 13–56)
ANION GAP SERPL CALCULATED.3IONS-SCNC: 15 MMOL/L (ref 7–16)
AST SERPL W P-5'-P-CCNC: 16 U/L (ref 15–37)
BACTERIA #/AREA URNS HPF: ABNORMAL /HPF
BASOPHILS # BLD AUTO: 0.03 K/UL (ref 0–0.2)
BASOPHILS NFR BLD AUTO: 0.4 % (ref 0–1)
BILIRUB SERPL-MCNC: 0.4 MG/DL (ref ?–1.2)
BILIRUB UR QL STRIP: NEGATIVE
BUN SERPL-MCNC: 17 MG/DL (ref 7–18)
BUN/CREAT SERPL: 19 (ref 6–20)
CALCIUM SERPL-MCNC: 9.8 MG/DL (ref 8.5–10.1)
CHLORIDE SERPL-SCNC: 95 MMOL/L (ref 98–107)
CLARITY UR: CLEAR
CO2 SERPL-SCNC: 28 MMOL/L (ref 21–32)
COLOR UR: YELLOW
CREAT SERPL-MCNC: 0.89 MG/DL (ref 0.55–1.02)
DIFFERENTIAL METHOD BLD: ABNORMAL
EGFR (NO RACE VARIABLE) (RUSH/TITUS): 62 ML/MIN/1.73M2
EOSINOPHIL # BLD AUTO: 0.22 K/UL (ref 0–0.5)
EOSINOPHIL NFR BLD AUTO: 3.1 % (ref 1–4)
ERYTHROCYTE [DISTWIDTH] IN BLOOD BY AUTOMATED COUNT: 13.4 % (ref 11.5–14.5)
GLOBULIN SER-MCNC: 3.8 G/DL (ref 2–4)
GLUCOSE SERPL-MCNC: 102 MG/DL (ref 74–106)
GLUCOSE UR STRIP-MCNC: NEGATIVE MG/DL
HCT VFR BLD AUTO: 40.4 % (ref 38–47)
HGB BLD-MCNC: 12.9 G/DL (ref 12–16)
KETONES UR STRIP-SCNC: NEGATIVE MG/DL
LEUKOCYTE ESTERASE UR QL STRIP: ABNORMAL
LYMPHOCYTES # BLD AUTO: 1.83 K/UL (ref 1–4.8)
LYMPHOCYTES NFR BLD AUTO: 26 % (ref 27–41)
MCH RBC QN AUTO: 30.4 PG (ref 27–31)
MCHC RBC AUTO-ENTMCNC: 31.9 G/DL (ref 32–36)
MCV RBC AUTO: 95.3 FL (ref 80–96)
MONOCYTES # BLD AUTO: 0.52 K/UL (ref 0–0.8)
MONOCYTES NFR BLD AUTO: 7.4 % (ref 2–6)
MPC BLD CALC-MCNC: 9.4 FL (ref 9.4–12.4)
NEUTROPHILS # BLD AUTO: 4.43 K/UL (ref 1.8–7.7)
NEUTROPHILS NFR BLD AUTO: 63.1 % (ref 53–65)
NITRITE UR QL STRIP: NEGATIVE
PH UR STRIP: 7.5 PH UNITS
PLATELET # BLD AUTO: 287 K/UL (ref 150–400)
POTASSIUM SERPL-SCNC: 4 MMOL/L (ref 3.5–5.1)
PROT SERPL-MCNC: 7.3 G/DL (ref 6.4–8.2)
PROT UR QL STRIP: NEGATIVE
RBC # BLD AUTO: 4.24 M/UL (ref 4.2–5.4)
RBC # UR STRIP: ABNORMAL /UL
SODIUM SERPL-SCNC: 134 MMOL/L (ref 136–145)
SP GR UR STRIP: 1.01
TSH SERPL DL<=0.005 MIU/L-ACNC: 3.81 UIU/ML (ref 0.36–3.74)
UROBILINOGEN UR STRIP-ACNC: 0.2 MG/DL
WBC # BLD AUTO: 7.03 K/UL (ref 4.5–11)
WBC #/AREA URNS HPF: ABNORMAL /HPF

## 2024-02-05 PROCEDURE — 25000003 PHARM REV CODE 250: Performed by: NURSE PRACTITIONER

## 2024-02-05 PROCEDURE — 87086 URINE CULTURE/COLONY COUNT: CPT | Performed by: NURSE PRACTITIONER

## 2024-02-05 PROCEDURE — 84443 ASSAY THYROID STIM HORMONE: CPT | Performed by: NURSE PRACTITIONER

## 2024-02-05 PROCEDURE — 96374 THER/PROPH/DIAG INJ IV PUSH: CPT

## 2024-02-05 PROCEDURE — 99285 EMERGENCY DEPT VISIT HI MDM: CPT | Mod: 25

## 2024-02-05 PROCEDURE — 81003 URINALYSIS AUTO W/O SCOPE: CPT | Performed by: NURSE PRACTITIONER

## 2024-02-05 PROCEDURE — 93005 ELECTROCARDIOGRAM TRACING: CPT

## 2024-02-05 PROCEDURE — 93010 ELECTROCARDIOGRAM REPORT: CPT | Mod: ,,, | Performed by: INTERNAL MEDICINE

## 2024-02-05 PROCEDURE — 85025 COMPLETE CBC W/AUTO DIFF WBC: CPT | Performed by: NURSE PRACTITIONER

## 2024-02-05 PROCEDURE — 96361 HYDRATE IV INFUSION ADD-ON: CPT

## 2024-02-05 PROCEDURE — 80053 COMPREHEN METABOLIC PANEL: CPT | Performed by: NURSE PRACTITIONER

## 2024-02-05 PROCEDURE — 99284 EMERGENCY DEPT VISIT MOD MDM: CPT | Mod: GF | Performed by: NURSE PRACTITIONER

## 2024-02-05 RX ORDER — HYDROCODONE BITARTRATE AND ACETAMINOPHEN 5; 325 MG/1; MG/1
1 TABLET ORAL EVERY 4 HOURS PRN
COMMUNITY
Start: 2023-11-24

## 2024-02-05 RX ORDER — DILTIAZEM HYDROCHLORIDE 30 MG/1
30 TABLET, FILM COATED ORAL
Status: COMPLETED | OUTPATIENT
Start: 2024-02-05 | End: 2024-02-05

## 2024-02-05 RX ORDER — GABAPENTIN 300 MG/1
300 CAPSULE ORAL NIGHTLY
COMMUNITY

## 2024-02-05 RX ORDER — VIT C/E/ZN/COPPR/LUTEIN/ZEAXAN 250MG-90MG
CAPSULE ORAL
COMMUNITY

## 2024-02-05 RX ORDER — METOPROLOL TARTRATE 1 MG/ML
2.5 INJECTION, SOLUTION INTRAVENOUS
Status: COMPLETED | OUTPATIENT
Start: 2024-02-05 | End: 2024-02-05

## 2024-02-05 RX ORDER — FERROUS SULFATE 325(65) MG
325 TABLET, DELAYED RELEASE (ENTERIC COATED) ORAL
COMMUNITY

## 2024-02-05 RX ORDER — MECLIZINE HYDROCHLORIDE 25 MG/1
25 TABLET ORAL EVERY 8 HOURS PRN
COMMUNITY
Start: 2024-01-05

## 2024-02-05 RX ORDER — DILTIAZEM HYDROCHLORIDE 30 MG/1
30 TABLET, FILM COATED ORAL EVERY 8 HOURS
COMMUNITY

## 2024-02-05 RX ADMIN — DILTIAZEM HYDROCHLORIDE 30 MG: 30 TABLET, FILM COATED ORAL at 01:02

## 2024-02-05 RX ADMIN — METOPROLOL TARTRATE 2.5 MG: 1 INJECTION, SOLUTION INTRAVENOUS at 02:02

## 2024-02-05 RX ADMIN — SODIUM CHLORIDE 500 ML: 9 INJECTION, SOLUTION INTRAVENOUS at 12:02

## 2024-02-05 NOTE — DISCHARGE INSTRUCTIONS
We were not able to do CT head but she has no noted deficits and no LOC.  Please do Neuro checks every 4 hours for 24 hours.  Will need to go to an ER with CT if she has altered LOC or concerns with neuro checks.   TSH is 3.815.  HR up to 144 here.  We gave 500 ml NS bolus, HR remains up- We gave the 1 pm dose of  diltiazem 30 mg then 1 hour after HR remains up so we gave metoprolol 2.5 which improved -110.

## 2024-02-05 NOTE — ED TRIAGE NOTES
Pt presents to the ED via EMS w/ c/o fall this morning at [facility], pt hit right side of head and right hip.

## 2024-02-05 NOTE — ED PROVIDER NOTES
Encounter Date: 2/5/2024       History     Chief Complaint   Patient presents with    Fall    Hip Pain    Head Injury     Right side     89 yr old WF with PMH of HTN, GERD, hyponatremia, depression [place] [facility] [place] fell this morning in the dining room.  States was bending over to  an egg and fell.  C/o pain to head and right hip.  Noted abrasion to right side of scalp    The history is provided by the patient and the EMS personnel.   Fall  The accident occurred just prior to arrival. Fall occurred: bending over. She landed on A hard floor. The point of impact was the head. The pain is present in the head and right hip. Associated symptoms include headaches. Pertinent negatives include no neck pain. She has tried nothing for the symptoms.     Review of patient's allergies indicates:   Allergen Reactions    Sulfa (sulfonamide antibiotics)      Past Medical History:   Diagnosis Date    Allergic rhinitis     Anticoagulant long-term use     Anxiety disorder, unspecified     Arthritis     Chronic pain     Depression     GERD (gastroesophageal reflux disease)     hyperlipidemia     Hypertension     Hyponatremia     Hypoxemia     Insomnia     Mood disorder     Neuropathy     Rhinitis     Thyroid disease     Type 2 diabetes mellitus     Urinary tract infection, site not specified      History reviewed. No pertinent surgical history.  History reviewed. No pertinent family history.  Social History     Tobacco Use    Smoking status: Unknown   Substance Use Topics    Alcohol use: Not Currently    Drug use: Not Currently     Review of Systems   Constitutional: Negative.    Respiratory: Negative.     Cardiovascular: Negative.    Musculoskeletal:  Negative for neck pain.   Skin:  Positive for wound.   Neurological:  Positive for headaches.       Physical Exam     Initial Vitals [02/05/24 1128]   BP Pulse Resp Temp SpO2   128/86 (!) 125 16 97.8 °F (36.6 °C) 97 %      MAP       --         Physical  Exam    Nursing note and vitals reviewed.  Constitutional: She appears well-developed and well-nourished.   HENT:   Mouth/Throat: Mucous membranes are dry.   Neck: Neck supple.   Cardiovascular:    Tachycardia present.         Pulmonary/Chest: Breath sounds normal.   Abdominal: Abdomen is soft. There is no abdominal tenderness.   Musculoskeletal:      Cervical back: Neck supple.     Neurological: She is alert.   Skin: Skin is dry.         Medical Screening Exam   See Full Note    ED Course   Procedures  Labs Reviewed   COMPREHENSIVE METABOLIC PANEL - Abnormal; Notable for the following components:       Result Value    Sodium 134 (*)     Chloride 95 (*)     All other components within normal limits   CBC WITH DIFFERENTIAL - Abnormal; Notable for the following components:    MCHC 31.9 (*)     Lymphocytes % 26.0 (*)     Monocytes % 7.4 (*)     All other components within normal limits   URINALYSIS, REFLEX TO URINE CULTURE - Abnormal; Notable for the following components:    Leukocytes, UA Large (*)     Blood, UA Trace-Intact (*)     All other components within normal limits   URINALYSIS, MICROSCOPIC - Abnormal; Notable for the following components:    WBC, UA 15-25 (*)     Bacteria, UA Loaded (*)     All other components within normal limits   TSH - Abnormal; Notable for the following components:    TSH 3.815 (*)     All other components within normal limits   CULTURE, URINE   CBC W/ AUTO DIFFERENTIAL    Narrative:     The following orders were created for panel order CBC Auto Differential.  Procedure                               Abnormality         Status                     ---------                               -----------         ------                     CBC with Differential[[id number]]       Abnormal            Final result                 Please view results for these tests on the individual orders.          Imaging Results              X-Ray Cervical Spine AP And Lateral (Final result)  Result time  02/05/24 13:51:19      Final result by Casa Stone DO (02/05/24 13:51:19)                   Impression:      No acute fracture or dislocation. Grade 1 anterolisthesis C4 on C5. Grade 1 retrolisthesis C5 relative to C6. Mild multilevel endplate change in multilevel anterior osteophytes. Mild multilevel uncovertebral joint and facet change. Upper lobes of the lungs are clear.      Electronically signed by: Casa Stone  Date:    02/05/2024  Time:    13:51               Narrative:    EXAMINATION:  XR CERVICAL SPINE AP LATERAL    CLINICAL HISTORY:  Unspecified fall, initial encounter    TECHNIQUE:  XR CERVICAL SPINE AP LATERAL    COMPARISON:  None    FINDINGS:  No acute fracture or dislocation.  Grade 1 anterolisthesis C4 on C5.  Grade 1 retrolisthesis C5 relative to C6.  Mild multilevel endplate change in multilevel anterior osteophytes.  Mild multilevel uncovertebral joint and facet change.  Upper lobes of the lungs are clear.                                       X-Ray Hip 2 or 3 views Right (with Pelvis when performed) (Final result)  Result time 02/05/24 12:27:21      Final result by Zaheer Mosquera II, MD (02/05/24 12:27:21)                   Impression:      No evidence of acute injury demonstrated      Electronically signed by: Zaheer Mosquera  Date:    02/05/2024  Time:    12:27               Narrative:    EXAMINATION:  XR HIP WITH PELVIS WHEN PERFORMED, 2 OR 3  VIEWS RIGHT    CLINICAL HISTORY:  Unspecified fall, initial encounter    COMPARISON:  None available    TECHNIQUE:  XR HIP WITH PELVIS 3 VIEWS RIGHT    FINDINGS:  No evidence of fracture seen.  The alignment of the joints appears normal.  No degenerative change is present.  No soft tissue abnormality is seen.                                       Medications   sodium chloride 0.9% bolus 500 mL 500 mL (0 mLs Intravenous Stopped 2/5/24 1309)   diltiaZEM tablet 30 mg (30 mg Oral Given 2/5/24 1320)   metoprolol injection 2.5 mg (2.5 mg  Intravenous Given 2/5/24 1412)     Medical Decision Making  89 yr old WF with PMH of HTN, GERD, hyponatremia, depression to ED via EMS stretcher from [facility] [place] with c/o fell this morning in the dining room.  States was bending over to  an egg and fell.  C/o pain to head and right hip.  Noted abrasion to right side of scalp      Amount and/or Complexity of Data Reviewed  Labs: ordered.     Details: Elevated TSH otherwise unremarkable.   Radiology: ordered.     Details: No acute findings    Discussion of management or test interpretation with external provider(s): Consult with Dr. Rebolledo who agrees ok to go back to NH and have them do neurochecks if ok with family.  Discussed with pt son, Ant who agrees.ok to go back to NH     Risk  Prescription drug management.               ED Course as of 02/05/24 1615   Mon Feb 05, 2024   1308 Pulse(!): 125  States HR has been elevated her whole life.  Called nursing home who states her HR is around 120-s for months.  She was started on diltiazem  30 every 8 hours 11/24/23.  She is due for medication at this time.   [CG]   1320 Discussed pt with Dr. Kwok,  [CG]   1359 Pulse(!): 144  HR increased to 144 despite receiving IV fluids and PO diltiazem [CG]   1416 Med Com reports Dr. Nye will call back. [CG]   1418 HR down to 109 - 116 after metoprolol.  Will continue to monitor.  Awaiting telemed consult. [CG]   1450 Discussed pt status with patient's son Ant, explained that pt has not had a CT due to scanner not working.  Told him that pt is awake and alert and denies pain.  He agrees that she does not need to be transferred elsewhere for CT.   [CG]   1452 Telemed consult with Dr. Rebolledo who agrees ok to send back to NH without CT but suggest talking to family to be sure they agree.   [CG]   1614 Transport from NH here.  PT dc'd to NH [CG]      ED Course User Index  [CG] Chrissy Dash, ROSEMARY                           Clinical Impression:   Final diagnoses:  [R00.0]  Tachycardia - responded well to metoprolol 2.5   [W19.XXXA] Fall (Primary)  [S09.90XA] Injury of head, initial encounter - NO LOC  [S00.03XA] Contusion of scalp, initial encounter  [S70.01XA] Contusion of right hip, initial encounter        ED Disposition Condition    Discharge Stable          ED Prescriptions    None       Follow-up Information       Follow up With Specialties Details Why Contact Info    Walker Watkins MD Internal Medicine   [address]  [place] [address]  [phone]               Chrissy Dash, [facility]  02/05/24 1502       Chrissy Dash, [facility]  02/05/24 1616

## 2024-02-06 LAB
OHS QRS DURATION: 94 MS
OHS QTC CALCULATION: 481 MS

## 2024-02-07 LAB — UA COMPLETE W REFLEX CULTURE PNL UR: ABNORMAL

## 2024-02-09 ENCOUNTER — LAB REQUISITION (OUTPATIENT)
Dept: LAB | Facility: HOSPITAL | Age: 89
End: 2024-02-09
Attending: INTERNAL MEDICINE
Payer: MEDICARE

## 2024-02-09 DIAGNOSIS — E11.8 TYPE 2 DIABETES MELLITUS WITH UNSPECIFIED COMPLICATIONS: ICD-10-CM

## 2024-02-14 PROCEDURE — 82947 ASSAY GLUCOSE BLOOD QUANT: CPT | Performed by: INTERNAL MEDICINE

## 2024-02-15 LAB — GLUCOSE SERPL-MCNC: 81 MG/DL (ref 74–106)

## 2024-02-29 ENCOUNTER — LAB REQUISITION (OUTPATIENT)
Dept: LAB | Facility: HOSPITAL | Age: 89
End: 2024-02-29
Attending: NURSE PRACTITIONER
Payer: MEDICARE

## 2024-02-29 DIAGNOSIS — R30.0 DYSURIA: ICD-10-CM

## 2024-02-29 DIAGNOSIS — R31.9 HEMATURIA, UNSPECIFIED: ICD-10-CM

## 2024-02-29 LAB
BACTERIA #/AREA URNS HPF: ABNORMAL /HPF
BILIRUB UR QL STRIP: NEGATIVE
CLARITY UR: ABNORMAL
COLOR UR: YELLOW
GLUCOSE UR STRIP-MCNC: NEGATIVE MG/DL
KETONES UR STRIP-SCNC: ABNORMAL MG/DL
LEUKOCYTE ESTERASE UR QL STRIP: ABNORMAL
NITRITE UR QL STRIP: NEGATIVE
PH UR STRIP: 7 PH UNITS
PROT UR QL STRIP: 100
RBC # UR STRIP: ABNORMAL /UL
SP GR UR STRIP: 1.02
UROBILINOGEN UR STRIP-ACNC: 0.2 MG/DL
WBC #/AREA URNS HPF: ABNORMAL /HPF

## 2024-02-29 PROCEDURE — 81003 URINALYSIS AUTO W/O SCOPE: CPT | Performed by: NURSE PRACTITIONER

## 2024-02-29 PROCEDURE — 87086 URINE CULTURE/COLONY COUNT: CPT | Performed by: NURSE PRACTITIONER

## 2024-03-02 LAB — UA COMPLETE W REFLEX CULTURE PNL UR: ABNORMAL

## 2024-03-15 ENCOUNTER — LAB REQUISITION (OUTPATIENT)
Dept: LAB | Facility: HOSPITAL | Age: 89
End: 2024-03-15
Attending: INTERNAL MEDICINE
Payer: MEDICARE

## 2024-03-15 DIAGNOSIS — E11.8 TYPE 2 DIABETES MELLITUS WITH UNSPECIFIED COMPLICATIONS: ICD-10-CM

## 2024-03-20 LAB — GLUCOSE SERPL-MCNC: 86 MG/DL (ref 74–106)

## 2024-03-20 PROCEDURE — 82947 ASSAY GLUCOSE BLOOD QUANT: CPT | Performed by: INTERNAL MEDICINE

## 2024-04-12 ENCOUNTER — LAB REQUISITION (OUTPATIENT)
Dept: LAB | Facility: HOSPITAL | Age: 89
End: 2024-04-12
Attending: INTERNAL MEDICINE
Payer: MEDICARE

## 2024-04-12 DIAGNOSIS — E11.8 TYPE 2 DIABETES MELLITUS WITH UNSPECIFIED COMPLICATIONS: ICD-10-CM

## 2024-04-12 DIAGNOSIS — I11.9 HYPERTENSIVE HEART DISEASE WITHOUT HEART FAILURE: ICD-10-CM

## 2024-04-12 DIAGNOSIS — E87.1 HYPO-OSMOLALITY AND HYPONATREMIA: ICD-10-CM

## 2024-04-12 DIAGNOSIS — E03.9 HYPOTHYROIDISM, UNSPECIFIED: ICD-10-CM

## 2024-04-17 LAB
ALBUMIN SERPL BCP-MCNC: 2.8 G/DL (ref 3.5–5)
ALBUMIN/GLOB SERPL: 0.8 {RATIO}
ALP SERPL-CCNC: 62 U/L (ref 55–142)
ALT SERPL W P-5'-P-CCNC: 9 U/L (ref 13–56)
ANION GAP SERPL CALCULATED.3IONS-SCNC: 15 MMOL/L (ref 7–16)
AST SERPL W P-5'-P-CCNC: 17 U/L (ref 15–37)
BASOPHILS # BLD AUTO: 0.02 K/UL (ref 0–0.2)
BASOPHILS NFR BLD AUTO: 0.5 % (ref 0–1)
BILIRUB SERPL-MCNC: 0.2 MG/DL (ref ?–1.2)
BUN SERPL-MCNC: 12 MG/DL (ref 7–18)
BUN/CREAT SERPL: 17 (ref 6–20)
CALCIUM SERPL-MCNC: 9.3 MG/DL (ref 8.5–10.1)
CHLORIDE SERPL-SCNC: 102 MMOL/L (ref 98–107)
CO2 SERPL-SCNC: 24 MMOL/L (ref 21–32)
CREAT SERPL-MCNC: 0.72 MG/DL (ref 0.55–1.02)
DIFFERENTIAL METHOD BLD: ABNORMAL
EGFR (NO RACE VARIABLE) (RUSH/TITUS): 80 ML/MIN/1.73M2
EOSINOPHIL # BLD AUTO: 0.35 K/UL (ref 0–0.5)
EOSINOPHIL NFR BLD AUTO: 8.1 % (ref 1–4)
ERYTHROCYTE [DISTWIDTH] IN BLOOD BY AUTOMATED COUNT: 15.1 % (ref 11.5–14.5)
EST. AVERAGE GLUCOSE BLD GHB EST-MCNC: 128 MG/DL
GLOBULIN SER-MCNC: 3.3 G/DL (ref 2–4)
GLUCOSE SERPL-MCNC: 78 MG/DL (ref 74–106)
HBA1C MFR BLD HPLC: 6.1 % (ref 4.5–6.6)
HCT VFR BLD AUTO: 36.1 % (ref 38–47)
HGB BLD-MCNC: 10.6 G/DL (ref 12–16)
LYMPHOCYTES # BLD AUTO: 1.75 K/UL (ref 1–4.8)
LYMPHOCYTES NFR BLD AUTO: 40.3 % (ref 27–41)
MCH RBC QN AUTO: 29.7 PG (ref 27–31)
MCHC RBC AUTO-ENTMCNC: 29.4 G/DL (ref 32–36)
MCV RBC AUTO: 101.1 FL (ref 80–96)
MONOCYTES # BLD AUTO: 0.4 K/UL (ref 0–0.8)
MONOCYTES NFR BLD AUTO: 9.2 % (ref 2–6)
MPC BLD CALC-MCNC: 11 FL (ref 9.4–12.4)
NEUTROPHILS # BLD AUTO: 1.82 K/UL (ref 1.8–7.7)
NEUTROPHILS NFR BLD AUTO: 41.9 % (ref 53–65)
PLATELET # BLD AUTO: 263 K/UL (ref 150–400)
POTASSIUM SERPL-SCNC: 4 MMOL/L (ref 3.5–5.1)
PROT SERPL-MCNC: 6.1 G/DL (ref 6.4–8.2)
RBC # BLD AUTO: 3.57 M/UL (ref 4.2–5.4)
SODIUM SERPL-SCNC: 137 MMOL/L (ref 136–145)
TSH SERPL DL<=0.005 MIU/L-ACNC: 1.92 UIU/ML (ref 0.36–3.74)
WBC # BLD AUTO: 4.34 K/UL (ref 4.5–11)

## 2024-04-17 PROCEDURE — 85025 COMPLETE CBC W/AUTO DIFF WBC: CPT | Performed by: INTERNAL MEDICINE

## 2024-04-17 PROCEDURE — 83036 HEMOGLOBIN GLYCOSYLATED A1C: CPT | Performed by: INTERNAL MEDICINE

## 2024-04-17 PROCEDURE — 80053 COMPREHEN METABOLIC PANEL: CPT | Performed by: INTERNAL MEDICINE

## 2024-04-17 PROCEDURE — 84443 ASSAY THYROID STIM HORMONE: CPT | Performed by: INTERNAL MEDICINE

## 2024-05-15 ENCOUNTER — LAB REQUISITION (OUTPATIENT)
Dept: LAB | Facility: HOSPITAL | Age: 89
End: 2024-05-15
Attending: INTERNAL MEDICINE
Payer: MEDICARE

## 2024-05-15 DIAGNOSIS — E87.1 HYPO-OSMOLALITY AND HYPONATREMIA: ICD-10-CM

## 2024-05-15 DIAGNOSIS — E11.8 TYPE 2 DIABETES MELLITUS WITH UNSPECIFIED COMPLICATIONS: ICD-10-CM

## 2024-05-15 DIAGNOSIS — I48.91 UNSPECIFIED ATRIAL FIBRILLATION: ICD-10-CM

## 2024-05-15 LAB
ALBUMIN SERPL BCP-MCNC: 2.4 G/DL (ref 3.5–5)
ALBUMIN/GLOB SERPL: 0.8 {RATIO}
ALP SERPL-CCNC: 59 U/L (ref 55–142)
ALT SERPL W P-5'-P-CCNC: 14 U/L (ref 13–56)
ANION GAP SERPL CALCULATED.3IONS-SCNC: 11 MMOL/L (ref 7–16)
AST SERPL W P-5'-P-CCNC: 22 U/L (ref 15–37)
BASOPHILS # BLD AUTO: 0.03 K/UL (ref 0–0.2)
BASOPHILS NFR BLD AUTO: 0.6 % (ref 0–1)
BILIRUB SERPL-MCNC: 0.5 MG/DL (ref ?–1.2)
BUN SERPL-MCNC: 13 MG/DL (ref 7–18)
BUN/CREAT SERPL: 20 (ref 6–20)
CALCIUM SERPL-MCNC: 8.6 MG/DL (ref 8.5–10.1)
CHLORIDE SERPL-SCNC: 103 MMOL/L (ref 98–107)
CO2 SERPL-SCNC: 27 MMOL/L (ref 21–32)
CREAT SERPL-MCNC: 0.64 MG/DL (ref 0.55–1.02)
DIFFERENTIAL METHOD BLD: ABNORMAL
EGFR (NO RACE VARIABLE) (RUSH/TITUS): 84 ML/MIN/1.73M2
EOSINOPHIL # BLD AUTO: 0.33 K/UL (ref 0–0.5)
EOSINOPHIL NFR BLD AUTO: 6.2 % (ref 1–4)
ERYTHROCYTE [DISTWIDTH] IN BLOOD BY AUTOMATED COUNT: 15.7 % (ref 11.5–14.5)
EST. AVERAGE GLUCOSE BLD GHB EST-MCNC: 140 MG/DL
GLOBULIN SER-MCNC: 3.1 G/DL (ref 2–4)
GLUCOSE SERPL-MCNC: 107 MG/DL (ref 74–106)
HBA1C MFR BLD HPLC: 6.5 % (ref 4.5–6.6)
HCT VFR BLD AUTO: 35.2 % (ref 38–47)
HGB BLD-MCNC: 11 G/DL (ref 12–16)
LYMPHOCYTES # BLD AUTO: 1.59 K/UL (ref 1–4.8)
LYMPHOCYTES NFR BLD AUTO: 30 % (ref 27–41)
MCH RBC QN AUTO: 29.7 PG (ref 27–31)
MCHC RBC AUTO-ENTMCNC: 31.3 G/DL (ref 32–36)
MCV RBC AUTO: 95.1 FL (ref 80–96)
MONOCYTES # BLD AUTO: 0.65 K/UL (ref 0–0.8)
MONOCYTES NFR BLD AUTO: 12.3 % (ref 2–6)
MPC BLD CALC-MCNC: 11 FL (ref 9.4–12.4)
NEUTROPHILS # BLD AUTO: 2.7 K/UL (ref 1.8–7.7)
NEUTROPHILS NFR BLD AUTO: 50.9 % (ref 53–65)
PLATELET # BLD AUTO: 183 K/UL (ref 150–400)
POTASSIUM SERPL-SCNC: 4.1 MMOL/L (ref 3.5–5.1)
PROT SERPL-MCNC: 5.5 G/DL (ref 6.4–8.2)
RBC # BLD AUTO: 3.7 M/UL (ref 4.2–5.4)
SODIUM SERPL-SCNC: 137 MMOL/L (ref 136–145)
WBC # BLD AUTO: 5.3 K/UL (ref 4.5–11)

## 2024-05-15 PROCEDURE — 80053 COMPREHEN METABOLIC PANEL: CPT | Performed by: INTERNAL MEDICINE

## 2024-05-15 PROCEDURE — 83036 HEMOGLOBIN GLYCOSYLATED A1C: CPT | Performed by: INTERNAL MEDICINE

## 2024-05-15 PROCEDURE — 85025 COMPLETE CBC W/AUTO DIFF WBC: CPT | Performed by: INTERNAL MEDICINE

## 2024-06-14 ENCOUNTER — LAB REQUISITION (OUTPATIENT)
Dept: LAB | Facility: HOSPITAL | Age: 89
End: 2024-06-14
Attending: INTERNAL MEDICINE
Payer: MEDICARE

## 2024-06-14 DIAGNOSIS — E11.8 TYPE 2 DIABETES MELLITUS WITH UNSPECIFIED COMPLICATIONS: ICD-10-CM

## 2024-06-19 LAB — GLUCOSE SERPL-MCNC: 98 MG/DL (ref 74–106)

## 2024-06-19 PROCEDURE — 82947 ASSAY GLUCOSE BLOOD QUANT: CPT | Performed by: INTERNAL MEDICINE

## 2024-07-10 ENCOUNTER — LAB REQUISITION (OUTPATIENT)
Dept: LAB | Facility: HOSPITAL | Age: 89
End: 2024-07-10
Attending: INTERNAL MEDICINE
Payer: MEDICARE

## 2024-07-10 DIAGNOSIS — E11.8 TYPE 2 DIABETES MELLITUS WITH UNSPECIFIED COMPLICATIONS: ICD-10-CM

## 2024-07-17 LAB — GLUCOSE SERPL-MCNC: 95 MG/DL (ref 74–106)

## 2024-07-17 PROCEDURE — 82947 ASSAY GLUCOSE BLOOD QUANT: CPT | Performed by: INTERNAL MEDICINE

## 2024-08-15 ENCOUNTER — LAB REQUISITION (OUTPATIENT)
Dept: LAB | Facility: HOSPITAL | Age: 89
End: 2024-08-15
Attending: INTERNAL MEDICINE
Payer: MEDICARE

## 2024-08-15 DIAGNOSIS — I11.9 HYPERTENSIVE HEART DISEASE WITHOUT HEART FAILURE: ICD-10-CM

## 2024-08-15 DIAGNOSIS — E11.8 TYPE 2 DIABETES MELLITUS WITH UNSPECIFIED COMPLICATIONS: ICD-10-CM

## 2024-08-21 LAB
ALBUMIN SERPL BCP-MCNC: 2.7 G/DL (ref 3.5–5)
ALBUMIN/GLOB SERPL: 0.9 {RATIO}
ALP SERPL-CCNC: 85 U/L (ref 55–142)
ALT SERPL W P-5'-P-CCNC: 11 U/L (ref 13–56)
ANION GAP SERPL CALCULATED.3IONS-SCNC: 10 MMOL/L (ref 7–16)
AST SERPL W P-5'-P-CCNC: 12 U/L (ref 15–37)
BASOPHILS # BLD AUTO: 0.02 K/UL (ref 0–0.2)
BASOPHILS NFR BLD AUTO: 0.4 % (ref 0–1)
BILIRUB SERPL-MCNC: 0.3 MG/DL (ref ?–1.2)
BUN SERPL-MCNC: 17 MG/DL (ref 7–18)
BUN/CREAT SERPL: 20 (ref 6–20)
CALCIUM SERPL-MCNC: 9 MG/DL (ref 8.5–10.1)
CHLORIDE SERPL-SCNC: 100 MMOL/L (ref 98–107)
CO2 SERPL-SCNC: 28 MMOL/L (ref 21–32)
CREAT SERPL-MCNC: 0.86 MG/DL (ref 0.55–1.02)
DIFFERENTIAL METHOD BLD: ABNORMAL
EGFR (NO RACE VARIABLE) (RUSH/TITUS): 64 ML/MIN/1.73M2
EOSINOPHIL # BLD AUTO: 0.14 K/UL (ref 0–0.5)
EOSINOPHIL NFR BLD AUTO: 3 % (ref 1–4)
ERYTHROCYTE [DISTWIDTH] IN BLOOD BY AUTOMATED COUNT: 12.8 % (ref 11.5–14.5)
EST. AVERAGE GLUCOSE BLD GHB EST-MCNC: 140 MG/DL
GLOBULIN SER-MCNC: 3 G/DL (ref 2–4)
GLUCOSE SERPL-MCNC: 96 MG/DL (ref 74–106)
HBA1C MFR BLD HPLC: 6.5 % (ref 4.5–6.6)
HCT VFR BLD AUTO: 34.8 % (ref 38–47)
HGB BLD-MCNC: 10.8 G/DL (ref 12–16)
LYMPHOCYTES # BLD AUTO: 2.31 K/UL (ref 1–4.8)
LYMPHOCYTES NFR BLD AUTO: 49.7 % (ref 27–41)
MCH RBC QN AUTO: 28.3 PG (ref 27–31)
MCHC RBC AUTO-ENTMCNC: 31 G/DL (ref 32–36)
MCV RBC AUTO: 91.1 FL (ref 80–96)
MONOCYTES # BLD AUTO: 0.55 K/UL (ref 0–0.8)
MONOCYTES NFR BLD AUTO: 11.8 % (ref 2–6)
MPC BLD CALC-MCNC: 10.6 FL (ref 9.4–12.4)
NEUTROPHILS # BLD AUTO: 1.63 K/UL (ref 1.8–7.7)
NEUTROPHILS NFR BLD AUTO: 35.1 % (ref 53–65)
PLATELET # BLD AUTO: 241 K/UL (ref 150–400)
POTASSIUM SERPL-SCNC: 4 MMOL/L (ref 3.5–5.1)
PROT SERPL-MCNC: 5.7 G/DL (ref 6.4–8.2)
RBC # BLD AUTO: 3.82 M/UL (ref 4.2–5.4)
SODIUM SERPL-SCNC: 134 MMOL/L (ref 136–145)
WBC # BLD AUTO: 4.65 K/UL (ref 4.5–11)

## 2024-08-21 PROCEDURE — 85025 COMPLETE CBC W/AUTO DIFF WBC: CPT | Performed by: INTERNAL MEDICINE

## 2024-08-21 PROCEDURE — 83036 HEMOGLOBIN GLYCOSYLATED A1C: CPT | Performed by: INTERNAL MEDICINE

## 2024-08-21 PROCEDURE — 80053 COMPREHEN METABOLIC PANEL: CPT | Performed by: INTERNAL MEDICINE

## 2024-08-30 ENCOUNTER — HOSPITAL ENCOUNTER (INPATIENT)
Facility: HOSPITAL | Age: 89
LOS: 4 days | Discharge: NURSING, REHAB OR LONG-TERM CARE | DRG: 177 | End: 2024-09-04
Attending: HOSPITALIST | Admitting: HOSPITALIST
Payer: MEDICARE

## 2024-08-30 DIAGNOSIS — E11.65 HYPERGLYCEMIA DUE TO DIABETES MELLITUS: ICD-10-CM

## 2024-08-30 DIAGNOSIS — E87.1 HYPONATREMIA: ICD-10-CM

## 2024-08-30 DIAGNOSIS — J96.01 ACUTE HYPOXEMIC RESPIRATORY FAILURE: ICD-10-CM

## 2024-08-30 DIAGNOSIS — J96.01 ACUTE RESPIRATORY FAILURE WITH HYPOXIA: Primary | ICD-10-CM

## 2024-08-30 DIAGNOSIS — N30.01 ACUTE CYSTITIS WITH HEMATURIA: ICD-10-CM

## 2024-08-30 DIAGNOSIS — U07.1 COVID-19: ICD-10-CM

## 2024-08-30 LAB
BASOPHILS # BLD AUTO: 0.03 K/UL (ref 0–0.2)
BASOPHILS NFR BLD AUTO: 0.2 % (ref 0–1)
DIFFERENTIAL METHOD BLD: ABNORMAL
EOSINOPHIL # BLD AUTO: 0.13 K/UL (ref 0–0.5)
EOSINOPHIL NFR BLD AUTO: 0.8 % (ref 1–4)
ERYTHROCYTE [DISTWIDTH] IN BLOOD BY AUTOMATED COUNT: 12.9 % (ref 11.5–14.5)
HCT VFR BLD AUTO: 40.5 % (ref 38–47)
HGB BLD-MCNC: 12.8 G/DL (ref 12–16)
LYMPHOCYTES # BLD AUTO: 1.79 K/UL (ref 1–4.8)
LYMPHOCYTES NFR BLD AUTO: 10.4 % (ref 27–41)
MCH RBC QN AUTO: 27.9 PG (ref 27–31)
MCHC RBC AUTO-ENTMCNC: 31.6 G/DL (ref 32–36)
MCV RBC AUTO: 88.4 FL (ref 80–96)
MONOCYTES # BLD AUTO: 0.78 K/UL (ref 0–0.8)
MONOCYTES NFR BLD AUTO: 4.5 % (ref 2–6)
MPC BLD CALC-MCNC: 9.8 FL (ref 9.4–12.4)
NEUTROPHILS # BLD AUTO: 14.53 K/UL (ref 1.8–7.7)
NEUTROPHILS NFR BLD AUTO: 84.1 % (ref 53–65)
PLATELET # BLD AUTO: 236 K/UL (ref 150–400)
RBC # BLD AUTO: 4.58 M/UL (ref 4.2–5.4)
WBC # BLD AUTO: 17.26 K/UL (ref 4.5–11)

## 2024-08-30 PROCEDURE — 63600175 PHARM REV CODE 636 W HCPCS: Performed by: NURSE PRACTITIONER

## 2024-08-30 PROCEDURE — 85025 COMPLETE CBC W/AUTO DIFF WBC: CPT | Performed by: NURSE PRACTITIONER

## 2024-08-30 PROCEDURE — 96374 THER/PROPH/DIAG INJ IV PUSH: CPT

## 2024-08-30 PROCEDURE — 80053 COMPREHEN METABOLIC PANEL: CPT | Performed by: NURSE PRACTITIONER

## 2024-08-30 PROCEDURE — 83605 ASSAY OF LACTIC ACID: CPT | Performed by: NURSE PRACTITIONER

## 2024-08-30 PROCEDURE — 99285 EMERGENCY DEPT VISIT HI MDM: CPT | Mod: 25

## 2024-08-30 PROCEDURE — 99285 EMERGENCY DEPT VISIT HI MDM: CPT | Mod: EDII,,, | Performed by: NURSE PRACTITIONER

## 2024-08-30 PROCEDURE — 27000221 HC OXYGEN, UP TO 24 HOURS

## 2024-08-30 PROCEDURE — 84484 ASSAY OF TROPONIN QUANT: CPT | Performed by: NURSE PRACTITIONER

## 2024-08-30 PROCEDURE — 94660 CPAP INITIATION&MGMT: CPT

## 2024-08-30 PROCEDURE — 87040 BLOOD CULTURE FOR BACTERIA: CPT | Performed by: NURSE PRACTITIONER

## 2024-08-30 PROCEDURE — 27000190 HC CPAP FULL FACE MASK W/VALVE

## 2024-08-30 PROCEDURE — 5A09357 ASSISTANCE WITH RESPIRATORY VENTILATION, LESS THAN 24 CONSECUTIVE HOURS, CONTINUOUS POSITIVE AIRWAY PRESSURE: ICD-10-PCS | Performed by: HOSPITALIST

## 2024-08-30 RX ORDER — METHYLPREDNISOLONE SOD SUCC 125 MG
125 VIAL (EA) INJECTION
Status: COMPLETED | OUTPATIENT
Start: 2024-08-30 | End: 2024-08-30

## 2024-08-30 RX ORDER — ROCURONIUM BROMIDE 10 MG/ML
INJECTION, SOLUTION INTRAVENOUS
Status: DISCONTINUED
Start: 2024-08-30 | End: 2024-08-30 | Stop reason: WASHOUT

## 2024-08-30 RX ORDER — ETOMIDATE 2 MG/ML
INJECTION INTRAVENOUS
Status: DISCONTINUED
Start: 2024-08-30 | End: 2024-08-30 | Stop reason: WASHOUT

## 2024-08-30 RX ORDER — PROPOFOL 10 MG/ML
INJECTION, EMULSION INTRAVENOUS
Status: DISPENSED
Start: 2024-08-30 | End: 2024-08-31

## 2024-08-30 RX ADMIN — METHYLPREDNISOLONE SODIUM SUCCINATE 125 MG: 125 INJECTION, POWDER, FOR SOLUTION INTRAMUSCULAR; INTRAVENOUS at 11:08

## 2024-08-31 PROBLEM — E11.65 TYPE 2 DIABETES MELLITUS WITH HYPERGLYCEMIA, WITHOUT LONG-TERM CURRENT USE OF INSULIN: Status: ACTIVE | Noted: 2024-08-31

## 2024-08-31 PROBLEM — Z91.81 AT HIGH RISK FOR INJURY RELATED TO FALL: Chronic | Status: ACTIVE | Noted: 2024-08-31

## 2024-08-31 PROBLEM — J96.01 ACUTE HYPOXEMIC RESPIRATORY FAILURE: Status: ACTIVE | Noted: 2024-08-31

## 2024-08-31 LAB
ALBUMIN SERPL BCP-MCNC: 3.1 G/DL (ref 3.5–5)
ALBUMIN SERPL BCP-MCNC: 3.2 G/DL (ref 3.5–5)
ALBUMIN/GLOB SERPL: 0.8 {RATIO}
ALBUMIN/GLOB SERPL: 0.9 {RATIO}
ALP SERPL-CCNC: 125 U/L (ref 55–142)
ALP SERPL-CCNC: 132 U/L (ref 55–142)
ALT SERPL W P-5'-P-CCNC: 18 U/L (ref 13–56)
ALT SERPL W P-5'-P-CCNC: 20 U/L (ref 13–56)
ANION GAP SERPL CALCULATED.3IONS-SCNC: 15 MMOL/L (ref 7–16)
ANION GAP SERPL CALCULATED.3IONS-SCNC: 18 MMOL/L (ref 7–16)
AST SERPL W P-5'-P-CCNC: 29 U/L (ref 15–37)
AST SERPL W P-5'-P-CCNC: 31 U/L (ref 15–37)
BACTERIA #/AREA URNS HPF: ABNORMAL /HPF
BASOPHILS # BLD AUTO: 0 K/UL (ref 0–0.2)
BASOPHILS NFR BLD AUTO: 0 % (ref 0–1)
BILIRUB SERPL-MCNC: 0.4 MG/DL (ref ?–1.2)
BILIRUB SERPL-MCNC: 0.4 MG/DL (ref ?–1.2)
BILIRUB UR QL STRIP: NEGATIVE
BUN SERPL-MCNC: 18 MG/DL (ref 7–18)
BUN SERPL-MCNC: 18 MG/DL (ref 7–18)
BUN/CREAT SERPL: 15 (ref 6–20)
BUN/CREAT SERPL: 16 (ref 6–20)
CALCIUM SERPL-MCNC: 8.7 MG/DL (ref 8.5–10.1)
CALCIUM SERPL-MCNC: 8.8 MG/DL (ref 8.5–10.1)
CHLORIDE SERPL-SCNC: 93 MMOL/L (ref 98–107)
CHLORIDE SERPL-SCNC: 93 MMOL/L (ref 98–107)
CLARITY UR: ABNORMAL
CO2 SERPL-SCNC: 23 MMOL/L (ref 21–32)
CO2 SERPL-SCNC: 25 MMOL/L (ref 21–32)
COLOR UR: YELLOW
CREAT SERPL-MCNC: 1.12 MG/DL (ref 0.55–1.02)
CREAT SERPL-MCNC: 1.22 MG/DL (ref 0.55–1.02)
DIFFERENTIAL METHOD BLD: ABNORMAL
EGFR (NO RACE VARIABLE) (RUSH/TITUS): 42 ML/MIN/1.73M2
EGFR (NO RACE VARIABLE) (RUSH/TITUS): 47 ML/MIN/1.73M2
EOSINOPHIL # BLD AUTO: 0 K/UL (ref 0–0.5)
EOSINOPHIL NFR BLD AUTO: 0 % (ref 1–4)
ERYTHROCYTE [DISTWIDTH] IN BLOOD BY AUTOMATED COUNT: 13 % (ref 11.5–14.5)
GLOBULIN SER-MCNC: 3.6 G/DL (ref 2–4)
GLOBULIN SER-MCNC: 3.8 G/DL (ref 2–4)
GLUCOSE SERPL-MCNC: 155 MG/DL (ref 74–106)
GLUCOSE SERPL-MCNC: 216 MG/DL (ref 70–105)
GLUCOSE SERPL-MCNC: 240 MG/DL (ref 74–106)
GLUCOSE SERPL-MCNC: 308 MG/DL (ref 70–105)
GLUCOSE SERPL-MCNC: 310 MG/DL (ref 70–105)
GLUCOSE SERPL-MCNC: 320 MG/DL (ref 74–106)
GLUCOSE UR STRIP-MCNC: NEGATIVE MG/DL
HCO3 UR-SCNC: 22.4 MMOL/L (ref 21–28)
HCT VFR BLD AUTO: 38.7 % (ref 38–47)
HGB BLD-MCNC: 12.3 G/DL (ref 12–16)
KETONES UR STRIP-SCNC: NEGATIVE MG/DL
LACTATE SERPL-SCNC: 1.2 MMOL/L (ref 0.4–2)
LACTATE SERPL-SCNC: 2.1 MMOL/L (ref 0.4–2)
LACTATE SERPL-SCNC: 3.5 MMOL/L (ref 0.4–2)
LACTATE SERPL-SCNC: 4 MMOL/L (ref 0.4–2)
LEUKOCYTE ESTERASE UR QL STRIP: ABNORMAL
LYMPHOCYTES # BLD AUTO: 0.45 K/UL (ref 1–4.8)
LYMPHOCYTES NFR BLD AUTO: 4 % (ref 27–41)
MCH RBC QN AUTO: 28.1 PG (ref 27–31)
MCHC RBC AUTO-ENTMCNC: 31.8 G/DL (ref 32–36)
MCV RBC AUTO: 88.4 FL (ref 80–96)
MONOCYTES # BLD AUTO: 0.27 K/UL (ref 0–0.8)
MONOCYTES NFR BLD AUTO: 2.4 % (ref 2–6)
MPC BLD CALC-MCNC: 10.6 FL (ref 9.4–12.4)
NEUTROPHILS # BLD AUTO: 10.55 K/UL (ref 1.8–7.7)
NEUTROPHILS NFR BLD AUTO: 93.6 % (ref 53–65)
NITRITE UR QL STRIP: NEGATIVE
OHS QRS DURATION: 102 MS
OHS QTC CALCULATION: 447 MS
PCO2 BLDA: 37 MMHG (ref 35–48)
PH SMN: 7.39 [PH] (ref 7.35–7.45)
PH UR STRIP: 6 PH UNITS
PLATELET # BLD AUTO: 209 K/UL (ref 150–400)
PO2 BLDA: 174 MMHG (ref 83–108)
POC BASE EXCESS: -2.2 MMOL/L (ref -2–3)
POC SATURATED O2: 100 %
POTASSIUM SERPL-SCNC: 4 MMOL/L (ref 3.5–5.1)
POTASSIUM SERPL-SCNC: 4.2 MMOL/L (ref 3.5–5.1)
PROT SERPL-MCNC: 6.8 G/DL (ref 6.4–8.2)
PROT SERPL-MCNC: 6.9 G/DL (ref 6.4–8.2)
PROT UR QL STRIP: 300
RBC # BLD AUTO: 4.38 M/UL (ref 4.2–5.4)
RBC # UR STRIP: ABNORMAL /UL
RBC #/AREA URNS HPF: ABNORMAL /HPF
SARS-COV-2 RDRP RESP QL NAA+PROBE: POSITIVE
SODIUM SERPL-SCNC: 129 MMOL/L (ref 136–145)
SODIUM SERPL-SCNC: 130 MMOL/L (ref 136–145)
SP GR UR STRIP: 1.02
SQUAMOUS #/AREA URNS LPF: ABNORMAL /LPF
TROPONIN I SERPL DL<=0.01 NG/ML-MCNC: 54.5 PG/ML
UROBILINOGEN UR STRIP-ACNC: 0.2 MG/DL
WBC # BLD AUTO: 11.27 K/UL (ref 4.5–11)
WBC #/AREA URNS HPF: ABNORMAL /HPF
YEAST #/AREA URNS HPF: ABNORMAL /HPF

## 2024-08-31 PROCEDURE — 27000207 HC ISOLATION

## 2024-08-31 PROCEDURE — 94640 AIRWAY INHALATION TREATMENT: CPT

## 2024-08-31 PROCEDURE — 25000003 PHARM REV CODE 250: Performed by: NURSE PRACTITIONER

## 2024-08-31 PROCEDURE — 63600175 PHARM REV CODE 636 W HCPCS: Performed by: NURSE PRACTITIONER

## 2024-08-31 PROCEDURE — 25000003 PHARM REV CODE 250: Performed by: HOSPITALIST

## 2024-08-31 PROCEDURE — 25000242 PHARM REV CODE 250 ALT 637 W/ HCPCS: Performed by: NURSE PRACTITIONER

## 2024-08-31 PROCEDURE — 36415 COLL VENOUS BLD VENIPUNCTURE: CPT | Performed by: HOSPITALIST

## 2024-08-31 PROCEDURE — 93005 ELECTROCARDIOGRAM TRACING: CPT

## 2024-08-31 PROCEDURE — XW033E5 INTRODUCTION OF REMDESIVIR ANTI-INFECTIVE INTO PERIPHERAL VEIN, PERCUTANEOUS APPROACH, NEW TECHNOLOGY GROUP 5: ICD-10-PCS | Performed by: NURSE PRACTITIONER

## 2024-08-31 PROCEDURE — 27000221 HC OXYGEN, UP TO 24 HOURS

## 2024-08-31 PROCEDURE — 81003 URINALYSIS AUTO W/O SCOPE: CPT | Performed by: NURSE PRACTITIONER

## 2024-08-31 PROCEDURE — 36415 COLL VENOUS BLD VENIPUNCTURE: CPT | Performed by: NURSE PRACTITIONER

## 2024-08-31 PROCEDURE — 85025 COMPLETE CBC W/AUTO DIFF WBC: CPT | Performed by: NURSE PRACTITIONER

## 2024-08-31 PROCEDURE — 82962 GLUCOSE BLOOD TEST: CPT

## 2024-08-31 PROCEDURE — 36600 WITHDRAWAL OF ARTERIAL BLOOD: CPT

## 2024-08-31 PROCEDURE — 11000001 HC ACUTE MED/SURG PRIVATE ROOM

## 2024-08-31 PROCEDURE — 96372 THER/PROPH/DIAG INJ SC/IM: CPT | Performed by: NURSE PRACTITIONER

## 2024-08-31 PROCEDURE — 81001 URINALYSIS AUTO W/SCOPE: CPT | Performed by: NURSE PRACTITIONER

## 2024-08-31 PROCEDURE — 87086 URINE CULTURE/COLONY COUNT: CPT | Performed by: NURSE PRACTITIONER

## 2024-08-31 PROCEDURE — 93010 ELECTROCARDIOGRAM REPORT: CPT | Mod: ,,, | Performed by: INTERNAL MEDICINE

## 2024-08-31 PROCEDURE — 27000958

## 2024-08-31 PROCEDURE — 27000987 HC MATTRESS, MATRIX LOW PROFILE

## 2024-08-31 PROCEDURE — 83605 ASSAY OF LACTIC ACID: CPT | Performed by: NURSE PRACTITIONER

## 2024-08-31 PROCEDURE — 94660 CPAP INITIATION&MGMT: CPT

## 2024-08-31 PROCEDURE — 87635 SARS-COV-2 COVID-19 AMP PRB: CPT | Performed by: NURSE PRACTITIONER

## 2024-08-31 PROCEDURE — 94761 N-INVAS EAR/PLS OXIMETRY MLT: CPT

## 2024-08-31 PROCEDURE — 99900035 HC TECH TIME PER 15 MIN (STAT)

## 2024-08-31 PROCEDURE — 80053 COMPREHEN METABOLIC PANEL: CPT | Performed by: NURSE PRACTITIONER

## 2024-08-31 PROCEDURE — 82803 BLOOD GASES ANY COMBINATION: CPT

## 2024-08-31 PROCEDURE — 82947 ASSAY GLUCOSE BLOOD QUANT: CPT | Performed by: HOSPITALIST

## 2024-08-31 PROCEDURE — 96375 TX/PRO/DX INJ NEW DRUG ADDON: CPT

## 2024-08-31 RX ORDER — FAMOTIDINE 10 MG/ML
20 INJECTION INTRAVENOUS DAILY
Status: DISCONTINUED | OUTPATIENT
Start: 2024-08-31 | End: 2024-09-01

## 2024-08-31 RX ORDER — ACETAMINOPHEN 650 MG/1
650 SUPPOSITORY RECTAL EVERY 8 HOURS PRN
Status: DISCONTINUED | OUTPATIENT
Start: 2024-08-31 | End: 2024-09-04 | Stop reason: HOSPADM

## 2024-08-31 RX ORDER — GLUCAGON 1 MG
1 KIT INJECTION
Status: DISCONTINUED | OUTPATIENT
Start: 2024-08-31 | End: 2024-09-04 | Stop reason: HOSPADM

## 2024-08-31 RX ORDER — DILTIAZEM HYDROCHLORIDE 30 MG/1
30 TABLET, FILM COATED ORAL EVERY 8 HOURS
Status: DISCONTINUED | OUTPATIENT
Start: 2024-08-31 | End: 2024-09-03

## 2024-08-31 RX ORDER — SODIUM CHLORIDE 9 MG/ML
INJECTION, SOLUTION INTRAVENOUS CONTINUOUS
Status: ACTIVE | OUTPATIENT
Start: 2024-08-31 | End: 2024-08-31

## 2024-08-31 RX ORDER — SODIUM CHLORIDE 9 MG/ML
1000 INJECTION, SOLUTION INTRAVENOUS
Status: COMPLETED | OUTPATIENT
Start: 2024-08-31 | End: 2024-08-31

## 2024-08-31 RX ORDER — FUROSEMIDE 10 MG/ML
20 INJECTION INTRAMUSCULAR; INTRAVENOUS
Status: COMPLETED | OUTPATIENT
Start: 2024-08-31 | End: 2024-08-31

## 2024-08-31 RX ORDER — LEVOTHYROXINE SODIUM 50 UG/1
50 TABLET ORAL
Status: DISCONTINUED | OUTPATIENT
Start: 2024-09-01 | End: 2024-09-04 | Stop reason: HOSPADM

## 2024-08-31 RX ORDER — DEXAMETHASONE SODIUM PHOSPHATE 4 MG/ML
6 INJECTION, SOLUTION INTRA-ARTICULAR; INTRALESIONAL; INTRAMUSCULAR; INTRAVENOUS; SOFT TISSUE
Status: DISCONTINUED | OUTPATIENT
Start: 2024-08-31 | End: 2024-08-31

## 2024-08-31 RX ORDER — INSULIN ASPART 100 [IU]/ML
0-5 INJECTION, SOLUTION INTRAVENOUS; SUBCUTANEOUS
Status: DISCONTINUED | OUTPATIENT
Start: 2024-08-31 | End: 2024-09-04 | Stop reason: HOSPADM

## 2024-08-31 RX ORDER — DULOXETIN HYDROCHLORIDE 30 MG/1
30 CAPSULE, DELAYED RELEASE ORAL DAILY
Status: DISCONTINUED | OUTPATIENT
Start: 2024-08-31 | End: 2024-09-04 | Stop reason: HOSPADM

## 2024-08-31 RX ORDER — ALBUTEROL SULFATE 0.83 MG/ML
2.5 SOLUTION RESPIRATORY (INHALATION) EVERY 6 HOURS
Status: DISCONTINUED | OUTPATIENT
Start: 2024-08-31 | End: 2024-09-01

## 2024-08-31 RX ORDER — FLUCONAZOLE 2 MG/ML
200 INJECTION, SOLUTION INTRAVENOUS ONCE
Status: COMPLETED | OUTPATIENT
Start: 2024-08-31 | End: 2024-08-31

## 2024-08-31 RX ORDER — INSULIN ASPART 100 [IU]/ML
0-5 INJECTION, SOLUTION INTRAVENOUS; SUBCUTANEOUS EVERY 6 HOURS PRN
Status: DISCONTINUED | OUTPATIENT
Start: 2024-08-31 | End: 2024-08-31

## 2024-08-31 RX ORDER — GABAPENTIN 100 MG/1
100 CAPSULE ORAL DAILY
Status: DISCONTINUED | OUTPATIENT
Start: 2024-08-31 | End: 2024-09-04 | Stop reason: HOSPADM

## 2024-08-31 RX ORDER — DEXAMETHASONE SODIUM PHOSPHATE 4 MG/ML
6 INJECTION, SOLUTION INTRA-ARTICULAR; INTRALESIONAL; INTRAMUSCULAR; INTRAVENOUS; SOFT TISSUE EVERY 24 HOURS
Status: DISCONTINUED | OUTPATIENT
Start: 2024-08-31 | End: 2024-09-03

## 2024-08-31 RX ORDER — ONDANSETRON HYDROCHLORIDE 2 MG/ML
4 INJECTION, SOLUTION INTRAVENOUS EVERY 8 HOURS PRN
Status: DISCONTINUED | OUTPATIENT
Start: 2024-08-31 | End: 2024-09-04 | Stop reason: HOSPADM

## 2024-08-31 RX ORDER — ENOXAPARIN SODIUM 100 MG/ML
40 INJECTION SUBCUTANEOUS EVERY 24 HOURS
Status: DISCONTINUED | OUTPATIENT
Start: 2024-08-31 | End: 2024-09-04 | Stop reason: HOSPADM

## 2024-08-31 RX ORDER — FUROSEMIDE 10 MG/ML
20 INJECTION INTRAMUSCULAR; INTRAVENOUS
Status: DISCONTINUED | OUTPATIENT
Start: 2024-08-31 | End: 2024-08-31

## 2024-08-31 RX ORDER — MUPIROCIN 20 MG/G
OINTMENT TOPICAL 2 TIMES DAILY
Status: DISCONTINUED | OUTPATIENT
Start: 2024-08-31 | End: 2024-09-04 | Stop reason: HOSPADM

## 2024-08-31 RX ORDER — BUDESONIDE 0.5 MG/2ML
0.5 INHALANT ORAL EVERY 12 HOURS
Status: DISCONTINUED | OUTPATIENT
Start: 2024-08-31 | End: 2024-09-01

## 2024-08-31 RX ORDER — PANTOPRAZOLE SODIUM 40 MG/1
40 TABLET, DELAYED RELEASE ORAL DAILY
Status: DISCONTINUED | OUTPATIENT
Start: 2024-08-31 | End: 2024-09-04 | Stop reason: HOSPADM

## 2024-08-31 RX ORDER — SODIUM CHLORIDE 0.9 % (FLUSH) 0.9 %
10 SYRINGE (ML) INJECTION
Status: DISCONTINUED | OUTPATIENT
Start: 2024-08-31 | End: 2024-09-04 | Stop reason: HOSPADM

## 2024-08-31 RX ORDER — SERTRALINE HYDROCHLORIDE 25 MG/1
25 TABLET, FILM COATED ORAL DAILY
Status: DISCONTINUED | OUTPATIENT
Start: 2024-08-31 | End: 2024-09-04 | Stop reason: HOSPADM

## 2024-08-31 RX ADMIN — MUPIROCIN: 20 OINTMENT TOPICAL at 08:08

## 2024-08-31 RX ADMIN — GABAPENTIN 100 MG: 100 CAPSULE ORAL at 04:08

## 2024-08-31 RX ADMIN — FAMOTIDINE 20 MG: 10 INJECTION, SOLUTION INTRAVENOUS at 09:08

## 2024-08-31 RX ADMIN — DILTIAZEM HYDROCHLORIDE 30 MG: 30 TABLET, FILM COATED ORAL at 02:08

## 2024-08-31 RX ADMIN — AZITHROMYCIN MONOHYDRATE 500 MG: 500 INJECTION, POWDER, LYOPHILIZED, FOR SOLUTION INTRAVENOUS at 09:08

## 2024-08-31 RX ADMIN — ALBUTEROL SULFATE 2.5 MG: 2.5 SOLUTION RESPIRATORY (INHALATION) at 01:08

## 2024-08-31 RX ADMIN — FLUCONAZOLE 200 MG: 2 INJECTION, SOLUTION INTRAVENOUS at 09:08

## 2024-08-31 RX ADMIN — SODIUM CHLORIDE 1000 ML: 9 INJECTION, SOLUTION INTRAVENOUS at 12:08

## 2024-08-31 RX ADMIN — DILTIAZEM HYDROCHLORIDE 30 MG: 30 TABLET, FILM COATED ORAL at 09:08

## 2024-08-31 RX ADMIN — SODIUM CHLORIDE: 9 INJECTION, SOLUTION INTRAVENOUS at 02:08

## 2024-08-31 RX ADMIN — ALBUTEROL SULFATE 2.5 MG: 2.5 SOLUTION RESPIRATORY (INHALATION) at 07:08

## 2024-08-31 RX ADMIN — ACETAMINOPHEN 650 MG: 325 SUPPOSITORY RECTAL at 09:08

## 2024-08-31 RX ADMIN — INSULIN ASPART 2 UNITS: 100 INJECTION, SOLUTION INTRAVENOUS; SUBCUTANEOUS at 06:08

## 2024-08-31 RX ADMIN — BUDESONIDE INHALATION 0.5 MG: 0.5 SUSPENSION RESPIRATORY (INHALATION) at 07:08

## 2024-08-31 RX ADMIN — ENOXAPARIN SODIUM 40 MG: 40 INJECTION SUBCUTANEOUS at 04:08

## 2024-08-31 RX ADMIN — HUMAN INSULIN 8 UNITS: 100 INJECTION, SOLUTION SUBCUTANEOUS at 12:08

## 2024-08-31 RX ADMIN — INSULIN ASPART 2 UNITS: 100 INJECTION, SOLUTION INTRAVENOUS; SUBCUTANEOUS at 10:08

## 2024-08-31 RX ADMIN — DEXAMETHASONE SODIUM PHOSPHATE 6 MG: 4 INJECTION, SOLUTION INTRA-ARTICULAR; INTRALESIONAL; INTRAMUSCULAR; INTRAVENOUS; SOFT TISSUE at 09:08

## 2024-08-31 RX ADMIN — CEFTRIAXONE 1 G: 1 INJECTION, POWDER, FOR SOLUTION INTRAMUSCULAR; INTRAVENOUS at 08:08

## 2024-08-31 RX ADMIN — MUPIROCIN: 20 OINTMENT TOPICAL at 09:08

## 2024-08-31 RX ADMIN — FUROSEMIDE 20 MG: 10 INJECTION, SOLUTION INTRAMUSCULAR; INTRAVENOUS at 12:08

## 2024-08-31 RX ADMIN — AZITHROMYCIN MONOHYDRATE 500 MG: 500 INJECTION, POWDER, LYOPHILIZED, FOR SOLUTION INTRAVENOUS at 01:08

## 2024-08-31 RX ADMIN — CEFTRIAXONE SODIUM 1 G: 1 INJECTION, POWDER, FOR SOLUTION INTRAMUSCULAR; INTRAVENOUS at 01:08

## 2024-08-31 NOTE — SUBJECTIVE & OBJECTIVE
Interval History: ABGs improving    Review of Systems   Unable to perform ROS: Acuity of condition   Psychiatric/Behavioral:          Will open eyes and answer simple questions, only complains of headache. Follows simple commands (ie squeeze hands and blink eyes).       Objective:     Vital Signs (Most Recent):  Temp: 97.5 °F (36.4 °C) (08/31/24 0747)  Pulse: (!) 122 (08/31/24 0751)  Resp: (!) 24 (08/31/24 0751)  BP: 97/68 (08/31/24 0747)  SpO2: (!) 80 % (08/31/24 0755) Vital Signs (24h Range):  Temp:  [97.5 °F (36.4 °C)-99.2 °F (37.3 °C)] 97.5 °F (36.4 °C)  Pulse:  [121-133] 122  Resp:  [18-27] 24  SpO2:  [80 %-100 %] 80 %  BP: ()/(57-94) 97/68     Weight: 63.3 kg (139 lb 9.6 oz)  Body mass index is 21.86 kg/m².    Intake/Output Summary (Last 24 hours) at 8/31/2024 0816  Last data filed at 8/31/2024 0529  Gross per 24 hour   Intake 112.52 ml   Output 701 ml   Net -588.48 ml         Physical Exam  Constitutional:       Appearance: She is ill-appearing.   HENT:      Mouth/Throat:      Mouth: Mucous membranes are dry.   Cardiovascular:      Rate and Rhythm: Tachycardia present.   Pulmonary:      Effort: No respiratory distress.      Breath sounds: Examination of the right-lower field reveals decreased breath sounds. Examination of the left-lower field reveals decreased breath sounds. Decreased breath sounds and rhonchi present. No wheezing.      Comments: BiPap in use, changing to NRB at this time  Abdominal:      General: Bowel sounds are normal.      Palpations: Abdomen is soft.      Tenderness: There is no abdominal tenderness.   Musculoskeletal:         General: Normal range of motion.      Cervical back: Neck supple.      Comments: Moved all extremities to command   Skin:     General: Skin is warm and dry.   Neurological:      Mental Status: She is disoriented.             Significant Labs: All pertinent labs within the past 24 hours have been reviewed.    Significant Imaging: I have reviewed all pertinent  imaging results/findings within the past 24 hours.

## 2024-08-31 NOTE — SUBJECTIVE & OBJECTIVE
Review of Systems   Unable to perform ROS: Acuity of condition     Objective:     Vital Signs (Most Recent):  Temp: 98.1 °F (36.7 °C) (08/30/24 2355)  Pulse: (!) 131 (08/30/24 2355)  Resp: (!) 27 (08/30/24 2355)  BP: (!) 142/94 (08/30/24 2355)  SpO2: 100 % (08/30/24 2355) Vital Signs (24h Range):  Temp:  [98.1 °F (36.7 °C)] 98.1 °F (36.7 °C)  Pulse:  [131-133] 131  Resp:  [22-27] 27  SpO2:  [100 %] 100 %  BP: (142)/(94) 142/94     Weight: 63.3 kg (139 lb 9.6 oz)  Body mass index is 21.86 kg/m².    Intake/Output Summary (Last 24 hours) at 8/31/2024 0120  Last data filed at 8/31/2024 0036  Gross per 24 hour   Intake 12.73 ml   Output --   Net 12.73 ml         Physical Exam  Vitals and nursing note reviewed.   Constitutional:       Appearance: She is ill-appearing.   HENT:      Head: Normocephalic.      Right Ear: External ear normal.      Left Ear: External ear normal.      Nose: Nose normal.      Mouth/Throat:      Mouth: Mucous membranes are dry.   Eyes:      Extraocular Movements: Extraocular movements intact.      Pupils: Pupils are equal, round, and reactive to light.   Cardiovascular:      Rate and Rhythm: Regular rhythm. Tachycardia present.      Pulses: Normal pulses.   Pulmonary:      Effort: Tachypnea, accessory muscle usage and respiratory distress present.      Comments: Coarse throughout  Abdominal:      General: There is no distension.      Palpations: Abdomen is soft.   Musculoskeletal:         General: Normal range of motion.      Cervical back: Normal range of motion and neck supple.   Skin:     General: Skin is warm.      Capillary Refill: Capillary refill takes 2 to 3 seconds.   Neurological:      Mental Status: She is disoriented.      GCS: GCS eye subscore is 3. GCS verbal subscore is 4. GCS motor subscore is 6.             Significant Labs: All pertinent labs within the past 24 hours have been reviewed.  Recent Lab Results         08/31/24  0035   08/31/24  0018   08/31/24  0013    08/30/24  2346        Albumin/Globulin Ratio       0.9       Albumin       3.2       ALP       132       ALT       18       Anion Gap       15       Appearance, UA Cloudy             AST       29       Bacteria, UA Many             Baso #       0.03       Basophil %       0.2       Bilirubin (UA) Negative             BILIRUBIN TOTAL       0.4       BUN       18       BUN/CREAT RATIO       15       Calcium       8.7       Chloride       93       CO2       25       Color, UA Yellow             SARS COV-2 MOLECULAR     Positive         Creatinine       1.22       Differential Method       Auto       eGFR       42       Eos #       0.13       Eos %       0.8       Globulin, Total       3.6       Glucose       320       Glucose, UA Negative             Hematocrit       40.5       Hemoglobin       12.8       Ketones, UA Negative             Lactic Acid Level       3.5  Comment: A repeat order for Lactic Acid has been placed for collection in 2 hours.       Leukocyte Esterase, UA Large             Lymph #       1.79       Lymph %       10.4       MCH       27.9       MCHC       31.6       MCV       88.4       Mono #       0.78       Mono %       4.5       MPV       9.8       Neutrophils, Abs       14.53       Neutrophils Relative       84.1       NITRITE UA Negative             Blood, UA 2+             pH, UA 6.0             Platelet Count       236       POC Base Excess   -2.2           POC HCO3   22.4           POC PCO2   37           POC PH   7.39           POC PO2   174           POC SATURATED O2   100           Potassium       4.0       PROTEIN TOTAL       6.8       Protein,              RBC       4.58       RBC, UA 10-15             RDW       12.9       Sodium       129       Spec Grav UA 1.025             Squam Epithel, UA Few             Troponin I High Sensitivity       54.5       UROBILINOGEN UA 0.2             WBC, UA 15-25             WBC       17.26       Yeast, UA Few                     Significant  Imaging: I have reviewed all pertinent imaging results/findings within the past 24 hours.

## 2024-08-31 NOTE — ASSESSMENT & PLAN NOTE
Patient with Hypoxic Respiratory failure which is Acute.  she is not on home oxygen. Supplemental oxygen was provided and noted- Oxygen Concentration (%):  [40] 40    .   Signs/symptoms of respiratory failure include- tachypnea, increased work of breathing, respiratory distress, use of accessory muscles, and lethargy. Contributing diagnoses includes -  Covid  Labs and images were reviewed. Patient Has recent ABG, which has been reviewed. Will treat underlying causes and adjust management of respiratory failure as follows- Bipap, recheck ABG

## 2024-08-31 NOTE — H&P
[facility] - Emergency Department  [facility] Medicine  Progress Note    Patient Name: Joyce Dow  MRN: [id number]  Patient Class: IP- Inpatient   Admission Date: 8/30/2024  Length of Stay: 0 days  Attending Physician: Darinel Kwok DO  Primary Care Provider: Walker Watkins MD        Subjective:     Principal Problem:Acute hypoxemic respiratory failure        HPI:  89 yo WF admitted from ED for acute respiratory distress, Covid, UTI. Patient on Bipap with some improvement.     History:   GERD  HLD  HTN  Hyponatremia  T2DM  Thyroid disease  Dementia  Depression  GERD      DNR    Overview/Hospital Course:  No notes on file        Review of Systems   Unable to perform ROS: Acuity of condition     Objective:     Vital Signs (Most Recent):  Temp: 98.1 °F (36.7 °C) (08/30/24 2355)  Pulse: (!) 131 (08/30/24 2355)  Resp: (!) 27 (08/30/24 2355)  BP: (!) 142/94 (08/30/24 2355)  SpO2: 100 % (08/30/24 2355) Vital Signs (24h Range):  Temp:  [98.1 °F (36.7 °C)] 98.1 °F (36.7 °C)  Pulse:  [131-133] 131  Resp:  [22-27] 27  SpO2:  [100 %] 100 %  BP: (142)/(94) 142/94     Weight: 63.3 kg (139 lb 9.6 oz)  Body mass index is 21.86 kg/m².    Intake/Output Summary (Last 24 hours) at 8/31/2024 0120  Last data filed at 8/31/2024 0036  Gross per 24 hour   Intake 12.73 ml   Output --   Net 12.73 ml         Physical Exam  Vitals and nursing note reviewed.   Constitutional:       Appearance: She is ill-appearing.   HENT:      Head: Normocephalic.      Right Ear: External ear normal.      Left Ear: External ear normal.      Nose: Nose normal.      Mouth/Throat:      Mouth: Mucous membranes are dry.   Eyes:      Extraocular Movements: Extraocular movements intact.      Pupils: Pupils are equal, round, and reactive to light.   Cardiovascular:      Rate and Rhythm: Regular rhythm. Tachycardia present.      Pulses: Normal pulses.   Pulmonary:      Effort: Tachypnea, accessory muscle usage and respiratory distress present.       Comments: Coarse throughout  Abdominal:      General: There is no distension.      Palpations: Abdomen is soft.   Musculoskeletal:         General: Normal range of motion.      Cervical back: Normal range of motion and neck supple.   Skin:     General: Skin is warm.      Capillary Refill: Capillary refill takes 2 to 3 seconds.   Neurological:      Mental Status: She is disoriented.      GCS: GCS eye subscore is 3. GCS verbal subscore is 4. GCS motor subscore is 6.             Significant Labs: All pertinent labs within the past 24 hours have been reviewed.  Recent Lab Results         08/31/24  0035   08/31/24  0018   08/31/24  0013   08/30/24  2346        Albumin/Globulin Ratio       0.9       Albumin       3.2       ALP       132       ALT       18       Anion Gap       15       Appearance, UA Cloudy             AST       29       Bacteria, UA Many             Baso #       0.03       Basophil %       0.2       Bilirubin (UA) Negative             BILIRUBIN TOTAL       0.4       BUN       18       BUN/CREAT RATIO       15       Calcium       8.7       Chloride       93       CO2       25       Color, UA Yellow             SARS COV-2 MOLECULAR     Positive         Creatinine       1.22       Differential Method       Auto       eGFR       42       Eos #       0.13       Eos %       0.8       Globulin, Total       3.6       Glucose       320       Glucose, UA Negative             Hematocrit       40.5       Hemoglobin       12.8       Ketones, UA Negative             Lactic Acid Level       3.5  Comment: A repeat order for Lactic Acid has been placed for collection in 2 hours.       Leukocyte Esterase, UA Large             Lymph #       1.79       Lymph %       10.4       MCH       27.9       MCHC       31.6       MCV       88.4       Mono #       0.78       Mono %       4.5       MPV       9.8       Neutrophils, Abs       14.53       Neutrophils Relative       84.1       NITRITE UA Negative             Blood, UA 2+              pH, UA 6.0             Platelet Count       236       POC Base Excess   -2.2           POC HCO3   22.4           POC PCO2   37           POC PH   7.39           POC PO2   174           POC SATURATED O2   100           Potassium       4.0       PROTEIN TOTAL       6.8       Protein,              RBC       4.58       RBC, UA 10-15             RDW       12.9       Sodium       129       Spec Grav UA 1.025             Squam Epithel, UA Few             Troponin I High Sensitivity       54.5       UROBILINOGEN UA 0.2             WBC, UA 15-25             WBC       17.26       Yeast, UA Few                     Significant Imaging: I have reviewed all pertinent imaging results/findings within the past 24 hours.    Assessment/Plan:      * Acute hypoxemic respiratory failure  Patient with Hypoxic Respiratory failure which is Acute.  she is not on home oxygen. Supplemental oxygen was provided and noted- Oxygen Concentration (%):  [40] 40    .   Signs/symptoms of respiratory failure include- tachypnea, increased work of breathing, respiratory distress, use of accessory muscles, and lethargy. Contributing diagnoses includes -  Covid  Labs and images were reviewed. Patient Has recent ABG, which has been reviewed. Will treat underlying causes and adjust management of respiratory failure as follows- Bipap, recheck ABG    At high risk for injury related to fall  Fall prevention      Type 2 diabetes mellitus with hyperglycemia, without long-term current use of insulin  Scheduled FSG  Sliding scale insulin      Hyponatremia  BMP daily      VTE Risk Mitigation (From admission, onward)      None            Discharge Planning   JUANPABLO:      Code Status: DNR   Is the patient medically ready for discharge?:     Reason for patient still in hospital (select all that apply): Patient unstable, Patient new problem, Patient trending condition, Laboratory test, and Treatment               ROSEMARY Rodgers  Department of  [facility] Medicine   [facility] - Emergency Department

## 2024-08-31 NOTE — PROGRESS NOTES
[facility] - [facility] Medicine  Progress Note    Patient Name: Joyce Dow  MRN: [id number]  Patient Class: IP- Inpatient   Admission Date: 8/30/2024  Length of Stay: 0 days  Attending Physician: Darinel Kwok DO  Primary Care Provider: Walker Watkins MD        Subjective:     Principal Problem:Acute hypoxemic respiratory failure        HPI:  89 yo WF admitted from ED for acute respiratory distress, Covid, UTI. Patient on Bipap with some improvement.     History:   GERD  HLD  HTN  Hyponatremia  T2DM  Thyroid disease  Dementia  Depression  GERD      DNR    Overview/Hospital Course:  8/31 0730 ABGs improving, No increased work of breathing, sat 97-98 on Bipap, changed to NRB on 80% O2 will titrate to maintain sat >92%. Will keep Bipap for use at night if needed.  Discussed pt status and POC with Dr. Sundar Dc'd order for Remdesivir, added pulmicort to treatment regiment and give 500 ml IV fluids due to dehydration, tachycardia and hyponatremia.        Interval History: ABGs improving    Review of Systems   Unable to perform ROS: Acuity of condition   Psychiatric/Behavioral:          Will open eyes and answer simple questions, only complains of headache. Follows simple commands (ie squeeze hands and blink eyes).       Objective:     Vital Signs (Most Recent):  Temp: 97.5 °F (36.4 °C) (08/31/24 0747)  Pulse: (!) 122 (08/31/24 0751)  Resp: (!) 24 (08/31/24 0751)  BP: 97/68 (08/31/24 0747)  SpO2: (!) 80 % (08/31/24 0755) Vital Signs (24h Range):  Temp:  [97.5 °F (36.4 °C)-99.2 °F (37.3 °C)] 97.5 °F (36.4 °C)  Pulse:  [121-133] 122  Resp:  [18-27] 24  SpO2:  [80 %-100 %] 80 %  BP: ()/(57-94) 97/68     Weight: 63.3 kg (139 lb 9.6 oz)  Body mass index is 21.86 kg/m².    Intake/Output Summary (Last 24 hours) at 8/31/2024 0816  Last data filed at 8/31/2024 0529  Gross per 24 hour   Intake 112.52 ml   Output 701 ml   Net -588.48 ml         Physical Exam  Constitutional:        Appearance: She is ill-appearing.   HENT:      Mouth/Throat:      Mouth: Mucous membranes are dry.   Cardiovascular:      Rate and Rhythm: Tachycardia present.   Pulmonary:      Effort: No respiratory distress.      Breath sounds: Examination of the right-lower field reveals decreased breath sounds. Examination of the left-lower field reveals decreased breath sounds. Decreased breath sounds and rhonchi present. No wheezing.      Comments: BiPap in use, changing to NRB at this time  Abdominal:      General: Bowel sounds are normal.      Palpations: Abdomen is soft.      Tenderness: There is no abdominal tenderness.   Musculoskeletal:         General: Normal range of motion.      Cervical back: Neck supple.      Comments: Moved all extremities to command   Skin:     General: Skin is warm and dry.   Neurological:      Mental Status: She is disoriented.             Significant Labs: All pertinent labs within the past 24 hours have been reviewed.    Significant Imaging: I have reviewed all pertinent imaging results/findings within the past 24 hours.    Assessment/Plan:      * Acute hypoxemic respiratory failure  Patient with Hypoxic Respiratory failure which is Acute.  she is not on home oxygen. Supplemental oxygen was provided and noted- Oxygen Concentration (%):  [40] 40    .   Signs/symptoms of respiratory failure include- tachypnea, increased work of breathing, respiratory distress, use of accessory muscles, and lethargy. Contributing diagnoses includes -  Covid  Labs and images were reviewed. Patient Has recent ABG, which has been reviewed. Will treat underlying causes and adjust management of respiratory failure as follows- Bipap, recheck ABG    At high risk for injury related to fall  Fall prevention      Type 2 diabetes mellitus with hyperglycemia, without long-term current use of insulin  Scheduled FSG  Sliding scale insulin      Hyponatremia  BMP daily      VTE Risk Mitigation (From admission, onward)            Ordered     enoxaparin injection 40 mg  Daily         08/31/24 0313     IP VTE HIGH RISK PATIENT  Once         08/31/24 0313     Place MEHREEN hose  Until discontinued         08/31/24 0313                    Discharge Planning   JUANPABLO:      Code Status: DNR   Is the patient medically ready for discharge?:     Reason for patient still in hospital (select all that apply): Patient trending condition                     ROSEMARY Duenas  Department of Hospital Medicine   [facility]

## 2024-08-31 NOTE — ED PROVIDER NOTES
Encounter Date: 8/30/2024       History     Chief Complaint   Patient presents with    Respiratory Distress     89 yo WF to ED via EMS for respiratory distress. +Covid. EMS advises NH reported patient had been altered with difficulty breathing x 30 minutes. RA sats on EMS arrival in 70's. Patient is DNR - paperwork indicates no CPR, does not discuss DNI. Patient states she does not want to be intubated, son is on on phone and reports patient has hx of dementia but agrees that he does not want intubation. Patient states she is more comfortable since being on CPAP with EMS.     The history is provided by the EMS personnel.     Review of patient's allergies indicates:   Allergen Reactions    Sulfa (sulfonamide antibiotics)      Past Medical History:   Diagnosis Date    Allergic rhinitis     Anticoagulant long-term use     Anxiety disorder, unspecified     Arthritis     Chronic pain     Depression     GERD (gastroesophageal reflux disease)     hyperlipidemia     Hypertension     Hyponatremia     Hypoxemia     Insomnia     Mood disorder     Neuropathy     Rhinitis     Thyroid disease     Type 2 diabetes mellitus     Urinary tract infection, site not specified      History reviewed. No pertinent surgical history.  No family history on file.  Social History     Tobacco Use    Smoking status: Unknown   Substance Use Topics    Alcohol use: Not Currently    Drug use: Not Currently     Review of Systems   Unable to perform ROS: Acuity of condition       Physical Exam     Initial Vitals   BP Pulse Resp Temp SpO2   08/30/24 2355 08/30/24 2347 08/30/24 2347 08/30/24 2355 08/30/24 2347   (!) 142/94 (!) 133 (!) 22 98.1 °F (36.7 °C) 100 %      MAP       --                Physical Exam    Nursing note and vitals reviewed.  Constitutional: She appears lethargic. She appears cachectic. She is cooperative.   HENT:   Head: Normocephalic.   Right Ear: External ear normal.   Left Ear: External ear normal.   Nose: Nose normal.    Mouth/Throat: Oropharynx is clear and moist. Mucous membranes are dry.   Eyes: EOM are normal. Pupils are equal, round, and reactive to light.   Neck: Neck supple.   Normal range of motion.  Cardiovascular:  Regular rhythm.   Tachycardia present.         Pulmonary/Chest: Accessory muscle usage present. Tachypnea noted. She is in respiratory distress.   Coarse throughout   Abdominal: Abdomen is soft. She exhibits no distension. There is no abdominal tenderness.   Musculoskeletal:         General: Normal range of motion.      Cervical back: Normal range of motion and neck supple.     Neurological: She appears lethargic.   Skin: Skin is warm. Capillary refill takes 2 to 3 seconds.         Medical Screening Exam   See Full Note    ED Course   Procedures  Labs Reviewed   COMPREHENSIVE METABOLIC PANEL - Abnormal       Result Value    Sodium 129 (*)     Potassium 4.0      Chloride 93 (*)     CO2 25      Anion Gap 15      Glucose 320 (*)     BUN 18      Creatinine 1.22 (*)     BUN/Creatinine Ratio 15      Calcium 8.7      Total Protein 6.8      Albumin 3.2 (*)     Globulin 3.6      A/G Ratio 0.9      Bilirubin, Total 0.4      Alk Phos 132      ALT 18      AST 29      eGFR 42 (*)    LACTIC ACID, PLASMA - Abnormal    Lactic Acid 3.5 (*)    URINALYSIS, REFLEX TO URINE CULTURE - Abnormal    Color, UA Yellow      Clarity, UA Cloudy      pH, UA 6.0      Leukocytes, UA Large (*)     Nitrites, UA Negative      Protein,  (*)     Glucose, UA Negative      Ketones, UA Negative      Urobilinogen, UA 0.2      Bilirubin, UA Negative      Blood, UA 2+ (*)     Specific Gravity, UA 1.025     CBC WITH DIFFERENTIAL - Abnormal    WBC 17.26 (*)     RBC 4.58      Hemoglobin 12.8      Hematocrit 40.5      MCV 88.4      MCH 27.9      MCHC 31.6 (*)     RDW 12.9      Platelet Count 236      MPV 9.8      Neutrophils % 84.1 (*)     Lymphocytes % 10.4 (*)     Neutrophils, Abs 14.53 (*)     Lymphocytes, Absolute 1.79      Diff Type Auto       Monocytes % 4.5      Eosinophils % 0.8 (*)     Basophils % 0.2      Monocytes, Absolute 0.78      Eosinophils, Absolute 0.13      Basophils, Absolute 0.03     SARS-COV-2 RNA AMPLIFICATION, QUAL - Abnormal    SARS COV-2 Molecular Positive (*)    URINALYSIS, MICROSCOPIC - Abnormal    WBC, UA 15-25 (*)     RBC, UA 10-15 (*)     Bacteria, UA Many (*)     Yeast, UA Few (*)     Squamous Epithelial Cells, UA Few (*)    TROPONIN I - Normal    Troponin I High Sensitivity 54.5     CULTURE, BLOOD   CULTURE, BLOOD   CULTURE, URINE   CBC W/ AUTO DIFFERENTIAL    Narrative:     The following orders were created for panel order CBC auto differential.  Procedure                               Abnormality         Status                     ---------                               -----------         ------                     CBC with Differential[[id number]]       Abnormal            Final result                 Please view results for these tests on the individual orders.   LACTIC ACID, PLASMA   POCT GLUCOSE MONITORING CONTINUOUS     EKG Readings: (Independently Interpreted)   Initial Reading: No STEMI. Rhythm: Atrial Flutter. Heart Rate: 130. Conduction: RBBB.       Imaging Results              X-Ray Chest AP Portable (In process)  Result time 08/30/24 23:50:02   Procedure changed from X-Ray Chest 1 View for Line/Tube Placement                    Medications   propofoL (DIPRIVAN) 10 mg/mL infusion (  Not Given 8/30/24 2345)   azithromycin (ZITHROMAX) 500 mg in D5W 250 mL  IVPB (admixture device) (500 mg Intravenous New Bag 8/31/24 0141)   fluconazole (DIFLUCAN) IVPB 200 mg (has no administration in time range)   mupirocin 2 % ointment (has no administration in time range)   0.9%  NaCl infusion (has no administration in time range)   methylPREDNISolone sodium succinate injection 125 mg (125 mg Intravenous Given 8/30/24 2352)   insulin regular injection 8 Units 0.08 mL (8 Units Subcutaneous Given 8/31/24 0022)   0.9%  NaCl infusion (0  mLs Intravenous Stopped 8/31/24 0036)   furosemide injection 20 mg (20 mg Intravenous Given 8/31/24 0037)   cefTRIAXone (Rocephin) 1 g in D5W 100 mL IVPB (MB+) (0 g Intravenous Stopped 8/31/24 0138)     Medical Decision Making  89 yo WF to ED via EMS for respiratory distress. +Covid. EMS advises NH reported patient had been altered with difficulty breathing x 30 minutes. RA sats on EMS arrival in 70's. Patient is DNR - paperwork indicates no CPR, does not discuss DNI. Patient states she does not want to be intubated, son is on on phone and reports patient has hx of dementia but agrees that he does not want intubation. Patient states she is more comfortable since being on CPAP with EMS.     Transitioned to Bipap on arrival.    The history is provided by the EMS personnel.   Vitals reviewed: /94, , RR 27  Labs reviewed: WBC 17.26, Lactic acid 3.5, Sodium 129, Glucose 320, Cr 1.22, GFR 42  CXR: Increased interstitial opacities represent pulmonary edema or an atypical/viral infection.   Lasix given, concepcion cath placed          Amount and/or Complexity of Data Reviewed  Labs: ordered. Decision-making details documented in ED Course.  Radiology: ordered. Decision-making details documented in ED Course.  ECG/medicine tests: ordered and independent interpretation performed. Decision-making details documented in ED Course.  Discussion of management or test interpretation with external provider(s): Dr. Archer, [facility] Hospitalist    [facility] transfer initiated, discussed with Dr. Archer who advised to admit here since she is DNI/DNR and doing well on bipap    Risk  OTC drugs.  Prescription drug management.  Decision regarding hospitalization.               ED Course as of 08/31/24 0156   Fri Aug 30, 2024   2351 WBC(!): 17.26 [MJ]   Sat Aug 31, 2024   0006 Lactic Acid Level(!): 3.5 [MJ]   0013 Sodium(!): 129 [MJ]   0013 Glucose(!): 320 [MJ]   0013 Creatinine(!): 1.22 [MJ]   0014 eGFR(!): 42 [MJ]   0016 EMS took NH documentation.  DNR form was with that paperwork.  [MJ]   0024 SARS COV-2 MOLECULAR(!): Positive [MJ]   0029 Discussed patient with Dr. Archer, advised not to give IVF and to give Lasix. IV fluid order stopped verbally.  [MJ]   0031 POC PO2(!): 174 [MJ]   0039 Leukocyte Esterase, UA(!): Large [MJ]   0040 Blood, UA(!): 2+ [MJ]   0132 WBC, UA(!): 15-25 [MJ]   0132 Bacteria, UA(!): Many [MJ]      ED Course User Index  [MJ] Bertha Cunningham FNP                           Clinical Impression:   Final diagnoses:  [J96.01] Acute respiratory failure with hypoxia (Primary)  [U07.1] COVID-19  [N30.01] Acute cystitis with hematuria  [E87.1] Hyponatremia  [E11.65] Hyperglycemia due to diabetes mellitus        ED Disposition Condition    Admit Stable                           Bertha Cunningham FNP  08/31/24 0156

## 2024-08-31 NOTE — HPI
89 yo WF admitted from ED for acute respiratory distress, Covid, UTI. Patient on Bipap with some improvement.     History:   GERD  HLD  HTN  Hyponatremia  T2DM  Thyroid disease  Dementia  Depression  GERD      DNR

## 2024-08-31 NOTE — PROGRESS NOTES
Saw patient today.  Agree with plan.  She is a DNR/DNI and will honor that.  Work on weaning down O2 as tolerated.  Gentle fluids as needed.  Continue IV steroids and ABX.

## 2024-08-31 NOTE — HOSPITAL COURSE
8/31 0730 ABGs improving, No increased work of breathing, sat 97-98 on Bipap, changed to NRB on 80% O2 will titrate to maintain sat >92%. Will keep Bipap for use at night if needed.  Discussed pt status and POC with Dr. Kwok, Dc'd order for Remdesivir, added pulmicort to treatment regiment and give 500 ml IV fluids due to dehydration, tachycardia and hyponatremia.      9/1: Ms. Dow has improved significantly since admission. She is answering questions, eating well. Has weaned to 2L NC and will attempt RA. Lung sounds improved. States she feels better. Expect discharge tomorrow.    9/2 Ms Dow reports nausea this morning, will treat with zofran and monitor.  She is awake and alert, answering questions.  She is having O2 sat 98 % on 2 L NC, will continue to wean O2. Breath sounds rhonchi at times but essentially clear. Will DC concepcion and monitor output. HR still 120s despite cardizem,  this morning,Discussed with Dr. Kwok  will start IV NS @ 50 ml/hr and monitor.      9/3 NA down again.  Will inc NS to 100/hr.  Serial BMPs and trend NA.  Continue to wean down O2.  Will need close lab work monitoring.  Will give dose of IV lasix to try to start free water contraction     9/4 Seems to have gone wrong way with NA.  Will place on Fluid restriction and stop IVFs, sched lasix and place concepcion for I/O.  If trends up can go back to [facility] on fluid restriction and they can follow labs.     DC to [facility] today for lab monitoring

## 2024-08-31 NOTE — PLAN OF CARE
Problem: Breathing Pattern Ineffective  Goal: Effective Breathing Pattern  8/31/2024 0252 by Oscar Kidd, RRT  Outcome: Progressing  8/31/2024 0252 by Oscar Kidd, RRT  Outcome: Progressing     Problem: Gas Exchange Impaired  Goal: Optimal Gas Exchange  8/31/2024 0252 by Oscar Kidd, RRT  Outcome: Progressing  8/31/2024 0252 by Oscar Kidd, RRT  Outcome: Progressing

## 2024-09-01 LAB
BASOPHILS # BLD AUTO: 0.01 K/UL (ref 0–0.2)
BASOPHILS NFR BLD AUTO: 0.1 % (ref 0–1)
DIFFERENTIAL METHOD BLD: ABNORMAL
EOSINOPHIL # BLD AUTO: 0.01 K/UL (ref 0–0.5)
EOSINOPHIL NFR BLD AUTO: 0.1 % (ref 1–4)
ERYTHROCYTE [DISTWIDTH] IN BLOOD BY AUTOMATED COUNT: 12.8 % (ref 11.5–14.5)
GLUCOSE SERPL-MCNC: 283 MG/DL (ref 70–105)
GLUCOSE SERPL-MCNC: 287 MG/DL (ref 70–105)
GLUCOSE SERPL-MCNC: 299 MG/DL (ref 70–105)
HCT VFR BLD AUTO: 31.3 % (ref 38–47)
HGB BLD-MCNC: 10 G/DL (ref 12–16)
LYMPHOCYTES # BLD AUTO: 0.82 K/UL (ref 1–4.8)
LYMPHOCYTES NFR BLD AUTO: 7 % (ref 27–41)
MCH RBC QN AUTO: 27.9 PG (ref 27–31)
MCHC RBC AUTO-ENTMCNC: 31.9 G/DL (ref 32–36)
MCV RBC AUTO: 87.4 FL (ref 80–96)
MONOCYTES # BLD AUTO: 0.47 K/UL (ref 0–0.8)
MONOCYTES NFR BLD AUTO: 4 % (ref 2–6)
MPC BLD CALC-MCNC: 9.5 FL (ref 9.4–12.4)
NEUTROPHILS # BLD AUTO: 10.36 K/UL (ref 1.8–7.7)
NEUTROPHILS NFR BLD AUTO: 88.8 % (ref 53–65)
PLATELET # BLD AUTO: 176 K/UL (ref 150–400)
RBC # BLD AUTO: 3.58 M/UL (ref 4.2–5.4)
WBC # BLD AUTO: 11.67 K/UL (ref 4.5–11)

## 2024-09-01 PROCEDURE — 27000221 HC OXYGEN, UP TO 24 HOURS

## 2024-09-01 PROCEDURE — 25000003 PHARM REV CODE 250: Performed by: HOSPITALIST

## 2024-09-01 PROCEDURE — 25000003 PHARM REV CODE 250: Performed by: NURSE PRACTITIONER

## 2024-09-01 PROCEDURE — 94640 AIRWAY INHALATION TREATMENT: CPT

## 2024-09-01 PROCEDURE — 36415 COLL VENOUS BLD VENIPUNCTURE: CPT | Performed by: NURSE PRACTITIONER

## 2024-09-01 PROCEDURE — 63600175 PHARM REV CODE 636 W HCPCS: Performed by: NURSE PRACTITIONER

## 2024-09-01 PROCEDURE — 27000207 HC ISOLATION

## 2024-09-01 PROCEDURE — 94761 N-INVAS EAR/PLS OXIMETRY MLT: CPT

## 2024-09-01 PROCEDURE — 99900035 HC TECH TIME PER 15 MIN (STAT)

## 2024-09-01 PROCEDURE — 85025 COMPLETE CBC W/AUTO DIFF WBC: CPT | Performed by: NURSE PRACTITIONER

## 2024-09-01 PROCEDURE — 11000001 HC ACUTE MED/SURG PRIVATE ROOM

## 2024-09-01 PROCEDURE — 25000242 PHARM REV CODE 250 ALT 637 W/ HCPCS: Performed by: NURSE PRACTITIONER

## 2024-09-01 PROCEDURE — 82962 GLUCOSE BLOOD TEST: CPT

## 2024-09-01 RX ORDER — SODIUM CHLORIDE 9 MG/ML
INJECTION, SOLUTION INTRAVENOUS
Status: DISCONTINUED | OUTPATIENT
Start: 2024-09-01 | End: 2024-09-04 | Stop reason: HOSPADM

## 2024-09-01 RX ORDER — ALBUTEROL SULFATE 90 UG/1
2 INHALANT RESPIRATORY (INHALATION) EVERY 6 HOURS PRN
Status: DISCONTINUED | OUTPATIENT
Start: 2024-09-01 | End: 2024-09-04 | Stop reason: HOSPADM

## 2024-09-01 RX ADMIN — DILTIAZEM HYDROCHLORIDE 30 MG: 30 TABLET, FILM COATED ORAL at 05:09

## 2024-09-01 RX ADMIN — DILTIAZEM HYDROCHLORIDE 30 MG: 30 TABLET, FILM COATED ORAL at 01:09

## 2024-09-01 RX ADMIN — ENOXAPARIN SODIUM 40 MG: 40 INJECTION SUBCUTANEOUS at 05:09

## 2024-09-01 RX ADMIN — SODIUM CHLORIDE 30 ML: 9 INJECTION, SOLUTION INTRAVENOUS at 10:09

## 2024-09-01 RX ADMIN — ALBUTEROL SULFATE 2 PUFF: 108 INHALANT RESPIRATORY (INHALATION) at 11:09

## 2024-09-01 RX ADMIN — MUPIROCIN: 20 OINTMENT TOPICAL at 09:09

## 2024-09-01 RX ADMIN — PANTOPRAZOLE SODIUM 40 MG: 40 TABLET, DELAYED RELEASE ORAL at 08:09

## 2024-09-01 RX ADMIN — BUDESONIDE INHALATION 0.5 MG: 0.5 SUSPENSION RESPIRATORY (INHALATION) at 08:09

## 2024-09-01 RX ADMIN — MUPIROCIN: 20 OINTMENT TOPICAL at 08:09

## 2024-09-01 RX ADMIN — LEVOTHYROXINE SODIUM 50 MCG: 0.05 TABLET ORAL at 05:09

## 2024-09-01 RX ADMIN — AZITHROMYCIN MONOHYDRATE 500 MG: 500 INJECTION, POWDER, LYOPHILIZED, FOR SOLUTION INTRAVENOUS at 10:09

## 2024-09-01 RX ADMIN — ALBUTEROL SULFATE 2.5 MG: 2.5 SOLUTION RESPIRATORY (INHALATION) at 08:09

## 2024-09-01 RX ADMIN — ALBUTEROL SULFATE 2.5 MG: 2.5 SOLUTION RESPIRATORY (INHALATION) at 12:09

## 2024-09-01 RX ADMIN — DULOXETINE HYDROCHLORIDE 30 MG: 30 CAPSULE, DELAYED RELEASE ORAL at 08:09

## 2024-09-01 RX ADMIN — SODIUM CHLORIDE: 9 INJECTION, SOLUTION INTRAVENOUS at 10:09

## 2024-09-01 RX ADMIN — DEXAMETHASONE SODIUM PHOSPHATE 6 MG: 4 INJECTION, SOLUTION INTRA-ARTICULAR; INTRALESIONAL; INTRAMUSCULAR; INTRAVENOUS; SOFT TISSUE at 08:09

## 2024-09-01 RX ADMIN — GABAPENTIN 100 MG: 100 CAPSULE ORAL at 05:09

## 2024-09-01 RX ADMIN — INSULIN ASPART 2 UNITS: 100 INJECTION, SOLUTION INTRAVENOUS; SUBCUTANEOUS at 05:09

## 2024-09-01 RX ADMIN — DILTIAZEM HYDROCHLORIDE 30 MG: 30 TABLET, FILM COATED ORAL at 09:09

## 2024-09-01 RX ADMIN — INSULIN ASPART 2 UNITS: 100 INJECTION, SOLUTION INTRAVENOUS; SUBCUTANEOUS at 11:09

## 2024-09-01 RX ADMIN — SERTRALINE HYDROCHLORIDE 25 MG: 25 TABLET ORAL at 08:09

## 2024-09-01 RX ADMIN — CEFTRIAXONE 1 G: 1 INJECTION, POWDER, FOR SOLUTION INTRAMUSCULAR; INTRAVENOUS at 10:09

## 2024-09-01 RX ADMIN — INSULIN ASPART 1 UNITS: 100 INJECTION, SOLUTION INTRAVENOUS; SUBCUTANEOUS at 09:09

## 2024-09-02 LAB
ALBUMIN SERPL BCP-MCNC: 3 G/DL (ref 3.5–5)
ALBUMIN/GLOB SERPL: 0.9 {RATIO}
ALP SERPL-CCNC: 119 U/L (ref 55–142)
ALT SERPL W P-5'-P-CCNC: 184 U/L (ref 13–56)
ANION GAP SERPL CALCULATED.3IONS-SCNC: 15 MMOL/L (ref 7–16)
AST SERPL W P-5'-P-CCNC: 157 U/L (ref 15–37)
BASOPHILS # BLD AUTO: 0.01 K/UL (ref 0–0.2)
BASOPHILS NFR BLD AUTO: 0.1 % (ref 0–1)
BILIRUB SERPL-MCNC: 0.3 MG/DL (ref ?–1.2)
BUN SERPL-MCNC: 22 MG/DL (ref 7–18)
BUN/CREAT SERPL: 27 (ref 6–20)
CALCIUM SERPL-MCNC: 8.8 MG/DL (ref 8.5–10.1)
CHLORIDE SERPL-SCNC: 89 MMOL/L (ref 98–107)
CO2 SERPL-SCNC: 26 MMOL/L (ref 21–32)
CREAT SERPL-MCNC: 0.82 MG/DL (ref 0.55–1.02)
DIFFERENTIAL METHOD BLD: ABNORMAL
EGFR (NO RACE VARIABLE) (RUSH/TITUS): 68 ML/MIN/1.73M2
EOSINOPHIL # BLD AUTO: 0 K/UL (ref 0–0.5)
EOSINOPHIL NFR BLD AUTO: 0 % (ref 1–4)
ERYTHROCYTE [DISTWIDTH] IN BLOOD BY AUTOMATED COUNT: 12.7 % (ref 11.5–14.5)
GLOBULIN SER-MCNC: 3.4 G/DL (ref 2–4)
GLUCOSE SERPL-MCNC: 181 MG/DL (ref 70–105)
GLUCOSE SERPL-MCNC: 190 MG/DL (ref 74–106)
GLUCOSE SERPL-MCNC: 206 MG/DL (ref 70–105)
GLUCOSE SERPL-MCNC: 211 MG/DL (ref 70–105)
GLUCOSE SERPL-MCNC: 275 MG/DL (ref 70–105)
HCT VFR BLD AUTO: 35.3 % (ref 38–47)
HGB BLD-MCNC: 11.4 G/DL (ref 12–16)
LYMPHOCYTES # BLD AUTO: 1.62 K/UL (ref 1–4.8)
LYMPHOCYTES NFR BLD AUTO: 10.7 % (ref 27–41)
MCH RBC QN AUTO: 28 PG (ref 27–31)
MCHC RBC AUTO-ENTMCNC: 32.3 G/DL (ref 32–36)
MCV RBC AUTO: 86.7 FL (ref 80–96)
MONOCYTES # BLD AUTO: 0.74 K/UL (ref 0–0.8)
MONOCYTES NFR BLD AUTO: 4.9 % (ref 2–6)
MPC BLD CALC-MCNC: 9.8 FL (ref 9.4–12.4)
NEUTROPHILS # BLD AUTO: 12.73 K/UL (ref 1.8–7.7)
NEUTROPHILS NFR BLD AUTO: 84.3 % (ref 53–65)
PLATELET # BLD AUTO: 266 K/UL (ref 150–400)
POTASSIUM SERPL-SCNC: 4.8 MMOL/L (ref 3.5–5.1)
PROT SERPL-MCNC: 6.4 G/DL (ref 6.4–8.2)
RBC # BLD AUTO: 4.07 M/UL (ref 4.2–5.4)
SODIUM SERPL-SCNC: 125 MMOL/L (ref 136–145)
UA COMPLETE W REFLEX CULTURE PNL UR: NORMAL
WBC # BLD AUTO: 15.1 K/UL (ref 4.5–11)

## 2024-09-02 PROCEDURE — 63600175 PHARM REV CODE 636 W HCPCS: Performed by: NURSE PRACTITIONER

## 2024-09-02 PROCEDURE — 27000987 HC MATTRESS, MATRIX LOW PROFILE

## 2024-09-02 PROCEDURE — 27000207 HC ISOLATION

## 2024-09-02 PROCEDURE — 85025 COMPLETE CBC W/AUTO DIFF WBC: CPT | Performed by: NURSE PRACTITIONER

## 2024-09-02 PROCEDURE — 82962 GLUCOSE BLOOD TEST: CPT

## 2024-09-02 PROCEDURE — 94640 AIRWAY INHALATION TREATMENT: CPT

## 2024-09-02 PROCEDURE — 25000003 PHARM REV CODE 250: Performed by: HOSPITALIST

## 2024-09-02 PROCEDURE — 27000221 HC OXYGEN, UP TO 24 HOURS

## 2024-09-02 PROCEDURE — 11000001 HC ACUTE MED/SURG PRIVATE ROOM

## 2024-09-02 PROCEDURE — 27000958

## 2024-09-02 PROCEDURE — 25000003 PHARM REV CODE 250: Performed by: NURSE PRACTITIONER

## 2024-09-02 PROCEDURE — 94761 N-INVAS EAR/PLS OXIMETRY MLT: CPT

## 2024-09-02 PROCEDURE — 36415 COLL VENOUS BLD VENIPUNCTURE: CPT | Performed by: NURSE PRACTITIONER

## 2024-09-02 PROCEDURE — 80053 COMPREHEN METABOLIC PANEL: CPT | Performed by: NURSE PRACTITIONER

## 2024-09-02 PROCEDURE — 99900035 HC TECH TIME PER 15 MIN (STAT)

## 2024-09-02 RX ORDER — SODIUM CHLORIDE 9 MG/ML
1000 INJECTION, SOLUTION INTRAVENOUS CONTINUOUS
Status: DISCONTINUED | OUTPATIENT
Start: 2024-09-02 | End: 2024-09-03

## 2024-09-02 RX ADMIN — ONDANSETRON 4 MG: 2 INJECTION INTRAMUSCULAR; INTRAVENOUS at 07:09

## 2024-09-02 RX ADMIN — DILTIAZEM HYDROCHLORIDE 30 MG: 30 TABLET, FILM COATED ORAL at 05:09

## 2024-09-02 RX ADMIN — PANTOPRAZOLE SODIUM 40 MG: 40 TABLET, DELAYED RELEASE ORAL at 08:09

## 2024-09-02 RX ADMIN — CEFTRIAXONE 1 G: 1 INJECTION, POWDER, FOR SOLUTION INTRAMUSCULAR; INTRAVENOUS at 08:09

## 2024-09-02 RX ADMIN — ENOXAPARIN SODIUM 40 MG: 40 INJECTION SUBCUTANEOUS at 05:09

## 2024-09-02 RX ADMIN — DILTIAZEM HYDROCHLORIDE 30 MG: 30 TABLET, FILM COATED ORAL at 09:09

## 2024-09-02 RX ADMIN — SODIUM CHLORIDE 1000 ML: 9 INJECTION, SOLUTION INTRAVENOUS at 10:09

## 2024-09-02 RX ADMIN — DULOXETINE HYDROCHLORIDE 30 MG: 30 CAPSULE, DELAYED RELEASE ORAL at 08:09

## 2024-09-02 RX ADMIN — SERTRALINE HYDROCHLORIDE 25 MG: 25 TABLET ORAL at 08:09

## 2024-09-02 RX ADMIN — LEVOTHYROXINE SODIUM 50 MCG: 0.05 TABLET ORAL at 05:09

## 2024-09-02 RX ADMIN — MUPIROCIN: 20 OINTMENT TOPICAL at 08:09

## 2024-09-02 RX ADMIN — GABAPENTIN 100 MG: 100 CAPSULE ORAL at 05:09

## 2024-09-02 RX ADMIN — SODIUM CHLORIDE 250 ML: 9 INJECTION, SOLUTION INTRAVENOUS at 08:09

## 2024-09-02 RX ADMIN — INSULIN ASPART 2 UNITS: 100 INJECTION, SOLUTION INTRAVENOUS; SUBCUTANEOUS at 05:09

## 2024-09-02 RX ADMIN — ALBUTEROL SULFATE 2 PUFF: 108 INHALANT RESPIRATORY (INHALATION) at 09:09

## 2024-09-02 RX ADMIN — INSULIN ASPART 2 UNITS: 100 INJECTION, SOLUTION INTRAVENOUS; SUBCUTANEOUS at 10:09

## 2024-09-02 RX ADMIN — INSULIN ASPART 2 UNITS: 100 INJECTION, SOLUTION INTRAVENOUS; SUBCUTANEOUS at 06:09

## 2024-09-02 RX ADMIN — AZITHROMYCIN MONOHYDRATE 500 MG: 500 INJECTION, POWDER, LYOPHILIZED, FOR SOLUTION INTRAVENOUS at 09:09

## 2024-09-02 RX ADMIN — SODIUM CHLORIDE 1000 ML: 9 INJECTION, SOLUTION INTRAVENOUS at 09:09

## 2024-09-02 RX ADMIN — DEXAMETHASONE SODIUM PHOSPHATE 6 MG: 4 INJECTION, SOLUTION INTRA-ARTICULAR; INTRALESIONAL; INTRAMUSCULAR; INTRAVENOUS; SOFT TISSUE at 08:09

## 2024-09-02 RX ADMIN — DILTIAZEM HYDROCHLORIDE 30 MG: 30 TABLET, FILM COATED ORAL at 02:09

## 2024-09-02 NOTE — SUBJECTIVE & OBJECTIVE
Interval History: nausea today    Review of Systems   Constitutional:  Positive for fatigue.   Respiratory:  Positive for cough. Negative for shortness of breath.    Cardiovascular: Negative.    Gastrointestinal:  Positive for nausea. Negative for vomiting.   Skin: Negative.      Objective:     Vital Signs (Most Recent):  Temp: 97.7 °F (36.5 °C) (09/02/24 0737)  Pulse: (!) 126 (09/02/24 0947)  Resp: 18 (09/02/24 0947)  BP: (!) 134/93 (09/02/24 0737)  SpO2: 96 % (09/02/24 0947) Vital Signs (24h Range):  Temp:  [97.4 °F (36.3 °C)-98.1 °F (36.7 °C)] 97.7 °F (36.5 °C)  Pulse:  [119-132] 126  Resp:  [18-26] 18  SpO2:  [96 %-99 %] 96 %  BP: (105-134)/(70-93) 134/93     Weight: 63.3 kg (139 lb 9.6 oz)  Body mass index is 21.86 kg/m².    Intake/Output Summary (Last 24 hours) at 9/2/2024 1003  Last data filed at 9/2/2024 0549  Gross per 24 hour   Intake 600 ml   Output 2050 ml   Net -1450 ml         Physical Exam  Constitutional:       Appearance: She is ill-appearing.   HENT:      Mouth/Throat:      Mouth: Mucous membranes are dry.   Cardiovascular:      Rate and Rhythm: Regular rhythm. Tachycardia present.      Comments: Taking cardizem, Denies chest pain or SOB  Pulmonary:      Effort: Pulmonary effort is normal.      Breath sounds: Rhonchi present.   Abdominal:      General: Abdomen is flat.      Palpations: Abdomen is soft.      Tenderness: There is no abdominal tenderness.   Musculoskeletal:         General: Normal range of motion.   Skin:     General: Skin is warm and dry.   Neurological:      Mental Status: She is alert. Mental status is at baseline.   Psychiatric:         Mood and Affect: Mood normal.             Significant Labs: All pertinent labs within the past 24 hours have been reviewed.    Significant Imaging: I have reviewed all pertinent imaging results/findings within the past 24 hours.

## 2024-09-02 NOTE — SUBJECTIVE & OBJECTIVE
Review of Systems   Constitutional:  Negative for chills and fever.   Respiratory:  Positive for cough. Negative for shortness of breath and wheezing.    Gastrointestinal:  Negative for abdominal pain, nausea and vomiting.   Musculoskeletal:  Positive for myalgias.   Neurological:  Negative for dizziness, weakness and headaches.   All other systems reviewed and are negative.    Objective:     Vital Signs (Most Recent):  Temp: 97.4 °F (36.3 °C) (09/01/24 1927)  Pulse: (!) 130 (09/01/24 2020)  Resp: 18 (09/01/24 2020)  BP: 118/74 (09/01/24 1927)  SpO2: 97 % (09/01/24 2020) Vital Signs (24h Range):  Temp:  [97.3 °F (36.3 °C)-98.1 °F (36.7 °C)] 97.4 °F (36.3 °C)  Pulse:  [] 130  Resp:  [18-20] 18  SpO2:  [96 %-99 %] 97 %  BP: ()/(52-76) 118/74     Weight: 63.3 kg (139 lb 9.6 oz)  Body mass index is 21.86 kg/m².    Intake/Output Summary (Last 24 hours) at 9/1/2024 2156  Last data filed at 9/1/2024 1901  Gross per 24 hour   Intake 1330 ml   Output 2000 ml   Net -670 ml         Physical Exam  Vitals and nursing note reviewed.   Constitutional:       Appearance: She is ill-appearing.   HENT:      Head: Normocephalic.      Right Ear: External ear normal.      Left Ear: External ear normal.      Nose: Nose normal.      Mouth/Throat:      Mouth: Mucous membranes are dry.   Eyes:      Extraocular Movements: Extraocular movements intact.      Pupils: Pupils are equal, round, and reactive to light.   Cardiovascular:      Rate and Rhythm: Regular rhythm. Tachycardia present.   Pulmonary:      Effort: Pulmonary effort is normal.      Breath sounds: Wheezing present.      Comments: Mildly decreased breath sounds, improved overall  Abdominal:      Palpations: Abdomen is soft.   Musculoskeletal:      Cervical back: Normal range of motion and neck supple.   Skin:     General: Skin is warm.      Capillary Refill: Capillary refill takes 2 to 3 seconds.   Neurological:      Mental Status: She is alert. She is disoriented.       Comments: At baseline mental status   Psychiatric:         Mood and Affect: Mood normal.         Behavior: Behavior normal.         Thought Content: Thought content normal.         Judgment: Judgment normal.             Significant Labs: All pertinent labs within the past 24 hours have been reviewed.  Recent Lab Results         09/01/24  2110   09/01/24  1715   09/01/24  1014   09/01/24  0458        Baso #       0.01       Basophil %       0.1       Differential Method       Auto       Eos #       0.01       Eos %       0.1       Hematocrit       31.3       Hemoglobin       10.0       Lymph #       0.82       Lymph %       7.0       MCH       27.9       MCHC       31.9       MCV       87.4       Mono #       0.47       Mono %       4.0       MPV       9.5       Neutrophils, Abs       10.36       Neutrophils Relative       88.8       Platelet Count       176       POC Glucose 283   287   299         RBC       3.58       RDW       12.8       WBC       11.67               Significant Imaging: I have reviewed all pertinent imaging results/findings within the past 24 hours.

## 2024-09-02 NOTE — PROGRESS NOTES
[facility] Medicine  Progress Note    Patient Name: Joyce Dow  MRN: [id number]  Patient Class: IP- Inpatient   Admission Date: 8/30/2024  Length of Stay: 2 days  Attending Physician: Darinel Kwok DO  Primary Care Provider: Walker Watkins MD        Subjective:     Principal Problem:Acute hypoxemic respiratory failure        HPI:  89 yo WF admitted from ED for acute respiratory distress, Covid, UTI. Patient on Bipap with some improvement.     History:   GERD  HLD  HTN  Hyponatremia  T2DM  Thyroid disease  Dementia  Depression  GERD      DNR    Overview/Hospital Course:  8/31 0730 ABGs improving, No increased work of breathing, sat 97-98 on Bipap, changed to NRB on 80% O2 will titrate to maintain sat >92%. Will keep Bipap for use at night if needed.  Discussed pt status and POC with Dr. Kwok, Irwin'd order for Remdesivir, added pulmicort to treatment regiment and give 500 ml IV fluids due to dehydration, tachycardia and hyponatremia.      9/1: Ms. Dow has improved significantly since admission. She is answering questions, eating well. Has weaned to 2L NC and will attempt RA. Lung sounds improved. States she feels better. Expect discharge tomorrow.    9/2 Ms Dow reports nausea this morning, will treat with zofran and monitor.  She is awake and alert, answering questions.  She is having O2 sat 98 % on 2 L NC, will continue to wean O2. Breath sounds rhonchi at times but essentially clear. Will DC concepcion and monitor output. HR still 120s despite cardizem,  this morning,Discussed with Dr. Kwok  will start IV NS @ 50 ml/hr and monitor.      Interval History: nausea today    Review of Systems   Constitutional:  Positive for fatigue.   Respiratory:  Positive for cough. Negative for shortness of breath.    Cardiovascular: Negative.    Gastrointestinal:  Positive for nausea. Negative for vomiting.   Skin: Negative.      Objective:     Vital Signs  (Most Recent):  Temp: 97.7 °F (36.5 °C) (09/02/24 0737)  Pulse: (!) 126 (09/02/24 0947)  Resp: 18 (09/02/24 0947)  BP: (!) 134/93 (09/02/24 0737)  SpO2: 96 % (09/02/24 0947) Vital Signs (24h Range):  Temp:  [97.4 °F (36.3 °C)-98.1 °F (36.7 °C)] 97.7 °F (36.5 °C)  Pulse:  [119-132] 126  Resp:  [18-26] 18  SpO2:  [96 %-99 %] 96 %  BP: (105-134)/(70-93) 134/93     Weight: 63.3 kg (139 lb 9.6 oz)  Body mass index is 21.86 kg/m².    Intake/Output Summary (Last 24 hours) at 9/2/2024 1003  Last data filed at 9/2/2024 0549  Gross per 24 hour   Intake 600 ml   Output 2050 ml   Net -1450 ml         Physical Exam  Constitutional:       Appearance: She is ill-appearing.   HENT:      Mouth/Throat:      Mouth: Mucous membranes are dry.   Cardiovascular:      Rate and Rhythm: Regular rhythm. Tachycardia present.      Comments: Taking cardizem, Denies chest pain or SOB  Pulmonary:      Effort: Pulmonary effort is normal.      Breath sounds: Rhonchi present.   Abdominal:      General: Abdomen is flat.      Palpations: Abdomen is soft.      Tenderness: There is no abdominal tenderness.   Musculoskeletal:         General: Normal range of motion.   Skin:     General: Skin is warm and dry.   Neurological:      Mental Status: She is alert. Mental status is at baseline.   Psychiatric:         Mood and Affect: Mood normal.             Significant Labs: All pertinent labs within the past 24 hours have been reviewed.    Significant Imaging: I have reviewed all pertinent imaging results/findings within the past 24 hours.    Assessment/Plan:      * Acute hypoxemic respiratory failure  Patient with Hypoxic Respiratory failure which is Acute.  she is not on home oxygen. Supplemental oxygen was provided and noted- Oxygen Concentration (%):  [40] 40    .   Signs/symptoms of respiratory failure include- tachypnea, increased work of breathing, respiratory distress, use of accessory muscles, and lethargy. Contributing diagnoses includes -  Covid   Labs and images were reviewed. Patient Has recent ABG, which has been reviewed. Will treat underlying causes and adjust management of respiratory failure as follows- Bipap, recheck ABG    At high risk for injury related to fall  Fall prevention      Type 2 diabetes mellitus with hyperglycemia, without long-term current use of insulin  Scheduled FSG  Sliding scale insulin      Hyponatremia  BMP daily      VTE Risk Mitigation (From admission, onward)           Ordered     enoxaparin injection 40 mg  Daily         08/31/24 0313     IP VTE HIGH RISK PATIENT  Once         08/31/24 0313     Place MEHREEN hose  Until discontinued         08/31/24 0313                    Discharge Planning   JUANPABLO:      Code Status: DNR   Is the patient medically ready for discharge?:     Reason for patient still in hospital (select all that apply): Patient trending condition                     ROSEMARY Duenas  Department of Hospital Medicine   [facility]

## 2024-09-03 LAB
ALBUMIN SERPL BCP-MCNC: 2.8 G/DL (ref 3.5–5)
ALBUMIN/GLOB SERPL: 0.8 {RATIO}
ALP SERPL-CCNC: 107 U/L (ref 55–142)
ALT SERPL W P-5'-P-CCNC: 123 U/L (ref 13–56)
ANION GAP SERPL CALCULATED.3IONS-SCNC: 11 MMOL/L (ref 7–16)
ANION GAP SERPL CALCULATED.3IONS-SCNC: 12 MMOL/L (ref 7–16)
ANION GAP SERPL CALCULATED.3IONS-SCNC: 12 MMOL/L (ref 7–16)
AST SERPL W P-5'-P-CCNC: 59 U/L (ref 15–37)
BASOPHILS NFR BLD AUTO: 2.3 % (ref 0–1)
BILIRUB SERPL-MCNC: 0.3 MG/DL (ref ?–1.2)
BUN SERPL-MCNC: 22 MG/DL (ref 7–18)
BUN SERPL-MCNC: 22 MG/DL (ref 7–18)
BUN SERPL-MCNC: 23 MG/DL (ref 7–18)
BUN/CREAT SERPL: 22 (ref 6–20)
BUN/CREAT SERPL: 23 (ref 6–20)
BUN/CREAT SERPL: 23 (ref 6–20)
CALCIUM SERPL-MCNC: 8.4 MG/DL (ref 8.5–10.1)
CHLORIDE SERPL-SCNC: 86 MMOL/L (ref 98–107)
CHLORIDE SERPL-SCNC: 87 MMOL/L (ref 98–107)
CHLORIDE SERPL-SCNC: 87 MMOL/L (ref 98–107)
CO2 SERPL-SCNC: 24 MMOL/L (ref 21–32)
CO2 SERPL-SCNC: 26 MMOL/L (ref 21–32)
CO2 SERPL-SCNC: 26 MMOL/L (ref 21–32)
CREAT SERPL-MCNC: 0.95 MG/DL (ref 0.55–1.02)
CREAT SERPL-MCNC: 0.98 MG/DL (ref 0.55–1.02)
CREAT SERPL-MCNC: 1.01 MG/DL (ref 0.55–1.02)
DIFFERENTIAL METHOD BLD: ABNORMAL
EGFR (NO RACE VARIABLE) (RUSH/TITUS): 53 ML/MIN/1.73M2
EGFR (NO RACE VARIABLE) (RUSH/TITUS): 55 ML/MIN/1.73M2
EGFR (NO RACE VARIABLE) (RUSH/TITUS): 57 ML/MIN/1.73M2
EOSINOPHIL NFR BLD AUTO: 0.2 % (ref 1–4)
ERYTHROCYTE [DISTWIDTH] IN BLOOD BY AUTOMATED COUNT: 12.4 % (ref 11.5–14.5)
GLOBULIN SER-MCNC: 3.3 G/DL (ref 2–4)
GLUCOSE SERPL-MCNC: 196 MG/DL (ref 70–105)
GLUCOSE SERPL-MCNC: 197 MG/DL (ref 74–106)
GLUCOSE SERPL-MCNC: 209 MG/DL (ref 70–105)
GLUCOSE SERPL-MCNC: 236 MG/DL (ref 70–105)
GLUCOSE SERPL-MCNC: 242 MG/DL (ref 74–106)
GLUCOSE SERPL-MCNC: 244 MG/DL (ref 74–106)
GLUCOSE SERPL-MCNC: 249 MG/DL (ref 70–105)
HCT VFR BLD AUTO: 32.9 % (ref 38–47)
HGB BLD-MCNC: 10.5 G/DL (ref 12–16)
LYMPHOCYTES NFR BLD AUTO: 15.8 % (ref 27–41)
MCH RBC QN AUTO: 27.6 PG (ref 27–31)
MCHC RBC AUTO-ENTMCNC: 31.9 G/DL (ref 32–36)
MCV RBC AUTO: 86.4 FL (ref 80–96)
MONOCYTES NFR BLD AUTO: 6.9 % (ref 2–6)
MPC BLD CALC-MCNC: 10.1 FL (ref 9.4–12.4)
NEUTROPHILS NFR BLD AUTO: 76.8 % (ref 53–65)
PLATELET # BLD AUTO: 218 K/UL (ref 150–400)
POTASSIUM SERPL-SCNC: 5 MMOL/L (ref 3.5–5.1)
POTASSIUM SERPL-SCNC: 5.1 MMOL/L (ref 3.5–5.1)
POTASSIUM SERPL-SCNC: 5.2 MMOL/L (ref 3.5–5.1)
PROT SERPL-MCNC: 6.1 G/DL (ref 6.4–8.2)
RBC # BLD AUTO: 3.81 M/UL (ref 4.2–5.4)
SODIUM SERPL-SCNC: 118 MMOL/L (ref 136–145)
SODIUM SERPL-SCNC: 118 MMOL/L (ref 136–145)
SODIUM SERPL-SCNC: 120 MMOL/L (ref 136–145)
TSH SERPL DL<=0.005 MIU/L-ACNC: 1.64 UIU/ML (ref 0.36–3.74)
WBC # BLD AUTO: 11 K/UL (ref 4.5–11)

## 2024-09-03 PROCEDURE — 36415 COLL VENOUS BLD VENIPUNCTURE: CPT | Performed by: NURSE PRACTITIONER

## 2024-09-03 PROCEDURE — 27000221 HC OXYGEN, UP TO 24 HOURS

## 2024-09-03 PROCEDURE — 94640 AIRWAY INHALATION TREATMENT: CPT

## 2024-09-03 PROCEDURE — 27000958

## 2024-09-03 PROCEDURE — 27000207 HC ISOLATION

## 2024-09-03 PROCEDURE — 82962 GLUCOSE BLOOD TEST: CPT

## 2024-09-03 PROCEDURE — 84443 ASSAY THYROID STIM HORMONE: CPT | Performed by: NURSE PRACTITIONER

## 2024-09-03 PROCEDURE — 36415 COLL VENOUS BLD VENIPUNCTURE: CPT | Performed by: HOSPITALIST

## 2024-09-03 PROCEDURE — 25000003 PHARM REV CODE 250: Performed by: HOSPITALIST

## 2024-09-03 PROCEDURE — 11000001 HC ACUTE MED/SURG PRIVATE ROOM

## 2024-09-03 PROCEDURE — 25000003 PHARM REV CODE 250: Performed by: NURSE PRACTITIONER

## 2024-09-03 PROCEDURE — 27000987 HC MATTRESS, MATRIX LOW PROFILE

## 2024-09-03 PROCEDURE — 63600175 PHARM REV CODE 636 W HCPCS: Performed by: NURSE PRACTITIONER

## 2024-09-03 PROCEDURE — 80053 COMPREHEN METABOLIC PANEL: CPT | Performed by: NURSE PRACTITIONER

## 2024-09-03 PROCEDURE — 94761 N-INVAS EAR/PLS OXIMETRY MLT: CPT

## 2024-09-03 PROCEDURE — 99900035 HC TECH TIME PER 15 MIN (STAT)

## 2024-09-03 PROCEDURE — 85025 COMPLETE CBC W/AUTO DIFF WBC: CPT | Performed by: NURSE PRACTITIONER

## 2024-09-03 RX ORDER — PREDNISONE 20 MG/1
20 TABLET ORAL DAILY
Status: DISCONTINUED | OUTPATIENT
Start: 2024-09-04 | End: 2024-09-04 | Stop reason: HOSPADM

## 2024-09-03 RX ORDER — SODIUM CHLORIDE 9 MG/ML
INJECTION, SOLUTION INTRAVENOUS CONTINUOUS
Status: DISCONTINUED | OUTPATIENT
Start: 2024-09-03 | End: 2024-09-04

## 2024-09-03 RX ORDER — DILTIAZEM HYDROCHLORIDE 60 MG/1
60 TABLET, FILM COATED ORAL EVERY 8 HOURS
Status: DISCONTINUED | OUTPATIENT
Start: 2024-09-03 | End: 2024-09-04

## 2024-09-03 RX ADMIN — INSULIN ASPART 2 UNITS: 100 INJECTION, SOLUTION INTRAVENOUS; SUBCUTANEOUS at 05:09

## 2024-09-03 RX ADMIN — DILTIAZEM HYDROCHLORIDE 30 MG: 30 TABLET, FILM COATED ORAL at 05:09

## 2024-09-03 RX ADMIN — SODIUM CHLORIDE: 9 INJECTION, SOLUTION INTRAVENOUS at 10:09

## 2024-09-03 RX ADMIN — MUPIROCIN: 20 OINTMENT TOPICAL at 08:09

## 2024-09-03 RX ADMIN — DILTIAZEM HYDROCHLORIDE 60 MG: 60 TABLET, FILM COATED ORAL at 09:09

## 2024-09-03 RX ADMIN — AZITHROMYCIN MONOHYDRATE 500 MG: 500 INJECTION, POWDER, LYOPHILIZED, FOR SOLUTION INTRAVENOUS at 10:09

## 2024-09-03 RX ADMIN — ENOXAPARIN SODIUM 40 MG: 40 INJECTION SUBCUTANEOUS at 05:09

## 2024-09-03 RX ADMIN — INSULIN ASPART 2 UNITS: 100 INJECTION, SOLUTION INTRAVENOUS; SUBCUTANEOUS at 11:09

## 2024-09-03 RX ADMIN — DULOXETINE HYDROCHLORIDE 30 MG: 30 CAPSULE, DELAYED RELEASE ORAL at 08:09

## 2024-09-03 RX ADMIN — CEFTRIAXONE 1 G: 1 INJECTION, POWDER, FOR SOLUTION INTRAMUSCULAR; INTRAVENOUS at 09:09

## 2024-09-03 RX ADMIN — GABAPENTIN 100 MG: 100 CAPSULE ORAL at 05:09

## 2024-09-03 RX ADMIN — DILTIAZEM HYDROCHLORIDE 60 MG: 60 TABLET, FILM COATED ORAL at 01:09

## 2024-09-03 RX ADMIN — MUPIROCIN: 20 OINTMENT TOPICAL at 09:09

## 2024-09-03 RX ADMIN — LEVOTHYROXINE SODIUM 50 MCG: 0.05 TABLET ORAL at 05:09

## 2024-09-03 RX ADMIN — ALBUTEROL SULFATE 2 PUFF: 108 INHALANT RESPIRATORY (INHALATION) at 10:09

## 2024-09-03 RX ADMIN — SERTRALINE HYDROCHLORIDE 25 MG: 25 TABLET ORAL at 08:09

## 2024-09-03 RX ADMIN — INSULIN ASPART 1 UNITS: 100 INJECTION, SOLUTION INTRAVENOUS; SUBCUTANEOUS at 09:09

## 2024-09-03 RX ADMIN — PANTOPRAZOLE SODIUM 40 MG: 40 TABLET, DELAYED RELEASE ORAL at 08:09

## 2024-09-03 RX ADMIN — DEXAMETHASONE SODIUM PHOSPHATE 6 MG: 4 INJECTION, SOLUTION INTRA-ARTICULAR; INTRALESIONAL; INTRAMUSCULAR; INTRAVENOUS; SOFT TISSUE at 08:09

## 2024-09-03 NOTE — PROGRESS NOTES
[facility] Medicine  Progress Note    Patient Name: Joyce Dow  MRN: [id number]  Patient Class: IP- Inpatient   Admission Date: 8/30/2024  Length of Stay: 3 days  Attending Physician: Darinel Kwok DO  Primary Care Provider: Walker Watkins MD        Subjective:     Principal Problem:Acute hypoxemic respiratory failure        HPI:  89 yo WF admitted from ED for acute respiratory distress, Covid, UTI. Patient on Bipap with some improvement.     History:   GERD  HLD  HTN  Hyponatremia  T2DM  Thyroid disease  Dementia  Depression  GERD      DNR    Overview/Hospital Course:  8/31 0730 ABGs improving, No increased work of breathing, sat 97-98 on Bipap, changed to NRB on 80% O2 will titrate to maintain sat >92%. Will keep Bipap for use at night if needed.  Discussed pt status and POC with Dr. Kwok, Irwin'd order for Remdesivir, added pulmicort to treatment regiment and give 500 ml IV fluids due to dehydration, tachycardia and hyponatremia.      9/1: Ms. Dow has improved significantly since admission. She is answering questions, eating well. Has weaned to 2L NC and will attempt RA. Lung sounds improved. States she feels better. Expect discharge tomorrow.    9/2 Ms Dow reports nausea this morning, will treat with zofran and monitor.  She is awake and alert, answering questions.  She is having O2 sat 98 % on 2 L NC, will continue to wean O2. Breath sounds rhonchi at times but essentially clear. Will DC concepcion and monitor output. HR still 120s despite cardizem,  this morning,Discussed with Dr. Kwok  will start IV NS @ 50 ml/hr and monitor.      9/3 NA down again.  Will inc NS to 100/hr.  Serial BMPs and trend NA.  Continue to wean down O2.  Will need close lab work monitoring.  Will give dose of IV lasix to try to start free water contraction     Interval History: NA dropping, remains on O2, continue ABX    Review of Systems   Constitutional:   Positive for appetite change.   Respiratory:  Positive for shortness of breath.    Cardiovascular:  Negative for chest pain.   Gastrointestinal:  Negative for abdominal pain, nausea and vomiting.   Neurological:  Positive for weakness.   Psychiatric/Behavioral:  Negative for agitation and confusion.    All other systems reviewed and are negative.    Objective:     Vital Signs (Most Recent):  Temp: 97.6 °F (36.4 °C) (09/03/24 1033)  Pulse: 110 (09/03/24 1034)  Resp: 18 (09/03/24 1034)  BP: 127/89 (09/03/24 1033)  SpO2: 99 % (09/03/24 1034) Vital Signs (24h Range):  Temp:  [97.3 °F (36.3 °C)-98 °F (36.7 °C)] 97.6 °F (36.4 °C)  Pulse:  [110-130] 110  Resp:  [18-26] 18  SpO2:  [94 %-99 %] 99 %  BP: (106-131)/(75-91) 127/89     Weight: 63.3 kg (139 lb 9.6 oz)  Body mass index is 21.86 kg/m².    Intake/Output Summary (Last 24 hours) at 9/3/2024 1053  Last data filed at 9/2/2024 2112  Gross per 24 hour   Intake 884.79 ml   Output --   Net 884.79 ml         Physical Exam  Constitutional:       Appearance: She is ill-appearing.   HENT:      Mouth/Throat:      Mouth: Mucous membranes are dry.   Eyes:      Extraocular Movements: Extraocular movements intact.      Pupils: Pupils are equal, round, and reactive to light.   Cardiovascular:      Rate and Rhythm: Regular rhythm. Tachycardia present.   Pulmonary:      Effort: Pulmonary effort is normal.      Breath sounds: Rhonchi present.   Abdominal:      General: Abdomen is flat.      Palpations: Abdomen is soft.      Tenderness: There is no abdominal tenderness.   Musculoskeletal:         General: Normal range of motion.      Right lower leg: No edema.      Left lower leg: No edema.   Skin:     General: Skin is warm and dry.   Neurological:      Mental Status: She is alert. Mental status is at baseline.      Motor: Weakness present.   Psychiatric:         Mood and Affect: Mood normal.             Significant Labs: All pertinent labs within the past 24 hours have been  reviewed.  Recent Lab Results  (Last 5 results in the past 24 hours)        09/03/24  0916   09/03/24  0533   09/03/24  0513   09/02/24  1947   09/02/24  1721        Albumin/Globulin Ratio   0.8             Albumin   2.8             ALP   107             ALT   123             Anion Gap   12             AST   59             Basophil %   2.3             BILIRUBIN TOTAL   0.3             BUN   22             BUN/CREAT RATIO   22             Calcium   8.4             Chloride   87             CO2   26             Creatinine   1.01             Differential Method   Auto             eGFR   53             Eos %   0.2             Globulin, Total   3.3             Glucose   197             Hematocrit   32.9             Hemoglobin   10.5             Lymph %   15.8             MCH   27.6             MCHC   31.9             MCV   86.4             Mono %   6.9             MPV   10.1             Neutrophils Relative   76.8             Platelet Count   218             POC Glucose     196   181   211       Potassium   5.2             PROTEIN TOTAL   6.1             RBC   3.81             RDW   12.4             Sodium   120             TSH 1.636               WBC   11.00                                    Significant Imaging: I have reviewed all pertinent imaging results/findings within the past 24 hours.    Assessment/Plan:      * Acute hypoxemic respiratory failure  Patient with Hypoxic Respiratory failure which is Acute.  she is not on home oxygen. Supplemental oxygen was provided and noted- Oxygen Concentration (%):  [40] 40    .   Signs/symptoms of respiratory failure include- tachypnea, increased work of breathing, respiratory distress, use of accessory muscles, and lethargy. Contributing diagnoses includes -  Covid  Labs and images were reviewed. Patient Has recent ABG, which has been reviewed. Will treat underlying causes and adjust management of respiratory failure as follows- Bipap, recheck ABG    At high risk for injury  related to fall  Fall prevention      Type 2 diabetes mellitus with hyperglycemia, without long-term current use of insulin  Scheduled FSG  Sliding scale insulin      Hyponatremia  Increased NS to 100, BMP Q4 and monitor NA levels. I/O shows neg net gain.  Will increase IVFs      VTE Risk Mitigation (From admission, onward)           Ordered     enoxaparin injection 40 mg  Daily         08/31/24 0313     IP VTE HIGH RISK PATIENT  Once         08/31/24 0313     Place MEHREEN hose  Until discontinued         08/31/24 0313                    Discharge Planning   JUANPABLO:      Code Status: DNR   Is the patient medically ready for discharge?:     Reason for patient still in hospital (select all that apply): Patient trending condition, Laboratory test, and Treatment                     Darinel Kwok DO  Department of Hospital Medicine   [facility]

## 2024-09-03 NOTE — PLAN OF CARE
Delaney at [facility]- Al updated on plan of care. Plan to administer IV fluids and monitor Na levels. Possible dc tomorrow.

## 2024-09-03 NOTE — PLAN OF CARE
Problem: Infection  Goal: Absence of Infection Signs and Symptoms  Outcome: Progressing     Problem: Adult Inpatient Plan of Care  Goal: Plan of Care Review  Outcome: Progressing  Goal: Patient-Specific Goal (Individualized)  Outcome: Progressing  Goal: Absence of Hospital-Acquired Illness or Injury  Outcome: Progressing  Goal: Optimal Comfort and Wellbeing  Outcome: Progressing  Goal: Readiness for Transition of Care  Outcome: Progressing     Problem: Diabetes Comorbidity  Goal: Blood Glucose Level Within Targeted Range  Outcome: Progressing     Problem: Breathing Pattern Ineffective  Goal: Effective Breathing Pattern  Outcome: Progressing     Problem: Fall Injury Risk  Goal: Absence of Fall and Fall-Related Injury  Outcome: Progressing     Problem: Gas Exchange Impaired  Goal: Optimal Gas Exchange  Outcome: Progressing     Problem: Skin Injury Risk Increased  Goal: Skin Health and Integrity  Outcome: Progressing

## 2024-09-03 NOTE — PROGRESS NOTES
MST 3 notification sent. Patient with no weight loss noted over the past year per chart review. Patient does not meet ASPEN guidelines for malnutrition at this time. RD available as needed.

## 2024-09-03 NOTE — SUBJECTIVE & OBJECTIVE
Interval History: NA dropping, remains on O2, continue ABX    Review of Systems   Constitutional:  Positive for appetite change.   Respiratory:  Positive for shortness of breath.    Cardiovascular:  Negative for chest pain.   Gastrointestinal:  Negative for abdominal pain, nausea and vomiting.   Neurological:  Positive for weakness.   Psychiatric/Behavioral:  Negative for agitation and confusion.    All other systems reviewed and are negative.    Objective:     Vital Signs (Most Recent):  Temp: 97.6 °F (36.4 °C) (09/03/24 1033)  Pulse: 110 (09/03/24 1034)  Resp: 18 (09/03/24 1034)  BP: 127/89 (09/03/24 1033)  SpO2: 99 % (09/03/24 1034) Vital Signs (24h Range):  Temp:  [97.3 °F (36.3 °C)-98 °F (36.7 °C)] 97.6 °F (36.4 °C)  Pulse:  [110-130] 110  Resp:  [18-26] 18  SpO2:  [94 %-99 %] 99 %  BP: (106-131)/(75-91) 127/89     Weight: 63.3 kg (139 lb 9.6 oz)  Body mass index is 21.86 kg/m².    Intake/Output Summary (Last 24 hours) at 9/3/2024 1053  Last data filed at 9/2/2024 2112  Gross per 24 hour   Intake 884.79 ml   Output --   Net 884.79 ml         Physical Exam  Constitutional:       Appearance: She is ill-appearing.   HENT:      Mouth/Throat:      Mouth: Mucous membranes are dry.   Eyes:      Extraocular Movements: Extraocular movements intact.      Pupils: Pupils are equal, round, and reactive to light.   Cardiovascular:      Rate and Rhythm: Regular rhythm. Tachycardia present.   Pulmonary:      Effort: Pulmonary effort is normal.      Breath sounds: Rhonchi present.   Abdominal:      General: Abdomen is flat.      Palpations: Abdomen is soft.      Tenderness: There is no abdominal tenderness.   Musculoskeletal:         General: Normal range of motion.      Right lower leg: No edema.      Left lower leg: No edema.   Skin:     General: Skin is warm and dry.   Neurological:      Mental Status: She is alert. Mental status is at baseline.      Motor: Weakness present.   Psychiatric:         Mood and Affect: Mood  normal.             Significant Labs: All pertinent labs within the past 24 hours have been reviewed.  Recent Lab Results  (Last 5 results in the past 24 hours)        09/03/24  0916   09/03/24  0533   09/03/24  0513   09/02/24  1947   09/02/24  1721        Albumin/Globulin Ratio   0.8             Albumin   2.8             ALP   107             ALT   123             Anion Gap   12             AST   59             Basophil %   2.3             BILIRUBIN TOTAL   0.3             BUN   22             BUN/CREAT RATIO   22             Calcium   8.4             Chloride   87             CO2   26             Creatinine   1.01             Differential Method   Auto             eGFR   53             Eos %   0.2             Globulin, Total   3.3             Glucose   197             Hematocrit   32.9             Hemoglobin   10.5             Lymph %   15.8             MCH   27.6             MCHC   31.9             MCV   86.4             Mono %   6.9             MPV   10.1             Neutrophils Relative   76.8             Platelet Count   218             POC Glucose     196   181   211       Potassium   5.2             PROTEIN TOTAL   6.1             RBC   3.81             RDW   12.4             Sodium   120             TSH 1.636               WBC   11.00                                    Significant Imaging: I have reviewed all pertinent imaging results/findings within the past 24 hours.

## 2024-09-03 NOTE — NURSING
Patient's cardiac and continuous O2 monitor began alarming. Upon entering patient's room, bed alarm began to sound, and witnessed patient attempting to exit bed. Caught patient prior to her complete collapse onto the floor, and sounded for assistance from the nearest nurse. We proceeded to check for any possible injuries, re-establish patient's connection to heart and oxygen monitoring system, ensured bed alarm was set to loudest volume at most sensitive level, and assist patient into a comfortable position. Attempted to obtain patient's compliance with calling for assistance for any mobility needs. Patient is resting comfortably with eyes closed, respirations are even and unlabored, No signs of distress noted at this time. Will continue to monitor.

## 2024-09-03 NOTE — PLAN OF CARE
Problem: Infection  Goal: Absence of Infection Signs and Symptoms  Outcome: Progressing     Problem: Adult Inpatient Plan of Care  Goal: Plan of Care Review  Outcome: Progressing  Goal: Patient-Specific Goal (Individualized)  Outcome: Progressing  Goal: Absence of Hospital-Acquired Illness or Injury  Outcome: Progressing  Goal: Optimal Comfort and Wellbeing  Outcome: Progressing  Goal: Readiness for Transition of Care  Outcome: Progressing     Problem: Diabetes Comorbidity  Goal: Blood Glucose Level Within Targeted Range  Outcome: Progressing     Problem: Fall Injury Risk  Goal: Absence of Fall and Fall-Related Injury  Outcome: Progressing     Problem: Skin Injury Risk Increased  Goal: Skin Health and Integrity  Outcome: Progressing

## 2024-09-04 ENCOUNTER — HOSPITAL ENCOUNTER (INPATIENT)
Facility: HOSPITAL | Age: 89
LOS: 6 days | Discharge: NURSING, REHAB OR LONG-TERM CARE | DRG: 177 | End: 2024-09-10
Attending: HOSPITALIST | Admitting: HOSPITALIST
Payer: MEDICARE

## 2024-09-04 VITALS
HEART RATE: 129 BPM | HEIGHT: 67 IN | SYSTOLIC BLOOD PRESSURE: 128 MMHG | RESPIRATION RATE: 20 BRPM | BODY MASS INDEX: 22.44 KG/M2 | OXYGEN SATURATION: 98 % | WEIGHT: 143 LBS | DIASTOLIC BLOOD PRESSURE: 84 MMHG | TEMPERATURE: 97 F

## 2024-09-04 DIAGNOSIS — E87.1 HYPONATREMIA: Primary | ICD-10-CM

## 2024-09-04 DIAGNOSIS — J96.01 ACUTE RESPIRATORY FAILURE WITH HYPOXIA: ICD-10-CM

## 2024-09-04 PROBLEM — N30.01 ACUTE CYSTITIS WITH HEMATURIA: Status: RESOLVED | Noted: 2023-05-09 | Resolved: 2024-09-04

## 2024-09-04 LAB
ALBUMIN SERPL BCP-MCNC: 2.9 G/DL (ref 3.5–5)
ALBUMIN/GLOB SERPL: 0.9 {RATIO}
ALP SERPL-CCNC: 112 U/L (ref 55–142)
ALT SERPL W P-5'-P-CCNC: 125 U/L (ref 13–56)
ANION GAP SERPL CALCULATED.3IONS-SCNC: 11 MMOL/L (ref 7–16)
ANION GAP SERPL CALCULATED.3IONS-SCNC: 12 MMOL/L (ref 7–16)
ANION GAP SERPL CALCULATED.3IONS-SCNC: 14 MMOL/L (ref 7–16)
AST SERPL W P-5'-P-CCNC: 53 U/L (ref 15–37)
BASOPHILS # BLD AUTO: 0.01 K/UL (ref 0–0.2)
BASOPHILS NFR BLD AUTO: 0.1 % (ref 0–1)
BILIRUB SERPL-MCNC: 0.4 MG/DL (ref ?–1.2)
BUN SERPL-MCNC: 22 MG/DL (ref 7–18)
BUN SERPL-MCNC: 26 MG/DL (ref 7–18)
BUN SERPL-MCNC: 26 MG/DL (ref 7–18)
BUN/CREAT SERPL: 24 (ref 6–20)
CALCIUM SERPL-MCNC: 8.2 MG/DL (ref 8.5–10.1)
CALCIUM SERPL-MCNC: 8.4 MG/DL (ref 8.5–10.1)
CALCIUM SERPL-MCNC: 8.6 MG/DL (ref 8.5–10.1)
CHLORIDE SERPL-SCNC: 86 MMOL/L (ref 98–107)
CO2 SERPL-SCNC: 24 MMOL/L (ref 21–32)
CO2 SERPL-SCNC: 25 MMOL/L (ref 21–32)
CO2 SERPL-SCNC: 27 MMOL/L (ref 21–32)
CREAT SERPL-MCNC: 0.92 MG/DL (ref 0.55–1.02)
CREAT SERPL-MCNC: 1.08 MG/DL (ref 0.55–1.02)
CREAT SERPL-MCNC: 1.1 MG/DL (ref 0.55–1.02)
DIFFERENTIAL METHOD BLD: ABNORMAL
EGFR (NO RACE VARIABLE) (RUSH/TITUS): 48 ML/MIN/1.73M2
EGFR (NO RACE VARIABLE) (RUSH/TITUS): 49 ML/MIN/1.73M2
EGFR (NO RACE VARIABLE) (RUSH/TITUS): 59 ML/MIN/1.73M2
EOSINOPHIL # BLD AUTO: 0 K/UL (ref 0–0.5)
EOSINOPHIL NFR BLD AUTO: 0 % (ref 1–4)
ERYTHROCYTE [DISTWIDTH] IN BLOOD BY AUTOMATED COUNT: 12.4 % (ref 11.5–14.5)
GLOBULIN SER-MCNC: 3.3 G/DL (ref 2–4)
GLUCOSE SERPL-MCNC: 185 MG/DL (ref 70–105)
GLUCOSE SERPL-MCNC: 192 MG/DL (ref 74–106)
GLUCOSE SERPL-MCNC: 277 MG/DL (ref 70–105)
GLUCOSE SERPL-MCNC: 296 MG/DL (ref 74–106)
GLUCOSE SERPL-MCNC: 299 MG/DL (ref 74–106)
GLUCOSE SERPL-MCNC: 300 MG/DL (ref 70–105)
GLUCOSE SERPL-MCNC: 304 MG/DL (ref 70–105)
HCT VFR BLD AUTO: 32 % (ref 38–47)
HGB BLD-MCNC: 10.4 G/DL (ref 12–16)
LYMPHOCYTES # BLD AUTO: 1.59 K/UL (ref 1–4.8)
LYMPHOCYTES NFR BLD AUTO: 18.5 % (ref 27–41)
MCH RBC QN AUTO: 27.7 PG (ref 27–31)
MCHC RBC AUTO-ENTMCNC: 32.5 G/DL (ref 32–36)
MCV RBC AUTO: 85.1 FL (ref 80–96)
MONOCYTES # BLD AUTO: 0.68 K/UL (ref 0–0.8)
MONOCYTES NFR BLD AUTO: 7.9 % (ref 2–6)
MPC BLD CALC-MCNC: 9.9 FL (ref 9.4–12.4)
NEUTROPHILS # BLD AUTO: 6.33 K/UL (ref 1.8–7.7)
NEUTROPHILS NFR BLD AUTO: 73.5 % (ref 53–65)
PLATELET # BLD AUTO: 254 K/UL (ref 150–400)
POTASSIUM SERPL-SCNC: 4.7 MMOL/L (ref 3.5–5.1)
POTASSIUM SERPL-SCNC: 4.7 MMOL/L (ref 3.5–5.1)
POTASSIUM SERPL-SCNC: 5 MMOL/L (ref 3.5–5.1)
PROT SERPL-MCNC: 6.2 G/DL (ref 6.4–8.2)
RBC # BLD AUTO: 3.76 M/UL (ref 4.2–5.4)
SODIUM SERPL-SCNC: 118 MMOL/L (ref 136–145)
SODIUM SERPL-SCNC: 119 MMOL/L (ref 136–145)
SODIUM SERPL-SCNC: 119 MMOL/L (ref 136–145)
WBC # BLD AUTO: 8.61 K/UL (ref 4.5–11)

## 2024-09-04 PROCEDURE — 99239 HOSP IP/OBS DSCHRG MGMT >30: CPT | Mod: ,,, | Performed by: HOSPITALIST

## 2024-09-04 PROCEDURE — 27000207 HC ISOLATION

## 2024-09-04 PROCEDURE — 85025 COMPLETE CBC W/AUTO DIFF WBC: CPT | Performed by: NURSE PRACTITIONER

## 2024-09-04 PROCEDURE — 27000958

## 2024-09-04 PROCEDURE — 25000003 PHARM REV CODE 250: Performed by: HOSPITALIST

## 2024-09-04 PROCEDURE — 36415 COLL VENOUS BLD VENIPUNCTURE: CPT | Performed by: NURSE PRACTITIONER

## 2024-09-04 PROCEDURE — 99900035 HC TECH TIME PER 15 MIN (STAT)

## 2024-09-04 PROCEDURE — 63600175 PHARM REV CODE 636 W HCPCS: Performed by: NURSE PRACTITIONER

## 2024-09-04 PROCEDURE — 82962 GLUCOSE BLOOD TEST: CPT

## 2024-09-04 PROCEDURE — 36415 COLL VENOUS BLD VENIPUNCTURE: CPT | Performed by: HOSPITALIST

## 2024-09-04 PROCEDURE — 80053 COMPREHEN METABOLIC PANEL: CPT | Performed by: NURSE PRACTITIONER

## 2024-09-04 PROCEDURE — 51702 INSERT TEMP BLADDER CATH: CPT

## 2024-09-04 PROCEDURE — 27000987 HC MATTRESS, MATRIX LOW PROFILE

## 2024-09-04 PROCEDURE — 11000004 HC SNF PRIVATE

## 2024-09-04 PROCEDURE — 63600175 PHARM REV CODE 636 W HCPCS: Performed by: HOSPITALIST

## 2024-09-04 PROCEDURE — 82962 GLUCOSE BLOOD TEST: CPT | Mod: 91

## 2024-09-04 PROCEDURE — 94761 N-INVAS EAR/PLS OXIMETRY MLT: CPT

## 2024-09-04 RX ORDER — ACETAMINOPHEN 325 MG/1
650 TABLET ORAL EVERY 6 HOURS PRN
Status: DISCONTINUED | OUTPATIENT
Start: 2024-09-04 | End: 2024-09-10 | Stop reason: HOSPADM

## 2024-09-04 RX ORDER — GABAPENTIN 100 MG/1
100 CAPSULE ORAL DAILY
Status: CANCELLED | OUTPATIENT
Start: 2024-09-04

## 2024-09-04 RX ORDER — PANTOPRAZOLE SODIUM 40 MG/1
40 TABLET, DELAYED RELEASE ORAL DAILY
Status: CANCELLED | OUTPATIENT
Start: 2024-09-05

## 2024-09-04 RX ORDER — LEVOTHYROXINE SODIUM 50 UG/1
50 TABLET ORAL
Status: DISCONTINUED | OUTPATIENT
Start: 2024-09-05 | End: 2024-09-10 | Stop reason: HOSPADM

## 2024-09-04 RX ORDER — DILTIAZEM HYDROCHLORIDE 60 MG/1
60 TABLET, FILM COATED ORAL EVERY 6 HOURS
Status: DISCONTINUED | OUTPATIENT
Start: 2024-09-04 | End: 2024-09-04 | Stop reason: HOSPADM

## 2024-09-04 RX ORDER — ALBUTEROL SULFATE 90 UG/1
2 INHALANT RESPIRATORY (INHALATION) EVERY 6 HOURS PRN
Status: CANCELLED | OUTPATIENT
Start: 2024-09-04

## 2024-09-04 RX ORDER — FUROSEMIDE 10 MG/ML
40 INJECTION INTRAMUSCULAR; INTRAVENOUS ONCE
Status: COMPLETED | OUTPATIENT
Start: 2024-09-04 | End: 2024-09-04

## 2024-09-04 RX ORDER — SERTRALINE HYDROCHLORIDE 25 MG/1
25 TABLET, FILM COATED ORAL DAILY
Status: CANCELLED | OUTPATIENT
Start: 2024-09-05

## 2024-09-04 RX ORDER — ENOXAPARIN SODIUM 100 MG/ML
40 INJECTION SUBCUTANEOUS EVERY 24 HOURS
Status: DISCONTINUED | OUTPATIENT
Start: 2024-09-04 | End: 2024-09-10 | Stop reason: HOSPADM

## 2024-09-04 RX ORDER — DULOXETIN HYDROCHLORIDE 30 MG/1
30 CAPSULE, DELAYED RELEASE ORAL DAILY
Status: DISCONTINUED | OUTPATIENT
Start: 2024-09-05 | End: 2024-09-10 | Stop reason: HOSPADM

## 2024-09-04 RX ORDER — ENOXAPARIN SODIUM 100 MG/ML
40 INJECTION SUBCUTANEOUS EVERY 24 HOURS
Status: CANCELLED | OUTPATIENT
Start: 2024-09-04

## 2024-09-04 RX ORDER — SODIUM CHLORIDE 9 MG/ML
INJECTION, SOLUTION INTRAVENOUS
Status: CANCELLED | OUTPATIENT
Start: 2024-09-04

## 2024-09-04 RX ORDER — TALC
6 POWDER (GRAM) TOPICAL NIGHTLY PRN
Status: DISCONTINUED | OUTPATIENT
Start: 2024-09-04 | End: 2024-09-10 | Stop reason: HOSPADM

## 2024-09-04 RX ORDER — AMOXICILLIN 250 MG
1 CAPSULE ORAL 2 TIMES DAILY PRN
Status: CANCELLED | OUTPATIENT
Start: 2024-09-04

## 2024-09-04 RX ORDER — GLUCAGON 1 MG
1 KIT INJECTION
Status: DISCONTINUED | OUTPATIENT
Start: 2024-09-04 | End: 2024-09-05

## 2024-09-04 RX ORDER — TALC
6 POWDER (GRAM) TOPICAL NIGHTLY PRN
Status: CANCELLED | OUTPATIENT
Start: 2024-09-04

## 2024-09-04 RX ORDER — PREDNISONE 20 MG/1
20 TABLET ORAL DAILY
Status: CANCELLED | OUTPATIENT
Start: 2024-09-05

## 2024-09-04 RX ORDER — SERTRALINE HYDROCHLORIDE 25 MG/1
25 TABLET, FILM COATED ORAL DAILY
Status: DISCONTINUED | OUTPATIENT
Start: 2024-09-05 | End: 2024-09-10 | Stop reason: HOSPADM

## 2024-09-04 RX ORDER — PREDNISONE 20 MG/1
20 TABLET ORAL DAILY
Status: DISCONTINUED | OUTPATIENT
Start: 2024-09-05 | End: 2024-09-09

## 2024-09-04 RX ORDER — INSULIN ASPART 100 [IU]/ML
0-5 INJECTION, SOLUTION INTRAVENOUS; SUBCUTANEOUS
Status: DISCONTINUED | OUTPATIENT
Start: 2024-09-04 | End: 2024-09-05

## 2024-09-04 RX ORDER — GLUCAGON 1 MG
1 KIT INJECTION
Status: CANCELLED | OUTPATIENT
Start: 2024-09-04

## 2024-09-04 RX ORDER — DILTIAZEM HYDROCHLORIDE 60 MG/1
60 TABLET, FILM COATED ORAL EVERY 6 HOURS
Status: DISCONTINUED | OUTPATIENT
Start: 2024-09-04 | End: 2024-09-10 | Stop reason: HOSPADM

## 2024-09-04 RX ORDER — ONDANSETRON HYDROCHLORIDE 2 MG/ML
4 INJECTION, SOLUTION INTRAVENOUS EVERY 8 HOURS PRN
Status: CANCELLED | OUTPATIENT
Start: 2024-09-04

## 2024-09-04 RX ORDER — FUROSEMIDE 10 MG/ML
40 INJECTION INTRAMUSCULAR; INTRAVENOUS EVERY 6 HOURS
Status: CANCELLED | OUTPATIENT
Start: 2024-09-04 | End: 2024-09-05

## 2024-09-04 RX ORDER — LEVOTHYROXINE SODIUM 50 UG/1
50 TABLET ORAL
Status: CANCELLED | OUTPATIENT
Start: 2024-09-05

## 2024-09-04 RX ORDER — ACETAMINOPHEN 325 MG/1
650 TABLET ORAL EVERY 6 HOURS PRN
Status: CANCELLED | OUTPATIENT
Start: 2024-09-04

## 2024-09-04 RX ORDER — DILTIAZEM HYDROCHLORIDE 60 MG/1
60 TABLET, FILM COATED ORAL EVERY 6 HOURS
Status: CANCELLED | OUTPATIENT
Start: 2024-09-04

## 2024-09-04 RX ORDER — FUROSEMIDE 10 MG/ML
40 INJECTION INTRAMUSCULAR; INTRAVENOUS EVERY 6 HOURS
Status: DISCONTINUED | OUTPATIENT
Start: 2024-09-04 | End: 2024-09-04 | Stop reason: HOSPADM

## 2024-09-04 RX ORDER — SODIUM CHLORIDE 0.9 % (FLUSH) 0.9 %
10 SYRINGE (ML) INJECTION
Status: CANCELLED | OUTPATIENT
Start: 2024-09-04

## 2024-09-04 RX ORDER — DULOXETIN HYDROCHLORIDE 30 MG/1
30 CAPSULE, DELAYED RELEASE ORAL DAILY
Status: CANCELLED | OUTPATIENT
Start: 2024-09-05

## 2024-09-04 RX ORDER — SODIUM CHLORIDE 9 MG/ML
INJECTION, SOLUTION INTRAVENOUS
Status: DISCONTINUED | OUTPATIENT
Start: 2024-09-04 | End: 2024-09-10 | Stop reason: HOSPADM

## 2024-09-04 RX ORDER — INSULIN ASPART 100 [IU]/ML
0-5 INJECTION, SOLUTION INTRAVENOUS; SUBCUTANEOUS
Status: CANCELLED | OUTPATIENT
Start: 2024-09-04

## 2024-09-04 RX ORDER — CALCIUM CARBONATE 200(500)MG
500 TABLET,CHEWABLE ORAL 2 TIMES DAILY PRN
Status: DISCONTINUED | OUTPATIENT
Start: 2024-09-04 | End: 2024-09-10 | Stop reason: HOSPADM

## 2024-09-04 RX ORDER — GABAPENTIN 100 MG/1
100 CAPSULE ORAL DAILY
Status: DISCONTINUED | OUTPATIENT
Start: 2024-09-04 | End: 2024-09-10 | Stop reason: HOSPADM

## 2024-09-04 RX ORDER — SODIUM CHLORIDE 0.9 % (FLUSH) 0.9 %
10 SYRINGE (ML) INJECTION
Status: DISCONTINUED | OUTPATIENT
Start: 2024-09-04 | End: 2024-09-10 | Stop reason: HOSPADM

## 2024-09-04 RX ORDER — AMOXICILLIN 250 MG
1 CAPSULE ORAL 2 TIMES DAILY PRN
Status: DISCONTINUED | OUTPATIENT
Start: 2024-09-04 | End: 2024-09-10 | Stop reason: HOSPADM

## 2024-09-04 RX ORDER — MUPIROCIN 20 MG/G
OINTMENT TOPICAL 2 TIMES DAILY
Status: ACTIVE | OUTPATIENT
Start: 2024-09-04 | End: 2024-09-05

## 2024-09-04 RX ORDER — MUPIROCIN 20 MG/G
OINTMENT TOPICAL 2 TIMES DAILY
Status: CANCELLED | OUTPATIENT
Start: 2024-09-04 | End: 2024-09-05

## 2024-09-04 RX ORDER — PANTOPRAZOLE SODIUM 40 MG/1
40 TABLET, DELAYED RELEASE ORAL DAILY
Status: DISCONTINUED | OUTPATIENT
Start: 2024-09-05 | End: 2024-09-10 | Stop reason: HOSPADM

## 2024-09-04 RX ORDER — ONDANSETRON HYDROCHLORIDE 2 MG/ML
4 INJECTION, SOLUTION INTRAVENOUS EVERY 8 HOURS PRN
Status: DISCONTINUED | OUTPATIENT
Start: 2024-09-04 | End: 2024-09-10 | Stop reason: HOSPADM

## 2024-09-04 RX ORDER — ALBUTEROL SULFATE 90 UG/1
2 INHALANT RESPIRATORY (INHALATION) EVERY 6 HOURS PRN
Status: DISCONTINUED | OUTPATIENT
Start: 2024-09-04 | End: 2024-09-10 | Stop reason: HOSPADM

## 2024-09-04 RX ORDER — FUROSEMIDE 10 MG/ML
40 INJECTION INTRAMUSCULAR; INTRAVENOUS EVERY 6 HOURS
Status: COMPLETED | OUTPATIENT
Start: 2024-09-04 | End: 2024-09-05

## 2024-09-04 RX ORDER — CALCIUM CARBONATE 200(500)MG
500 TABLET,CHEWABLE ORAL 2 TIMES DAILY PRN
Status: CANCELLED | OUTPATIENT
Start: 2024-09-04

## 2024-09-04 RX ADMIN — FUROSEMIDE 40 MG: 10 INJECTION, SOLUTION INTRAMUSCULAR; INTRAVENOUS at 09:09

## 2024-09-04 RX ADMIN — SODIUM CHLORIDE: 9 INJECTION, SOLUTION INTRAVENOUS at 09:09

## 2024-09-04 RX ADMIN — PREDNISONE 20 MG: 20 TABLET ORAL at 09:09

## 2024-09-04 RX ADMIN — GABAPENTIN 100 MG: 100 CAPSULE ORAL at 05:09

## 2024-09-04 RX ADMIN — FUROSEMIDE 40 MG: 10 INJECTION, SOLUTION INTRAMUSCULAR; INTRAVENOUS at 05:09

## 2024-09-04 RX ADMIN — SERTRALINE HYDROCHLORIDE 25 MG: 25 TABLET ORAL at 09:09

## 2024-09-04 RX ADMIN — CEFTRIAXONE SODIUM 1 G: 1 INJECTION, POWDER, FOR SOLUTION INTRAMUSCULAR; INTRAVENOUS at 09:09

## 2024-09-04 RX ADMIN — INSULIN ASPART 1 UNITS: 100 INJECTION, SOLUTION INTRAVENOUS; SUBCUTANEOUS at 09:09

## 2024-09-04 RX ADMIN — FUROSEMIDE 40 MG: 10 INJECTION, SOLUTION INTRAMUSCULAR; INTRAVENOUS at 11:09

## 2024-09-04 RX ADMIN — INSULIN ASPART 3 UNITS: 100 INJECTION, SOLUTION INTRAVENOUS; SUBCUTANEOUS at 04:09

## 2024-09-04 RX ADMIN — DILTIAZEM HYDROCHLORIDE 60 MG: 60 TABLET, FILM COATED ORAL at 11:09

## 2024-09-04 RX ADMIN — DILTIAZEM HYDROCHLORIDE 60 MG: 60 TABLET, FILM COATED ORAL at 05:09

## 2024-09-04 RX ADMIN — PANTOPRAZOLE SODIUM 40 MG: 40 TABLET, DELAYED RELEASE ORAL at 09:09

## 2024-09-04 RX ADMIN — DULOXETINE HYDROCHLORIDE 30 MG: 30 CAPSULE, DELAYED RELEASE ORAL at 09:09

## 2024-09-04 RX ADMIN — LEVOTHYROXINE SODIUM 50 MCG: 0.05 TABLET ORAL at 05:09

## 2024-09-04 RX ADMIN — MUPIROCIN: 20 OINTMENT TOPICAL at 09:09

## 2024-09-04 RX ADMIN — FUROSEMIDE 40 MG: 10 INJECTION, SOLUTION INTRAMUSCULAR; INTRAVENOUS at 01:09

## 2024-09-04 RX ADMIN — INSULIN ASPART 4 UNITS: 100 INJECTION, SOLUTION INTRAVENOUS; SUBCUTANEOUS at 11:09

## 2024-09-04 RX ADMIN — ENOXAPARIN SODIUM 40 MG: 40 INJECTION SUBCUTANEOUS at 05:09

## 2024-09-04 RX ADMIN — SODIUM CHLORIDE: 9 INJECTION, SOLUTION INTRAVENOUS at 03:09

## 2024-09-04 RX ADMIN — AZITHROMYCIN MONOHYDRATE 500 MG: 500 INJECTION, POWDER, LYOPHILIZED, FOR SOLUTION INTRAVENOUS at 11:09

## 2024-09-04 NOTE — DISCHARGE SUMMARY
[facility] Medicine  Discharge Summary      Patient Name: Joyce Dow  MRN: [id number]  GUY: [id number]  Patient Class: IP- Inpatient  Admission Date: 8/30/2024  Hospital Length of Stay: 4 days  Discharge Date and Time:  09/04/2024 4:02 PM  Attending Physician: Darinel Kwok DO   Discharging Provider: Darniel Kwok DO  Primary Care Provider: Walker Watkins MD    Primary Care Team: Networked reference to record PCT     HPI:   89 yo WF admitted from ED for acute respiratory distress, Covid, UTI. Patient on Bipap with some improvement.     History:   GERD  HLD  HTN  Hyponatremia  T2DM  Thyroid disease  Dementia  Depression  GERD      DNR    * No surgery found *      Hospital Course:   8/31 0730 ABGs improving, No increased work of breathing, sat 97-98 on Bipap, changed to NRB on 80% O2 will titrate to maintain sat >92%. Will keep Bipap for use at night if needed.  Discussed pt status and POC with Dr. Kwok, Irwin'd order for Remdesivir, added pulmicort to treatment regiment and give 500 ml IV fluids due to dehydration, tachycardia and hyponatremia.      9/1: Ms. Dow has improved significantly since admission. She is answering questions, eating well. Has weaned to 2L NC and will attempt RA. Lung sounds improved. States she feels better. Expect discharge tomorrow.    9/2 Ms Dow reports nausea this morning, will treat with zofran and monitor.  She is awake and alert, answering questions.  She is having O2 sat 98 % on 2 L NC, will continue to wean O2. Breath sounds rhonchi at times but essentially clear. Will DC concepcion and monitor output. HR still 120s despite cardizem,  this morning,Discussed with Dr. Kwok  will start IV NS @ 50 ml/hr and monitor.      9/3 NA down again.  Will inc NS to 100/hr.  Serial BMPs and trend NA.  Continue to wean down O2.  Will need close lab work monitoring.  Will give dose of IV lasix to try to start free water  contraction     9/4 Seems to have gone wrong way with NA.  Will place on Fluid restriction and stop IVFs, sched lasix and place concepcion for I/O.  If trends up can go back to NH on fluid restriction and they can follow labs.     DC to [facility] today for lab monitoring      Goals of Care Treatment Preferences:  Code Status: DNR      SDOH Screening:  The patient was screened for utility difficulties, food insecurity, transport difficulties, housing insecurity, and interpersonal safety and there were no concerns identified this admission.     Consults:     No new Assessment & Plan notes have been filed under this hospital service since the last note was generated.  Service: Hospital Medicine    Final Active Diagnoses:    Diagnosis Date Noted POA    PRINCIPAL PROBLEM:  Acute hypoxemic respiratory failure [J96.01] 08/31/2024 Yes    Type 2 diabetes mellitus with hyperglycemia, without long-term current use of insulin [E11.65] 08/31/2024 Yes    At high risk for injury related to fall [Z91.81] 08/31/2024 Yes     Chronic    Hyponatremia [E87.1] 05/09/2023 Yes     Chronic      Problems Resolved During this Admission:    Diagnosis Date Noted Date Resolved POA    Acute cystitis with hematuria [N30.01] 05/09/2023 09/04/2024 Yes       Discharged Condition: good    Disposition:     Follow Up:    Patient Instructions:   No discharge procedures on file.    Significant Diagnostic Studies: Labs: BMP:   Recent Labs   Lab 09/03/24  1639 09/04/24  0514 09/04/24  1153   * 192* 296*   * 119* 118*   K 5.1 5.0 4.7   CL 87* 86* 86*   CO2 24 24 25   BUN 22* 22* 26*   CREATININE 0.95 0.92 1.08*   CALCIUM 8.4* 8.6 8.2*       Pending Diagnostic Studies:       Procedure Component Value Units Date/Time    Basic Metabolic Panel [[id number]]     Order Status: Sent Lab Status: No result     Specimen: Blood            Medications:  Transfer Medications (for Discharge Readmit only):   Current Facility-Administered Medications   Medication Dose  Route Frequency Provider Last Rate Last Admin    0.9%  NaCl infusion   Intravenous PRN Darinel Kwok DO 30 mL/hr at 09/01/24 2256 30 mL at 09/01/24 2256    acetaminophen suppository 650 mg  650 mg Rectal Q8H PRN Bertha Cunningham FNP   650 mg at 08/31/24 0930    albuterol inhaler 2 puff  2 puff Inhalation Q6H PRN Bertha Cunningham FNP   2 puff at 09/03/24 1034    azithromycin (ZITHROMAX) 500 mg in D5W 250 mL  IVPB (admixture device)  500 mg Intravenous Q24H Bertha Cunningham FNP   Stopped at 09/03/24 2302    cefTRIAXone (Rocephin) 1 g in D5W 100 mL IVPB (MB+)  1 g Intravenous Q24H Bertha Cunningham FNP   Stopped at 09/03/24 2152    dextrose 50% injection 12.5 g  12.5 g Intravenous PRN Bertha Cunningham FNP        diltiaZEM tablet 60 mg  60 mg Oral Q6H Darinel Kwok DO   60 mg at 09/04/24 1136    DULoxetine DR capsule 30 mg  30 mg Oral Daily Darinel Kwok DO   30 mg at 09/04/24 0920    enoxaparin injection 40 mg  40 mg Subcutaneous Daily Bertha Cunningham FNP   40 mg at 09/03/24 1738    furosemide injection 40 mg  40 mg Intravenous Q6H Dairnel Kwok DO   40 mg at 09/04/24 1315    gabapentin capsule 100 mg  100 mg Oral Daily Darinel Kwok DO   100 mg at 09/03/24 1738    glucagon (human recombinant) injection 1 mg  1 mg Intramuscular PRN Bertha Cunningham FNP        insulin aspart U-100 injection 0-5 Units  0-5 Units Subcutaneous QID (AC & HS) Chrissy Dash FNP   4 Units at 09/04/24 1136    levothyroxine tablet 50 mcg  50 mcg Oral Before breakfast Darinel Kwok DO   50 mcg at 09/04/24 0527    mupirocin 2 % ointment   Nasal BID Darinel Kwok DO   Given at 09/04/24 0925    ondansetron injection 4 mg  4 mg Intravenous Q8H PRN Bertha Cunningham FNP   4 mg at 09/02/24 0740    pantoprazole EC tablet 40 mg  40 mg Oral Daily Darinel Kwok DO   40 mg at 09/04/24 0920    predniSONE tablet 20 mg  20 mg Oral Daily Darinel Kwok DO   20 mg at 09/04/24 0920    sertraline  tablet 25 mg  25 mg Oral Daily Darinel Kwok DO   25 mg at 09/04/24 0920    sodium chloride 0.9% flush 10 mL  10 mL Intravenous PRN Bertha Cunningham FNP           Indwelling Lines/Drains at time of discharge:   Lines/Drains/Airways       Drain  Duration                  Urethral Catheter 09/04/24 1426 Non-latex 18 Fr. <1 day                    Time spent on the discharge of patient: 32 minutes         Darinel Kwok DO  Department of Hospital Medicine  [facility]

## 2024-09-04 NOTE — ASSESSMENT & PLAN NOTE
Went wrong direction with IVFs.  Will fluid restrict, stop IVFs, sched lasix, place concepcion follow labs

## 2024-09-04 NOTE — PROGRESS NOTES
[facility] Medicine  Progress Note    Patient Name: Joyce Dow  MRN: [id number]  Patient Class: IP- Inpatient   Admission Date: 8/30/2024  Length of Stay: 4 days  Attending Physician: Darinel Kwok DO  Primary Care Provider: Walker Watkins MD        Subjective:     Principal Problem:Acute hypoxemic respiratory failure        HPI:  89 yo WF admitted from ED for acute respiratory distress, Covid, UTI. Patient on Bipap with some improvement.     History:   GERD  HLD  HTN  Hyponatremia  T2DM  Thyroid disease  Dementia  Depression  GERD      DNR    Overview/Hospital Course:  8/31 0730 ABGs improving, No increased work of breathing, sat 97-98 on Bipap, changed to NRB on 80% O2 will titrate to maintain sat >92%. Will keep Bipap for use at night if needed.  Discussed pt status and POC with Dr. Kwok, Irwin'd order for Remdesivir, added pulmicort to treatment regiment and give 500 ml IV fluids due to dehydration, tachycardia and hyponatremia.      9/1: Ms. Dow has improved significantly since admission. She is answering questions, eating well. Has weaned to 2L NC and will attempt RA. Lung sounds improved. States she feels better. Expect discharge tomorrow.    9/2 Ms Dow reports nausea this morning, will treat with zofran and monitor.  She is awake and alert, answering questions.  She is having O2 sat 98 % on 2 L NC, will continue to wean O2. Breath sounds rhonchi at times but essentially clear. Will DC concepcion and monitor output. HR still 120s despite cardizem,  this morning,Discussed with Dr. Kwok  will start IV NS @ 50 ml/hr and monitor.      9/3 NA down again.  Will inc NS to 100/hr.  Serial BMPs and trend NA.  Continue to wean down O2.  Will need close lab work monitoring.  Will give dose of IV lasix to try to start free water contraction     9/4 Seems to have gone wrong way with NA.  Will place on Fluid restriction and stop IVFs, sched lasix and  place concepcion for I/O.  If trends up can go back to NH on fluid restriction and they can follow labs.     Interval History: stop IVFs, place concepcion, fluid restrict, IV lasix and follow labs     Review of Systems   Respiratory:  Negative for shortness of breath.    Cardiovascular:  Negative for chest pain.   Gastrointestinal:  Negative for abdominal pain, nausea and vomiting.   Musculoskeletal:  Positive for arthralgias.   Neurological:  Positive for weakness.   Psychiatric/Behavioral:  Negative for agitation and confusion.    All other systems reviewed and are negative.    Objective:     Vital Signs (Most Recent):  Temp: 97.8 °F (36.6 °C) (09/04/24 0729)  Pulse: (!) 112 (09/04/24 0800)  Resp: 20 (09/04/24 0800)  BP: (!) 141/87 (09/04/24 0729)  SpO2: 98 % (09/04/24 0800) Vital Signs (24h Range):  Temp:  [96.6 °F (35.9 °C)-97.8 °F (36.6 °C)] 97.8 °F (36.6 °C)  Pulse:  [104-128] 112  Resp:  [18-24] 20  SpO2:  [97 %-99 %] 98 %  BP: (106-141)/(64-87) 141/87     Weight: 64.9 kg (143 lb)  Body mass index is 22.4 kg/m².    Intake/Output Summary (Last 24 hours) at 9/4/2024 1101  Last data filed at 9/3/2024 1847  Gross per 24 hour   Intake 480 ml   Output --   Net 480 ml         Physical Exam  Constitutional:       Appearance: She is ill-appearing.   HENT:      Mouth/Throat:      Mouth: Mucous membranes are dry.   Eyes:      Extraocular Movements: Extraocular movements intact.      Pupils: Pupils are equal, round, and reactive to light.   Cardiovascular:      Rate and Rhythm: Regular rhythm. Tachycardia present.   Pulmonary:      Effort: Pulmonary effort is normal.      Breath sounds: Rhonchi present.   Abdominal:      General: Abdomen is flat.      Palpations: Abdomen is soft.      Tenderness: There is no abdominal tenderness.   Musculoskeletal:         General: Normal range of motion.      Right lower leg: No edema.      Left lower leg: No edema.   Skin:     General: Skin is warm and dry.   Neurological:      Mental Status:  She is alert. Mental status is at baseline.      Motor: Weakness present.   Psychiatric:         Mood and Affect: Mood normal.             Significant Labs: All pertinent labs within the past 24 hours have been reviewed.  Recent Lab Results  (Last 5 results in the past 24 hours)        09/04/24  0530   09/04/24  0514   09/03/24  2104   09/03/24  1721   09/03/24  1639        Albumin/Globulin Ratio   0.9             Albumin   2.9             ALP   112             ALT   125             Anion Gap   14       12       AST   53             Baso #   0.01             Basophil %   0.1             BILIRUBIN TOTAL   0.4             BUN   22       22       BUN/CREAT RATIO   24       23       Calcium   8.6       8.4       Chloride   86       87       CO2   24       24       Creatinine   0.92       0.95       Differential Method   Manual             eGFR   59       57       Eos #   0.00             Eos %   0.0             Globulin, Total   3.3             Glucose   192       244       Hematocrit   32.0             Hemoglobin   10.4             Lymph #   1.59             Lymph %   18.5             MCH   27.7             MCHC   32.5             MCV   85.1             Mono #   0.68             Mono %   7.9             MPV   9.9             Neutrophils, Abs   6.33             Neutrophils Relative   73.5             Platelet Count   254             POC Glucose 185     209   249         Potassium   5.0       5.1       PROTEIN TOTAL   6.2             RBC   3.76             RDW   12.4             Sodium   119       118       WBC   8.61                                    Significant Imaging: I have reviewed all pertinent imaging results/findings within the past 24 hours.    Assessment/Plan:      * Acute hypoxemic respiratory failure  Patient with Hypoxic Respiratory failure which is Acute.  she is not on home oxygen. Supplemental oxygen was provided and noted- Oxygen Concentration (%):  [40] 40    .   Signs/symptoms of respiratory failure  include- tachypnea, increased work of breathing, respiratory distress, use of accessory muscles, and lethargy. Contributing diagnoses includes -  Covid  Labs and images were reviewed. Patient Has recent ABG, which has been reviewed. Will treat underlying causes and adjust management of respiratory failure as follows- Bipap, recheck ABG    At high risk for injury related to fall  Fall prevention      Type 2 diabetes mellitus with hyperglycemia, without long-term current use of insulin  Scheduled FSG  Sliding scale insulin      Hyponatremia  Went wrong direction with IVFs.  Will fluid restrict, stop IVFs, sched lasix, place concepcion follow labs       VTE Risk Mitigation (From admission, onward)           Ordered     enoxaparin injection 40 mg  Daily         08/31/24 0313     IP VTE HIGH RISK PATIENT  Once         08/31/24 0313     Place MEHREEN hose  Until discontinued         08/31/24 0313                    Discharge Planning   JUANPABLO: 9/3/2024     Code Status: DNR   Is the patient medically ready for discharge?:     Reason for patient still in hospital (select all that apply): Patient trending condition and Laboratory test  Discharge Plan A: Return to nursing home                  Darinel Kwok DO  Department of Hospital Medicine   [facility]

## 2024-09-04 NOTE — PLAN OF CARE
Updates sent to [facility]. I explained plan of care to Delaney and they have agreed to let patient stay and be transferred to swingbed status. Dr Kwok aware.

## 2024-09-04 NOTE — PLAN OF CARE
[facility]  Discharge Final Note    Primary Care Provider: Walker Watkins MD    Expected Discharge Date: 9/3/2024    Final Discharge Note (most recent)       Final Note - 09/04/24 1419          Final Note    Assessment Type Final Discharge Note     Anticipated Discharge Disposition Swing Bed        Post-Acute Status    Post-Acute Authorization Placement     Post-Acute Placement Status Set-up Complete/Auth obtained     Patient choice form signed by patient/caregiver List from CMS Compare;List from System Post-Acute Care;List with quality metrics by geographic area provided     Discharge Delays None known at this time                     Important Message from Medicare  Important Message from Medicare regarding Discharge Appeal Rights: Given to patient/caregiver, Explained to patient/caregiver, Signed/date by patient/caregiver     Date IMM was signed: 09/04/24  Time IMM was signed: 1419      Ms. Dow to dc to swingbed status to continue managing care.

## 2024-09-04 NOTE — SUBJECTIVE & OBJECTIVE
Interval History: stop IVFs, place concepcion, fluid restrict, IV lasix and follow labs     Review of Systems   Respiratory:  Negative for shortness of breath.    Cardiovascular:  Negative for chest pain.   Gastrointestinal:  Negative for abdominal pain, nausea and vomiting.   Musculoskeletal:  Positive for arthralgias.   Neurological:  Positive for weakness.   Psychiatric/Behavioral:  Negative for agitation and confusion.    All other systems reviewed and are negative.    Objective:     Vital Signs (Most Recent):  Temp: 97.8 °F (36.6 °C) (09/04/24 0729)  Pulse: (!) 112 (09/04/24 0800)  Resp: 20 (09/04/24 0800)  BP: (!) 141/87 (09/04/24 0729)  SpO2: 98 % (09/04/24 0800) Vital Signs (24h Range):  Temp:  [96.6 °F (35.9 °C)-97.8 °F (36.6 °C)] 97.8 °F (36.6 °C)  Pulse:  [104-128] 112  Resp:  [18-24] 20  SpO2:  [97 %-99 %] 98 %  BP: (106-141)/(64-87) 141/87     Weight: 64.9 kg (143 lb)  Body mass index is 22.4 kg/m².    Intake/Output Summary (Last 24 hours) at 9/4/2024 1101  Last data filed at 9/3/2024 1847  Gross per 24 hour   Intake 480 ml   Output --   Net 480 ml         Physical Exam  Constitutional:       Appearance: She is ill-appearing.   HENT:      Mouth/Throat:      Mouth: Mucous membranes are dry.   Eyes:      Extraocular Movements: Extraocular movements intact.      Pupils: Pupils are equal, round, and reactive to light.   Cardiovascular:      Rate and Rhythm: Regular rhythm. Tachycardia present.   Pulmonary:      Effort: Pulmonary effort is normal.      Breath sounds: Rhonchi present.   Abdominal:      General: Abdomen is flat.      Palpations: Abdomen is soft.      Tenderness: There is no abdominal tenderness.   Musculoskeletal:         General: Normal range of motion.      Right lower leg: No edema.      Left lower leg: No edema.   Skin:     General: Skin is warm and dry.   Neurological:      Mental Status: She is alert. Mental status is at baseline.      Motor: Weakness present.   Psychiatric:         Mood and  Affect: Mood normal.             Significant Labs: All pertinent labs within the past 24 hours have been reviewed.  Recent Lab Results  (Last 5 results in the past 24 hours)        09/04/24  0530   09/04/24  0514   09/03/24  2104   09/03/24  1721   09/03/24  1639        Albumin/Globulin Ratio   0.9             Albumin   2.9             ALP   112             ALT   125             Anion Gap   14       12       AST   53             Baso #   0.01             Basophil %   0.1             BILIRUBIN TOTAL   0.4             BUN   22       22       BUN/CREAT RATIO   24       23       Calcium   8.6       8.4       Chloride   86       87       CO2   24       24       Creatinine   0.92       0.95       Differential Method   Manual             eGFR   59       57       Eos #   0.00             Eos %   0.0             Globulin, Total   3.3             Glucose   192       244       Hematocrit   32.0             Hemoglobin   10.4             Lymph #   1.59             Lymph %   18.5             MCH   27.7             MCHC   32.5             MCV   85.1             Mono #   0.68             Mono %   7.9             MPV   9.9             Neutrophils, Abs   6.33             Neutrophils Relative   73.5             Platelet Count   254             POC Glucose 185     209   249         Potassium   5.0       5.1       PROTEIN TOTAL   6.2             RBC   3.76             RDW   12.4             Sodium   119       118       WBC   8.61                                    Significant Imaging: I have reviewed all pertinent imaging results/findings within the past 24 hours.

## 2024-09-05 LAB
ANION GAP SERPL CALCULATED.3IONS-SCNC: 7 MMOL/L (ref 7–16)
ANION GAP SERPL CALCULATED.3IONS-SCNC: 7 MMOL/L (ref 7–16)
BUN SERPL-MCNC: 23 MG/DL (ref 7–18)
BUN SERPL-MCNC: 23 MG/DL (ref 7–18)
BUN/CREAT SERPL: 21 (ref 6–20)
BUN/CREAT SERPL: 23 (ref 6–20)
CALCIUM SERPL-MCNC: 8.5 MG/DL (ref 8.5–10.1)
CALCIUM SERPL-MCNC: 8.6 MG/DL (ref 8.5–10.1)
CHLORIDE SERPL-SCNC: 81 MMOL/L (ref 98–107)
CHLORIDE SERPL-SCNC: 83 MMOL/L (ref 98–107)
CO2 SERPL-SCNC: 35 MMOL/L (ref 21–32)
CO2 SERPL-SCNC: 37 MMOL/L (ref 21–32)
CREAT SERPL-MCNC: 0.98 MG/DL (ref 0.55–1.02)
CREAT SERPL-MCNC: 1.11 MG/DL (ref 0.55–1.02)
EGFR (NO RACE VARIABLE) (RUSH/TITUS): 47 ML/MIN/1.73M2
EGFR (NO RACE VARIABLE) (RUSH/TITUS): 55 ML/MIN/1.73M2
GLUCOSE SERPL-MCNC: 173 MG/DL (ref 70–105)
GLUCOSE SERPL-MCNC: 198 MG/DL (ref 74–106)
GLUCOSE SERPL-MCNC: 203 MG/DL (ref 74–106)
GLUCOSE SERPL-MCNC: 260 MG/DL (ref 70–105)
GLUCOSE SERPL-MCNC: 276 MG/DL (ref 70–105)
GLUCOSE SERPL-MCNC: 322 MG/DL (ref 70–105)
POTASSIUM SERPL-SCNC: 3.4 MMOL/L (ref 3.5–5.1)
POTASSIUM SERPL-SCNC: 3.6 MMOL/L (ref 3.5–5.1)
SODIUM SERPL-SCNC: 121 MMOL/L (ref 136–145)
SODIUM SERPL-SCNC: 122 MMOL/L (ref 136–145)

## 2024-09-05 PROCEDURE — 80048 BASIC METABOLIC PNL TOTAL CA: CPT | Performed by: HOSPITALIST

## 2024-09-05 PROCEDURE — 27000207 HC ISOLATION

## 2024-09-05 PROCEDURE — 11000004 HC SNF PRIVATE

## 2024-09-05 PROCEDURE — 27000221 HC OXYGEN, UP TO 24 HOURS

## 2024-09-05 PROCEDURE — 99305 1ST NF CARE MODERATE MDM 35: CPT | Mod: AI,,, | Performed by: HOSPITALIST

## 2024-09-05 PROCEDURE — 27000958

## 2024-09-05 PROCEDURE — 63600175 PHARM REV CODE 636 W HCPCS: Performed by: NURSE PRACTITIONER

## 2024-09-05 PROCEDURE — 94761 N-INVAS EAR/PLS OXIMETRY MLT: CPT

## 2024-09-05 PROCEDURE — 63600175 PHARM REV CODE 636 W HCPCS: Performed by: HOSPITALIST

## 2024-09-05 PROCEDURE — 25000003 PHARM REV CODE 250: Performed by: HOSPITALIST

## 2024-09-05 PROCEDURE — 82962 GLUCOSE BLOOD TEST: CPT

## 2024-09-05 PROCEDURE — 99900035 HC TECH TIME PER 15 MIN (STAT)

## 2024-09-05 PROCEDURE — 27000987 HC MATTRESS, MATRIX LOW PROFILE

## 2024-09-05 PROCEDURE — 36415 COLL VENOUS BLD VENIPUNCTURE: CPT | Performed by: HOSPITALIST

## 2024-09-05 RX ORDER — DOXYCYCLINE HYCLATE 100 MG
100 TABLET ORAL EVERY 12 HOURS
Status: DISCONTINUED | OUTPATIENT
Start: 2024-09-06 | End: 2024-09-09

## 2024-09-05 RX ORDER — INSULIN ASPART 100 [IU]/ML
0-5 INJECTION, SOLUTION INTRAVENOUS; SUBCUTANEOUS
Status: DISCONTINUED | OUTPATIENT
Start: 2024-09-05 | End: 2024-09-10 | Stop reason: HOSPADM

## 2024-09-05 RX ORDER — AZITHROMYCIN 250 MG/1
500 TABLET, FILM COATED ORAL NIGHTLY
Status: DISCONTINUED | OUTPATIENT
Start: 2024-09-05 | End: 2024-09-05

## 2024-09-05 RX ORDER — IBUPROFEN 200 MG
24 TABLET ORAL
Status: DISCONTINUED | OUTPATIENT
Start: 2024-09-05 | End: 2024-09-10 | Stop reason: HOSPADM

## 2024-09-05 RX ORDER — IBUPROFEN 200 MG
16 TABLET ORAL
Status: DISCONTINUED | OUTPATIENT
Start: 2024-09-05 | End: 2024-09-10 | Stop reason: HOSPADM

## 2024-09-05 RX ADMIN — SERTRALINE HYDROCHLORIDE 25 MG: 25 TABLET ORAL at 09:09

## 2024-09-05 RX ADMIN — INSULIN ASPART 2 UNITS: 100 INJECTION, SOLUTION INTRAVENOUS; SUBCUTANEOUS at 09:09

## 2024-09-05 RX ADMIN — PREDNISONE 20 MG: 20 TABLET ORAL at 09:09

## 2024-09-05 RX ADMIN — DILTIAZEM HYDROCHLORIDE 60 MG: 60 TABLET, FILM COATED ORAL at 11:09

## 2024-09-05 RX ADMIN — DILTIAZEM HYDROCHLORIDE 60 MG: 60 TABLET, FILM COATED ORAL at 05:09

## 2024-09-05 RX ADMIN — LEVOTHYROXINE SODIUM 50 MCG: 0.05 TABLET ORAL at 05:09

## 2024-09-05 RX ADMIN — INSULIN ASPART 3 UNITS: 100 INJECTION, SOLUTION INTRAVENOUS; SUBCUTANEOUS at 11:09

## 2024-09-05 RX ADMIN — DULOXETINE HYDROCHLORIDE 30 MG: 30 CAPSULE, DELAYED RELEASE ORAL at 09:09

## 2024-09-05 RX ADMIN — GABAPENTIN 100 MG: 100 CAPSULE ORAL at 05:09

## 2024-09-05 RX ADMIN — FUROSEMIDE 40 MG: 10 INJECTION, SOLUTION INTRAMUSCULAR; INTRAVENOUS at 05:09

## 2024-09-05 RX ADMIN — ENOXAPARIN SODIUM 40 MG: 40 INJECTION SUBCUTANEOUS at 05:09

## 2024-09-05 RX ADMIN — INSULIN ASPART 3 UNITS: 100 INJECTION, SOLUTION INTRAVENOUS; SUBCUTANEOUS at 05:09

## 2024-09-05 RX ADMIN — PANTOPRAZOLE SODIUM 40 MG: 40 TABLET, DELAYED RELEASE ORAL at 09:09

## 2024-09-05 NOTE — H&P
[facility] Medicine  History & Physical    Patient Name: Joyce Dow  MRN: [id number]  Patient Class: IP- Swing  Admission Date: 9/4/2024  Attending Physician: Darinel Kwok DO   Primary Care Provider: Walker Watkins MD         Patient information was obtained from patient and ER records.     Subjective:     Principal Problem:Hyponatremia    Chief Complaint: No chief complaint on file.       HPI: 89 yo WF from NH admit with COVID and resp failure with possible PNA.  Resp wise she improved but her Na went down.  Suspect some polydipsia related.  Tried NS but numbers dropped.  Now on 1L fluid restriction.  Na is stable and slightly up.  She is awake and alert and talking.  Eating fine.  Remains on low dose O2 at night.      No new subjective & objective note has been filed under this hospital service since the last note was generated.    Assessment/Plan:     * Hyponatremia  Hyponatremia is likely due to polydipsia. The patient's most recent sodium results are listed below.  Recent Labs     09/04/24  1153 09/04/24  1607 09/05/24  0520   * 119* 121*     Plan  - Correct the sodium by 4-6mEq in 24 hours.   - Obtain the following studies: .  - Will treat the hyponatremia with Fluid restriction of:  1 liter per day  - Monitor sodium Daily.   - Patient hyponatremia is stable  -     Acute hypoxemic respiratory failure  Continue to wean O2.    Type 2 diabetes mellitus with hyperglycemia, without long-term current use of insulin  SSI and adjust as needed on steroids       HTN (hypertension)  Chronic, controlled. Latest blood pressure and vitals reviewed-     Temp:  [97 °F (36.1 °C)-98.2 °F (36.8 °C)]   Pulse:  []   Resp:  [16-22]   BP: (127-131)/(70-92)   SpO2:  [97 %-100 %] .   Home meds for hypertension were reviewed and noted below.   Hypertension Medications               diltiaZEM (CARDIZEM) 30 MG tablet Take 30 mg by mouth every 8 (eight) hours.     olmesartan (BENICAR) 40 MG tablet Take 40 mg by mouth once daily.            While in the hospital, will manage blood pressure as follows; Continue home antihypertensive regimen    Will utilize p.r.n. blood pressure medication only if patient's blood pressure greater than 180/110 and she develops symptoms such as worsening chest pain or shortness of breath.      VTE Risk Mitigation (From admission, onward)           Ordered     enoxaparin injection 40 mg  Daily         09/04/24 1701     IP VTE HIGH RISK PATIENT  Once         09/04/24 1701     Place MEHREEN hose  Until discontinued         09/04/24 1701                               Pharmacist Intervention IV to PO Note    Joyce Dow is a 89 y.o. female being treated with IV medication azithromycin    Patient Data:    Vital Signs (Most Recent):  Temp: 97.6 °F (36.4 °C) (09/05/24 0736)  Pulse: 97 (09/05/24 0736)  Resp: 18 (09/05/24 0736)  BP: 127/70 (09/05/24 0736)  SpO2: 100 % (09/05/24 0736) Vital Signs (72h Range):  Temp:  [96.6 °F (35.9 °C)-98.2 °F (36.8 °C)]   Pulse:  []   Resp:  [16-26]   BP: (106-141)/(64-92)   SpO2:  [94 %-100 %]      CBC:  Recent Labs   Lab 09/02/24  0519 09/03/24  0533 09/04/24  0514   WBC 15.10* 11.00 8.61   RBC 4.07* 3.81* 3.76*   HGB 11.4* 10.5* 10.4*   HCT 35.3* 32.9* 32.0*    218 254   MCV 86.7 86.4 85.1   MCH 28.0 27.6 27.7   MCHC 32.3 31.9* 32.5     CMP:     Recent Labs   Lab 09/02/24  0519 09/03/24  0533 09/03/24  1245 09/04/24  0514 09/04/24  1153 09/04/24  1607 09/05/24  0520   * 197*   < > 192* 296* 299* 198*   CALCIUM 8.8 8.4*   < > 8.6 8.2* 8.4* 8.5   ALBUMIN 3.0* 2.8*  --  2.9*  --   --   --    PROT 6.4 6.1*  --  6.2*  --   --   --    * 120*   < > 119* 118* 119* 121*   K 4.8 5.2*   < > 5.0 4.7 4.7 3.6   CO2 26 26   < > 24 25 27 35*   CL 89* 87*   < > 86* 86* 86* 83*   BUN 22* 22*   < > 22* 26* 26* 23*   CREATININE 0.82 1.01   < > 0.92 1.08* 1.10* 0.98   ALKPHOS 119 107  --  112  --   --   --    ALT  184* 123*  --  125*  --   --   --    * 59*  --  53*  --   --   --    BILITOT 0.3 0.3  --  0.4  --   --   --     < > = values in this interval not displayed.       Dietary Orders:  Diet Orders            Diet Dysphagia Mechanical Soft Chopped Meats; Fluid - 1000mL: Dysphagia 2 (Mechanical Soft Ground) starting at 09/04 1702            Based on the following criteria, this patient qualifies for intravenous to oral conversion:  [x] The patients gastrointestinal tract is functioning (tolerating medications via oral or enteral route for 24 hours and tolerating food or enteral feeds for 24 hours).  [x] The patient is hemodynamically stable for 24 hours (heart rate <100 beats per minute, systolic blood pressure >99 mm Hg, and respiratory rate <20 breaths per minute).  [x] The patient shows clinical improvement (afebrile for at least 24 hours and white blood cell count downtrending or normalized). Additionally, the patient must be non-neutropenic (absolute neutrophil count >500 cells/mm3).  [x] For antimicrobials, the patient has received IV therapy for at least 24 hours.    IV medication azithromycin will be changed to oral medication azithromycin    Pharmacist's Name: Osmar Suarez  Pharmacist's Extension: [phone]      Darinel Kwok DO  Department of Hospital Medicine  [facility]

## 2024-09-05 NOTE — HPI
89 yo WF from NH admit with COVID and resp failure with possible PNA.  Resp wise she improved but her Na went down.  Suspect some polydipsia related.  Tried NS but numbers dropped.  Now on 1L fluid restriction.  Na is stable and slightly up.  She is awake and alert and talking.  Eating fine.  Remains on low dose O2 at night.

## 2024-09-05 NOTE — CONSULTS
[facility]  Adult Nutrition  Consult Note         Reason for Assessment  Reason For Assessment: consult [facility]   Nutrition Risk Screen: no indicators present    Assessment and Plan  Consult received and appreciated. Patient admitted to [facility] on 9/4 with a dx of hyponatremia. Patient is ordered a dysphagia MCS diet with 1000 mL fluid restriction. PO intakes improved since IP admit with 75% documented per flowsheets today.       Patient is 64 kg with a BMI of 22.08 which is WNL. Diet adequate to meet estimated energy needs. Patient with hx DM and elevated BG. Recommend to continue liberalized diet due to advanced age. RD Following.     Learning Needs/Social Determinants of Health  Learning Assessment       09/04/2024 1811 [facility] (9/4/2024 - Present)   Created by Nori Delgado, RN - RN (Nurse) Status: Complete                 PRIMARY LEARNER     Primary Learner Name:  faye decker  - 09/04/2024 1811    Relationship:  Patient  - 09/04/2024 1811    Does the primary learner have any barriers to learning?:  No Barriers  - 09/04/2024 1811    What is the preferred language of the primary learner?:  English [facility] 09/04/2024 1811    Is an  required?:  No  - 09/04/2024 1811    How does the primary learner prefer to learn new concepts?:  Listening  - 09/04/2024 1811    How often do you need to have someone help you read instructions, pamphlets, or written material from your doctor or pharmacy?:  Never  - 09/04/2024 1811        CO-LEARNER #1     No question answered        CO-LEARNER #2     No question answered        SPECIAL TOPICS     No question answered        ANSWERED BY:     No question answered        Comments         Edit History       Nori Delgado, RN - RN (Nurse)   09/04/2024 1811                           Social Determinants of Health     Tobacco Use: Low Risk  (11/21/2023)    Received from [facility]  [facility]    Patient History     Smoking Tobacco Use: Never     Smokeless Tobacco Use: Never     Passive Exposure: Never   Alcohol Use: Not At Risk (9/5/2024)    AUDIT-C     Frequency of Alcohol Consumption: Never     Average Number of Drinks: Patient does not drink     Frequency of Binge Drinking: Never   Financial Resource Strain: Low Risk  (9/5/2024)    Overall Financial Resource Strain (CARDIA)     Difficulty of Paying Living Expenses: Not hard at all   Recent Concern: Financial Resource Strain - High Risk (9/3/2024)    Overall Financial Resource Strain (CARDIA)     Difficulty of Paying Living Expenses: Hard   Food Insecurity: No Food Insecurity (9/5/2024)    Hunger Vital Sign     Worried About Running Out of Food in the Last Year: Never true     Ran Out of Food in the Last Year: Never true   Transportation Needs: No Transportation Needs (9/5/2024)    TRANSPORTATION NEEDS     Transportation : No   Physical Activity: Inactive (9/5/2024)    Exercise Vital Sign     Days of Exercise per Week: 0 days     Minutes of Exercise per Session: 0 min   Stress: No Stress Concern Present (9/5/2024)    [employer] [place] [employer] - Occupational Stress Questionnaire     Feeling of Stress : Not at all   Housing Stability: Low Risk  (9/5/2024)    Housing Stability Vital Sign     Unable to Pay for Housing in the Last Year: No     Homeless in the Last Year: No   Depression: Not on file   Utilities: Not At Risk (9/5/2024)    [facility] Utilities     Threatened with loss of utilities: No   Health Literacy: Inadequate Health Literacy (9/5/2024)     Health Literacy     Frequency of need for help with medical instructions: Always   Social Isolation: Socially Integrated (9/5/2024)    Social Isolation     Social Isolation: 1            Malnutrition  Is Patient Malnourished: No    Nutrition Diagnosis  Decreased nutrient needs of fluid  related to Chronic illness as evidenced by dx  chronic hyponatremia  Comments: MD ordered 1 L fluid    Recent Labs   Lab 09/05/24  1104 09/05/24  1157   GLU  --  203*   POCGLU 260*  --      Comments on Glucose: dx DM    Nutrition Prescription / Recommendations  Recommendation/Intervention: Continue diet as tolerated  Goals: po intakes 50% during admission  Nutrition Goal Status: new  Communication of RD Recs: discussed on rounds  Current Diet Order: dysphagia MCS 1000 mL fluid restriction  Chewing or Swallowing Difficulty?: Poor Dentition  Recommended Diet: Mechanical Soft with chopped meats  Recommended Oral Supplement: No Oral Supplements  Is Nutrition Support Recommended: [facility] Nutrition Support: No  Is Nutrition Education Recommended: No    Monitor and Evaluation  % current Intake: P.O. intake of 50 - 75 %  % intake to meet estimated needs: 50 - 75 %  Food and Nutrient Intake: energy intake, food and beverage intake  Food and Nutrient Adminstration: diet order  Anthropometric Measurements: height/length, weight, weight change, body mass index  Biochemical Data, Medical Tests and Procedures: electrolyte and renal panel, gastrointestinal profile, glucose/endocrine profile, inflammatory profile, lipid profile  Tolerance: tolerating    Current Medical Diagnosis and Past Medical History     Past Medical History:   Diagnosis Date    Allergic rhinitis     Anticoagulant long-term use     Anxiety disorder, unspecified     Arthritis     Chronic pain     Depression     GERD (gastroesophageal reflux disease)     hyperlipidemia     Hypertension     Hyponatremia     Hypoxemia     Insomnia     Mood disorder     Neuropathy     Rhinitis     Thyroid disease     Type 2 diabetes mellitus     Urinary tract infection, site not specified        Nutrition/Diet History  Food Allergies: NKFA    Lab/Procedures/Meds  Recent Labs   Lab 09/04/24  0514 09/04/24  1153 09/05/24  1157   *   < > 122*   K 5.0   < > 3.4*   BUN 22*   < > 23*   CREATININE 0.92   < > 1.11*   CALCIUM  "8.6   < > 8.6   ALBUMIN 2.9*  --   --    CL 86*   < > 81*   *  --   --    AST 53*  --   --     < > = values in this interval not displayed.   Dx hyponatremia ; on 1 L fluid restriction  Last A1c:   Lab Results   Component Value Date    HGBA1C 6.5 08/21/2024     Lab Results   Component Value Date    RBC 3.76 (L) 09/04/2024    HGB 10.4 (L) 09/04/2024    HCT 32.0 (L) 09/04/2024    MCV 85.1 09/04/2024    MCH 27.7 09/04/2024    MCHC 32.5 09/04/2024     Pertinent Labs Reviewed: reviewed  Pertinent Medications Reviewed: reviewed  Scheduled Meds:   diltiaZEM  60 mg Oral Q6H    [START ON 9/6/2024] doxycycline  100 mg Oral Q12H    DULoxetine  30 mg Oral Daily    enoxparin  40 mg Subcutaneous Daily    gabapentin  100 mg Oral Daily    levothyroxine  50 mcg Oral Before breakfast    pantoprazole  40 mg Oral Daily    predniSONE  20 mg Oral Daily    sertraline  25 mg Oral Daily     Continuous Infusions:  PRN Meds:.  Current Facility-Administered Medications:     0.9% NaCl, , Intravenous, PRN    acetaminophen, 650 mg, Oral, Q6H PRN    albuterol, 2 puff, Inhalation, Q6H PRN **AND** MDI Q6H PRN, , , Q6H PRN    calcium carbonate, 500 mg, Oral, BID PRN    dextrose 50%, 25 g, Intravenous, PRN    glucose, 16 g, Oral, PRN    glucose, 24 g, Oral, PRN    insulin aspart U-100, 0-5 Units, Subcutaneous, QID (AC + HS) PRN    melatonin, 6 mg, Oral, Nightly PRN    ondansetron, 4 mg, Intravenous, Q8H PRN    senna-docusate 8.6-50 mg, 1 tablet, Oral, BID PRN    sodium chloride 0.9%, 10 mL, Intravenous, PRN    Anthropometrics  Temp: 97.6 °F (36.4 °C)  Height Method: Stated  Height: 5' 7" (170.2 cm)  Height (inches): 67 in  Weight Method: Bed Scale  Weight: 64 kg (141 lb)  Weight (lb): 141 lb  Ideal Body Weight (IBW), Female: 135 lb  % Ideal Body Weight, Female (lb): 105.93 %  BMI (Calculated): 22.1       Estimated/Assessed Needs      Temp: 97.6 °F (36.4 °C)Oral  Weight Used For Calorie Calculations: 64 kg (141 lb 1.5 oz)   Energy Need Method: " Kcal/kg Energy Calorie Requirements (kcal): 1601-2193  Weight Used For Protein Calculations: 64 kg (141 lb 1.5 oz)  Protein Requirements: 51-64  Estimated Fluid Requirement Method: RDA Method    RDA Method (mL): 1600       Nutrition by Nursing     Intake (%): 75%        Last Bowel Movement: 09/03/24                Nutrition Follow-Up  RD Follow-up?: Yes      Nutrition Discharge Planning: new admit to [facility]; RD Following for dc needs            Available via Secure Chat

## 2024-09-05 NOTE — SUBJECTIVE & OBJECTIVE
Past Medical History:   Diagnosis Date    Allergic rhinitis     Anticoagulant long-term use     Anxiety disorder, unspecified     Arthritis     Chronic pain     Depression     GERD (gastroesophageal reflux disease)     hyperlipidemia     Hypertension     Hyponatremia     Hypoxemia     Insomnia     Mood disorder     Neuropathy     Rhinitis     Thyroid disease     Type 2 diabetes mellitus     Urinary tract infection, site not specified        No past surgical history on file.    Review of patient's allergies indicates:   Allergen Reactions    Sulfa (sulfonamide antibiotics)        Current Facility-Administered Medications on File Prior to Encounter   Medication    [DISCONTINUED] 0.9%  NaCl infusion    [DISCONTINUED] acetaminophen suppository 650 mg    [DISCONTINUED] albuterol inhaler 2 puff    [DISCONTINUED] azithromycin (ZITHROMAX) 500 mg in D5W 250 mL  IVPB (admixture device)    [DISCONTINUED] cefTRIAXone (Rocephin) 1 g in D5W 100 mL IVPB (MB+)    [DISCONTINUED] dextrose 50% injection 12.5 g    [DISCONTINUED] diltiaZEM tablet 60 mg    [DISCONTINUED] DULoxetine DR capsule 30 mg    [DISCONTINUED] enoxaparin injection 40 mg    [DISCONTINUED] furosemide injection 40 mg    [DISCONTINUED] gabapentin capsule 100 mg    [DISCONTINUED] glucagon (human recombinant) injection 1 mg    [DISCONTINUED] insulin aspart U-100 injection 0-5 Units    [DISCONTINUED] levothyroxine tablet 50 mcg    [DISCONTINUED] mupirocin 2 % ointment    [DISCONTINUED] ondansetron injection 4 mg    [DISCONTINUED] pantoprazole EC tablet 40 mg    [DISCONTINUED] predniSONE tablet 20 mg    [DISCONTINUED] sertraline tablet 25 mg    [DISCONTINUED] sodium chloride 0.9% flush 10 mL     Current Outpatient Medications on File Prior to Encounter   Medication Sig    acetaminophen (TYLENOL) 500 MG tablet Take 1,000 mg by mouth every 4 (four) hours as needed for Pain. Temp or pain    acetaminophen (TYLENOL) 650 MG Supp Place rectally every 4 (four) hours as needed.     alpha-D-galactosidase (BEANO ORAL) Take 2 tablets by mouth 3 (three) times daily.    calcium carb/magnesium hydrox (MYLANTA ORAL) Take 10 mLs by mouth every 4 (four) hours as needed.    cetirizine (ZYRTEC) 10 MG tablet Take 10 mg by mouth once daily.    diltiaZEM (CARDIZEM) 30 MG tablet Take 30 mg by mouth every 8 (eight) hours.    DULCOLAX, BISACODYL, RECT Place rectally daily as needed. constipation    DULoxetine (CYMBALTA) 30 MG capsule Take 30 mg by mouth once daily. 9am    ferrous sulfate 325 (65 FE) MG EC tablet Take 325 mg by mouth.    fluticasone propionate (FLONASE) 50 mcg/actuation nasal spray 1 spray by Each Nostril route once daily. 9am    gabapentin (NEURONTIN) 100 MG capsule Take 100 mg by mouth Daily.    gabapentin (NEURONTIN) 300 MG capsule Take 300 mg by mouth every evening.    HYDROcodone-acetaminophen (NORCO) 5-325 mg per tablet Take 1 tablet by mouth every 4 (four) hours as needed.    levothyroxine (SYNTHROID) 50 MCG tablet Take 50 mcg by mouth before breakfast.    magnesium 250 mg Tab Take 500 mg by mouth 2 (two) times a day.    magnesium hydroxide 400 mg/5 ml (MILK OF MAGNESIA) 400 mg/5 mL Susp Take 60 mLs by mouth daily as needed.    meclizine (ANTIVERT) 25 mg tablet Take 25 mg by mouth every 8 (eight) hours as needed for Dizziness.    meloxicam (MOBIC) 7.5 MG tablet Take 7.5 mg by mouth once daily.    metFORMIN (FORTAMET) 500 mg 24hr tablet Take 1,000 mg by mouth daily with breakfast.    metFORMIN (GLUCOPHAGE) 500 MG tablet Take 500 mg by mouth every evening.    olmesartan (BENICAR) 40 MG tablet Take 40 mg by mouth once daily.    ondansetron (ZOFRAN) 4 MG tablet Take 4 mg by mouth every 4 (four) hours as needed for Nausea.    pantoprazole (PROTONIX) 40 MG tablet Take 40 mg by mouth once daily.    polyethylene glycol (GLYCOLAX) 17 gram PwPk Take 17 g by mouth once daily. Mix in 4 oz H20    PRESERVISION AREDS-2 250-90-40-1 mg Cap SMARTSI Tablet(s) By Mouth Morning-Night    sennosides 8.8  mg/5 ml (SENOKOT) 8.8 mg/5 mL syrup Take 10 mLs by mouth daily as needed.    sertraline (ZOLOFT) 25 MG tablet Take 25 mg by mouth once daily. morning    simethicone (MYLICON) 80 MG chewable tablet Take 160 mg by mouth 4 (four) times daily. After meals and at HS     give 2 80 mg tabs= 160mg    simvastatin (ZOCOR) 40 MG tablet Take 40 mg by mouth every evening.    sod.chlorid-potassium chloride (THERMOTABS) 287-180-15 mg Tab Take by mouth 3 (three) times daily.    trazodone (DESYREL) 25 MG tablet Take by mouth every evening.    vits A and D-white pet-lanolin ointment Apply topically as needed for Dry Skin.    zinc oxide/cod liver oil (DESITIN TOP) Apply topically daily as needed. Desitin ointment     Family History    Family history is unknown by patient.       Tobacco Use    Smoking status: Unknown    Smokeless tobacco: Not on file   Substance and Sexual Activity    Alcohol use: Not Currently     Comment: lethargic - unable to access    Drug use: Not Currently     Comment: lethargic - unable to access    Sexual activity: Not Currently     Comment: lethargic - unable to access     Review of Systems   Respiratory:  Negative for shortness of breath.    Cardiovascular:  Negative for chest pain.   Gastrointestinal:  Negative for abdominal pain, nausea and vomiting.   Neurological:  Positive for weakness.   Psychiatric/Behavioral:  Negative for agitation and confusion.    All other systems reviewed and are negative.    Objective:     Vital Signs (Most Recent):  Temp: 97.6 °F (36.4 °C) (09/05/24 0736)  Pulse: 88 (09/05/24 0830)  Resp: 18 (09/05/24 0830)  BP: 127/70 (09/05/24 0736)  SpO2: 97 % (09/05/24 0830) Vital Signs (24h Range):  Temp:  [97 °F (36.1 °C)-98.2 °F (36.8 °C)] 97.6 °F (36.4 °C)  Pulse:  [] 88  Resp:  [16-22] 18  SpO2:  [97 %-100 %] 97 %  BP: (127-131)/(70-92) 127/70     Weight: 64 kg (141 lb)  Body mass index is 22.08 kg/m².     Physical Exam  Constitutional:       Appearance: She is not ill-appearing.    HENT:      Mouth/Throat:      Mouth: Mucous membranes are dry.   Eyes:      Extraocular Movements: Extraocular movements intact.      Pupils: Pupils are equal, round, and reactive to light.   Cardiovascular:      Rate and Rhythm: Regular rhythm. Tachycardia present.   Pulmonary:      Effort: Pulmonary effort is normal.      Breath sounds: No wheezing or rhonchi.   Abdominal:      General: Abdomen is flat.      Palpations: Abdomen is soft.      Tenderness: There is no abdominal tenderness.   Musculoskeletal:         General: Normal range of motion.      Right lower leg: No edema.      Left lower leg: No edema.   Skin:     General: Skin is warm and dry.   Neurological:      Mental Status: She is alert. Mental status is at baseline.      Motor: Weakness present.   Psychiatric:         Mood and Affect: Mood normal.              CRANIAL NERVES     CN III, IV, VI   Pupils are equal, round, and reactive to light.       Significant Labs: All pertinent labs within the past 24 hours have been reviewed.  Recent Lab Results  (Last 5 results in the past 24 hours)        09/05/24  1104   09/05/24  0545   09/05/24  0520   09/04/24  2059   09/04/24  1607        Anion Gap     7           BUN     23           BUN/CREAT RATIO     23           Calcium     8.5           Chloride     83           CO2     35           Creatinine     0.98           eGFR     55           Glucose     198           POC Glucose 260   173     277   300       Potassium     3.6           Sodium     121                                  Significant Imaging: I have reviewed all pertinent imaging results/findings within the past 24 hours.

## 2024-09-05 NOTE — PROGRESS NOTES
Pharmacist Intervention IV to PO Note    Joyce Dow is a 89 y.o. female being treated with IV medication azithromycin    Patient Data:    Vital Signs (Most Recent):  Temp: 97.6 °F (36.4 °C) (09/05/24 0736)  Pulse: 97 (09/05/24 0736)  Resp: 18 (09/05/24 0736)  BP: 127/70 (09/05/24 0736)  SpO2: 100 % (09/05/24 0736) Vital Signs (72h Range):  Temp:  [96.6 °F (35.9 °C)-98.2 °F (36.8 °C)]   Pulse:  []   Resp:  [16-26]   BP: (106-141)/(64-92)   SpO2:  [94 %-100 %]      CBC:  Recent Labs   Lab 09/02/24  0519 09/03/24  0533 09/04/24  0514   WBC 15.10* 11.00 8.61   RBC 4.07* 3.81* 3.76*   HGB 11.4* 10.5* 10.4*   HCT 35.3* 32.9* 32.0*    218 254   MCV 86.7 86.4 85.1   MCH 28.0 27.6 27.7   MCHC 32.3 31.9* 32.5     CMP:     Recent Labs   Lab 09/02/24  0519 09/03/24  0533 09/03/24  1245 09/04/24  0514 09/04/24  1153 09/04/24  1607 09/05/24  0520   * 197*   < > 192* 296* 299* 198*   CALCIUM 8.8 8.4*   < > 8.6 8.2* 8.4* 8.5   ALBUMIN 3.0* 2.8*  --  2.9*  --   --   --    PROT 6.4 6.1*  --  6.2*  --   --   --    * 120*   < > 119* 118* 119* 121*   K 4.8 5.2*   < > 5.0 4.7 4.7 3.6   CO2 26 26   < > 24 25 27 35*   CL 89* 87*   < > 86* 86* 86* 83*   BUN 22* 22*   < > 22* 26* 26* 23*   CREATININE 0.82 1.01   < > 0.92 1.08* 1.10* 0.98   ALKPHOS 119 107  --  112  --   --   --    * 123*  --  125*  --   --   --    * 59*  --  53*  --   --   --    BILITOT 0.3 0.3  --  0.4  --   --   --     < > = values in this interval not displayed.       Dietary Orders:  Diet Orders            Diet Dysphagia Mechanical Soft Chopped Meats; Fluid - 1000mL: Dysphagia 2 (Mechanical Soft Ground) starting at 09/04 1702            Based on the following criteria, this patient qualifies for intravenous to oral conversion:  [x] The patients gastrointestinal tract is functioning (tolerating medications via oral or enteral route for 24 hours and tolerating food or enteral feeds for 24 hours).  [x] The patient is  hemodynamically stable for 24 hours (heart rate <100 beats per minute, systolic blood pressure >99 mm Hg, and respiratory rate <20 breaths per minute).  [x] The patient shows clinical improvement (afebrile for at least 24 hours and white blood cell count downtrending or normalized). Additionally, the patient must be non-neutropenic (absolute neutrophil count >500 cells/mm3).  [x] For antimicrobials, the patient has received IV therapy for at least 24 hours.    IV medication azithromycin will be changed to oral medication azithromycin    Pharmacist's Name: Osmar Suarez  Pharmacist's Extension: [phone]

## 2024-09-05 NOTE — ASSESSMENT & PLAN NOTE
Chronic, controlled. Latest blood pressure and vitals reviewed-     Temp:  [97 °F (36.1 °C)-98.2 °F (36.8 °C)]   Pulse:  []   Resp:  [16-22]   BP: (127-131)/(70-92)   SpO2:  [97 %-100 %] .   Home meds for hypertension were reviewed and noted below.   Hypertension Medications               diltiaZEM (CARDIZEM) 30 MG tablet Take 30 mg by mouth every 8 (eight) hours.    olmesartan (BENICAR) 40 MG tablet Take 40 mg by mouth once daily.            While in the hospital, will manage blood pressure as follows; Continue home antihypertensive regimen    Will utilize p.r.n. blood pressure medication only if patient's blood pressure greater than 180/110 and she develops symptoms such as worsening chest pain or shortness of breath.

## 2024-09-05 NOTE — ASSESSMENT & PLAN NOTE
Hyponatremia is likely due to polydipsia. The patient's most recent sodium results are listed below.  Recent Labs     09/04/24  1153 09/04/24  1607 09/05/24  0520   * 119* 121*     Plan  - Correct the sodium by 4-6mEq in 24 hours.   - Obtain the following studies: .  - Will treat the hyponatremia with Fluid restriction of:  1 liter per day  - Monitor sodium Daily.   - Patient hyponatremia is stable  -

## 2024-09-05 NOTE — H&P
[facility] Medicine  History & Physical    Patient Name: Joyce Dow  MRN: [id number]  Patient Class: IP- Swing  Admission Date: 9/4/2024  Attending Physician: Darinel Kwok DO   Primary Care Provider: Walker Watkins MD         Patient information was obtained from past medical records and ER records.     Subjective:     Principal Problem:Hyponatremia    Chief Complaint: No chief complaint on file.       HPI: 89 yo WF from NH admit with COVID and resp failure with possible PNA.  Resp wise she improved but her Na went down.  Suspect some polydipsia related.  Tried NS but numbers dropped.  Now on 1L fluid restriction.  Na is stable and slightly up.  She is awake and alert and talking.  Eating fine.  Remains on low dose O2 at night.      Past Medical History:   Diagnosis Date    Allergic rhinitis     Anticoagulant long-term use     Anxiety disorder, unspecified     Arthritis     Chronic pain     Depression     GERD (gastroesophageal reflux disease)     hyperlipidemia     Hypertension     Hyponatremia     Hypoxemia     Insomnia     Mood disorder     Neuropathy     Rhinitis     Thyroid disease     Type 2 diabetes mellitus     Urinary tract infection, site not specified        No past surgical history on file.    Review of patient's allergies indicates:   Allergen Reactions    Sulfa (sulfonamide antibiotics)        Current Facility-Administered Medications on File Prior to Encounter   Medication    [DISCONTINUED] 0.9%  NaCl infusion    [DISCONTINUED] acetaminophen suppository 650 mg    [DISCONTINUED] albuterol inhaler 2 puff    [DISCONTINUED] azithromycin (ZITHROMAX) 500 mg in D5W 250 mL  IVPB (admixture device)    [DISCONTINUED] cefTRIAXone (Rocephin) 1 g in D5W 100 mL IVPB (MB+)    [DISCONTINUED] dextrose 50% injection 12.5 g    [DISCONTINUED] diltiaZEM tablet 60 mg    [DISCONTINUED] DULoxetine DR capsule 30 mg    [DISCONTINUED] enoxaparin injection 40 mg     [DISCONTINUED] furosemide injection 40 mg    [DISCONTINUED] gabapentin capsule 100 mg    [DISCONTINUED] glucagon (human recombinant) injection 1 mg    [DISCONTINUED] insulin aspart U-100 injection 0-5 Units    [DISCONTINUED] levothyroxine tablet 50 mcg    [DISCONTINUED] mupirocin 2 % ointment    [DISCONTINUED] ondansetron injection 4 mg    [DISCONTINUED] pantoprazole EC tablet 40 mg    [DISCONTINUED] predniSONE tablet 20 mg    [DISCONTINUED] sertraline tablet 25 mg    [DISCONTINUED] sodium chloride 0.9% flush 10 mL     Current Outpatient Medications on File Prior to Encounter   Medication Sig    acetaminophen (TYLENOL) 500 MG tablet Take 1,000 mg by mouth every 4 (four) hours as needed for Pain. Temp or pain    acetaminophen (TYLENOL) 650 MG Supp Place rectally every 4 (four) hours as needed.    alpha-D-galactosidase (BEANO ORAL) Take 2 tablets by mouth 3 (three) times daily.    calcium carb/magnesium hydrox (MYLANTA ORAL) Take 10 mLs by mouth every 4 (four) hours as needed.    cetirizine (ZYRTEC) 10 MG tablet Take 10 mg by mouth once daily.    diltiaZEM (CARDIZEM) 30 MG tablet Take 30 mg by mouth every 8 (eight) hours.    DULCOLAX, BISACODYL, RECT Place rectally daily as needed. constipation    DULoxetine (CYMBALTA) 30 MG capsule Take 30 mg by mouth once daily. 9am    ferrous sulfate 325 (65 FE) MG EC tablet Take 325 mg by mouth.    fluticasone propionate (FLONASE) 50 mcg/actuation nasal spray 1 spray by Each Nostril route once daily. 9am    gabapentin (NEURONTIN) 100 MG capsule Take 100 mg by mouth Daily.    gabapentin (NEURONTIN) 300 MG capsule Take 300 mg by mouth every evening.    HYDROcodone-acetaminophen (NORCO) 5-325 mg per tablet Take 1 tablet by mouth every 4 (four) hours as needed.    levothyroxine (SYNTHROID) 50 MCG tablet Take 50 mcg by mouth before breakfast.    magnesium 250 mg Tab Take 500 mg by mouth 2 (two) times a day.    magnesium hydroxide 400 mg/5 ml (MILK OF MAGNESIA) 400 mg/5 mL Susp Take 60  mLs by mouth daily as needed.    meclizine (ANTIVERT) 25 mg tablet Take 25 mg by mouth every 8 (eight) hours as needed for Dizziness.    meloxicam (MOBIC) 7.5 MG tablet Take 7.5 mg by mouth once daily.    metFORMIN (FORTAMET) 500 mg 24hr tablet Take 1,000 mg by mouth daily with breakfast.    metFORMIN (GLUCOPHAGE) 500 MG tablet Take 500 mg by mouth every evening.    olmesartan (BENICAR) 40 MG tablet Take 40 mg by mouth once daily.    ondansetron (ZOFRAN) 4 MG tablet Take 4 mg by mouth every 4 (four) hours as needed for Nausea.    pantoprazole (PROTONIX) 40 MG tablet Take 40 mg by mouth once daily.    polyethylene glycol (GLYCOLAX) 17 gram PwPk Take 17 g by mouth once daily. Mix in 4 oz H20    PRESERVISION AREDS-2 250-90-40-1 mg Cap SMARTSI Tablet(s) By Mouth Morning-Night    sennosides 8.8 mg/5 ml (SENOKOT) 8.8 mg/5 mL syrup Take 10 mLs by mouth daily as needed.    sertraline (ZOLOFT) 25 MG tablet Take 25 mg by mouth once daily. morning    simethicone (MYLICON) 80 MG chewable tablet Take 160 mg by mouth 4 (four) times daily. After meals and at HS     give 2 80 mg tabs= 160mg    simvastatin (ZOCOR) 40 MG tablet Take 40 mg by mouth every evening.    sod.chlorid-potassium chloride (THERMOTABS) 287-180-15 mg Tab Take by mouth 3 (three) times daily.    trazodone (DESYREL) 25 MG tablet Take by mouth every evening.    vits A and D-white pet-lanolin ointment Apply topically as needed for Dry Skin.    zinc oxide/cod liver oil (DESITIN TOP) Apply topically daily as needed. Desitin ointment     Family History    Family history is unknown by patient.       Tobacco Use    Smoking status: Unknown    Smokeless tobacco: Not on file   Substance and Sexual Activity    Alcohol use: Not Currently     Comment: lethargic - unable to access    Drug use: Not Currently     Comment: lethargic - unable to access    Sexual activity: Not Currently     Comment: lethargic - unable to access     Review of Systems   Respiratory:  Negative for  shortness of breath.    Cardiovascular:  Negative for chest pain.   Gastrointestinal:  Negative for abdominal pain, nausea and vomiting.   Neurological:  Positive for weakness.   Psychiatric/Behavioral:  Negative for agitation and confusion.    All other systems reviewed and are negative.    Objective:     Vital Signs (Most Recent):  Temp: 97.6 °F (36.4 °C) (09/05/24 0736)  Pulse: 88 (09/05/24 0830)  Resp: 18 (09/05/24 0830)  BP: 127/70 (09/05/24 0736)  SpO2: 97 % (09/05/24 0830) Vital Signs (24h Range):  Temp:  [97 °F (36.1 °C)-98.2 °F (36.8 °C)] 97.6 °F (36.4 °C)  Pulse:  [] 88  Resp:  [16-22] 18  SpO2:  [97 %-100 %] 97 %  BP: (127-131)/(70-92) 127/70     Weight: 64 kg (141 lb)  Body mass index is 22.08 kg/m².     Physical Exam  Constitutional:       Appearance: She is not ill-appearing.   HENT:      Mouth/Throat:      Mouth: Mucous membranes are dry.   Eyes:      Extraocular Movements: Extraocular movements intact.      Pupils: Pupils are equal, round, and reactive to light.   Cardiovascular:      Rate and Rhythm: Regular rhythm. Tachycardia present.   Pulmonary:      Effort: Pulmonary effort is normal.      Breath sounds: No wheezing or rhonchi.   Abdominal:      General: Abdomen is flat.      Palpations: Abdomen is soft.      Tenderness: There is no abdominal tenderness.   Musculoskeletal:         General: Normal range of motion.      Right lower leg: No edema.      Left lower leg: No edema.   Skin:     General: Skin is warm and dry.   Neurological:      Mental Status: She is alert. Mental status is at baseline.      Motor: Weakness present.   Psychiatric:         Mood and Affect: Mood normal.              CRANIAL NERVES     CN III, IV, VI   Pupils are equal, round, and reactive to light.       Significant Labs: All pertinent labs within the past 24 hours have been reviewed.  Recent Lab Results  (Last 5 results in the past 24 hours)        09/05/24  1104   09/05/24  0545   09/05/24  0520   09/04/24  9218    09/04/24  1607        Anion Gap     7           BUN     23           BUN/CREAT RATIO     23           Calcium     8.5           Chloride     83           CO2     35           Creatinine     0.98           eGFR     55           Glucose     198           POC Glucose 260   173     277   300       Potassium     3.6           Sodium     121                                  Significant Imaging: I have reviewed all pertinent imaging results/findings within the past 24 hours.  Assessment/Plan:     * Hyponatremia  Hyponatremia is likely due to polydipsia. The patient's most recent sodium results are listed below.  Recent Labs     09/04/24  1153 09/04/24  1607 09/05/24  0520   * 119* 121*     Plan  - Correct the sodium by 4-6mEq in 24 hours.   - Obtain the following studies: .  - Will treat the hyponatremia with Fluid restriction of:  1 liter per day  - Monitor sodium Daily.   - Patient hyponatremia is stable  -     Acute hypoxemic respiratory failure  Continue to wean O2.    Type 2 diabetes mellitus with hyperglycemia, without long-term current use of insulin  SSI and adjust as needed on steroids       HTN (hypertension)  Chronic, controlled. Latest blood pressure and vitals reviewed-     Temp:  [97 °F (36.1 °C)-98.2 °F (36.8 °C)]   Pulse:  []   Resp:  [16-22]   BP: (127-131)/(70-92)   SpO2:  [97 %-100 %] .   Home meds for hypertension were reviewed and noted below.   Hypertension Medications               diltiaZEM (CARDIZEM) 30 MG tablet Take 30 mg by mouth every 8 (eight) hours.    olmesartan (BENICAR) 40 MG tablet Take 40 mg by mouth once daily.            While in the hospital, will manage blood pressure as follows; Continue home antihypertensive regimen    Will utilize p.r.n. blood pressure medication only if patient's blood pressure greater than 180/110 and she develops symptoms such as worsening chest pain or shortness of breath.      VTE Risk Mitigation (From admission, onward)           Ordered      enoxaparin injection 40 mg  Daily         09/04/24 1701     IP VTE HIGH RISK PATIENT  Once         09/04/24 1701     Place MEHREEN hose  Until discontinued         09/04/24 1701                               Pharmacist Intervention IV to PO Note    Joyce Dow is a 89 y.o. female being treated with IV medication azithromycin    Patient Data:    Vital Signs (Most Recent):  Temp: 97.6 °F (36.4 °C) (09/05/24 0736)  Pulse: 97 (09/05/24 0736)  Resp: 18 (09/05/24 0736)  BP: 127/70 (09/05/24 0736)  SpO2: 100 % (09/05/24 0736) Vital Signs (72h Range):  Temp:  [96.6 °F (35.9 °C)-98.2 °F (36.8 °C)]   Pulse:  []   Resp:  [16-26]   BP: (106-141)/(64-92)   SpO2:  [94 %-100 %]      CBC:  Recent Labs   Lab 09/02/24  0519 09/03/24  0533 09/04/24  0514   WBC 15.10* 11.00 8.61   RBC 4.07* 3.81* 3.76*   HGB 11.4* 10.5* 10.4*   HCT 35.3* 32.9* 32.0*    218 254   MCV 86.7 86.4 85.1   MCH 28.0 27.6 27.7   MCHC 32.3 31.9* 32.5     CMP:     Recent Labs   Lab 09/02/24  0519 09/03/24  0533 09/03/24  1245 09/04/24  0514 09/04/24  1153 09/04/24  1607 09/05/24  0520   * 197*   < > 192* 296* 299* 198*   CALCIUM 8.8 8.4*   < > 8.6 8.2* 8.4* 8.5   ALBUMIN 3.0* 2.8*  --  2.9*  --   --   --    PROT 6.4 6.1*  --  6.2*  --   --   --    * 120*   < > 119* 118* 119* 121*   K 4.8 5.2*   < > 5.0 4.7 4.7 3.6   CO2 26 26   < > 24 25 27 35*   CL 89* 87*   < > 86* 86* 86* 83*   BUN 22* 22*   < > 22* 26* 26* 23*   CREATININE 0.82 1.01   < > 0.92 1.08* 1.10* 0.98   ALKPHOS 119 107  --  112  --   --   --    * 123*  --  125*  --   --   --    * 59*  --  53*  --   --   --    BILITOT 0.3 0.3  --  0.4  --   --   --     < > = values in this interval not displayed.       Dietary Orders:  Diet Orders            Diet Dysphagia Mechanical Soft Chopped Meats; Fluid - 1000mL: Dysphagia 2 (Mechanical Soft Ground) starting at 09/04 1702            Based on the following criteria, this patient qualifies for intravenous to oral  conversion:  [x] The patients gastrointestinal tract is functioning (tolerating medications via oral or enteral route for 24 hours and tolerating food or enteral feeds for 24 hours).  [x] The patient is hemodynamically stable for 24 hours (heart rate <100 beats per minute, systolic blood pressure >99 mm Hg, and respiratory rate <20 breaths per minute).  [x] The patient shows clinical improvement (afebrile for at least 24 hours and white blood cell count downtrending or normalized). Additionally, the patient must be non-neutropenic (absolute neutrophil count >500 cells/mm3).  [x] For antimicrobials, the patient has received IV therapy for at least 24 hours.    IV medication azithromycin will be changed to oral medication azithromycin    Pharmacist's Name: Osmar Suarez  Pharmacist's Extension: [phone]      Darinel Kwok DO  Department of Hospital Medicine  [facility]

## 2024-09-05 NOTE — PLAN OF CARE
[facility]  Initial Discharge Assessment       Primary Care Provider: Walker Watkins MD    Admission Diagnosis: Acute respiratory failure with hypoxia [J96.01]    Admission Date: 9/4/2024  Expected Discharge Date:     Transition of Care Barriers: None    Payor: MEDICARE / Plan: MEDICARE PART A & B / Product Type: Government /     Extended Emergency Contact Information  Primary Emergency Contact: Ant Vasquez  Mobile Phone: [phone]  Relation: Son  Preferred language: English   needed? No    Discharge Plan A: Return to nursing home         [facility] [place], [place] - [address]  [address]  [place] [address]  Phone: [phone] Fax: [phone]      Initial Assessment (most recent)       Adult Discharge Assessment - 09/05/24 1043          Discharge Assessment    Assessment Type Discharge Planning Assessment     Confirmed/corrected address, phone number and insurance Yes     Source of Information patient     Communicated JUANPABLO with patient/caregiver Date not available/Unable to determine     Reason For Admission inpatient medical management     People in Home facility resident     [facility]     Do you expect to return to your current living situation? Yes     Prior to hospitilization cognitive status: Unable to Assess     Current cognitive status: Alert/Oriented     Walking or Climbing Stairs Difficulty yes     Walking or Climbing Stairs ambulation difficulty, requires equipment     Dressing/Bathing Difficulty yes     Dressing/Bathing bathing difficulty, assistance 1 person     Readmission within 30 days? No     Patient currently being followed by outpatient case management? No     Do you currently have service(s) that help you manage your care at home? No     Do you take prescription medications? Yes     Do you have prescription coverage? Yes     Do you have any problems affording any of your prescribed  medications? No     Is the patient taking medications as prescribed? yes     Who is going to help you get home at discharge? NH staff     Are you on dialysis? No     Do you take coumadin? No     Discharge Plan A Return to nursing home     DME Needed Upon Discharge  none     Discharge Plan discussed with: Adult children   NH staff    Transition of Care Barriers None        Physical Activity    On average, how many days per week do you engage in moderate to strenuous exercise (like a brisk walk)? 0 days     On average, how many minutes do you engage in exercise at this level? 0 min        Financial Resource Strain    How hard is it for you to pay for the very basics like food, housing, medical care, and heating? Not hard at all        Housing Stability    In the last 12 months, was there a time when you were not able to pay the mortgage or rent on time? No     At any time in the past 12 months, were you homeless or living in a shelter (including now)? No        Transportation Needs    Has the lack of transportation kept you from medical appointments, meetings, work or from getting things needed for daily living? No        Food Insecurity    Within the past 12 months, you worried that your food would run out before you got the money to buy more. Never true     Within the past 12 months, the food you bought just didn't last and you didn't have money to get more. Never true        Stress    Do you feel stress - tense, restless, nervous, or anxious, or unable to sleep at night because your mind is troubled all the time - these days? Not at all        Social Isolation    How often do you feel lonely or isolated from those around you?  Never        Alcohol Use    Q1: How often do you have a drink containing alcohol? Never     Q2: How many drinks containing alcohol do you have on a typical day when you are drinking? Patient does not drink     Q3: How often do you have six or more drinks on one occasion? Never         Utilities    In the past 12 months has the electric, gas, oil, or water company threatened to shut off services in your home? No        Health Literacy    How often do you need to have someone help you when you read instructions, pamphlets, or other written material from your doctor or pharmacy? Always                      Ms. Dow is admitted to [facility] for continued medical management of illness. She is a current resident of [facility] in [place]. Delaney updated on plan of care. Will continue to follow.

## 2024-09-06 LAB
ANION GAP SERPL CALCULATED.3IONS-SCNC: 7 MMOL/L (ref 7–16)
BACTERIA BLD CULT: NORMAL
BACTERIA BLD CULT: NORMAL
BUN SERPL-MCNC: 23 MG/DL (ref 7–18)
BUN/CREAT SERPL: 26 (ref 6–20)
CALCIUM SERPL-MCNC: 8.5 MG/DL (ref 8.5–10.1)
CHLORIDE SERPL-SCNC: 84 MMOL/L (ref 98–107)
CO2 SERPL-SCNC: 36 MMOL/L (ref 21–32)
CREAT SERPL-MCNC: 0.87 MG/DL (ref 0.55–1.02)
EGFR (NO RACE VARIABLE) (RUSH/TITUS): 63 ML/MIN/1.73M2
GLUCOSE SERPL-MCNC: 139 MG/DL (ref 70–105)
GLUCOSE SERPL-MCNC: 153 MG/DL (ref 74–106)
GLUCOSE SERPL-MCNC: 198 MG/DL (ref 70–105)
GLUCOSE SERPL-MCNC: 236 MG/DL (ref 70–105)
GLUCOSE SERPL-MCNC: 291 MG/DL (ref 70–105)
POTASSIUM SERPL-SCNC: 3.4 MMOL/L (ref 3.5–5.1)
SODIUM SERPL-SCNC: 124 MMOL/L (ref 136–145)

## 2024-09-06 PROCEDURE — 11000004 HC SNF PRIVATE

## 2024-09-06 PROCEDURE — 94761 N-INVAS EAR/PLS OXIMETRY MLT: CPT

## 2024-09-06 PROCEDURE — 36415 COLL VENOUS BLD VENIPUNCTURE: CPT | Performed by: HOSPITALIST

## 2024-09-06 PROCEDURE — 97161 PT EVAL LOW COMPLEX 20 MIN: CPT

## 2024-09-06 PROCEDURE — 97166 OT EVAL MOD COMPLEX 45 MIN: CPT

## 2024-09-06 PROCEDURE — 99900039 HC SLP GENERIC THERAPY SCREENING (STAT)

## 2024-09-06 PROCEDURE — 99900035 HC TECH TIME PER 15 MIN (STAT)

## 2024-09-06 PROCEDURE — 27000958

## 2024-09-06 PROCEDURE — 99308 SBSQ NF CARE LOW MDM 20: CPT | Mod: ,,, | Performed by: HOSPITALIST

## 2024-09-06 PROCEDURE — 25000003 PHARM REV CODE 250: Performed by: HOSPITALIST

## 2024-09-06 PROCEDURE — 82962 GLUCOSE BLOOD TEST: CPT

## 2024-09-06 PROCEDURE — 63600175 PHARM REV CODE 636 W HCPCS: Performed by: HOSPITALIST

## 2024-09-06 PROCEDURE — 27000987 HC MATTRESS, MATRIX LOW PROFILE

## 2024-09-06 PROCEDURE — 80048 BASIC METABOLIC PNL TOTAL CA: CPT | Performed by: HOSPITALIST

## 2024-09-06 PROCEDURE — 27000207 HC ISOLATION

## 2024-09-06 PROCEDURE — 63600175 PHARM REV CODE 636 W HCPCS: Performed by: NURSE PRACTITIONER

## 2024-09-06 RX ADMIN — DULOXETINE HYDROCHLORIDE 30 MG: 30 CAPSULE, DELAYED RELEASE ORAL at 09:09

## 2024-09-06 RX ADMIN — INSULIN ASPART 3 UNITS: 100 INJECTION, SOLUTION INTRAVENOUS; SUBCUTANEOUS at 05:09

## 2024-09-06 RX ADMIN — DILTIAZEM HYDROCHLORIDE 60 MG: 60 TABLET, FILM COATED ORAL at 05:09

## 2024-09-06 RX ADMIN — ENOXAPARIN SODIUM 40 MG: 40 INJECTION SUBCUTANEOUS at 05:09

## 2024-09-06 RX ADMIN — SERTRALINE HYDROCHLORIDE 25 MG: 25 TABLET ORAL at 09:09

## 2024-09-06 RX ADMIN — LEVOTHYROXINE SODIUM 50 MCG: 0.05 TABLET ORAL at 05:09

## 2024-09-06 RX ADMIN — DILTIAZEM HYDROCHLORIDE 60 MG: 60 TABLET, FILM COATED ORAL at 11:09

## 2024-09-06 RX ADMIN — POTASSIUM BICARBONATE 20 MEQ: 391 TABLET, EFFERVESCENT ORAL at 01:09

## 2024-09-06 RX ADMIN — PANTOPRAZOLE SODIUM 40 MG: 40 TABLET, DELAYED RELEASE ORAL at 09:09

## 2024-09-06 RX ADMIN — DILTIAZEM HYDROCHLORIDE 60 MG: 60 TABLET, FILM COATED ORAL at 12:09

## 2024-09-06 RX ADMIN — DOXYCYCLINE HYCLATE 100 MG: 100 TABLET, FILM COATED ORAL at 08:09

## 2024-09-06 RX ADMIN — GABAPENTIN 100 MG: 100 CAPSULE ORAL at 05:09

## 2024-09-06 RX ADMIN — DOXYCYCLINE HYCLATE 100 MG: 100 TABLET, FILM COATED ORAL at 09:09

## 2024-09-06 RX ADMIN — Medication 6 MG: at 08:09

## 2024-09-06 RX ADMIN — SENNOSIDES AND DOCUSATE SODIUM 1 TABLET: 50; 8.6 TABLET ORAL at 08:09

## 2024-09-06 RX ADMIN — PREDNISONE 20 MG: 20 TABLET ORAL at 09:09

## 2024-09-06 NOTE — PT/OT/SLP EVAL
SLP Screen    Date:9/6/24    SLP Screen completed this date. No skilled ST services warranted at this time. Reconsult ST upon change in status.     Ranjana Becerra M.S. [facility]-SLP

## 2024-09-06 NOTE — PT/OT/SLP EVAL
Occupational Therapy   Evaluation    Name: Joyce Dow  MRN: [id number]  Admitting Diagnosis: Hyponatremia  Recent Surgery: * No surgery found *      Recommendations:     Discharge Recommendations: Low Intensity Therapy  Discharge Equipment Recommendations:  wheelchair  Barriers to discharge:  None    Assessment:     Joyce Dow is a 89 y.o. female with a medical diagnosis of Hyponatremia.  She presents with UTI and +COVID. Performance deficits affecting function: weakness, impaired endurance, impaired self care skills, impaired functional mobility, gait instability, impaired balance, visual deficits, impaired cardiopulmonary response to activity.      Rehab Prognosis: Fair; patient would benefit from acute skilled OT services to address these deficits and reach maximum level of function.       Plan:     Patient to be seen 5 x/week to address the above listed problems via self-care/home management, therapeutic activities, therapeutic exercises, neuromuscular re-education, wheelchair management/training  Plan of Care Expires: 09/20/24  Plan of Care Reviewed with: patient    Subjective     Chief Complaint: weakness  Patient/Family Comments/goals: ready to go home THIS WEEKEND    Occupational Profile:  Living Environment: LTC facility  Previous level of function: min a overall with self care and functional mobility transfers bed to w/c  Roles and Routines: active in LTC minimally, likes to sit up in her recliner chair  Equipment Used at Home: 3-in-1 commode, wheelchair (lives in LTC facility)  Assistance upon Discharge: min a for transfers and adls    Pain/Comfort:  Pain Rating 1: 0/10    Patients cultural, spiritual, Sikhism conflicts given the current situation: no    Objective:     Communicated with: pt prior to session.  Patient found HOB elevated with telemetry upon OT entry to room.    General Precautions: Standard, fall, contact, airborne  Orthopedic Precautions:    Braces: N/A  Respiratory  Status: Room air    Occupational Performance:    Bed Mobility:    Patient completed Rolling/Turning to Left with  minimum assistance  Patient completed Rolling/Turning to Right with minimum assistance  Patient completed Scooting/Bridging with minimum assistance  Patient completed Supine to Sit with minimum assistance  Patient completed Sit to Supine with minimum assistance    Functional Mobility/Transfers:  Patient completed Sit <> Stand Transfer with moderate assistance  with  hand-held assist and grab bars(s)   Functional Mobility: see PT    Activities of Daily Living:  Feeding:  stand by assistance setup and occasional min a  Grooming: minimum assistance bedside from upright seated and unsupported but needs occasional assist to maintain upright position  Bathing: maximal assistance for thoroughness  Upper Body Dressing: moderate assistance bedside  Lower Body Dressing: maximal assistance bedside  Toileting: maximal assistance has jamey, needs much assist for bowel as well    Cognitive/Visual Perceptual:  Cognitive/Psychosocial Skills:     -       Oriented to: Person, Place, Time, and Situation   -       Follows Commands/attention:Follows two-step commands  -       Communication: clear/fluent  -       Memory: No Deficits noted  -       Safety awareness/insight to disability: intact   -       Mood/Affect/Coping skills/emotional control: Appropriate to situation, Cooperative, and Pleasant  Visual/Perceptual:      -Intact  tracking, visual field, and motor planning praxis has trouble seeing to sign her name to appropriate part of paperwork    Physical Exam:  Balance:    -       sitting upright EOB soft surface unsupported= CGA  Dominant hand:    -       right  Upper Extremity Range of Motion:     -       Right Upper Extremity: WFL  -       Left Upper Extremity: WFL  Upper Extremity Strength:    -       Right Upper Extremity: -3/5  -       Left Upper Extremity: -3/5   Strength:    -       Right Upper Extremity:  -3/5  -       Left Upper Extremity: -3/5  Fine Motor Coordination:    -       Intact  Gross motor coordination:   WFL    AMPAC 6 Click ADL:  AMPAC Total Score: 14    Treatment & Education:  EVAL completed today with pt    Patient left HOB elevated with call button in reach    GOALS:   Multidisciplinary Problems       Occupational Therapy Goals          Problem: Occupational Therapy    Goal Priority Disciplines Outcome Interventions   Occupational Therapy Goal     OT, PT/OT Progressing    Description: STG: within 1 week  Pt will perform grooming with setup at bedside  Pt will bathe UB with SBA, LB with mod a  Pt will perform UE dressing with SBA  Pt will perform LE dressing with mod a  Pt will sit EOB x 15 min with SVN assistance  Pt will transfer bed/chair/bsc with Min a  Pt will perform standing task x 2 min with CGA assistance  Pt will tolerate 30 minutes of tx without fatigue      LT.Restore to max I with self care and mobility.                         History:     Past Medical History:   Diagnosis Date    Allergic rhinitis     Anticoagulant long-term use     Anxiety disorder, unspecified     Arthritis     Chronic pain     Depression     GERD (gastroesophageal reflux disease)     hyperlipidemia     Hypertension     Hyponatremia     Hypoxemia     Insomnia     Mood disorder     Neuropathy     Rhinitis     Thyroid disease     Type 2 diabetes mellitus     Urinary tract infection, site not specified        No past surgical history on file.    Time Tracking:     OT Date of Treatment: 24  OT Start Time: 1425  OT Stop Time: 1445  OT Total Time (min): 20 min    Billable Minutes:Evaluation 20    2024

## 2024-09-06 NOTE — PLAN OF CARE
Problem: Adult Inpatient Plan of Care  Goal: Plan of Care Review  Outcome: Progressing  Goal: Patient-Specific Goal (Individualized)  Outcome: Progressing  Goal: Absence of Hospital-Acquired Illness or Injury  Outcome: Progressing  Goal: Optimal Comfort and Wellbeing  Outcome: Progressing  Goal: Readiness for Transition of Care  Outcome: Progressing     Problem: Diabetes Comorbidity  Goal: Blood Glucose Level Within Targeted Range  Outcome: Progressing  Intervention: Monitor and Manage Glycemia  Flowsheets (Taken 9/5/2024 2339)  Glycemic Management:   blood glucose monitored   oral hydration promoted   supplemental insulin given     Problem: Infection  Goal: Absence of Infection Signs and Symptoms  Outcome: Progressing  Intervention: Prevent or Manage Infection  Flowsheets (Taken 9/5/2024 2339)  Isolation Precautions:   precautions maintained   airborne

## 2024-09-06 NOTE — SUBJECTIVE & OBJECTIVE
Interval History: NA trending up doing well     Review of Systems   Respiratory:  Negative for shortness of breath.    Cardiovascular:  Negative for chest pain.   Gastrointestinal:  Negative for abdominal pain, nausea and vomiting.   Neurological:  Positive for weakness.   Psychiatric/Behavioral:  Negative for agitation, confusion and hallucinations.    All other systems reviewed and are negative.    Objective:     Vital Signs (Most Recent):  Temp: 97.9 °F (36.6 °C) (09/06/24 0716)  Pulse: 77 (09/06/24 0900)  Resp: 20 (09/06/24 0900)  BP: 98/60 (09/06/24 0716)  SpO2: 98 % (09/06/24 0900) Vital Signs (24h Range):  Temp:  [97.9 °F (36.6 °C)-98.3 °F (36.8 °C)] 97.9 °F (36.6 °C)  Pulse:  [] 77  Resp:  [18-22] 20  SpO2:  [94 %-98 %] 98 %  BP: ()/(60-64) 98/60     Weight: 64 kg (141 lb)  Body mass index is 22.08 kg/m².    Intake/Output Summary (Last 24 hours) at 9/6/2024 1208  Last data filed at 9/6/2024 0800  Gross per 24 hour   Intake 1260 ml   Output 1750 ml   Net -490 ml         Physical Exam  Constitutional:       Appearance: She is not ill-appearing.   HENT:      Mouth/Throat:      Mouth: Mucous membranes are dry.   Eyes:      Extraocular Movements: Extraocular movements intact.      Pupils: Pupils are equal, round, and reactive to light.   Cardiovascular:      Rate and Rhythm: Regular rhythm. Tachycardia present.   Pulmonary:      Effort: Pulmonary effort is normal.      Breath sounds: No wheezing or rhonchi.   Abdominal:      General: Abdomen is flat.      Palpations: Abdomen is soft.      Tenderness: There is no abdominal tenderness.   Musculoskeletal:         General: Normal range of motion.      Right lower leg: No edema.      Left lower leg: No edema.   Skin:     General: Skin is warm and dry.   Neurological:      Mental Status: She is alert. Mental status is at baseline.      Motor: Weakness present.   Psychiatric:         Mood and Affect: Mood normal.             Significant Labs: All pertinent  labs within the past 24 hours have been reviewed.  Recent Lab Results  (Last 5 results in the past 24 hours)        09/06/24  1116   09/06/24  0552   09/06/24  0524   09/05/24 2059 09/05/24  1703        Anion Gap     7           BUN     23           BUN/CREAT RATIO     26           Calcium     8.5           Chloride     84           CO2     36           Creatinine     0.87           eGFR     63           Glucose     153           POC Glucose 198   139     322   276       Potassium     3.4           Sodium     124                                  Significant Imaging: I have reviewed all pertinent imaging results/findings within the past 24 hours.

## 2024-09-06 NOTE — PLAN OF CARE
Problem: Adult Inpatient Plan of Care  Goal: Plan of Care Review  9/6/2024 1325 by Sussy Hickey RN  Outcome: Progressing  9/6/2024 1324 by Sussy Hickey RN  Outcome: Progressing  Goal: Patient-Specific Goal (Individualized)  9/6/2024 1325 by Sussy Hickey RN  Outcome: Progressing  9/6/2024 1324 by Sussy Hickey RN  Outcome: Progressing  Goal: Absence of Hospital-Acquired Illness or Injury  9/6/2024 1325 by Sussy Hickey RN  Outcome: Progressing  9/6/2024 1324 by Sussy Hickey RN  Outcome: Progressing  Goal: Optimal Comfort and Wellbeing  9/6/2024 1325 by Sussy Hickey RN  Outcome: Progressing  9/6/2024 1324 by Sussy Hickey RN  Outcome: Progressing  Goal: Readiness for Transition of Care  9/6/2024 1325 by Sussy Hickey RN  Outcome: Progressing  9/6/2024 1324 by Sussy Hickey RN  Outcome: Progressing

## 2024-09-06 NOTE — HOSPITAL COURSE
9/6 NA continues to trend up with fluid restriction     9/9 Doing well, off O2.  Alert and awake.  Rechecking NA today.  Possible DC back in am.     9/10 NA trending up to 125.  Can continue her home Na tabs.  DC to NH.

## 2024-09-06 NOTE — PLAN OF CARE
Problem: Physical Therapy  Goal: Physical Therapy Goal  Description: STG:  Patient will perform all bed mobility with mod I  Patient will perform sit to stand and SPT with Mod assist    LTG  Patient will perform sit to stand and SPT with min assist  Patient will perform 15 min of LE exercise without rest break to increase LE strength to +3/5.     Outcome: Progressing

## 2024-09-06 NOTE — PLAN OF CARE
Problem: Occupational Therapy  Goal: Occupational Therapy Goal  Description: STG: within 1 week  Pt will perform grooming with setup at bedside  Pt will bathe UB with SBA, LB with mod a  Pt will perform UE dressing with SBA  Pt will perform LE dressing with mod a  Pt will sit EOB x 15 min with SVN assistance  Pt will transfer bed/chair/bsc with Min a  Pt will perform standing task x 2 min with CGA assistance  Pt will tolerate 30 minutes of tx without fatigue      LT.Restore to max I with self care and mobility.    Outcome: Progressing

## 2024-09-06 NOTE — PT/OT/SLP EVAL
Physical Therapy Evaluation    Patient Name:  Joyce Dow   MRN:  [id number]    Recommendations:     Discharge Recommendations: Low Intensity Therapy   Discharge Equipment Recommendations:     Barriers to discharge: None    Assessment:     Joyce Dow is a 89 y.o. female admitted with a medical diagnosis of Hyponatremia.  She presents with the following impairments/functional limitations: weakness, impaired endurance, impaired self care skills, impaired functional mobility Patient is a NH resident admitted for Covid.  She reports she feels weaker than prior to covid but she was only performing assisted SPT bed to wheelchair at the NH prior to admission. I recommend a 2 week trial to improve her safety with bed to wheelchair transfers and then determine if further PT is indicated.    Rehab Prognosis: Fair; patient would benefit from acute skilled PT services to address these deficits and reach maximum level of function.    Recent Surgery: * No surgery found *      Plan:     During this hospitalization, patient to be seen 5 x/week to address the identified rehab impairments via gait training, therapeutic activities, therapeutic exercises and progress toward the following goals:    Plan of Care Expires:  09/20/24    Subjective     Chief Complaint: weak  Patient/Family Comments/goals: return to SNF  Pain/Comfort:  Pain Rating 1: 0/10  Pain Rating Post-Intervention 1: 0/10    Patients cultural, spiritual, Oriental orthodox conflicts given the current situation: no    Living Environment:  Patient is a SNF resident  Prior to admission, patients level of function was assisted bed to chair transfers.  Equipment used at home:  .  DME owned (not currently used): none.  Upon discharge, patient will have assistance from NH staff.    Objective:     Communicated with nurse prior to session.  Patient found supine with telemetry  upon PT entry to room.    General Precautions: Standard, fall  Orthopedic Precautions:    Braces:     Respiratory Status: Room air    Exams:  RLE ROM: WFL  RLE Strength: 3/5  LLE ROM: WFL  LLE Strength: 3/5    Functional Mobility:  Bed Mobility:     Supine to Sit: minimum assistance  Sit to Supine: minimum assistance  Transfers:     Sit to Stand:  maximal assistance with no AD      AM-PAC 6 CLICK MOBILITY  Total Score:12       Treatment & Education:  Eval only. Educated on POC    Patient left supine with all lines intact, call button in reach, and bed alarm on.    GOALS:   Multidisciplinary Problems       Physical Therapy Goals          Problem: Physical Therapy    Goal Priority Disciplines Outcome Goal Variances Interventions   Physical Therapy Goal     PT, PT/OT Progressing     Description: STG:  Patient will perform all bed mobility with mod I  Patient will perform sit to stand and SPT with Mod assist    LTG  Patient will perform sit to stand and SPT with min assist  Patient will perform 15 min of LE exercise without rest break to increase LE strength to +3/5.                          History:     Past Medical History:   Diagnosis Date    Allergic rhinitis     Anticoagulant long-term use     Anxiety disorder, unspecified     Arthritis     Chronic pain     Depression     GERD (gastroesophageal reflux disease)     hyperlipidemia     Hypertension     Hyponatremia     Hypoxemia     Insomnia     Mood disorder     Neuropathy     Rhinitis     Thyroid disease     Type 2 diabetes mellitus     Urinary tract infection, site not specified        No past surgical history on file.    Time Tracking:     PT Received On: 09/06/24  PT Start Time: 1004     PT Stop Time: 1017  PT Total Time (min): 13 min     Billable Minutes: Evaluation 15      09/06/2024

## 2024-09-06 NOTE — PROGRESS NOTES
[facility] Medicine  Progress Note    Patient Name: Joyce Dow  MRN: [id number]  Patient Class: IP- Swing   Admission Date: 9/4/2024  Length of Stay: 2 days  Attending Physician: Darinel Kwok DO  Primary Care Provider: Walker Watkins MD        Subjective:     Principal Problem:Hyponatremia        HPI:  89 yo WF from NH admit with COVID and resp failure with possible PNA.  Resp wise she improved but her Na went down.  Suspect some polydipsia related.  Tried NS but numbers dropped.  Now on 1L fluid restriction.  Na is stable and slightly up.  She is awake and alert and talking.  Eating fine.  Remains on low dose O2 at night.      Overview/Hospital Course:  9/6 NA continues to trend up with fluid restriction     Interval History: NA trending up doing well     Review of Systems   Respiratory:  Negative for shortness of breath.    Cardiovascular:  Negative for chest pain.   Gastrointestinal:  Negative for abdominal pain, nausea and vomiting.   Neurological:  Positive for weakness.   Psychiatric/Behavioral:  Negative for agitation, confusion and hallucinations.    All other systems reviewed and are negative.    Objective:     Vital Signs (Most Recent):  Temp: 97.9 °F (36.6 °C) (09/06/24 0716)  Pulse: 77 (09/06/24 0900)  Resp: 20 (09/06/24 0900)  BP: 98/60 (09/06/24 0716)  SpO2: 98 % (09/06/24 0900) Vital Signs (24h Range):  Temp:  [97.9 °F (36.6 °C)-98.3 °F (36.8 °C)] 97.9 °F (36.6 °C)  Pulse:  [] 77  Resp:  [18-22] 20  SpO2:  [94 %-98 %] 98 %  BP: ()/(60-64) 98/60     Weight: 64 kg (141 lb)  Body mass index is 22.08 kg/m².    Intake/Output Summary (Last 24 hours) at 9/6/2024 1208  Last data filed at 9/6/2024 0800  Gross per 24 hour   Intake 1260 ml   Output 1750 ml   Net -490 ml         Physical Exam  Constitutional:       Appearance: She is not ill-appearing.   HENT:      Mouth/Throat:      Mouth: Mucous membranes are dry.   Eyes:      Extraocular  Movements: Extraocular movements intact.      Pupils: Pupils are equal, round, and reactive to light.   Cardiovascular:      Rate and Rhythm: Regular rhythm. Tachycardia present.   Pulmonary:      Effort: Pulmonary effort is normal.      Breath sounds: No wheezing or rhonchi.   Abdominal:      General: Abdomen is flat.      Palpations: Abdomen is soft.      Tenderness: There is no abdominal tenderness.   Musculoskeletal:         General: Normal range of motion.      Right lower leg: No edema.      Left lower leg: No edema.   Skin:     General: Skin is warm and dry.   Neurological:      Mental Status: She is alert. Mental status is at baseline.      Motor: Weakness present.   Psychiatric:         Mood and Affect: Mood normal.             Significant Labs: All pertinent labs within the past 24 hours have been reviewed.  Recent Lab Results  (Last 5 results in the past 24 hours)        09/06/24  1116   09/06/24  0552   09/06/24  0524   09/05/24  2059   09/05/24  1703        Anion Gap     7           BUN     23           BUN/CREAT RATIO     26           Calcium     8.5           Chloride     84           CO2     36           Creatinine     0.87           eGFR     63           Glucose     153           POC Glucose 198   139     322   276       Potassium     3.4           Sodium     124                                  Significant Imaging: I have reviewed all pertinent imaging results/findings within the past 24 hours.    Assessment/Plan:      * Hyponatremia  Hyponatremia is likely due to polydipsia. The patient's most recent sodium results are listed below.  Recent Labs     09/05/24  0520 09/05/24  1157 09/06/24  0524   * 122* 124*       Plan  - Correct the sodium by 4-6mEq in 24 hours.   - Obtain the following studies: .  - Will treat the hyponatremia with Fluid restriction of:  1 liter per day  - Monitor sodium Daily.   - Patient hyponatremia is stable  -     Acute hypoxemic respiratory failure  Continue to wean  O2.    Type 2 diabetes mellitus with hyperglycemia, without long-term current use of insulin  SSI and adjust as needed on steroids       HTN (hypertension)  Chronic, controlled. Latest blood pressure and vitals reviewed-     Temp:  [97 °F (36.1 °C)-98.2 °F (36.8 °C)]   Pulse:  []   Resp:  [16-22]   BP: (127-131)/(70-92)   SpO2:  [97 %-100 %] .   Home meds for hypertension were reviewed and noted below.   Hypertension Medications               diltiaZEM (CARDIZEM) 30 MG tablet Take 30 mg by mouth every 8 (eight) hours.    olmesartan (BENICAR) 40 MG tablet Take 40 mg by mouth once daily.            While in the hospital, will manage blood pressure as follows; Continue home antihypertensive regimen    Will utilize p.r.n. blood pressure medication only if patient's blood pressure greater than 180/110 and she develops symptoms such as worsening chest pain or shortness of breath.      VTE Risk Mitigation (From admission, onward)           Ordered     enoxaparin injection 40 mg  Daily         09/04/24 1701     IP VTE HIGH RISK PATIENT  Once         09/04/24 1701     Place MEHREEN hose  Until discontinued         09/04/24 1701                    Discharge Planning   JUANPABLO:      Code Status: DNR   Is the patient medically ready for discharge?:     Reason for patient still in hospital (select all that apply): Patient trending condition, Laboratory test, and Treatment  Discharge Plan A: Return to nursing home                  Darinel Kwok DO  Department of Hospital Medicine   [facility]

## 2024-09-06 NOTE — ASSESSMENT & PLAN NOTE
Hyponatremia is likely due to polydipsia. The patient's most recent sodium results are listed below.  Recent Labs     09/05/24  0520 09/05/24  1157 09/06/24  0524   * 122* 124*       Plan  - Correct the sodium by 4-6mEq in 24 hours.   - Obtain the following studies: .  - Will treat the hyponatremia with Fluid restriction of:  1 liter per day  - Monitor sodium Daily.   - Patient hyponatremia is stable  -

## 2024-09-07 LAB
GLUCOSE SERPL-MCNC: 137 MG/DL (ref 70–105)
GLUCOSE SERPL-MCNC: 181 MG/DL (ref 70–105)
GLUCOSE SERPL-MCNC: 222 MG/DL (ref 70–105)
GLUCOSE SERPL-MCNC: 251 MG/DL (ref 70–105)

## 2024-09-07 PROCEDURE — 63600175 PHARM REV CODE 636 W HCPCS: Performed by: HOSPITALIST

## 2024-09-07 PROCEDURE — 82962 GLUCOSE BLOOD TEST: CPT

## 2024-09-07 PROCEDURE — 27000207 HC ISOLATION

## 2024-09-07 PROCEDURE — 27000987 HC MATTRESS, MATRIX LOW PROFILE

## 2024-09-07 PROCEDURE — A4216 STERILE WATER/SALINE, 10 ML: HCPCS | Performed by: HOSPITALIST

## 2024-09-07 PROCEDURE — 25000003 PHARM REV CODE 250: Performed by: HOSPITALIST

## 2024-09-07 PROCEDURE — 11000004 HC SNF PRIVATE

## 2024-09-07 PROCEDURE — 63600175 PHARM REV CODE 636 W HCPCS: Performed by: NURSE PRACTITIONER

## 2024-09-07 PROCEDURE — 99900035 HC TECH TIME PER 15 MIN (STAT)

## 2024-09-07 PROCEDURE — 27000958

## 2024-09-07 RX ADMIN — DILTIAZEM HYDROCHLORIDE 60 MG: 60 TABLET, FILM COATED ORAL at 06:09

## 2024-09-07 RX ADMIN — Medication 10 ML: at 11:09

## 2024-09-07 RX ADMIN — ENOXAPARIN SODIUM 40 MG: 40 INJECTION SUBCUTANEOUS at 05:09

## 2024-09-07 RX ADMIN — DOXYCYCLINE HYCLATE 100 MG: 100 TABLET, FILM COATED ORAL at 08:09

## 2024-09-07 RX ADMIN — SENNOSIDES AND DOCUSATE SODIUM 1 TABLET: 50; 8.6 TABLET ORAL at 12:09

## 2024-09-07 RX ADMIN — DULOXETINE HYDROCHLORIDE 30 MG: 30 CAPSULE, DELAYED RELEASE ORAL at 08:09

## 2024-09-07 RX ADMIN — DOXYCYCLINE HYCLATE 100 MG: 100 TABLET, FILM COATED ORAL at 09:09

## 2024-09-07 RX ADMIN — DILTIAZEM HYDROCHLORIDE 60 MG: 60 TABLET, FILM COATED ORAL at 12:09

## 2024-09-07 RX ADMIN — SERTRALINE HYDROCHLORIDE 25 MG: 25 TABLET ORAL at 09:09

## 2024-09-07 RX ADMIN — DILTIAZEM HYDROCHLORIDE 60 MG: 60 TABLET, FILM COATED ORAL at 05:09

## 2024-09-07 RX ADMIN — DILTIAZEM HYDROCHLORIDE 60 MG: 60 TABLET, FILM COATED ORAL at 11:09

## 2024-09-07 RX ADMIN — PANTOPRAZOLE SODIUM 40 MG: 40 TABLET, DELAYED RELEASE ORAL at 09:09

## 2024-09-07 RX ADMIN — INSULIN ASPART 3 UNITS: 100 INJECTION, SOLUTION INTRAVENOUS; SUBCUTANEOUS at 05:09

## 2024-09-07 RX ADMIN — INSULIN ASPART 1 UNITS: 100 INJECTION, SOLUTION INTRAVENOUS; SUBCUTANEOUS at 09:09

## 2024-09-07 RX ADMIN — POTASSIUM BICARBONATE 20 MEQ: 391 TABLET, EFFERVESCENT ORAL at 09:09

## 2024-09-07 RX ADMIN — LEVOTHYROXINE SODIUM 50 MCG: 0.05 TABLET ORAL at 05:09

## 2024-09-07 RX ADMIN — PREDNISONE 20 MG: 20 TABLET ORAL at 09:09

## 2024-09-07 RX ADMIN — GABAPENTIN 100 MG: 100 CAPSULE ORAL at 05:09

## 2024-09-07 NOTE — PLAN OF CARE
Problem: Adult Inpatient Plan of Care  Goal: Plan of Care Review  Outcome: Progressing  Goal: Patient-Specific Goal (Individualized)  Outcome: Progressing  Goal: Absence of Hospital-Acquired Illness or Injury  Outcome: Progressing     Problem: Diabetes Comorbidity  Goal: Blood Glucose Level Within Targeted Range  Outcome: Progressing     Problem: Infection  Goal: Absence of Infection Signs and Symptoms  Outcome: Progressing     Problem: Fall Injury Risk  Goal: Absence of Fall and Fall-Related Injury  Outcome: Progressing

## 2024-09-08 LAB
GLUCOSE SERPL-MCNC: 150 MG/DL (ref 70–105)
GLUCOSE SERPL-MCNC: 164 MG/DL (ref 70–105)
GLUCOSE SERPL-MCNC: 209 MG/DL (ref 70–105)
GLUCOSE SERPL-MCNC: 212 MG/DL (ref 70–105)

## 2024-09-08 PROCEDURE — 27000207 HC ISOLATION

## 2024-09-08 PROCEDURE — 63600175 PHARM REV CODE 636 W HCPCS: Performed by: NURSE PRACTITIONER

## 2024-09-08 PROCEDURE — 25000003 PHARM REV CODE 250: Performed by: HOSPITALIST

## 2024-09-08 PROCEDURE — 82962 GLUCOSE BLOOD TEST: CPT

## 2024-09-08 PROCEDURE — 27000958

## 2024-09-08 PROCEDURE — 63600175 PHARM REV CODE 636 W HCPCS: Performed by: HOSPITALIST

## 2024-09-08 PROCEDURE — 11000004 HC SNF PRIVATE

## 2024-09-08 PROCEDURE — 27000987 HC MATTRESS, MATRIX LOW PROFILE

## 2024-09-08 PROCEDURE — 99900035 HC TECH TIME PER 15 MIN (STAT)

## 2024-09-08 RX ADMIN — POTASSIUM BICARBONATE 20 MEQ: 391 TABLET, EFFERVESCENT ORAL at 08:09

## 2024-09-08 RX ADMIN — INSULIN ASPART 2 UNITS: 100 INJECTION, SOLUTION INTRAVENOUS; SUBCUTANEOUS at 05:09

## 2024-09-08 RX ADMIN — INSULIN ASPART 1 UNITS: 100 INJECTION, SOLUTION INTRAVENOUS; SUBCUTANEOUS at 08:09

## 2024-09-08 RX ADMIN — LEVOTHYROXINE SODIUM 50 MCG: 0.05 TABLET ORAL at 05:09

## 2024-09-08 RX ADMIN — PANTOPRAZOLE SODIUM 40 MG: 40 TABLET, DELAYED RELEASE ORAL at 08:09

## 2024-09-08 RX ADMIN — DOXYCYCLINE HYCLATE 100 MG: 100 TABLET, FILM COATED ORAL at 08:09

## 2024-09-08 RX ADMIN — DILTIAZEM HYDROCHLORIDE 60 MG: 60 TABLET, FILM COATED ORAL at 05:09

## 2024-09-08 RX ADMIN — SERTRALINE HYDROCHLORIDE 25 MG: 25 TABLET ORAL at 08:09

## 2024-09-08 RX ADMIN — DULOXETINE HYDROCHLORIDE 30 MG: 30 CAPSULE, DELAYED RELEASE ORAL at 08:09

## 2024-09-08 RX ADMIN — PREDNISONE 20 MG: 20 TABLET ORAL at 08:09

## 2024-09-08 RX ADMIN — GABAPENTIN 100 MG: 100 CAPSULE ORAL at 05:09

## 2024-09-08 RX ADMIN — ENOXAPARIN SODIUM 40 MG: 40 INJECTION SUBCUTANEOUS at 05:09

## 2024-09-08 RX ADMIN — DILTIAZEM HYDROCHLORIDE 60 MG: 60 TABLET, FILM COATED ORAL at 06:09

## 2024-09-08 RX ADMIN — DILTIAZEM HYDROCHLORIDE 60 MG: 60 TABLET, FILM COATED ORAL at 12:09

## 2024-09-08 RX ADMIN — DILTIAZEM HYDROCHLORIDE 60 MG: 60 TABLET, FILM COATED ORAL at 11:09

## 2024-09-08 NOTE — PLAN OF CARE
Problem: Adult Inpatient Plan of Care  Goal: Plan of Care Review  Outcome: Progressing  Goal: Patient-Specific Goal (Individualized)  Outcome: Progressing  Goal: Absence of Hospital-Acquired Illness or Injury  Outcome: Progressing  Goal: Optimal Comfort and Wellbeing  Outcome: Progressing  Goal: Readiness for Transition of Care  Outcome: Progressing     Problem: Diabetes Comorbidity  Goal: Blood Glucose Level Within Targeted Range  Outcome: Progressing     Problem: Infection  Goal: Absence of Infection Signs and Symptoms  Outcome: Progressing     Problem: Fall Injury Risk  Goal: Absence of Fall and Fall-Related Injury  Outcome: Progressing     Problem: Skin Injury Risk Increased  Goal: Skin Health and Integrity  Outcome: Progressing     Problem: Breathing Pattern Ineffective  Goal: Effective Breathing Pattern  Outcome: Progressing     Problem: Gas Exchange Impaired  Goal: Optimal Gas Exchange  Outcome: Progressing

## 2024-09-09 LAB
ANION GAP SERPL CALCULATED.3IONS-SCNC: 5 MMOL/L (ref 7–16)
BUN SERPL-MCNC: 17 MG/DL (ref 7–18)
BUN/CREAT SERPL: 22 (ref 6–20)
CALCIUM SERPL-MCNC: 8.5 MG/DL (ref 8.5–10.1)
CHLORIDE SERPL-SCNC: 86 MMOL/L (ref 98–107)
CO2 SERPL-SCNC: 33 MMOL/L (ref 21–32)
CREAT SERPL-MCNC: 0.77 MG/DL (ref 0.55–1.02)
EGFR (NO RACE VARIABLE) (RUSH/TITUS): 73 ML/MIN/1.73M2
GLUCOSE SERPL-MCNC: 137 MG/DL (ref 70–105)
GLUCOSE SERPL-MCNC: 150 MG/DL (ref 74–106)
GLUCOSE SERPL-MCNC: 153 MG/DL (ref 70–105)
GLUCOSE SERPL-MCNC: 217 MG/DL (ref 70–105)
GLUCOSE SERPL-MCNC: 292 MG/DL (ref 70–105)
POTASSIUM SERPL-SCNC: 4 MMOL/L (ref 3.5–5.1)
SARS-COV-2 RDRP RESP QL NAA+PROBE: POSITIVE
SODIUM SERPL-SCNC: 120 MMOL/L (ref 136–145)

## 2024-09-09 PROCEDURE — 87635 SARS-COV-2 COVID-19 AMP PRB: CPT | Performed by: HOSPITALIST

## 2024-09-09 PROCEDURE — 97530 THERAPEUTIC ACTIVITIES: CPT

## 2024-09-09 PROCEDURE — 36415 COLL VENOUS BLD VENIPUNCTURE: CPT | Performed by: HOSPITALIST

## 2024-09-09 PROCEDURE — 27000958

## 2024-09-09 PROCEDURE — 27000987 HC MATTRESS, MATRIX LOW PROFILE

## 2024-09-09 PROCEDURE — 99308 SBSQ NF CARE LOW MDM 20: CPT | Mod: ,,, | Performed by: HOSPITALIST

## 2024-09-09 PROCEDURE — 63600175 PHARM REV CODE 636 W HCPCS: Performed by: NURSE PRACTITIONER

## 2024-09-09 PROCEDURE — 63600175 PHARM REV CODE 636 W HCPCS: Performed by: HOSPITALIST

## 2024-09-09 PROCEDURE — 82962 GLUCOSE BLOOD TEST: CPT

## 2024-09-09 PROCEDURE — 11000004 HC SNF PRIVATE

## 2024-09-09 PROCEDURE — 25000003 PHARM REV CODE 250: Performed by: HOSPITALIST

## 2024-09-09 PROCEDURE — 97535 SELF CARE MNGMENT TRAINING: CPT

## 2024-09-09 PROCEDURE — 80048 BASIC METABOLIC PNL TOTAL CA: CPT | Performed by: HOSPITALIST

## 2024-09-09 RX ORDER — SODIUM CHLORIDE 1 G/1
1000 TABLET ORAL 2 TIMES DAILY
Status: DISCONTINUED | OUTPATIENT
Start: 2024-09-09 | End: 2024-09-10 | Stop reason: HOSPADM

## 2024-09-09 RX ORDER — SODIUM CHLORIDE 1 G/1
1000 TABLET ORAL 2 TIMES DAILY
Status: DISCONTINUED | OUTPATIENT
Start: 2024-09-09 | End: 2024-09-09

## 2024-09-09 RX ADMIN — DILTIAZEM HYDROCHLORIDE 60 MG: 60 TABLET, FILM COATED ORAL at 12:09

## 2024-09-09 RX ADMIN — DILTIAZEM HYDROCHLORIDE 60 MG: 60 TABLET, FILM COATED ORAL at 05:09

## 2024-09-09 RX ADMIN — PANTOPRAZOLE SODIUM 40 MG: 40 TABLET, DELAYED RELEASE ORAL at 08:09

## 2024-09-09 RX ADMIN — PREDNISONE 20 MG: 20 TABLET ORAL at 08:09

## 2024-09-09 RX ADMIN — DOXYCYCLINE HYCLATE 100 MG: 100 TABLET, FILM COATED ORAL at 08:09

## 2024-09-09 RX ADMIN — GABAPENTIN 100 MG: 100 CAPSULE ORAL at 05:09

## 2024-09-09 RX ADMIN — LEVOTHYROXINE SODIUM 50 MCG: 0.05 TABLET ORAL at 05:09

## 2024-09-09 RX ADMIN — Medication 1000 MG: at 08:09

## 2024-09-09 RX ADMIN — Medication 1000 MG: at 02:09

## 2024-09-09 RX ADMIN — INSULIN ASPART 3 UNITS: 100 INJECTION, SOLUTION INTRAVENOUS; SUBCUTANEOUS at 05:09

## 2024-09-09 RX ADMIN — DULOXETINE HYDROCHLORIDE 30 MG: 30 CAPSULE, DELAYED RELEASE ORAL at 08:09

## 2024-09-09 RX ADMIN — ENOXAPARIN SODIUM 40 MG: 40 INJECTION SUBCUTANEOUS at 05:09

## 2024-09-09 RX ADMIN — SERTRALINE HYDROCHLORIDE 25 MG: 25 TABLET ORAL at 08:09

## 2024-09-09 NOTE — PLAN OF CARE
Problem: Adult Inpatient Plan of Care  Goal: Plan of Care Review  Outcome: Progressing  Goal: Patient-Specific Goal (Individualized)  Outcome: Progressing  Goal: Absence of Hospital-Acquired Illness or Injury  Outcome: Progressing  Goal: Optimal Comfort and Wellbeing  Outcome: Progressing     Problem: Diabetes Comorbidity  Goal: Blood Glucose Level Within Targeted Range  Outcome: Progressing     Problem: Infection  Goal: Absence of Infection Signs and Symptoms  Outcome: Progressing     Problem: Fall Injury Risk  Goal: Absence of Fall and Fall-Related Injury  Outcome: Progressing

## 2024-09-09 NOTE — PT/OT/SLP PROGRESS
Physical Therapy Treatment    Patient Name:  Joyce Dow   MRN:  [id number]    Recommendations:     Discharge Recommendations: Low Intensity Therapy  Discharge Equipment Recommendations: none  Barriers to discharge: None    Assessment:     Joyce Dow is a 89 y.o. female admitted with a medical diagnosis of Hyponatremia.  She presents with the following impairments/functional limitations: weakness, impaired endurance, impaired self care skills, impaired functional mobility, gait instability, impaired balance Patient reports she is feeling much better.    Rehab Prognosis: Good; patient would benefit from acute skilled PT services to address these deficits and reach maximum level of function.    Recent Surgery: * No surgery found *      Plan:     During this hospitalization, patient to be seen 5 x/week to address the identified rehab impairments via gait training, therapeutic activities, therapeutic exercises and progress toward the following goals:    Plan of Care Expires:  09/20/24    Subjective     Chief Complaint: weakness  Patient/Family Comments/goals: return to SNF  Pain/Comfort:  Pain Rating 1: 0/10  Pain Rating Post-Intervention 1: 0/10      Objective:     Communicated with nurse prior to session.  Patient found supine with telemetry upon PT entry to room.     General Precautions: Standard, fall, contact, airborne  Orthopedic Precautions:    Braces:    Respiratory Status: Room air     Functional Mobility:  Bed Mobility:     Supine to Sit: contact guard assistance  Transfers:     Sit to Stand:  moderate assistance with no AD  Gait: patient performed side stepping at bed x 4 steps each direction x 2 trials each with min to mod assist      AM-PAC 6 CLICK MOBILITY  Turning over in bed (including adjusting bedclothes, sheets and blankets)?: 3  Sitting down on and standing up from a chair with arms (e.g., wheelchair, bedside commode, etc.): 2  Moving from lying on back to sitting on the side of the  bed?: 3  Moving to and from a bed to a chair (including a wheelchair)?: 2  Need to walk in hospital room?: 2  Climbing 3-5 steps with a railing?: 1  Basic Mobility Total Score: 13       Treatment & Education:  AP, LAQ, seated march, hip abd/add x 30 reps each.     Patient left supine with all lines intact and call button in reach..    GOALS:   Multidisciplinary Problems       Physical Therapy Goals          Problem: Physical Therapy    Goal Priority Disciplines Outcome Goal Variances Interventions   Physical Therapy Goal     PT, PT/OT Progressing     Description: STG:  Patient will perform all bed mobility with mod I  Patient will perform sit to stand and SPT with Mod assist    LTG  Patient will perform sit to stand and SPT with min assist  Patient will perform 15 min of LE exercise without rest break to increase LE strength to +3/5.                          Time Tracking:     PT Received On: 09/09/24  PT Start Time: 1211     PT Stop Time: 1230  PT Total Time (min): 19 min     Billable Minutes: Therapeutic Activity 18    Treatment Type: Treatment  PT/PTA: PT     Number of PTA visits since last PT visit: 0     09/09/2024

## 2024-09-09 NOTE — PLAN OF CARE
Problem: Adult Inpatient Plan of Care  Goal: Optimal Comfort and Wellbeing  Outcome: Progressing     Problem: Diabetes Comorbidity  Goal: Blood Glucose Level Within Targeted Range  Outcome: Progressing     Problem: Fall Injury Risk  Goal: Absence of Fall and Fall-Related Injury  Outcome: Progressing     Problem: Skin Injury Risk Increased  Goal: Skin Health and Integrity  Outcome: Progressing

## 2024-09-09 NOTE — SUBJECTIVE & OBJECTIVE
Interval History: rechecking labs today, possible DC back in am.     Review of Systems   Respiratory:  Negative for shortness of breath.    Cardiovascular:  Negative for chest pain.   Gastrointestinal:  Negative for abdominal pain, nausea and vomiting.   Neurological:  Positive for weakness.   Psychiatric/Behavioral:  Negative for agitation and confusion.    All other systems reviewed and are negative.    Objective:     Vital Signs (Most Recent):  Temp: 97.9 °F (36.6 °C) (09/09/24 1131)  Pulse: (!) 30 (09/09/24 1131)  Resp: 18 (09/09/24 1131)  BP: 105/65 (09/09/24 1131)  SpO2: 96 % (09/09/24 1131) Vital Signs (24h Range):  Temp:  [97.5 °F (36.4 °C)-98.1 °F (36.7 °C)] 97.9 °F (36.6 °C)  Pulse:  [] 30  Resp:  [18-20] 18  SpO2:  [95 %-98 %] 96 %  BP: (101-123)/(64-73) 105/65     Weight: 64 kg (141 lb)  Body mass index is 22.08 kg/m².    Intake/Output Summary (Last 24 hours) at 9/9/2024 1206  Last data filed at 9/9/2024 0638  Gross per 24 hour   Intake 360 ml   Output 1800 ml   Net -1440 ml         Physical Exam  Constitutional:       Appearance: She is not ill-appearing.   HENT:      Mouth/Throat:      Mouth: Mucous membranes are dry.   Eyes:      Extraocular Movements: Extraocular movements intact.      Pupils: Pupils are equal, round, and reactive to light.   Cardiovascular:      Rate and Rhythm: Regular rhythm. Tachycardia present.   Pulmonary:      Effort: Pulmonary effort is normal.      Breath sounds: No wheezing or rhonchi.   Abdominal:      General: Abdomen is flat.      Palpations: Abdomen is soft.      Tenderness: There is no abdominal tenderness.   Musculoskeletal:         General: Normal range of motion.      Right lower leg: No edema.      Left lower leg: No edema.   Skin:     General: Skin is warm and dry.   Neurological:      Mental Status: She is alert. Mental status is at baseline.      Motor: Weakness present.   Psychiatric:         Mood and Affect: Mood normal.             Significant Labs: All  pertinent labs within the past 24 hours have been reviewed.  Recent Lab Results         09/09/24  1120   09/09/24  0544   09/08/24  1944   09/08/24  1704        POC Glucose 153   137   212   209               Significant Imaging: I have reviewed all pertinent imaging results/findings within the past 24 hours.

## 2024-09-09 NOTE — ASSESSMENT & PLAN NOTE
Hyponatremia is likely due to polydipsia. The patient's most recent sodium results are listed below.  Recent Labs     09/09/24  1156   *       Plan  - Correct the sodium by 4-6mEq in 24 hours.   - Obtain the following studies: .  - Will treat the hyponatremia with Fluid restriction of:  1 liter per day  - Monitor sodium Daily.   - Patient hyponatremia is stable  -  NA back down.  May need to consider some salt PO intake

## 2024-09-09 NOTE — PROGRESS NOTES
[facility] Hospital Medicine  Progress Note    Patient Name: Joyce Dow  MRN: [id number]  Patient Class: IP- Swing   Admission Date: 9/4/2024  Length of Stay: 5 days  Attending Physician: Darinel Kwok DO  Primary Care Provider: Walker Watkins MD        Subjective:     Principal Problem:Hyponatremia    [facility] Hospital Admission Certification    I certify that skilled nursing facility services are required to be given on an inpatient basis due to the following required skilled nursing and/or skilled rehabilitation services on a continuing basis:    management & evaluation of a patient plan of care that requires skilled nursing or rehabilitation services    I estimate that the additional period of SNF inpatient care will be 1-21 days.    Plans for post SNF care are: Facility Care  ______________________________________________________________________        HPI:  89 yo WF from NH admit with COVID and resp failure with possible PNA.  Resp wise she improved but her Na went down.  Suspect some polydipsia related.  Tried NS but numbers dropped.  Now on 1L fluid restriction.  Na is stable and slightly up.  She is awake and alert and talking.  Eating fine.  Remains on low dose O2 at night.      Overview/Hospital Course:  9/6 NA continues to trend up with fluid restriction     9/9 Doing well, off O2.  Alert and awake.  Rechecking NA today.  Possible DC back in am.     Interval History: rechecking labs today, possible DC back in am.     Review of Systems   Respiratory:  Negative for shortness of breath.    Cardiovascular:  Negative for chest pain.   Gastrointestinal:  Negative for abdominal pain, nausea and vomiting.   Neurological:  Positive for weakness.   Psychiatric/Behavioral:  Negative for agitation and confusion.    All other systems reviewed and are negative.    Objective:     Vital Signs (Most Recent):  Temp: 97.9 °F (36.6 °C)  (09/09/24 1131)  Pulse: (!) 30 (09/09/24 1131)  Resp: 18 (09/09/24 1131)  BP: 105/65 (09/09/24 1131)  SpO2: 96 % (09/09/24 1131) Vital Signs (24h Range):  Temp:  [97.5 °F (36.4 °C)-98.1 °F (36.7 °C)] 97.9 °F (36.6 °C)  Pulse:  [] 30  Resp:  [18-20] 18  SpO2:  [95 %-98 %] 96 %  BP: (101-123)/(64-73) 105/65     Weight: 64 kg (141 lb)  Body mass index is 22.08 kg/m².    Intake/Output Summary (Last 24 hours) at 9/9/2024 1206  Last data filed at 9/9/2024 0638  Gross per 24 hour   Intake 360 ml   Output 1800 ml   Net -1440 ml         Physical Exam  Constitutional:       Appearance: She is not ill-appearing.   HENT:      Mouth/Throat:      Mouth: Mucous membranes are dry.   Eyes:      Extraocular Movements: Extraocular movements intact.      Pupils: Pupils are equal, round, and reactive to light.   Cardiovascular:      Rate and Rhythm: Regular rhythm. Tachycardia present.   Pulmonary:      Effort: Pulmonary effort is normal.      Breath sounds: No wheezing or rhonchi.   Abdominal:      General: Abdomen is flat.      Palpations: Abdomen is soft.      Tenderness: There is no abdominal tenderness.   Musculoskeletal:         General: Normal range of motion.      Right lower leg: No edema.      Left lower leg: No edema.   Skin:     General: Skin is warm and dry.   Neurological:      Mental Status: She is alert. Mental status is at baseline.      Motor: Weakness present.   Psychiatric:         Mood and Affect: Mood normal.             Significant Labs: All pertinent labs within the past 24 hours have been reviewed.  Recent Lab Results         09/09/24  1120   09/09/24  0544   09/08/24  1944   09/08/24  1704        POC Glucose 153   137   212   209               Significant Imaging: I have reviewed all pertinent imaging results/findings within the past 24 hours.    Assessment/Plan:      * Hyponatremia  Hyponatremia is likely due to polydipsia. The patient's most recent sodium results are listed below.  Recent Labs      09/09/24  1156   *       Plan  - Correct the sodium by 4-6mEq in 24 hours.   - Obtain the following studies: .  - Will treat the hyponatremia with Fluid restriction of:  1 liter per day  - Monitor sodium Daily.   - Patient hyponatremia is stable  -  NA back down.  May need to consider some salt PO intake     Acute hypoxemic respiratory failure  Continue to wean O2.    Type 2 diabetes mellitus with hyperglycemia, without long-term current use of insulin  SSI and adjust as needed on steroids       HTN (hypertension)  Chronic, controlled. Latest blood pressure and vitals reviewed-     Temp:  [97 °F (36.1 °C)-98.2 °F (36.8 °C)]   Pulse:  []   Resp:  [16-22]   BP: (127-131)/(70-92)   SpO2:  [97 %-100 %] .   Home meds for hypertension were reviewed and noted below.   Hypertension Medications               diltiaZEM (CARDIZEM) 30 MG tablet Take 30 mg by mouth every 8 (eight) hours.    olmesartan (BENICAR) 40 MG tablet Take 40 mg by mouth once daily.            While in the hospital, will manage blood pressure as follows; Continue home antihypertensive regimen    Will utilize p.r.n. blood pressure medication only if patient's blood pressure greater than 180/110 and she develops symptoms such as worsening chest pain or shortness of breath.      VTE Risk Mitigation (From admission, onward)           Ordered     enoxaparin injection 40 mg  Daily         09/04/24 1701     IP VTE HIGH RISK PATIENT  Once         09/04/24 1701     Place MEHREEN hose  Until discontinued         09/04/24 1701                    Discharge Planning   JUANPABLO: 9/10/2024     Code Status: DNR   Is the patient medically ready for discharge?:     Reason for patient still in hospital (select all that apply): Laboratory test  Discharge Plan A: Return to nursing home                  Darinel Kwok DO  Department of Hospital Medicine   [facility]

## 2024-09-09 NOTE — PLAN OF CARE
[facility] updated on patient's plan of care. If patient's labs improve, plan to dc tomorrow, 9/10.

## 2024-09-09 NOTE — PLAN OF CARE
Problem: Occupational Therapy  Goal: Occupational Therapy Goal  Description: STG: within 1 week  Pt will perform grooming with setup at bedside ONGOING/PROGRESSING  Pt will bathe UB with SBA, LB with mod a ONGOING/PROGRESSING  Pt will perform UE dressing with SBA ONGOING/PROGRESSING  Pt will perform LE dressing with mod a ONGOING/PROGRESSING  Pt will sit EOB x 15 min with SVN assistance ONGOING/PROGRESSING  Pt will transfer bed/chair/bsc with Min a ONGOING/PROGRESSING  Pt will perform standing task x 2 min with CGA assistance ONGOING/PROGRESSING  Pt will tolerate 30 minutes of tx without fatigue ONGOING/PROGRESSING      LT.Restore to max I with self care and mobility.    Outcome: Progressing

## 2024-09-09 NOTE — PT/OT/SLP PROGRESS
Occupational Therapy  Treatment    Joyce Dow   MRN: [id number]   Admitting Diagnosis: Hyponatremia    OT Date of Treatment: 09/09/24   OT Start Time: 0941  OT Stop Time: 1001  OT Total Time (min): 20 min    Billable Minutes:  Self Care/Home Management 20    OT/DANIEL: OT          General Precautions: Standard, fall, contact, airborne  Orthopedic Precautions:    Braces: N/A  Respiratory Status: Room air         Subjective:  Communicated with pt prior to session.  No new complaints       Objective:        Functional Mobility:  Bed Mobility: min a overall with rolling side to side and from sit to supine and supine to sit       Transfers: see bed mobility        Functional Ambulation: none with OT today    Activities of Daily Living:     Feeding adaptive equipment:  none needed     UE adaptive equipment: none at this time     LE adaptive equipment: none at this time                    Bathing adaptive equipment: none at this time    Balance:   Static Sit: FAIR-: Maintains without assist but inconsistent   Dynamic Sit: FAIR: Cannot move trunk without losing balance  Static Stand: not attempted today  Dynamic stand: not attempted today    Therapeutic Activities and Exercises:  Upon OT arriving to assist pt up to EOB, she was found to have very liquid bowel movement in brief.  OT assisted pt in getting cleaned up and fresh brief replaced, requiring OT to cue her in rolling side to side (min a) several times.  Pericare carefully completed due to catheter.  Nursing notified of pt loose bowel status and need of looking at reddened area at top of coccyx.  Upon getting cleaned up in derek area, pt was able to sit up on EOB for 5 min to comb hair and wash face with just SBA and setup overall, needing a little extra time for thorough completion.  Due to OT having scheduled meeting at 10am, OT  assisted pt back to HOB elevated position for safety at ending of session with pt.  Pt in good mood and smiling upon OT leaving  "her.    AM-PAC 6 CLICK ADL   How much help from another person does this patient currently need?   1 = Unable, Total/Dependent Assistance  2 = A lot, Maximum/Moderate Assistance  3 = A little, Minimum/Contact Guard/Supervision  4 = None, Modified [place]/Independent    Putting on and taking off regular lower body clothing? : 2  Bathing (including washing, rinsing, drying)?: 2  Toileting, which includes using toilet, bedpan, or urinal? : 2  Putting on and taking off regular upper body clothing?: 3  Taking care of personal grooming such as brushing teeth?: 3  Eating meals?: 4  Daily Activity Total Score: 16     AM-PAC Raw Score CMS "G-Code Modifier Level of Impairment Assistance   6 % Total / Unable   7 - 8 CM 80 - 100% Maximal Assist   9-13 CL 60 - 80% Moderate Assist   14 - 19 CK 40 - 60% Moderate Assist   20 - 22 CJ 20 - 40% Minimal Assist   23 CI 1-20% SBA / CGA   24 CH 0% Independent/ Mod I       Patient left HOB elevated with call button in reach    ASSESSMENT:  Joyce Dow is a 89 y.o. female with a medical diagnosis of Hyponatremia and presents with no new complaints.    Rehab identified problem list/impairments:  weakness, impaired endurance, impaired self care skills, impaired functional mobility, gait instability, impaired balance, visual deficits, impaired cardiopulmonary response to activity    Rehab potential is good.    Activity tolerance: Good    Discharge recommendations: Low Intensity Therapy   Barriers to discharge: Barriers to Discharge: None    Equipment recommendations: wheelchair    GOALS:   Multidisciplinary Problems       Occupational Therapy Goals          Problem: Occupational Therapy    Goal Priority Disciplines Outcome Interventions   Occupational Therapy Goal     OT, PT/OT Progressing    Description: STG: within 1 week  Pt will perform grooming with setup at bedside ONGOING/PROGRESSING  Pt will bathe UB with SBA, LB with mod a ONGOING/PROGRESSING  Pt will perform UE " dressing with SBA ONGOING/PROGRESSING  Pt will perform LE dressing with mod a ONGOING/PROGRESSING  Pt will sit EOB x 15 min with SVN assistance ONGOING/PROGRESSING  Pt will transfer bed/chair/bsc with Min a ONGOING/PROGRESSING  Pt will perform standing task x 2 min with CGA assistance ONGOING/PROGRESSING  Pt will tolerate 30 minutes of tx without fatigue ONGOING/PROGRESSING      LT.Restore to max I with self care and mobility.                         Plan:  Patient to be seen 5 x/week to address the above listed problems via self-care/home management, therapeutic activities, therapeutic exercises, neuromuscular re-education, wheelchair management/training  Plan of Care expires: 24  Plan of Care reviewed with: patient         2024

## 2024-09-10 VITALS
BODY MASS INDEX: 21.97 KG/M2 | TEMPERATURE: 97 F | WEIGHT: 140 LBS | SYSTOLIC BLOOD PRESSURE: 119 MMHG | OXYGEN SATURATION: 95 % | DIASTOLIC BLOOD PRESSURE: 76 MMHG | RESPIRATION RATE: 16 BRPM | HEIGHT: 67 IN | HEART RATE: 112 BPM

## 2024-09-10 DIAGNOSIS — U07.1 COVID-19 VIRUS DETECTED: ICD-10-CM

## 2024-09-10 PROBLEM — J96.01 ACUTE HYPOXEMIC RESPIRATORY FAILURE: Status: RESOLVED | Noted: 2024-08-31 | Resolved: 2024-09-10

## 2024-09-10 LAB
ANION GAP SERPL CALCULATED.3IONS-SCNC: 9 MMOL/L (ref 7–16)
BUN SERPL-MCNC: 19 MG/DL (ref 7–18)
BUN/CREAT SERPL: 27 (ref 6–20)
CALCIUM SERPL-MCNC: 8.5 MG/DL (ref 8.5–10.1)
CHLORIDE SERPL-SCNC: 89 MMOL/L (ref 98–107)
CO2 SERPL-SCNC: 31 MMOL/L (ref 21–32)
CREAT SERPL-MCNC: 0.71 MG/DL (ref 0.55–1.02)
EGFR (NO RACE VARIABLE) (RUSH/TITUS): 80 ML/MIN/1.73M2
GLUCOSE SERPL-MCNC: 124 MG/DL (ref 70–105)
GLUCOSE SERPL-MCNC: 130 MG/DL (ref 74–106)
POTASSIUM SERPL-SCNC: 4 MMOL/L (ref 3.5–5.1)
SODIUM SERPL-SCNC: 125 MMOL/L (ref 136–145)

## 2024-09-10 PROCEDURE — 99316 NF DSCHRG MGMT 30 MIN+: CPT | Mod: ,,, | Performed by: HOSPITALIST

## 2024-09-10 PROCEDURE — 99900035 HC TECH TIME PER 15 MIN (STAT)

## 2024-09-10 PROCEDURE — 25000003 PHARM REV CODE 250: Performed by: HOSPITALIST

## 2024-09-10 PROCEDURE — 36415 COLL VENOUS BLD VENIPUNCTURE: CPT | Performed by: HOSPITALIST

## 2024-09-10 PROCEDURE — 82962 GLUCOSE BLOOD TEST: CPT

## 2024-09-10 PROCEDURE — 80048 BASIC METABOLIC PNL TOTAL CA: CPT | Performed by: HOSPITALIST

## 2024-09-10 RX ORDER — DILTIAZEM HYDROCHLORIDE 240 MG/1
240 CAPSULE, COATED, EXTENDED RELEASE ORAL DAILY
Qty: 30 CAPSULE | Refills: 11 | Status: SHIPPED | OUTPATIENT
Start: 2024-09-10 | End: 2025-09-10

## 2024-09-10 RX ADMIN — Medication 1000 MG: at 08:09

## 2024-09-10 RX ADMIN — DULOXETINE HYDROCHLORIDE 30 MG: 30 CAPSULE, DELAYED RELEASE ORAL at 08:09

## 2024-09-10 RX ADMIN — SERTRALINE HYDROCHLORIDE 25 MG: 25 TABLET ORAL at 08:09

## 2024-09-10 RX ADMIN — DILTIAZEM HYDROCHLORIDE 60 MG: 60 TABLET, FILM COATED ORAL at 12:09

## 2024-09-10 RX ADMIN — LEVOTHYROXINE SODIUM 50 MCG: 0.05 TABLET ORAL at 05:09

## 2024-09-10 RX ADMIN — DILTIAZEM HYDROCHLORIDE 60 MG: 60 TABLET, FILM COATED ORAL at 05:09

## 2024-09-10 RX ADMIN — PANTOPRAZOLE SODIUM 40 MG: 40 TABLET, DELAYED RELEASE ORAL at 08:09

## 2024-09-10 NOTE — NURSING
Pt showed no signs of distress. Denies pain. Paperwork given to transport (NIKHIL Lew). Pt assisted x2 into the SUV front seat. Skin intact. Left facility at 1400.

## 2024-09-10 NOTE — PLAN OF CARE
Problem: Adult Inpatient Plan of Care  Goal: Plan of Care Review  Outcome: Adequate for Care Transition  Goal: Patient-Specific Goal (Individualized)  Outcome: Adequate for Care Transition  Goal: Absence of Hospital-Acquired Illness or Injury  Outcome: Adequate for Care Transition  Goal: Optimal Comfort and Wellbeing  Outcome: Adequate for Care Transition     Problem: Diabetes Comorbidity  Goal: Blood Glucose Level Within Targeted Range  Outcome: Adequate for Care Transition     Problem: Infection  Goal: Absence of Infection Signs and Symptoms  Outcome: Adequate for Care Transition     Problem: Fall Injury Risk  Goal: Absence of Fall and Fall-Related Injury  Outcome: Adequate for Care Transition     Problem: Skin Injury Risk Increased  Goal: Skin Health and Integrity  Outcome: Adequate for Care Transition     Problem: Breathing Pattern Ineffective  Goal: Effective Breathing Pattern  Outcome: Adequate for Care Transition     Problem: Gas Exchange Impaired  Goal: Optimal Gas Exchange  Outcome: Adequate for Care Transition     Problem: Violence Risk or Actual  Goal: Anger and Impulse Control  Outcome: Adequate for Care Transition

## 2024-09-10 NOTE — DISCHARGE SUMMARY
[facility] Medicine  Discharge Summary      Patient Name: Joyce Dow  MRN: [id number]  GUY: [id number]  Patient Class: IP- Swing  Admission Date: 9/4/2024  Hospital Length of Stay: 6 days  Discharge Date and Time:  09/10/2024 11:24 AM  Attending Physician: Johann Kwok DO   Discharging Provider: Johann Kwok DO  Primary Care Provider: Walker Watkins MD    Primary Care Team: Networked reference to record PCT     HPI:   89 yo WF from NH admit with COVID and resp failure with possible PNA.  Resp wise she improved but her Na went down.  Suspect some polydipsia related.  Tried NS but numbers dropped.  Now on 1L fluid restriction.  Na is stable and slightly up.  She is awake and alert and talking.  Eating fine.  Remains on low dose O2 at night.      * No surgery found *      Hospital Course:   9/6 NA continues to trend up with fluid restriction     9/9 Doing well, off O2.  Alert and awake.  Rechecking NA today.  Possible DC back in am.     9/10 NA trending up to 125.  Can continue her home Na tabs.  DC to NH.     Goals of Care Treatment Preferences:  Code Status: DNR      SDOH Screening:  The patient was screened for utility difficulties, food insecurity, transport difficulties, housing insecurity, and interpersonal safety and there were no concerns identified this admission.     Consults:   Consults (From admission, onward)          Status Ordering Provider     Inpatient consult to Registered Dietitian/Nutritionist  Once        Provider:  (Not yet assigned)    Completed JOHANN KWOK            Endocrine  * Hyponatremia  Hyponatremia is likely due to polydipsia. The patient's most recent sodium results are listed below.  Recent Labs     09/09/24  1156 09/10/24  0507   * 125*       Plan  - Correct the sodium by 4-6mEq in 24 hours.   - Obtain the following studies: .  - Will treat the hyponatremia with Fluid restriction of:  1 liter per day  - Monitor  sodium Daily.   - Patient hyponatremia is stable  -  NA back down.  May need to consider some salt PO intake       Final Active Diagnoses:    Diagnosis Date Noted POA    PRINCIPAL PROBLEM:  Hyponatremia [E87.1] 05/09/2023 Yes     Chronic    Type 2 diabetes mellitus with hyperglycemia, without long-term current use of insulin [E11.65] 08/31/2024 Yes    HTN (hypertension) [I10] 05/10/2023 Yes      Problems Resolved During this Admission:    Diagnosis Date Noted Date Resolved POA    Acute hypoxemic respiratory failure [J96.01] 08/31/2024 09/10/2024 Yes       Discharged Condition: good    Disposition: Skilled Nursing Facility    Follow Up:    Patient Instructions:   No discharge procedures on file.    Significant Diagnostic Studies: Labs: BMP:   Recent Labs   Lab 09/09/24  1156 09/10/24  0507   * 130*   * 125*   K 4.0 4.0   CL 86* 89*   CO2 33* 31   BUN 17 19*   CREATININE 0.77 0.71   CALCIUM 8.5 8.5       Pending Diagnostic Studies:       None           Medications:  Reconciled Home Medications:      Medication List        START taking these medications      diltiaZEM 240 MG 24 hr capsule  Commonly known as: CARDIZEM CD  Take 1 capsule (240 mg total) by mouth once daily.  Replaces: diltiaZEM 30 MG tablet            CONTINUE taking these medications      cetirizine 10 MG tablet  Commonly known as: ZYRTEC  Take 10 mg by mouth once daily.     DULoxetine 30 MG capsule  Commonly known as: CYMBALTA  Take 30 mg by mouth once daily. 9am     fluticasone propionate 50 mcg/actuation nasal spray  Commonly known as: FLONASE  1 spray by Each Nostril route once daily. 9am     * gabapentin 300 MG capsule  Commonly known as: NEURONTIN  Take 300 mg by mouth every evening.     * gabapentin 100 MG capsule  Commonly known as: NEURONTIN  Take 100 mg by mouth Daily.     levothyroxine 50 MCG tablet  Commonly known as: SYNTHROID  Take 50 mcg by mouth before breakfast.     * metFORMIN 500 mg 24hr tablet  Commonly known as:  FORTAMET  Take 1,000 mg by mouth daily with breakfast.     * metFORMIN 500 MG tablet  Commonly known as: GLUCOPHAGE  Take 500 mg by mouth every evening.     olmesartan 40 MG tablet  Commonly known as: BENICAR  Take 40 mg by mouth once daily.     pantoprazole 40 MG tablet  Commonly known as: PROTONIX  Take 40 mg by mouth once daily.     polyethylene glycol 17 gram Pwpk  Commonly known as: GLYCOLAX  Take 17 g by mouth once daily. Mix in 4 oz H20     PRESERVISION AREDS-2 250-90-40-1 mg Cap  Generic drug: vit C,T-Sk-ptczz-lutein-zeaxan  SMARTSI Tablet(s) By Mouth Morning-Night     sertraline 25 MG tablet  Commonly known as: ZOLOFT  Take 25 mg by mouth once daily. morning     THERMOTABS 287-180-15 mg Tab  Generic drug: sod.chlorid-potassium chloride  Take by mouth 3 (three) times daily.     trazodone 25 MG tablet  Commonly known as: DESYREL  Take by mouth every evening.           * This list has 4 medication(s) that are the same as other medications prescribed for you. Read the directions carefully, and ask your doctor or other care provider to review them with you.                STOP taking these medications      acetaminophen 500 MG tablet  Commonly known as: TYLENOL     acetaminophen 650 MG Supp  Commonly known as: TYLENOL     BEANO ORAL     DESITIN TOP     diltiaZEM 30 MG tablet  Commonly known as: CARDIZEM  Replaced by: diltiaZEM 240 MG 24 hr capsule     DULCOLAX (BISACODYL) RECT     ferrous sulfate 325 (65 FE) MG EC tablet     HYDROcodone-acetaminophen 5-325 mg per tablet  Commonly known as: NORCO     magnesium 250 mg Tab     magnesium hydroxide 400 mg/5 ml 400 mg/5 mL Susp  Commonly known as: MILK OF MAGNESIA     meclizine 25 mg tablet  Commonly known as: ANTIVERT     meloxicam 7.5 MG tablet  Commonly known as: MOBIC     MYLANTA ORAL     ondansetron 4 MG tablet  Commonly known as: ZOFRAN     sennosides 8.8 mg/5 ml 8.8 mg/5 mL syrup  Commonly known as: SENOKOT     simethicone 80 MG chewable tablet  Commonly  known as: MYLICON     simvastatin 40 MG tablet  Commonly known as: ZOCOR     vits A and D-white pet-lanolin ointment              Indwelling Lines/Drains at time of discharge:   Lines/Drains/Airways       None                   Time spent on the discharge of patient: 33 minutes         Darinel Kwok DO  Department of Hospital Medicine  [facility]

## 2024-09-10 NOTE — PLAN OF CARE
[facility]  Discharge Final Note    Primary Care Provider: Walker Watkins MD    Expected Discharge Date: 9/10/2024    Final Discharge Note (most recent)       Final Note - 09/10/24 1151          Final Note    Assessment Type Final Discharge Note     Anticipated Discharge Disposition Another Health Care Institution Not Defined   nursing    What phone number can be called within the next 1-3 days to see how you are doing after discharge? [phone]     Hospital Resources/Appts/Education Provided Provided patient/caregiver with written discharge plan information        Post-Acute Status    Post-Acute Authorization Placement     Post-Acute Placement Status Set-up Complete/Auth obtained     Patient choice form signed by patient/caregiver List with quality metrics by geographic area provided;List from CMS Compare;List from System Post-Acute Care     Discharge Delays None known at this time                     Important Message from Medicare  Important Message from Medicare regarding Discharge Appeal Rights: Given to patient/caregiver, Explained to patient/caregiver, Signed/date by patient/caregiver     Date IMM was signed: 09/10/24  Time IMM was signed: 1140

## 2024-09-10 NOTE — PLAN OF CARE
Problem: Adult Inpatient Plan of Care  Goal: Plan of Care Review  9/10/2024 1208 by Sanna Villa RN  Outcome: Met  9/10/2024 1207 by Sanna Villa RN  Outcome: Adequate for Care Transition  Goal: Patient-Specific Goal (Individualized)  9/10/2024 1208 by Sanna Villa RN  Outcome: Met  9/10/2024 1207 by Sanna Villa RN  Outcome: Adequate for Care Transition  Goal: Absence of Hospital-Acquired Illness or Injury  9/10/2024 1208 by Sanna Villa RN  Outcome: Met  9/10/2024 1207 by Sanna Villa RN  Outcome: Adequate for Care Transition  Goal: Optimal Comfort and Wellbeing  9/10/2024 1208 by Sanna Villa RN  Outcome: Met  9/10/2024 1207 by Sanna Villa RN  Outcome: Adequate for Care Transition     Problem: Diabetes Comorbidity  Goal: Blood Glucose Level Within Targeted Range  9/10/2024 1208 by Sanna Villa RN  Outcome: Met  9/10/2024 1207 by Sanna Villa RN  Outcome: Adequate for Care Transition     Problem: Infection  Goal: Absence of Infection Signs and Symptoms  9/10/2024 1208 by Sanna Villa RN  Outcome: Met  9/10/2024 1207 by Sanna Villa RN  Outcome: Adequate for Care Transition     Problem: Fall Injury Risk  Goal: Absence of Fall and Fall-Related Injury  9/10/2024 1208 by Sanna Villa RN  Outcome: Met  9/10/2024 1207 by Sanna Villa RN  Outcome: Adequate for Care Transition     Problem: Skin Injury Risk Increased  Goal: Skin Health and Integrity  9/10/2024 1208 by Sanna Villa RN  Outcome: Met  9/10/2024 1207 by Sanna Villa RN  Outcome: Adequate for Care Transition     Problem: Breathing Pattern Ineffective  Goal: Effective Breathing Pattern  9/10/2024 1208 by Sanna Villa RN  Outcome: Met  9/10/2024 1207 by Sanna Villa RN  Outcome: Adequate for Care Transition     Problem: Gas Exchange Impaired  Goal: Optimal Gas Exchange  9/10/2024 1208 by Sanna Villa RN  Outcome: Met  9/10/2024 1207 by Merchant  JAYLEN Isidro  Outcome: Adequate for Care Transition     Problem: Violence Risk or Actual  Goal: Anger and Impulse Control  9/10/2024 1208 by Sanna Villa RN  Outcome: Met  9/10/2024 1207 by Sanna Villa, RN  Outcome: Adequate for Care Transition

## 2024-09-10 NOTE — NURSING
During routine check by CNA, found diaper to be wet.  The wetness indicator of the diaper had turned fully blue, indicating a fully wet diaper.  This is the first void after concepcion removed this afternoon.

## 2024-09-10 NOTE — ASSESSMENT & PLAN NOTE
Hyponatremia is likely due to polydipsia. The patient's most recent sodium results are listed below.  Recent Labs     09/09/24  1156 09/10/24  0507   * 125*       Plan  - Correct the sodium by 4-6mEq in 24 hours.   - Obtain the following studies: .  - Will treat the hyponatremia with Fluid restriction of:  1 liter per day  - Monitor sodium Daily.   - Patient hyponatremia is stable  -  NA back down.  May need to consider some salt PO intake

## 2024-09-10 NOTE — NURSING
TB signs and symptoms sheet and COVID lab results faxed to [facility] @ [phone].

## 2024-09-10 NOTE — PT/OT/SLP DISCHARGE
Occupational Therapy Discharge Summary    Joyce Dow  MRN: [id number]   Principal Problem: Hyponatremia      Patient Discharged from acute Occupational Therapy on 9/10/2024.  Please refer to prior OT note dated 9/10/2024 for functional status.    Assessment:      Goals partially met.    Objective:     GOALS:   Multidisciplinary Problems       Occupational Therapy Goals          Problem: Occupational Therapy    Goal Priority Disciplines Outcome Interventions   Occupational Therapy Goal     OT, PT/OT Progressing    Description: STG: within 1 week  Pt will perform grooming with setup at bedside ONGOING/PROGRESSING  Pt will bathe UB with SBA, LB with mod a ONGOING/PROGRESSING  Pt will perform UE dressing with SBA ONGOING/PROGRESSING  Pt will perform LE dressing with mod a ONGOING/PROGRESSING  Pt will sit EOB x 15 min with SVN assistance ONGOING/PROGRESSING  Pt will transfer bed/chair/bsc with Min a ONGOING/PROGRESSING  Pt will perform standing task x 2 min with CGA assistance ONGOING/PROGRESSING  Pt will tolerate 30 minutes of tx without fatigue ONGOING/PROGRESSING      LT.Restore to max I with self care and mobility.                         Reasons for Discontinuation of Therapy Services  Transfer to alternate level of care.      Plan:     Patient Discharged to: Skilled Nursing Facility and LTC    9/10/2024

## 2024-09-10 NOTE — PLAN OF CARE
Problem: Adult Inpatient Plan of Care  Goal: Optimal Comfort and Wellbeing  Outcome: Progressing  Goal: Readiness for Transition of Care  Outcome: Met

## 2024-11-14 ENCOUNTER — LAB REQUISITION (OUTPATIENT)
Dept: LAB | Facility: HOSPITAL | Age: 89
End: 2024-11-14
Attending: INTERNAL MEDICINE
Payer: MEDICARE

## 2024-11-14 DIAGNOSIS — I11.9 HYPERTENSIVE HEART DISEASE WITHOUT HEART FAILURE: ICD-10-CM

## 2024-11-14 DIAGNOSIS — E03.9 HYPOTHYROIDISM, UNSPECIFIED: ICD-10-CM

## 2024-11-14 DIAGNOSIS — E11.8 TYPE 2 DIABETES MELLITUS WITH UNSPECIFIED COMPLICATIONS: ICD-10-CM

## 2024-11-20 LAB
ALBUMIN SERPL BCP-MCNC: 3.3 G/DL (ref 3.4–4.8)
ALBUMIN/GLOB SERPL: 1.3 {RATIO}
ALP SERPL-CCNC: 75 U/L (ref 40–150)
ALT SERPL W P-5'-P-CCNC: <7 U/L (ref 0–55)
ANION GAP SERPL CALCULATED.3IONS-SCNC: 13 MMOL/L (ref 7–16)
AST SERPL W P-5'-P-CCNC: 14 U/L (ref 5–34)
BASOPHILS # BLD AUTO: 0.03 K/UL (ref 0–0.2)
BASOPHILS NFR BLD AUTO: 0.5 % (ref 0–1)
BILIRUB SERPL-MCNC: 0.4 MG/DL
BUN SERPL-MCNC: 14 MG/DL (ref 10–20)
BUN/CREAT SERPL: 20 (ref 6–20)
CALCIUM SERPL-MCNC: 9.7 MG/DL (ref 8.4–10.2)
CHLORIDE SERPL-SCNC: 104 MMOL/L (ref 98–111)
CO2 SERPL-SCNC: 24 MMOL/L (ref 23–31)
CREAT SERPL-MCNC: 0.7 MG/DL (ref 0.55–1.02)
DIFFERENTIAL METHOD BLD: ABNORMAL
EGFR (NO RACE VARIABLE) (RUSH/TITUS): 82 ML/MIN/1.73M2
EOSINOPHIL # BLD AUTO: 0.09 K/UL (ref 0–0.5)
EOSINOPHIL NFR BLD AUTO: 1.6 % (ref 1–4)
ERYTHROCYTE [DISTWIDTH] IN BLOOD BY AUTOMATED COUNT: 16.3 % (ref 11.5–14.5)
EST. AVERAGE GLUCOSE BLD GHB EST-MCNC: 126 MG/DL
GLOBULIN SER-MCNC: 2.5 G/DL (ref 2–4)
GLUCOSE SERPL-MCNC: 113 MG/DL (ref 75–121)
HBA1C MFR BLD HPLC: 6 %
HCT VFR BLD AUTO: 34.4 % (ref 38–47)
HGB BLD-MCNC: 10.9 G/DL (ref 12–16)
LYMPHOCYTES # BLD AUTO: 2.07 K/UL (ref 1–4.8)
LYMPHOCYTES NFR BLD AUTO: 36.8 % (ref 27–41)
MCH RBC QN AUTO: 28.5 PG (ref 27–31)
MCHC RBC AUTO-ENTMCNC: 31.7 G/DL (ref 32–36)
MCV RBC AUTO: 90.1 FL (ref 80–96)
MONOCYTES # BLD AUTO: 0.55 K/UL (ref 0–0.8)
MONOCYTES NFR BLD AUTO: 9.8 % (ref 2–6)
MPC BLD CALC-MCNC: 11 FL (ref 9.4–12.4)
NEUTROPHILS # BLD AUTO: 2.89 K/UL (ref 1.8–7.7)
NEUTROPHILS NFR BLD AUTO: 51.3 % (ref 53–65)
PLATELET # BLD AUTO: 243 K/UL (ref 150–400)
POTASSIUM SERPL-SCNC: 4 MMOL/L (ref 3.5–5.1)
PROT SERPL-MCNC: 5.8 G/DL (ref 5.8–7.6)
RBC # BLD AUTO: 3.82 M/UL (ref 4.2–5.4)
SODIUM SERPL-SCNC: 137 MMOL/L (ref 136–145)
TSH SERPL DL<=0.005 MIU/L-ACNC: 1.3 UIU/ML (ref 0.35–4.94)
WBC # BLD AUTO: 5.63 K/UL (ref 4.5–11)

## 2024-11-20 PROCEDURE — 85025 COMPLETE CBC W/AUTO DIFF WBC: CPT | Performed by: INTERNAL MEDICINE

## 2024-11-20 PROCEDURE — 83036 HEMOGLOBIN GLYCOSYLATED A1C: CPT | Performed by: INTERNAL MEDICINE

## 2024-11-20 PROCEDURE — 80053 COMPREHEN METABOLIC PANEL: CPT | Performed by: INTERNAL MEDICINE

## 2024-11-20 PROCEDURE — 84443 ASSAY THYROID STIM HORMONE: CPT | Performed by: INTERNAL MEDICINE

## 2025-02-13 ENCOUNTER — LAB REQUISITION (OUTPATIENT)
Dept: LAB | Facility: HOSPITAL | Age: OVER 89
End: 2025-02-13
Attending: INTERNAL MEDICINE
Payer: MEDICARE

## 2025-02-13 DIAGNOSIS — E11.8 TYPE 2 DIABETES MELLITUS WITH UNSPECIFIED COMPLICATIONS: ICD-10-CM

## 2025-02-13 DIAGNOSIS — I11.9 HYPERTENSIVE HEART DISEASE WITHOUT HEART FAILURE: ICD-10-CM

## 2025-02-19 LAB
ALBUMIN SERPL BCP-MCNC: 3 G/DL (ref 3.4–4.8)
ALBUMIN/GLOB SERPL: 1.3 {RATIO}
ALP SERPL-CCNC: 62 U/L (ref 40–150)
ALT SERPL W P-5'-P-CCNC: <7 U/L
ANION GAP SERPL CALCULATED.3IONS-SCNC: 13 MMOL/L (ref 7–16)
AST SERPL W P-5'-P-CCNC: 25 U/L (ref 5–34)
BASOPHILS # BLD AUTO: 0.04 K/UL (ref 0–0.2)
BASOPHILS NFR BLD AUTO: 0.6 % (ref 0–1)
BILIRUB SERPL-MCNC: 0.4 MG/DL
BUN SERPL-MCNC: 10 MG/DL (ref 10–20)
BUN/CREAT SERPL: 14 (ref 6–20)
CALCIUM SERPL-MCNC: 9.1 MG/DL (ref 8.4–10.2)
CHLORIDE SERPL-SCNC: 103 MMOL/L (ref 98–111)
CO2 SERPL-SCNC: 24 MMOL/L (ref 23–31)
CREAT SERPL-MCNC: 0.7 MG/DL (ref 0.55–1.02)
DIFFERENTIAL METHOD BLD: ABNORMAL
EGFR (NO RACE VARIABLE) (RUSH/TITUS): 82 ML/MIN/1.73M2
EOSINOPHIL # BLD AUTO: 0.14 K/UL (ref 0–0.5)
EOSINOPHIL NFR BLD AUTO: 2.1 % (ref 1–4)
ERYTHROCYTE [DISTWIDTH] IN BLOOD BY AUTOMATED COUNT: 14.1 % (ref 11.5–14.5)
EST. AVERAGE GLUCOSE BLD GHB EST-MCNC: 117 MG/DL
GLOBULIN SER-MCNC: 2.3 G/DL (ref 2–4)
GLUCOSE SERPL-MCNC: 81 MG/DL (ref 75–121)
HBA1C MFR BLD HPLC: 5.7 %
HCT VFR BLD AUTO: 33.3 % (ref 38–47)
HGB BLD-MCNC: 10.4 G/DL (ref 12–16)
LYMPHOCYTES # BLD AUTO: 2.33 K/UL (ref 1–4.8)
LYMPHOCYTES NFR BLD AUTO: 35.1 % (ref 27–41)
MCH RBC QN AUTO: 28.6 PG (ref 27–31)
MCHC RBC AUTO-ENTMCNC: 31.2 G/DL (ref 32–36)
MCV RBC AUTO: 91.5 FL (ref 80–96)
MONOCYTES # BLD AUTO: 0.67 K/UL (ref 0–0.8)
MONOCYTES NFR BLD AUTO: 10.1 % (ref 2–6)
MPC BLD CALC-MCNC: 10.8 FL (ref 9.4–12.4)
NEUTROPHILS # BLD AUTO: 3.45 K/UL (ref 1.8–7.7)
NEUTROPHILS NFR BLD AUTO: 52.1 % (ref 53–65)
PLATELET # BLD AUTO: 212 K/UL (ref 150–400)
POTASSIUM SERPL-SCNC: 4.2 MMOL/L (ref 3.5–5.1)
PROT SERPL-MCNC: 5.3 G/DL (ref 5.8–7.6)
RBC # BLD AUTO: 3.64 M/UL (ref 4.2–5.4)
SODIUM SERPL-SCNC: 136 MMOL/L (ref 136–145)
WBC # BLD AUTO: 6.63 K/UL (ref 4.5–11)

## 2025-02-19 PROCEDURE — 85025 COMPLETE CBC W/AUTO DIFF WBC: CPT | Performed by: INTERNAL MEDICINE

## 2025-02-19 PROCEDURE — 80053 COMPREHEN METABOLIC PANEL: CPT | Performed by: INTERNAL MEDICINE

## 2025-02-19 PROCEDURE — 83036 HEMOGLOBIN GLYCOSYLATED A1C: CPT | Performed by: INTERNAL MEDICINE

## 2025-02-23 ENCOUNTER — HOSPITAL ENCOUNTER (OUTPATIENT)
Facility: HOSPITAL | Age: OVER 89
Discharge: NURSING, REHAB OR LONG-TERM CARE | End: 2025-02-24
Attending: HOSPITALIST | Admitting: HOSPITALIST
Payer: MEDICARE

## 2025-02-23 DIAGNOSIS — R06.02 SOB (SHORTNESS OF BREATH): ICD-10-CM

## 2025-02-23 DIAGNOSIS — R06.03 RESPIRATORY DISTRESS: ICD-10-CM

## 2025-02-23 DIAGNOSIS — J10.1 INFLUENZA A: Primary | ICD-10-CM

## 2025-02-23 LAB
ALBUMIN SERPL BCP-MCNC: 3.2 G/DL (ref 3.4–4.8)
ALBUMIN/GLOB SERPL: 1 {RATIO}
ALP SERPL-CCNC: 83 U/L (ref 40–150)
ALT SERPL W P-5'-P-CCNC: 9 U/L
ANION GAP SERPL CALCULATED.3IONS-SCNC: 17 MMOL/L (ref 7–16)
AST SERPL W P-5'-P-CCNC: 29 U/L (ref 5–34)
BASOPHILS # BLD AUTO: 0.04 K/UL (ref 0–0.2)
BASOPHILS NFR BLD AUTO: 0.4 % (ref 0–1)
BILIRUB SERPL-MCNC: 0.5 MG/DL
BUN SERPL-MCNC: 10 MG/DL (ref 10–20)
BUN/CREAT SERPL: 12 (ref 6–20)
CALCIUM SERPL-MCNC: 8.9 MG/DL (ref 8.4–10.2)
CHLORIDE SERPL-SCNC: 101 MMOL/L (ref 98–111)
CO2 SERPL-SCNC: 21 MMOL/L (ref 23–31)
CREAT SERPL-MCNC: 0.83 MG/DL (ref 0.55–1.02)
DIFFERENTIAL METHOD BLD: ABNORMAL
EGFR (NO RACE VARIABLE) (RUSH/TITUS): 67 ML/MIN/1.73M2
EOSINOPHIL # BLD AUTO: 0.22 K/UL (ref 0–0.5)
EOSINOPHIL NFR BLD AUTO: 2.1 % (ref 1–4)
ERYTHROCYTE [DISTWIDTH] IN BLOOD BY AUTOMATED COUNT: 14.1 % (ref 11.5–14.5)
GLOBULIN SER-MCNC: 3.2 G/DL (ref 2–4)
GLUCOSE SERPL-MCNC: 149 MG/DL (ref 70–105)
GLUCOSE SERPL-MCNC: 175 MG/DL (ref 70–105)
GLUCOSE SERPL-MCNC: 176 MG/DL (ref 70–105)
GLUCOSE SERPL-MCNC: 230 MG/DL (ref 70–105)
GLUCOSE SERPL-MCNC: 259 MG/DL (ref 75–121)
GLUCOSE SERPL-MCNC: 270 MG/DL (ref 70–105)
HCO3 UR-SCNC: 23.2 MMOL/L (ref 21–28)
HCT VFR BLD AUTO: 38.7 % (ref 38–47)
HGB BLD-MCNC: 12 G/DL (ref 12–16)
INFLUENZA A MOLECULAR (OHS): POSITIVE
INFLUENZA B MOLECULAR (OHS): NEGATIVE
LACTATE SERPL-SCNC: 1.8 MMOL/L (ref 0.5–2.2)
LACTATE SERPL-SCNC: 3.2 MMOL/L (ref 0.5–2.2)
LACTATE SERPL-SCNC: 3.9 MMOL/L (ref 0.5–2.2)
LYMPHOCYTES # BLD AUTO: 2.68 K/UL (ref 1–4.8)
LYMPHOCYTES NFR BLD AUTO: 25.1 % (ref 27–41)
MAGNESIUM SERPL-MCNC: 1.6 MG/DL (ref 1.6–2.6)
MCH RBC QN AUTO: 28.5 PG (ref 27–31)
MCHC RBC AUTO-ENTMCNC: 31 G/DL (ref 32–36)
MCV RBC AUTO: 91.9 FL (ref 80–96)
MONOCYTES # BLD AUTO: 0.74 K/UL (ref 0–0.8)
MONOCYTES NFR BLD AUTO: 6.9 % (ref 2–6)
MPC BLD CALC-MCNC: 9.8 FL (ref 9.4–12.4)
NEUTROPHILS # BLD AUTO: 7 K/UL (ref 1.8–7.7)
NEUTROPHILS NFR BLD AUTO: 65.5 % (ref 53–65)
PCO2 BLDA: 45 MMHG (ref 35–48)
PH SMN: 7.32 [PH] (ref 7.35–7.45)
PLATELET # BLD AUTO: 267 K/UL (ref 150–400)
PO2 BLDA: 99 MMHG (ref 83–108)
POC BASE EXCESS: -3 MMOL/L (ref -2–3)
POC SATURATED O2: 97 %
POTASSIUM SERPL-SCNC: 3.9 MMOL/L (ref 3.5–5.1)
PROT SERPL-MCNC: 6.4 G/DL (ref 5.8–7.6)
RBC # BLD AUTO: 4.21 M/UL (ref 4.2–5.4)
SARS-COV-2 RDRP RESP QL NAA+PROBE: NEGATIVE
SODIUM SERPL-SCNC: 135 MMOL/L (ref 136–145)
TROPONIN I SERPL HS-MCNC: 24.6 NG/L
TROPONIN I SERPL HS-MCNC: 76.1 NG/L
WBC # BLD AUTO: 10.68 K/UL (ref 4.5–11)

## 2025-02-23 PROCEDURE — 99285 EMERGENCY DEPT VISIT HI MDM: CPT | Mod: 25

## 2025-02-23 PROCEDURE — 99285 EMERGENCY DEPT VISIT HI MDM: CPT | Mod: EDII,,, | Performed by: NURSE PRACTITIONER

## 2025-02-23 PROCEDURE — 36415 COLL VENOUS BLD VENIPUNCTURE: CPT | Performed by: NURSE PRACTITIONER

## 2025-02-23 PROCEDURE — 93010 ELECTROCARDIOGRAM REPORT: CPT | Mod: ,,, | Performed by: HOSPITALIST

## 2025-02-23 PROCEDURE — 87502 INFLUENZA DNA AMP PROBE: CPT | Performed by: NURSE PRACTITIONER

## 2025-02-23 PROCEDURE — 87149 DNA/RNA DIRECT PROBE: CPT | Performed by: NURSE PRACTITIONER

## 2025-02-23 PROCEDURE — 85025 COMPLETE CBC W/AUTO DIFF WBC: CPT | Performed by: NURSE PRACTITIONER

## 2025-02-23 PROCEDURE — 83735 ASSAY OF MAGNESIUM: CPT | Performed by: NURSE PRACTITIONER

## 2025-02-23 PROCEDURE — 27000958

## 2025-02-23 PROCEDURE — 99900035 HC TECH TIME PER 15 MIN (STAT)

## 2025-02-23 PROCEDURE — 82962 GLUCOSE BLOOD TEST: CPT

## 2025-02-23 PROCEDURE — 25000003 PHARM REV CODE 250: Performed by: NURSE PRACTITIONER

## 2025-02-23 PROCEDURE — 96361 HYDRATE IV INFUSION ADD-ON: CPT

## 2025-02-23 PROCEDURE — 87635 SARS-COV-2 COVID-19 AMP PRB: CPT | Performed by: NURSE PRACTITIONER

## 2025-02-23 PROCEDURE — 87040 BLOOD CULTURE FOR BACTERIA: CPT | Mod: 91 | Performed by: NURSE PRACTITIONER

## 2025-02-23 PROCEDURE — 80053 COMPREHEN METABOLIC PANEL: CPT | Performed by: NURSE PRACTITIONER

## 2025-02-23 PROCEDURE — 27000987 HC MATTRESS, MATRIX LOW PROFILE

## 2025-02-23 PROCEDURE — 82803 BLOOD GASES ANY COMBINATION: CPT

## 2025-02-23 PROCEDURE — 93005 ELECTROCARDIOGRAM TRACING: CPT

## 2025-02-23 PROCEDURE — 63600175 PHARM REV CODE 636 W HCPCS: Performed by: NURSE PRACTITIONER

## 2025-02-23 PROCEDURE — 27000221 HC OXYGEN, UP TO 24 HOURS

## 2025-02-23 PROCEDURE — G0378 HOSPITAL OBSERVATION PER HR: HCPCS

## 2025-02-23 PROCEDURE — 84484 ASSAY OF TROPONIN QUANT: CPT | Mod: 91 | Performed by: NURSE PRACTITIONER

## 2025-02-23 PROCEDURE — 96372 THER/PROPH/DIAG INJ SC/IM: CPT | Performed by: NURSE PRACTITIONER

## 2025-02-23 PROCEDURE — 25000242 PHARM REV CODE 250 ALT 637 W/ HCPCS: Performed by: NURSE PRACTITIONER

## 2025-02-23 PROCEDURE — 94640 AIRWAY INHALATION TREATMENT: CPT

## 2025-02-23 PROCEDURE — 83605 ASSAY OF LACTIC ACID: CPT | Mod: 91 | Performed by: NURSE PRACTITIONER

## 2025-02-23 PROCEDURE — 94799 UNLISTED PULMONARY SVC/PX: CPT

## 2025-02-23 RX ORDER — OSELTAMIVIR PHOSPHATE 75 MG/1
75 CAPSULE ORAL NIGHTLY
Status: DISCONTINUED | OUTPATIENT
Start: 2025-02-23 | End: 2025-02-24

## 2025-02-23 RX ORDER — RIVASTIGMINE TARTRATE 1.5 MG/1
1.5 CAPSULE ORAL 2 TIMES DAILY
COMMUNITY

## 2025-02-23 RX ORDER — LEVOTHYROXINE SODIUM 50 UG/1
50 TABLET ORAL
Status: DISCONTINUED | OUTPATIENT
Start: 2025-02-24 | End: 2025-02-24 | Stop reason: HOSPADM

## 2025-02-23 RX ORDER — GLUCAGON 1 MG
1 KIT INJECTION
Status: DISCONTINUED | OUTPATIENT
Start: 2025-02-23 | End: 2025-02-24 | Stop reason: HOSPADM

## 2025-02-23 RX ORDER — TRAZODONE HYDROCHLORIDE 50 MG/1
50 TABLET ORAL NIGHTLY PRN
Status: DISCONTINUED | OUTPATIENT
Start: 2025-02-23 | End: 2025-02-24 | Stop reason: HOSPADM

## 2025-02-23 RX ORDER — SODIUM CHLORIDE 9 MG/ML
1000 INJECTION, SOLUTION INTRAVENOUS CONTINUOUS
Status: ACTIVE | OUTPATIENT
Start: 2025-02-23 | End: 2025-02-24

## 2025-02-23 RX ORDER — IBUPROFEN 200 MG
16 TABLET ORAL
Status: DISCONTINUED | OUTPATIENT
Start: 2025-02-23 | End: 2025-02-24 | Stop reason: HOSPADM

## 2025-02-23 RX ORDER — ONDANSETRON 4 MG/1
4 TABLET, FILM COATED ORAL EVERY 8 HOURS PRN
COMMUNITY

## 2025-02-23 RX ORDER — GABAPENTIN 100 MG/1
100 CAPSULE ORAL DAILY
Status: DISCONTINUED | OUTPATIENT
Start: 2025-02-24 | End: 2025-02-24 | Stop reason: HOSPADM

## 2025-02-23 RX ORDER — ENOXAPARIN SODIUM 100 MG/ML
40 INJECTION SUBCUTANEOUS EVERY 24 HOURS
Status: DISCONTINUED | OUTPATIENT
Start: 2025-02-23 | End: 2025-02-24 | Stop reason: HOSPADM

## 2025-02-23 RX ORDER — SERTRALINE HYDROCHLORIDE 25 MG/1
25 TABLET, FILM COATED ORAL DAILY
Status: DISCONTINUED | OUTPATIENT
Start: 2025-02-24 | End: 2025-02-24 | Stop reason: HOSPADM

## 2025-02-23 RX ORDER — LOPERAMIDE HYDROCHLORIDE 2 MG/1
4 CAPSULE ORAL ONCE
Status: DISCONTINUED | OUTPATIENT
Start: 2025-02-23 | End: 2025-02-23

## 2025-02-23 RX ORDER — DILTIAZEM HYDROCHLORIDE 180 MG/1
180 CAPSULE, COATED, EXTENDED RELEASE ORAL DAILY
Status: DISCONTINUED | OUTPATIENT
Start: 2025-02-24 | End: 2025-02-24 | Stop reason: HOSPADM

## 2025-02-23 RX ORDER — METHYLPREDNISOLONE SOD SUCC 125 MG
125 VIAL (EA) INJECTION DAILY
Status: DISCONTINUED | OUTPATIENT
Start: 2025-02-24 | End: 2025-02-24 | Stop reason: HOSPADM

## 2025-02-23 RX ORDER — CETIRIZINE HYDROCHLORIDE 10 MG/1
10 TABLET ORAL DAILY
Status: DISCONTINUED | OUTPATIENT
Start: 2025-02-24 | End: 2025-02-24 | Stop reason: HOSPADM

## 2025-02-23 RX ORDER — LOSARTAN POTASSIUM 50 MG/1
100 TABLET ORAL DAILY
Status: DISCONTINUED | OUTPATIENT
Start: 2025-02-24 | End: 2025-02-24 | Stop reason: HOSPADM

## 2025-02-23 RX ORDER — POLYVINYL ALCOHOL 14 MG/ML
1 SOLUTION/ DROPS OPHTHALMIC
Status: DISCONTINUED | OUTPATIENT
Start: 2025-02-23 | End: 2025-02-24

## 2025-02-23 RX ORDER — ACETAMINOPHEN 650 MG/1
SUPPOSITORY RECTAL
COMMUNITY

## 2025-02-23 RX ORDER — BUDESONIDE 0.5 MG/2ML
0.5 INHALANT ORAL EVERY 12 HOURS
Status: DISCONTINUED | OUTPATIENT
Start: 2025-02-23 | End: 2025-02-24 | Stop reason: HOSPADM

## 2025-02-23 RX ORDER — LOPERAMIDE HCL 2 MG
2 TABLET ORAL 4 TIMES DAILY PRN
COMMUNITY

## 2025-02-23 RX ORDER — POLYETHYLENE GLYCOL 3350 17 G/17G
17 POWDER, FOR SOLUTION ORAL 2 TIMES DAILY PRN
Status: DISCONTINUED | OUTPATIENT
Start: 2025-02-23 | End: 2025-02-24 | Stop reason: HOSPADM

## 2025-02-23 RX ORDER — MELATONIN 3 MG
CAPSULE ORAL
COMMUNITY

## 2025-02-23 RX ORDER — GABAPENTIN 300 MG/1
300 CAPSULE ORAL NIGHTLY
Status: DISCONTINUED | OUTPATIENT
Start: 2025-02-23 | End: 2025-02-24 | Stop reason: HOSPADM

## 2025-02-23 RX ORDER — ACETAMINOPHEN 325 MG/1
650 TABLET ORAL EVERY 8 HOURS PRN
Status: DISCONTINUED | OUTPATIENT
Start: 2025-02-23 | End: 2025-02-24 | Stop reason: HOSPADM

## 2025-02-23 RX ORDER — FLUTICASONE PROPIONATE 50 MCG
1 SPRAY, SUSPENSION (ML) NASAL DAILY
Status: DISCONTINUED | OUTPATIENT
Start: 2025-02-24 | End: 2025-02-24 | Stop reason: HOSPADM

## 2025-02-23 RX ORDER — GLUCOSAMINE/CHONDR SU A SOD 750-600 MG
1 TABLET ORAL 2 TIMES DAILY
COMMUNITY

## 2025-02-23 RX ORDER — INSULIN ASPART 100 [IU]/ML
0-5 INJECTION, SOLUTION INTRAVENOUS; SUBCUTANEOUS
Status: DISCONTINUED | OUTPATIENT
Start: 2025-02-23 | End: 2025-02-24 | Stop reason: HOSPADM

## 2025-02-23 RX ORDER — DULOXETIN HYDROCHLORIDE 30 MG/1
30 CAPSULE, DELAYED RELEASE ORAL DAILY
Status: DISCONTINUED | OUTPATIENT
Start: 2025-02-24 | End: 2025-02-24 | Stop reason: HOSPADM

## 2025-02-23 RX ORDER — PANTOPRAZOLE SODIUM 40 MG/10ML
40 INJECTION, POWDER, LYOPHILIZED, FOR SOLUTION INTRAVENOUS DAILY
Status: DISCONTINUED | OUTPATIENT
Start: 2025-02-24 | End: 2025-02-24 | Stop reason: HOSPADM

## 2025-02-23 RX ORDER — SODIUM CHLORIDE 0.9 % (FLUSH) 0.9 %
10 SYRINGE (ML) INJECTION
Status: DISCONTINUED | OUTPATIENT
Start: 2025-02-23 | End: 2025-02-24 | Stop reason: HOSPADM

## 2025-02-23 RX ORDER — POLYETHYLENE GLYCOL 3350 17 G/17G
17 POWDER, FOR SOLUTION ORAL DAILY
Status: DISCONTINUED | OUTPATIENT
Start: 2025-02-23 | End: 2025-02-23

## 2025-02-23 RX ORDER — ONDANSETRON HYDROCHLORIDE 2 MG/ML
4 INJECTION, SOLUTION INTRAVENOUS EVERY 8 HOURS PRN
Status: DISCONTINUED | OUTPATIENT
Start: 2025-02-23 | End: 2025-02-24 | Stop reason: HOSPADM

## 2025-02-23 RX ORDER — RIVASTIGMINE TARTRATE 1.5 MG/1
1.5 CAPSULE ORAL 2 TIMES DAILY
Status: DISCONTINUED | OUTPATIENT
Start: 2025-02-23 | End: 2025-02-24 | Stop reason: HOSPADM

## 2025-02-23 RX ORDER — IBUPROFEN 200 MG
24 TABLET ORAL
Status: DISCONTINUED | OUTPATIENT
Start: 2025-02-23 | End: 2025-02-24 | Stop reason: HOSPADM

## 2025-02-23 RX ADMIN — OSELTAMIVIR PHOSPHATE 75 MG: 75 CAPSULE ORAL at 09:02

## 2025-02-23 RX ADMIN — RIVASTIGMINE TARTRATE 1.5 MG: 1.5 CAPSULE ORAL at 09:02

## 2025-02-23 RX ADMIN — ENOXAPARIN SODIUM 40 MG: 40 INJECTION SUBCUTANEOUS at 04:02

## 2025-02-23 RX ADMIN — INSULIN ASPART 1 UNITS: 100 INJECTION, SOLUTION INTRAVENOUS; SUBCUTANEOUS at 09:02

## 2025-02-23 RX ADMIN — SODIUM CHLORIDE 1000 ML: 9 INJECTION, SOLUTION INTRAVENOUS at 10:02

## 2025-02-23 RX ADMIN — BUDESONIDE INHALATION 0.5 MG: 0.5 SUSPENSION RESPIRATORY (INHALATION) at 07:02

## 2025-02-23 RX ADMIN — SODIUM CHLORIDE 1000 ML: 9 INJECTION, SOLUTION INTRAVENOUS at 12:02

## 2025-02-23 RX ADMIN — TRAZODONE HYDROCHLORIDE 50 MG: 50 TABLET ORAL at 10:02

## 2025-02-23 RX ADMIN — GABAPENTIN 300 MG: 300 CAPSULE ORAL at 09:02

## 2025-02-23 NOTE — ED PROVIDER NOTES
Encounter Date: 2/23/2025       History     Chief Complaint   Patient presents with    Shortness of Breath     EMS reports client O2 sats 71% on simple face mask at facility      89 yo female to ED via EMS for respiratory distress. RA sat 71% on simple face mask. Arrived on CPAP with sat 90%. RT at bedside and placed her on CPAP with sat 97%. She received 1 Duoneb, 125 solumedrol, appx 250 mL NS enroute. EMS states she has become more responsive since they picked her up. EMS states she was diaphoretic on their arrival. She denies pain currently.     Code status: DNR    History: COPD, HTN, T2DM, Thyroid Disease, HLD, Dementia, AFIB    The history is provided by the EMS personnel.     Review of patient's allergies indicates:   Allergen Reactions    Sulfa (sulfonamide antibiotics)      Past Medical History:   Diagnosis Date    Allergic rhinitis     Anticoagulant long-term use     Anxiety disorder, unspecified     Arthritis     Chronic pain     COPD (chronic obstructive pulmonary disease)     Dementia     Dementia     Depression     GERD (gastroesophageal reflux disease)     hyperlipidemia     Hypertension     Hyponatremia     Hypoxemia     Insomnia     Mood disorder     Neuropathy     Osteoarthritis     Rhinitis     Thyroid disease     Type 2 diabetes mellitus     Unspecified atrial fibrillation     Urinary tract infection, site not specified      History reviewed. No pertinent surgical history.  Family History   Family history unknown: Yes     Social History[1]  Review of Systems   Unable to perform ROS: Dementia       Physical Exam     Initial Vitals [02/23/25 0924]   BP Pulse Resp Temp SpO2   (!) 143/89 (!) 124 (!) 26 97.9 °F (36.6 °C) (!) 94 %      MAP       --         Physical Exam    Nursing note and vitals reviewed.  Constitutional: She appears lethargic. She is cooperative. She appears ill. She appears distressed.   HENT:   Head: Normocephalic.   Right Ear: External ear normal.   Left Ear: External ear normal.    Nose: Nose normal. Mouth/Throat: Oropharynx is clear and moist.   Eyes: EOM are normal. Pupils are equal, round, and reactive to light.   Neck: Neck supple.   Normal range of motion.  Cardiovascular:  An irregular rhythm present.   Tachycardia present.         Pulmonary/Chest: Accessory muscle usage present. Tachypnea noted. She is in respiratory distress. She has rhonchi.   Abdominal: Abdomen is soft. She exhibits no distension. There is no abdominal tenderness.   Musculoskeletal:         General: Normal range of motion.      Cervical back: Normal range of motion and neck supple.     Neurological: She appears lethargic. GCS eye subscore is 4. GCS verbal subscore is 4. GCS motor subscore is 6.   Skin: Skin is warm. Capillary refill takes 2 to 3 seconds.         Medical Screening Exam   See Full Note    ED Course   Procedures  Labs Reviewed   INFLUENZA A & B BY MOLECULAR - Abnormal       Result Value    INFLUENZA A MOLECULAR Positive (*)     INFLUENZA B MOLECULAR  Negative     COMPREHENSIVE METABOLIC PANEL - Abnormal    Sodium 135 (*)     Potassium 3.9      Chloride 101      CO2 21 (*)     Anion Gap 17 (*)     Glucose 259 (*)     BUN 10      Creatinine 0.83      BUN/Creatinine Ratio 12      Calcium 8.9      Total Protein 6.4      Albumin 3.2 (*)     Globulin 3.2      A/G Ratio 1.0      Bilirubin, Total 0.5      Alk Phos 83      ALT 9      AST 29      eGFR 67     TROPONIN I - Abnormal    Troponin I High Sensitivity 24.6 (*)    CBC WITH DIFFERENTIAL - Abnormal    WBC 10.68      RBC 4.21      Hemoglobin 12.0      Hematocrit 38.7      MCV 91.9      MCH 28.5      MCHC 31.0 (*)     RDW 14.1      Platelet Count 267      MPV 9.8      Neutrophils % 65.5 (*)     Lymphocytes % 25.1 (*)     Neutrophils, Abs 7.00      Lymphocytes, Absolute 2.68      Diff Type Auto      Monocytes % 6.9 (*)     Eosinophils % 2.1      Basophils % 0.4      Monocytes, Absolute 0.74      Eosinophils, Absolute 0.22      Basophils, Absolute 0.04      LACTIC ACID, PLASMA - Abnormal    Lactic Acid 3.9 (*)    POCT GLUCOSE MONITORING CONTINUOUS - Abnormal    POC Glucose 270 (*)    MAGNESIUM - Normal    Magnesium 1.6     SARS-COV-2 RNA AMPLIFICATION, QUAL - Normal    SARS COV-2 Molecular Negative      Narrative:     Negative SARS-CoV results should not be used as the sole basis for treatment or patient management decisions; negative results should be considered in the context of a patient's recent exposures, history and the presene of clinical signs and symptoms consistent with COVID-19.  Negative results should be treated as presumptive and confirmed by molecular assay, if necessary for patient management.   CULTURE, BLOOD   CULTURE, BLOOD   CBC W/ AUTO DIFFERENTIAL    Narrative:     The following orders were created for panel order CBC auto differential.  Procedure                               Abnormality         Status                     ---------                               -----------         ------                     CBC with Differential[[id number]]       Abnormal            Final result                 Please view results for these tests on the individual orders.   URINALYSIS, REFLEX TO URINE CULTURE   LACTIC ACID, PLASMA   TROPONIN I   POCT GLUCOSE MONITORING CONTINUOUS     EKG Readings: (Independently Interpreted)   Initial Reading: No STEMI. Rhythm: Atrial Fibrillation. Heart Rate: 133.       Imaging Results              X-Ray Chest AP Portable (Final result)  Result time 02/23/25 10:11:58      Final result by Chaparro Fletcher MD (02/23/25 10:11:58)                   Impression:      Findings in keeping with pulmonary edema.  Question small layering pleural effusions.      Electronically signed by: Chaparro Fletcher  Date:    02/23/2025  Time:    10:11               Narrative:    EXAMINATION:  XR CHEST AP PORTABLE    CLINICAL HISTORY:  Shortness of breath    TECHNIQUE:  Single frontal view of the chest was performed.    COMPARISON:  Chest radiograph  performed 08/30/2024    FINDINGS:  Monitoring leads overlie the chest.  Grossly unchanged cardiac contours.  Atherosclerosis of the aorta    Chronic elevation of the right hemidiaphragm    Patchy opacities throughout both lungs.  Kerley B-lines are suggested.    No definite pneumothorax.  Small layering pleural effusions are not entirely excluded.    No acute findings in the visualized abdomen    Osseous and soft tissue structures appear without definite acute change.                                       Medications   sodium chloride 0.9% bolus 1,000 mL 1,000 mL (1,000 mLs Intravenous New Bag 2/23/25 1009)     Medical Decision Making  89 yo female to ED via EMS for respiratory distress. RA sat 71% on simple face mask. Arrived on CPAP with sat 90%. RT at bedside and placed her on Bipap with sat 97%. She received 1 Duoneb, 125 solumedrol, appx 250 mL NS enroute. EMS states she has become more responsive since they picked her up. EMS states she was diaphoretic on their arrival. She denies pain currently.     Code status: DNR    History: COPD, HTN, T2DM, Thyroid Disease, HLD, Dementia, AFIB    The history is provided by the EMS personnel.     Vitals reviewed  Labs reviewed: Influenza A positive  CXR with no infectious process  Admit to OBS      Amount and/or Complexity of Data Reviewed  Labs: ordered. Decision-making details documented in ED Course.  Radiology: ordered. Decision-making details documented in ED Course.  ECG/medicine tests: ordered and independent interpretation performed. Decision-making details documented in ED Course.  Discussion of management or test interpretation with external provider(s): Discussed patient with Dr. Ni, [facility] Hospitalist. Agrees with admission.       Additional MDM:   Sepsis:   This patient does have evidence of infective focus  My overall impression is sepsis.  Source: Respiratory  Antibiotics given- Antibiotics     Patient Encounter Information Not Found      Latest lactate  reviewed- 3.9  Organ dysfunction indicated by Acute respiratory failure    Fluid challenge Fluid Not Needed - Patient is not hypotensive and/or lactate is less than 4.0.     Post- resuscitation assessment Yes - I attest a sepsis perfusion exam was performed within 6 hours of sepsis, severe sepsis, or septic shock presentation, following fluid resuscitation.      Will Not start Pressors-                 ED Course as of 02/23/25 1039   Sun Feb 23, 2025   0947 Influenza A, Molecular(!): Positive [MJ]   0955 In/Out catheter attempted with no urine output. She has wet brief. Started IV fluids.  [MJ]   0956 Troponin I High Sensitivity(!!): 24.6 [MJ]   0956 Lactic Acid Level(!!): 3.9 [MJ]   0956 Glucose(!): 259 [MJ]   1039 RT to attempt Bipap wean as tolerated [MJ]      ED Course User Index  [MJ] Bertha Cunningham FNP                           Clinical Impression:   Final diagnoses:  [R06.02] SOB (shortness of breath)  [J10.1] Influenza A (Primary)  [R06.03] Respiratory distress        ED Disposition Condition    Observation Stable                  [1]   Social History  Tobacco Use    Smoking status: Unknown   Substance Use Topics    Alcohol use: Not Currently     Comment: lethargic - unable to access    Drug use: Not Currently     Comment: lethargic - unable to access        Bertha Cunningham FNP  02/23/25 1039

## 2025-02-23 NOTE — ASSESSMENT & PLAN NOTE
Patient's blood pressure range in the last 24 hours was: BP  Min: 115/89  Max: 143/89.The patient's inpatient anti-hypertensive regimen is listed below:  Current Antihypertensives       Plan  - BP is controlled, no changes needed to their regimen  -

## 2025-02-23 NOTE — HPI
89 yo female to ED via EMS for respiratory distress. RA sat 71% on simple face mask. Arrived on CPAP with sat 90%. RT at bedside and placed her on CPAP with sat 97%. She received 1 Duoneb, 125 solumedrol, appx 250 mL NS enroute.     Influenza A positive.      Code status: DNR     History: COPD, HTN, T2DM, Thyroid Disease, HLD, Dementia, AFIB

## 2025-02-23 NOTE — ASSESSMENT & PLAN NOTE
Creatinine (mg/dL)   Date Value   10/26/2020 1.29 (H)   07/22/2019 1.23 (H)      AST/SGOT (Units/L)   Date Value   07/22/2019 24     GOT/AST (Units/L)   Date Value   10/26/2020 21     ALT/SGPT (Units/L)   Date Value   07/22/2019 25     GPT/ALT (Units/L)   Date Value   10/26/2020 32       Tolerating well and denies bleeding.  Continue current dose.   Tamiflu daily  Isolation Precautions

## 2025-02-23 NOTE — H&P
[facility] - Emergency Department  [facility] Medicine  Progress Note    Patient Name: Joyce Dow  MRN: [id number]  Patient Class: OP- Observation   Admission Date: 2/23/2025  Length of Stay: 0 days  Attending Physician: Darinel Kwok DO  Primary Care Provider: Walker Watkins MD        Subjective     Principal Problem:Influenza A        HPI:  89 yo female to ED via EMS for respiratory distress. RA sat 71% on simple face mask. Arrived on CPAP with sat 90%. RT at bedside and placed her on CPAP with sat 97%. She received 1 Duoneb, 125 solumedrol, appx 250 mL NS enroute.     Influenza A positive.      Code status: DNR     History: COPD, HTN, T2DM, Thyroid Disease, HLD, Dementia, AFIB    Overview/Hospital Course:  No notes on file        Review of Systems   Unable to perform ROS: Dementia     Objective:     Vital Signs (Most Recent):  Temp: 97.9 °F (36.6 °C) (02/23/25 0924)  Pulse: (!) 120 (02/23/25 1011)  Resp: (!) 27 (02/23/25 1011)  BP: 115/89 (02/23/25 1011)  SpO2: 97 % (02/23/25 1011) Vital Signs (24h Range):  Temp:  [97.9 °F (36.6 °C)] 97.9 °F (36.6 °C)  Pulse:  [120-124] 120  Resp:  [26-27] 27  SpO2:  [94 %-97 %] 97 %  BP: (115-143)/(89) 115/89     Weight: 61.2 kg (135 lb)  Body mass index is 21.14 kg/m².  No intake or output data in the 24 hours ending 02/23/25 1041      Nursing note and vitals reviewed.  Constitutional: She appears lethargic. She is cooperative. She appears ill.   HENT:   Head: Normocephalic.   Right Ear: External ear normal.   Left Ear: External ear normal.   Nose: Nose normal. Mouth/Throat: Oropharynx is clear and moist.   Eyes: EOM are normal. Pupils are equal, round, and reactive to light.   Neck: Neck supple.   Normal range of motion.  Cardiovascular:  An irregular rhythm present.   Tachycardia present.         Pulmonary/Chest: Accessory muscle usage present. Tachypnea noted. She has rhonchi.   Abdominal: Abdomen is soft. She exhibits no distension. There is no  abdominal tenderness.   Musculoskeletal:         General: Normal range of motion.      Cervical back: Normal range of motion and neck supple.      Neurological: She appears lethargic. GCS eye subscore is 4. GCS verbal subscore is 4. GCS motor subscore is 6.   Skin: Skin is warm. Capillary refill takes 2 to 3 seconds.         Significant Labs: All pertinent labs within the past 24 hours have been reviewed.  Recent Lab Results  (Last 5 results in the past 24 hours)        02/23/25  0928   02/23/25  0917   02/23/25  0916   02/23/25  0915   02/23/25  0911        Influenza A, Molecular     Positive           Influenza B, Molecular     Negative           Albumin/Globulin Ratio   1.0             Albumin   3.2             ALP   83             ALT   9             Anion Gap   17             AST   29             Baso #   0.04             Basophil %   0.4             BILIRUBIN TOTAL   0.5             BUN   10             BUN/CREAT RATIO   12             Calcium   8.9             Chloride   101             CO2   21             SARS COV-2 MOLECULAR     Negative           Creatinine   0.83             Differential Method   Auto             eGFR   67  Comment: Estimated GFR calculated using the CKD-EPI creatinine (2021) equation.             Eos #   0.22             Eos %   2.1             Globulin, Total   3.2             Glucose   259             Hematocrit   38.7             Hemoglobin   12.0             Lactic Acid Level 3.9  Comment: A repeat order for Lactic Acid has been placed for collection in 2 hours.               Lymph #   2.68             Lymph %   25.1             Magnesium    1.6             MCH   28.5             MCHC   31.0             MCV   91.9             Mono #   0.74             Mono %   6.9             MPV   9.8             Neutrophils, Abs   7.00             Neutrophils Relative   65.5             Platelet Count   267             POC Base Excess       -3.0         POC Glucose         270       POC HCO3        23.2         POC PCO2       45         POC PH       7.32         POC PO2       99         POC SATURATED O2       97         Potassium   3.9             PROTEIN TOTAL   6.4             RBC   4.21             RDW   14.1             Sodium   135             Troponin I High Sensitivity   24.6  Comment: Critical Result called and verified by verbal readback to: Charlie called to JAYLEN uH @0954/rbherbie/rs on 02/23/2025 at 09:54. Reported by [id number].             WBC   10.68                                    Significant Imaging: I have reviewed all pertinent imaging results/findings within the past 24 hours.    Assessment and Plan     * Influenza A  Tamiflu daily  Isolation Precautions    At high risk for injury related to fall  Fall precautions      Type 2 diabetes mellitus with hyperglycemia, without long-term current use of insulin  SSI  Diabetic diet      HTN (hypertension)  Patient's blood pressure range in the last 24 hours was: BP  Min: 115/89  Max: 143/89.The patient's inpatient anti-hypertensive regimen is listed below:  Current Antihypertensives       Plan  - BP is controlled, no changes needed to their regimen  -       VTE Risk Mitigation (From admission, onward)      None            Discharge Planning   JUANPABLO:      Code Status: Prior   Medical Readiness for Discharge Date:                            ROSEMARY Rodgers  Department of Hospital Medicine   [facility] - Emergency Department

## 2025-02-23 NOTE — SUBJECTIVE & OBJECTIVE
Review of Systems   Unable to perform ROS: Dementia     Objective:     Vital Signs (Most Recent):  Temp: 97.9 °F (36.6 °C) (02/23/25 0924)  Pulse: (!) 120 (02/23/25 1011)  Resp: (!) 27 (02/23/25 1011)  BP: 115/89 (02/23/25 1011)  SpO2: 97 % (02/23/25 1011) Vital Signs (24h Range):  Temp:  [97.9 °F (36.6 °C)] 97.9 °F (36.6 °C)  Pulse:  [120-124] 120  Resp:  [26-27] 27  SpO2:  [94 %-97 %] 97 %  BP: (115-143)/(89) 115/89     Weight: 61.2 kg (135 lb)  Body mass index is 21.14 kg/m².  No intake or output data in the 24 hours ending 02/23/25 1041      Nursing note and vitals reviewed.  Constitutional: She appears lethargic. She is cooperative. She appears ill.   HENT:   Head: Normocephalic.   Right Ear: External ear normal.   Left Ear: External ear normal.   Nose: Nose normal. Mouth/Throat: Oropharynx is clear and moist.   Eyes: EOM are normal. Pupils are equal, round, and reactive to light.   Neck: Neck supple.   Normal range of motion.  Cardiovascular:  An irregular rhythm present.   Tachycardia present.         Pulmonary/Chest: Accessory muscle usage present. Tachypnea noted. She has rhonchi.   Abdominal: Abdomen is soft. She exhibits no distension. There is no abdominal tenderness.   Musculoskeletal:         General: Normal range of motion.      Cervical back: Normal range of motion and neck supple.      Neurological: She appears lethargic. GCS eye subscore is 4. GCS verbal subscore is 4. GCS motor subscore is 6.   Skin: Skin is warm. Capillary refill takes 2 to 3 seconds.         Significant Labs: All pertinent labs within the past 24 hours have been reviewed.  Recent Lab Results  (Last 5 results in the past 24 hours)        02/23/25  0928   02/23/25  0917   02/23/25  0916   02/23/25  0915   02/23/25  0911        Influenza A, Molecular     Positive           Influenza B, Molecular     Negative           Albumin/Globulin Ratio   1.0             Albumin   3.2             ALP   83             ALT   9              Anion Gap   17             AST   29             Baso #   0.04             Basophil %   0.4             BILIRUBIN TOTAL   0.5             BUN   10             BUN/CREAT RATIO   12             Calcium   8.9             Chloride   101             CO2   21             SARS COV-2 MOLECULAR     Negative           Creatinine   0.83             Differential Method   Auto             eGFR   67  Comment: Estimated GFR calculated using the CKD-EPI creatinine (2021) equation.             Eos #   0.22             Eos %   2.1             Globulin, Total   3.2             Glucose   259             Hematocrit   38.7             Hemoglobin   12.0             Lactic Acid Level 3.9  Comment: A repeat order for Lactic Acid has been placed for collection in 2 hours.               Lymph #   2.68             Lymph %   25.1             Magnesium    1.6             MCH   28.5             MCHC   31.0             MCV   91.9             Mono #   0.74             Mono %   6.9             MPV   9.8             Neutrophils, Abs   7.00             Neutrophils Relative   65.5             Platelet Count   267             POC Base Excess       -3.0         POC Glucose         270       POC HCO3       23.2         POC PCO2       45         POC PH       7.32         POC PO2       99         POC SATURATED O2       97         Potassium   3.9             PROTEIN TOTAL   6.4             RBC   4.21             RDW   14.1             Sodium   135             Troponin I High Sensitivity   24.6  Comment: Critical Result called and verified by verbal readback to: Charlie called to JAYLEN Hu @0954/rbv/rs on 02/23/2025 at 09:54. Reported by [id number].             WBC   10.68                                    Significant Imaging: I have reviewed all pertinent imaging results/findings within the past 24 hours.

## 2025-02-23 NOTE — PLAN OF CARE
Problem: Adult Inpatient Plan of Care  Goal: Plan of Care Review  2/23/2025 1551 by Sanna Villa, RN  Outcome: Progressing  2/23/2025 1551 by Sanna Villa, RN  Outcome: Progressing  Goal: Patient-Specific Goal (Individualized)  Outcome: Progressing  Goal: Optimal Comfort and Wellbeing  Outcome: Progressing     Problem: Skin Injury Risk Increased  Goal: Skin Health and Integrity  Outcome: Progressing      Casey Dugan(Attending)

## 2025-02-24 VITALS
DIASTOLIC BLOOD PRESSURE: 64 MMHG | HEART RATE: 122 BPM | SYSTOLIC BLOOD PRESSURE: 96 MMHG | WEIGHT: 137.81 LBS | RESPIRATION RATE: 18 BRPM | TEMPERATURE: 98 F | OXYGEN SATURATION: 96 % | HEIGHT: 68 IN | BODY MASS INDEX: 20.89 KG/M2

## 2025-02-24 PROBLEM — J10.1 INFLUENZA A: Status: RESOLVED | Noted: 2025-02-23 | Resolved: 2025-02-24

## 2025-02-24 LAB
GLUCOSE SERPL-MCNC: 138 MG/DL (ref 70–105)
GLUCOSE SERPL-MCNC: 150 MG/DL (ref 70–105)
VERIGENE RESULT: ABNORMAL

## 2025-02-24 PROCEDURE — 94799 UNLISTED PULMONARY SVC/PX: CPT

## 2025-02-24 PROCEDURE — 27000221 HC OXYGEN, UP TO 24 HOURS

## 2025-02-24 PROCEDURE — 25000003 PHARM REV CODE 250: Performed by: HOSPITALIST

## 2025-02-24 PROCEDURE — 27000987 HC MATTRESS, MATRIX LOW PROFILE

## 2025-02-24 PROCEDURE — 94761 N-INVAS EAR/PLS OXIMETRY MLT: CPT

## 2025-02-24 PROCEDURE — 99900035 HC TECH TIME PER 15 MIN (STAT)

## 2025-02-24 PROCEDURE — 99239 HOSP IP/OBS DSCHRG MGMT >30: CPT | Mod: ,,, | Performed by: HOSPITALIST

## 2025-02-24 PROCEDURE — 27000958

## 2025-02-24 PROCEDURE — 25000003 PHARM REV CODE 250: Performed by: NURSE PRACTITIONER

## 2025-02-24 PROCEDURE — 94640 AIRWAY INHALATION TREATMENT: CPT | Mod: XB

## 2025-02-24 PROCEDURE — 82962 GLUCOSE BLOOD TEST: CPT

## 2025-02-24 PROCEDURE — 96375 TX/PRO/DX INJ NEW DRUG ADDON: CPT

## 2025-02-24 PROCEDURE — 96374 THER/PROPH/DIAG INJ IV PUSH: CPT

## 2025-02-24 PROCEDURE — G0378 HOSPITAL OBSERVATION PER HR: HCPCS

## 2025-02-24 PROCEDURE — 63600175 PHARM REV CODE 636 W HCPCS: Mod: JZ,TB | Performed by: NURSE PRACTITIONER

## 2025-02-24 PROCEDURE — 25000242 PHARM REV CODE 250 ALT 637 W/ HCPCS: Performed by: NURSE PRACTITIONER

## 2025-02-24 RX ORDER — OSELTAMIVIR PHOSPHATE 30 MG/1
30 CAPSULE ORAL 2 TIMES DAILY
Status: DISCONTINUED | OUTPATIENT
Start: 2025-02-24 | End: 2025-02-24 | Stop reason: HOSPADM

## 2025-02-24 RX ADMIN — FLUTICASONE PROPIONATE 50 MCG: 50 SPRAY, METERED NASAL at 09:02

## 2025-02-24 RX ADMIN — PANTOPRAZOLE SODIUM 40 MG: 40 INJECTION, POWDER, LYOPHILIZED, FOR SOLUTION INTRAVENOUS at 10:02

## 2025-02-24 RX ADMIN — DULOXETINE HYDROCHLORIDE 30 MG: 30 CAPSULE, DELAYED RELEASE ORAL at 09:02

## 2025-02-24 RX ADMIN — RIVASTIGMINE TARTRATE 1.5 MG: 1.5 CAPSULE ORAL at 09:02

## 2025-02-24 RX ADMIN — OSELTAMAVIR PHOSPHATE 30 MG: 30 CAPSULE ORAL at 10:02

## 2025-02-24 RX ADMIN — SERTRALINE 25 MG: 25 TABLET, FILM COATED ORAL at 09:02

## 2025-02-24 RX ADMIN — LEVOTHYROXINE SODIUM 50 MCG: 0.05 TABLET ORAL at 05:02

## 2025-02-24 RX ADMIN — METHYLPREDNISOLONE SODIUM SUCCINATE 125 MG: 125 INJECTION, POWDER, FOR SOLUTION INTRAMUSCULAR; INTRAVENOUS at 10:02

## 2025-02-24 RX ADMIN — BUDESONIDE INHALATION 0.5 MG: 0.5 SUSPENSION RESPIRATORY (INHALATION) at 08:02

## 2025-02-24 RX ADMIN — GABAPENTIN 100 MG: 100 CAPSULE ORAL at 09:02

## 2025-02-24 RX ADMIN — CETIRIZINE HYDROCHLORIDE 10 MG: 10 TABLET, FILM COATED ORAL at 09:02

## 2025-02-24 NOTE — NURSING
Report called to NH. Spoke with JAYLEN Garcia.  said they could not take pt back without paper work sent. Spoke with Dalton about paperwork.

## 2025-02-24 NOTE — PROGRESS NOTES
Pharmacist Renal Dose Adjustment Note    Joyce Dow is a 89 y.o. female being treated with the medication tamiflu    Patient Data:    Vital Signs (Most Recent):  Temp: 97.4 °F (36.3 °C) (02/24/25 0737)  Pulse: (!) 122 (02/24/25 0820)  Resp: 20 (02/24/25 0820)  BP: 94/66 (02/24/25 0737)  SpO2: 96 % (02/24/25 0845) Vital Signs (72h Range):  Temp:  [97.4 °F (36.3 °C)-98.4 °F (36.9 °C)]   Pulse:  [118-124]   Resp:  [16-27]   BP: ()/(56-89)   SpO2:  [94 %-100 %]      Recent Labs   Lab 02/19/25  0800 02/23/25  0917   CREATININE 0.70 0.83     Serum creatinine: 0.83 mg/dL 02/23/25 0917  Estimated creatinine clearance: 43.6 mL/min    Medication:tamiflu dose: 75mg frequency nightly will be changed to medication:tamiflu dose:30mg frequency:bid x 5 days    Pharmacist's Name: Osmar Suarez  Pharmacist's Extension: 0742

## 2025-02-24 NOTE — PLAN OF CARE
Problem: Infection  Goal: Absence of Infection Signs and Symptoms  Outcome: Progressing     Problem: Adult Inpatient Plan of Care  Goal: Plan of Care Review  Outcome: Met  Goal: Patient-Specific Goal (Individualized)  Outcome: Met  Goal: Optimal Comfort and Wellbeing  Outcome: Met     Problem: Diabetes Comorbidity  Goal: Blood Glucose Level Within Targeted Range  Outcome: Met     Problem: Fall Injury Risk  Goal: Absence of Fall and Fall-Related Injury  Outcome: Met

## 2025-02-24 NOTE — PLAN OF CARE
Problem: Infection  Goal: Absence of Infection Signs and Symptoms  Outcome: Progressing     Problem: Adult Inpatient Plan of Care  Goal: Optimal Comfort and Wellbeing  Outcome: Progressing     Problem: Diabetes Comorbidity  Goal: Blood Glucose Level Within Targeted Range  Outcome: Progressing     Problem: Fall Injury Risk  Goal: Absence of Fall and Fall-Related Injury  Outcome: Progressing     Problem: Skin Injury Risk Increased  Goal: Skin Health and Integrity  Outcome: Progressing

## 2025-02-24 NOTE — PLAN OF CARE
Resident from nursing home. RN reports that facility is unable to take patient back for admission because they have not received paperwork. Information faxed to [facility] [phone]

## 2025-02-24 NOTE — DISCHARGE SUMMARY
[facility] Medicine  Discharge Summary      Patient Name: Joyce Dow  MRN: [id number]  GUY: [id number]  Patient Class: OP- Observation  Admission Date: 2/23/2025  Hospital Length of Stay: 0 days  Discharge Date and Time:  02/24/2025 11:26 AM  Attending Physician: Darinel Kwok DO   Discharging Provider: Darinel Kwok DO  Primary Care Provider: Walker Watkins MD    Primary Care Team: Networked reference to record PCT     HPI:   89 yo female to ED via EMS for respiratory distress. RA sat 71% on simple face mask. Arrived on CPAP with sat 90%. RT at bedside and placed her on CPAP with sat 97%. She received 1 Duoneb, 125 solumedrol, appx 250 mL NS enroute.     Influenza A positive.      Code status: DNR     History: COPD, HTN, T2DM, Thyroid Disease, HLD, Dementia, AFIB    * No surgery found *      Hospital Course:   2/24 Patient awake and on RA.  No distress. Asking me when she can go back home and what am I waiting on.  She has been off O2 all am.  No resp issues.  Can continue care at NH.      Goals of Care Treatment Preferences:  Code Status: DNR         Consults:     At high risk for injury related to fall  Fall precautions      Type 2 diabetes mellitus with hyperglycemia, without long-term current use of insulin  SSI  Diabetic diet      HTN (hypertension)  Patient's blood pressure range in the last 24 hours was: BP  Min: 115/89  Max: 143/89.The patient's inpatient anti-hypertensive regimen is listed below:  Current Antihypertensives       Plan  - BP is controlled, no changes needed to their regimen  -       Final Active Diagnoses:    Diagnosis Date Noted POA    At high risk for injury related to fall [Z91.81] 08/31/2024 Yes     Chronic    Type 2 diabetes mellitus with hyperglycemia, without long-term current use of insulin [E11.65] 08/31/2024 Yes    HTN (hypertension) [I10] 05/10/2023 Yes      Problems Resolved During this Admission:    Diagnosis Date  Noted Date Resolved POA    PRINCIPAL PROBLEM:  Influenza A [J10.1] 02/23/2025 02/24/2025 Yes       Discharged Condition: good    Disposition: Skilled Nursing Facility    Follow Up:    Patient Instructions:   No discharge procedures on file.    Significant Diagnostic Studies: N/A    Pending Diagnostic Studies:       Procedure Component Value Units Date/Time    Urinalysis, Reflex to Urine Culture Urine, Clean Catch [[id number]]     Order Status: Sent Lab Status: No result     Specimen: Urine, Clean Catch            Medications:  Reconciled Home Medications:      Medication List        CONTINUE taking these medications      acetaminophen 650 MG Supp  Commonly known as: TYLENOL  Place rectally.     alum-mag hydroxide-simeth 200-200-25 mg suspension  Take by mouth every 6 (six) hours as needed for Indigestion.     cetirizine 10 MG tablet  Commonly known as: ZYRTEC  Take 10 mg by mouth once daily.     dextran 70-hypromellose 0.1-0.3 % Drop  Apply to eye.     diltiaZEM 240 MG 24 hr capsule  Commonly known as: CARDIZEM CD  Take 1 capsule (240 mg total) by mouth once daily.     DULoxetine 30 MG capsule  Commonly known as: CYMBALTA  Take 30 mg by mouth once daily. 9am     fluticasone propionate 50 mcg/actuation nasal spray  Commonly known as: FLONASE  1 spray by Each Nostril route once daily. 9am     * gabapentin 300 MG capsule  Commonly known as: NEURONTIN  Take 300 mg by mouth every evening.     * gabapentin 100 MG capsule  Commonly known as: NEURONTIN  Take 100 mg by mouth Daily.     levothyroxine 50 MCG tablet  Commonly known as: SYNTHROID  Take 50 mcg by mouth before breakfast.     linaCLOtide 145 mcg Cap capsule  Commonly known as: LINZESS  Take 145 mcg by mouth before breakfast.     loperamide 2 mg Tab  Commonly known as: IMODIUM A-D  Take 2 mg by mouth 4 (four) times daily as needed.     melatonin 3 mg Cap  Take by mouth.     * metFORMIN 500 mg 24hr tablet  Commonly known as: FORTAMET  Take 1,000 mg by mouth daily  with breakfast.     * metFORMIN 500 MG tablet  Commonly known as: GLUCOPHAGE  Take 500 mg by mouth every evening.     OCUVITE LUTEIN 25 25-5 mg Cap  Generic drug: lutein-zeaxanthin  Take 1 tablet by mouth 2 (two) times a day.     olmesartan 40 MG tablet  Commonly known as: BENICAR  Take 40 mg by mouth once daily.     ondansetron 4 MG tablet  Commonly known as: ZOFRAN  Take 4 mg by mouth every 8 (eight) hours as needed for Nausea.     pantoprazole 40 MG tablet  Commonly known as: PROTONIX  Take 40 mg by mouth once daily.     polyethylene glycol 17 gram Pwpk  Commonly known as: GLYCOLAX  Take 17 g by mouth once daily. Mix in 4 oz H20     PRESERVISION AREDS-2 250-90-40-1 mg Cap  Generic drug: vit C,N-Jy-nyjpa-lutein-zeaxan  SMARTSI Tablet(s) By Mouth Morning-Night     rivastigmine tartrate 1.5 MG capsule  Commonly known as: EXELON  Take 1.5 mg by mouth 2 (two) times daily.     sertraline 25 MG tablet  Commonly known as: ZOLOFT  Take 25 mg by mouth once daily. morning     THERMOTABS 287-180-15 mg Tab  Generic drug: sod.chlorid-potassium chloride  Take by mouth 3 (three) times daily.     trazodone 25 MG tablet  Commonly known as: DESYREL  Take by mouth every evening.           * This list has 4 medication(s) that are the same as other medications prescribed for you. Read the directions carefully, and ask your doctor or other care provider to review them with you.                  Indwelling Lines/Drains at time of discharge:   Lines/Drains/Airways       None                   Time spent on the discharge of patient: 32 minutes         Darinel Kwok DO  Department of Hospital Medicine  [facility]

## 2025-02-24 NOTE — NURSING
Pt left facility via wheelchair with NH transporter @ 1515. Pt denied pain. No signs of acute distress noted.

## 2025-02-24 NOTE — HOSPITAL COURSE
2/24 Patient awake and on RA.  No distress. Asking me when she can go back home and what am I waiting on.  She has been off O2 all am.  No resp issues.  Can continue care at NH.

## 2025-02-25 LAB
OHS QRS DURATION: 96 MS
OHS QTC CALCULATION: 452 MS

## 2025-02-26 LAB
BACTERIA BLD CULT: ABNORMAL
GRAM STN SPEC: ABNORMAL

## 2025-03-01 LAB — BACTERIA BLD CULT: NORMAL

## 2025-03-12 ENCOUNTER — HOSPITAL ENCOUNTER (INPATIENT)
Facility: HOSPITAL | Age: OVER 89
LOS: 1 days | Discharge: NURSING, REHAB OR LONG-TERM CARE | DRG: 872 | End: 2025-03-14
Attending: HOSPITALIST | Admitting: HOSPITALIST
Payer: MEDICARE

## 2025-03-12 DIAGNOSIS — N39.0 SEPSIS DUE TO URINARY TRACT INFECTION: ICD-10-CM

## 2025-03-12 DIAGNOSIS — J96.90 RESPIRATORY FAILURE, UNSPECIFIED CHRONICITY, UNSPECIFIED WHETHER WITH HYPOXIA OR HYPERCAPNIA: Primary | ICD-10-CM

## 2025-03-12 DIAGNOSIS — R06.02 SOB (SHORTNESS OF BREATH): ICD-10-CM

## 2025-03-12 DIAGNOSIS — A41.9 SEPSIS DUE TO URINARY TRACT INFECTION: ICD-10-CM

## 2025-03-12 LAB
ALBUMIN SERPL BCP-MCNC: 2.9 G/DL (ref 3.4–4.8)
ALBUMIN/GLOB SERPL: 0.8 {RATIO}
ALP SERPL-CCNC: 91 U/L (ref 40–150)
ALT SERPL W P-5'-P-CCNC: <7 U/L
ANION GAP SERPL CALCULATED.3IONS-SCNC: 17 MMOL/L (ref 7–16)
AST SERPL W P-5'-P-CCNC: 17 U/L (ref 11–45)
BACTERIA #/AREA URNS HPF: ABNORMAL /HPF
BASOPHILS # BLD AUTO: 0.03 K/UL (ref 0–0.2)
BASOPHILS NFR BLD AUTO: 0.2 % (ref 0–1)
BILIRUB SERPL-MCNC: 0.4 MG/DL
BILIRUB UR QL STRIP: NEGATIVE
BUN SERPL-MCNC: 9 MG/DL (ref 10–20)
BUN/CREAT SERPL: 12 (ref 6–20)
CALCIUM SERPL-MCNC: 9.2 MG/DL (ref 8.4–10.2)
CHLORIDE SERPL-SCNC: 103 MMOL/L (ref 98–111)
CLARITY UR: ABNORMAL
CO2 SERPL-SCNC: 20 MMOL/L (ref 23–31)
COLOR UR: YELLOW
CREAT SERPL-MCNC: 0.77 MG/DL (ref 0.55–1.02)
DIFFERENTIAL METHOD BLD: ABNORMAL
EGFR (NO RACE VARIABLE) (RUSH/TITUS): 73 ML/MIN/1.73M2
EOSINOPHIL # BLD AUTO: 0.18 K/UL (ref 0–0.5)
EOSINOPHIL NFR BLD AUTO: 1.2 % (ref 1–4)
ERYTHROCYTE [DISTWIDTH] IN BLOOD BY AUTOMATED COUNT: 14.6 % (ref 11.5–14.5)
GLOBULIN SER-MCNC: 3.7 G/DL (ref 2–4)
GLUCOSE SERPL-MCNC: 210 MG/DL (ref 75–121)
GLUCOSE UR STRIP-MCNC: NEGATIVE MG/DL
HCT VFR BLD AUTO: 39.3 % (ref 38–47)
HGB BLD-MCNC: 12 G/DL (ref 12–16)
INFLUENZA A MOLECULAR (OHS): NEGATIVE
INFLUENZA B MOLECULAR (OHS): NEGATIVE
KETONES UR STRIP-SCNC: NEGATIVE MG/DL
LACTATE SERPL-SCNC: 2.7 MMOL/L (ref 0.5–2.2)
LEUKOCYTE ESTERASE UR QL STRIP: ABNORMAL
LYMPHOCYTES # BLD AUTO: 3.43 K/UL (ref 1–4.8)
LYMPHOCYTES NFR BLD AUTO: 22.3 % (ref 27–41)
MCH RBC QN AUTO: 28.2 PG (ref 27–31)
MCHC RBC AUTO-ENTMCNC: 30.5 G/DL (ref 32–36)
MCV RBC AUTO: 92.3 FL (ref 80–96)
MONOCYTES # BLD AUTO: 0.71 K/UL (ref 0–0.8)
MONOCYTES NFR BLD AUTO: 4.6 % (ref 2–6)
MPC BLD CALC-MCNC: 9.6 FL (ref 9.4–12.4)
NEUTROPHILS # BLD AUTO: 11.05 K/UL (ref 1.8–7.7)
NEUTROPHILS NFR BLD AUTO: 71.7 % (ref 53–65)
NITRITE UR QL STRIP: POSITIVE
NT-PROBNP SERPL-MCNC: 8270 PG/ML (ref 1–450)
PH UR STRIP: 6 PH UNITS
PLATELET # BLD AUTO: 446 K/UL (ref 150–400)
POTASSIUM SERPL-SCNC: 3.8 MMOL/L (ref 3.5–5.1)
PROT SERPL-MCNC: 6.6 G/DL (ref 5.8–7.6)
PROT UR QL STRIP: 100
RBC # BLD AUTO: 4.26 M/UL (ref 4.2–5.4)
RBC # UR STRIP: ABNORMAL /UL
RBC #/AREA URNS HPF: ABNORMAL /HPF
RSV AG SPEC QL IA: NEGATIVE
SARS-COV-2 RDRP RESP QL NAA+PROBE: NEGATIVE
SODIUM SERPL-SCNC: 136 MMOL/L (ref 136–145)
SP GR UR STRIP: 1.02
SQUAMOUS #/AREA URNS LPF: ABNORMAL /LPF
TROPONIN I SERPL HS-MCNC: 25.4 NG/L
UROBILINOGEN UR STRIP-ACNC: 0.2 MG/DL
WBC # BLD AUTO: 15.4 K/UL (ref 4.5–11)
WBC #/AREA URNS HPF: ABNORMAL /HPF

## 2025-03-12 PROCEDURE — 87040 BLOOD CULTURE FOR BACTERIA: CPT | Performed by: NURSE PRACTITIONER

## 2025-03-12 PROCEDURE — 80053 COMPREHEN METABOLIC PANEL: CPT | Performed by: NURSE PRACTITIONER

## 2025-03-12 PROCEDURE — 83605 ASSAY OF LACTIC ACID: CPT | Performed by: NURSE PRACTITIONER

## 2025-03-12 PROCEDURE — 83880 ASSAY OF NATRIURETIC PEPTIDE: CPT | Performed by: NURSE PRACTITIONER

## 2025-03-12 PROCEDURE — 84484 ASSAY OF TROPONIN QUANT: CPT | Performed by: NURSE PRACTITIONER

## 2025-03-12 PROCEDURE — 87635 SARS-COV-2 COVID-19 AMP PRB: CPT | Performed by: NURSE PRACTITIONER

## 2025-03-12 PROCEDURE — 87186 SC STD MICRODIL/AGAR DIL: CPT | Performed by: NURSE PRACTITIONER

## 2025-03-12 PROCEDURE — 93010 ELECTROCARDIOGRAM REPORT: CPT | Mod: ,,, | Performed by: HOSPITALIST

## 2025-03-12 PROCEDURE — 63600175 PHARM REV CODE 636 W HCPCS: Performed by: NURSE PRACTITIONER

## 2025-03-12 PROCEDURE — 81003 URINALYSIS AUTO W/O SCOPE: CPT | Performed by: NURSE PRACTITIONER

## 2025-03-12 PROCEDURE — 94640 AIRWAY INHALATION TREATMENT: CPT

## 2025-03-12 PROCEDURE — 99900035 HC TECH TIME PER 15 MIN (STAT)

## 2025-03-12 PROCEDURE — 87634 RSV DNA/RNA AMP PROBE: CPT | Performed by: NURSE PRACTITIONER

## 2025-03-12 PROCEDURE — 99285 EMERGENCY DEPT VISIT HI MDM: CPT | Mod: GF,EDII,, | Performed by: NURSE PRACTITIONER

## 2025-03-12 PROCEDURE — 96365 THER/PROPH/DIAG IV INF INIT: CPT

## 2025-03-12 PROCEDURE — 96375 TX/PRO/DX INJ NEW DRUG ADDON: CPT

## 2025-03-12 PROCEDURE — 94660 CPAP INITIATION&MGMT: CPT

## 2025-03-12 PROCEDURE — 25000003 PHARM REV CODE 250: Performed by: NURSE PRACTITIONER

## 2025-03-12 PROCEDURE — 85025 COMPLETE CBC W/AUTO DIFF WBC: CPT | Performed by: NURSE PRACTITIONER

## 2025-03-12 PROCEDURE — 36415 COLL VENOUS BLD VENIPUNCTURE: CPT | Performed by: NURSE PRACTITIONER

## 2025-03-12 PROCEDURE — 87502 INFLUENZA DNA AMP PROBE: CPT | Performed by: NURSE PRACTITIONER

## 2025-03-12 PROCEDURE — 5A09357 ASSISTANCE WITH RESPIRATORY VENTILATION, LESS THAN 24 CONSECUTIVE HOURS, CONTINUOUS POSITIVE AIRWAY PRESSURE: ICD-10-PCS | Performed by: HOSPITALIST

## 2025-03-12 PROCEDURE — 93005 ELECTROCARDIOGRAM TRACING: CPT

## 2025-03-12 PROCEDURE — 27000221 HC OXYGEN, UP TO 24 HOURS

## 2025-03-12 PROCEDURE — 25000242 PHARM REV CODE 250 ALT 637 W/ HCPCS: Performed by: NURSE PRACTITIONER

## 2025-03-12 PROCEDURE — 27000190 HC CPAP FULL FACE MASK W/VALVE

## 2025-03-12 RX ORDER — IPRATROPIUM BROMIDE AND ALBUTEROL SULFATE 2.5; .5 MG/3ML; MG/3ML
3 SOLUTION RESPIRATORY (INHALATION)
Status: COMPLETED | OUTPATIENT
Start: 2025-03-12 | End: 2025-03-12

## 2025-03-12 RX ORDER — DILTIAZEM HYDROCHLORIDE 180 MG/1
180 CAPSULE, COATED, EXTENDED RELEASE ORAL DAILY
Status: ON HOLD | COMMUNITY
End: 2025-03-14 | Stop reason: HOSPADM

## 2025-03-12 RX ORDER — ADHESIVE BANDAGE
30 BANDAGE TOPICAL DAILY PRN
Status: ON HOLD | COMMUNITY
End: 2025-03-14 | Stop reason: HOSPADM

## 2025-03-12 RX ORDER — FUROSEMIDE 10 MG/ML
60 INJECTION INTRAMUSCULAR; INTRAVENOUS
Status: DISCONTINUED | OUTPATIENT
Start: 2025-03-12 | End: 2025-03-12

## 2025-03-12 RX ADMIN — PIPERACILLIN AND TAZOBACTAM 4.5 G: 4; .5 INJECTION, POWDER, LYOPHILIZED, FOR SOLUTION INTRAVENOUS; PARENTERAL at 11:03

## 2025-03-12 RX ADMIN — IPRATROPIUM BROMIDE AND ALBUTEROL SULFATE 3 ML: .5; 3 SOLUTION RESPIRATORY (INHALATION) at 10:03

## 2025-03-13 PROBLEM — I48.0 PAROXYSMAL ATRIAL FIBRILLATION: Status: ACTIVE | Noted: 2023-11-20

## 2025-03-13 PROBLEM — A41.9 SEPSIS SECONDARY TO UTI: Status: ACTIVE | Noted: 2025-03-13

## 2025-03-13 PROBLEM — N39.0 SEPSIS SECONDARY TO UTI: Status: ACTIVE | Noted: 2025-03-13

## 2025-03-13 PROBLEM — E78.5 HYPERLIPIDEMIA: Status: ACTIVE | Noted: 2023-11-20

## 2025-03-13 PROBLEM — E03.9 HYPOTHYROIDISM: Status: ACTIVE | Noted: 2023-11-20

## 2025-03-13 PROBLEM — R06.03 RESPIRATORY DISTRESS: Status: ACTIVE | Noted: 2025-03-13

## 2025-03-13 LAB
HCO3 UR-SCNC: 25.8 MMOL/L (ref 21–28)
LACTATE SERPL-SCNC: 2.5 MMOL/L (ref 0.5–2.2)
PCO2 BLDA: 38 MMHG (ref 35–48)
PH SMN: 7.44 [PH] (ref 7.35–7.45)
PO2 BLDA: 113 MMHG (ref 83–108)
POC BASE EXCESS: 1.7 MMOL/L (ref -2–3)
POC SATURATED O2: 99 %

## 2025-03-13 PROCEDURE — 25000003 PHARM REV CODE 250: Performed by: NURSE PRACTITIONER

## 2025-03-13 PROCEDURE — 11000001 HC ACUTE MED/SURG PRIVATE ROOM

## 2025-03-13 PROCEDURE — 99223 1ST HOSP IP/OBS HIGH 75: CPT | Mod: AI,,, | Performed by: HOSPITALIST

## 2025-03-13 PROCEDURE — 25500020 PHARM REV CODE 255: Performed by: NURSE PRACTITIONER

## 2025-03-13 PROCEDURE — 25000242 PHARM REV CODE 250 ALT 637 W/ HCPCS: Performed by: NURSE PRACTITIONER

## 2025-03-13 PROCEDURE — 99900035 HC TECH TIME PER 15 MIN (STAT)

## 2025-03-13 PROCEDURE — 27000958

## 2025-03-13 PROCEDURE — 96361 HYDRATE IV INFUSION ADD-ON: CPT

## 2025-03-13 PROCEDURE — 27000221 HC OXYGEN, UP TO 24 HOURS

## 2025-03-13 PROCEDURE — 25000003 PHARM REV CODE 250: Performed by: HOSPITALIST

## 2025-03-13 PROCEDURE — 36415 COLL VENOUS BLD VENIPUNCTURE: CPT | Performed by: NURSE PRACTITIONER

## 2025-03-13 PROCEDURE — 27000987 HC MATTRESS, MATRIX LOW PROFILE

## 2025-03-13 PROCEDURE — 83605 ASSAY OF LACTIC ACID: CPT | Performed by: NURSE PRACTITIONER

## 2025-03-13 PROCEDURE — 96376 TX/PRO/DX INJ SAME DRUG ADON: CPT

## 2025-03-13 PROCEDURE — 99285 EMERGENCY DEPT VISIT HI MDM: CPT | Mod: 25

## 2025-03-13 PROCEDURE — 36600 WITHDRAWAL OF ARTERIAL BLOOD: CPT

## 2025-03-13 PROCEDURE — 94640 AIRWAY INHALATION TREATMENT: CPT

## 2025-03-13 PROCEDURE — 63600175 PHARM REV CODE 636 W HCPCS: Performed by: NURSE PRACTITIONER

## 2025-03-13 PROCEDURE — 82803 BLOOD GASES ANY COMBINATION: CPT

## 2025-03-13 PROCEDURE — 96367 TX/PROPH/DG ADDL SEQ IV INF: CPT

## 2025-03-13 RX ORDER — CETIRIZINE HYDROCHLORIDE 10 MG/1
10 TABLET ORAL DAILY
Status: DISCONTINUED | OUTPATIENT
Start: 2025-03-13 | End: 2025-03-14 | Stop reason: HOSPADM

## 2025-03-13 RX ORDER — DILTIAZEM HYDROCHLORIDE 180 MG/1
180 CAPSULE, COATED, EXTENDED RELEASE ORAL DAILY
Status: DISCONTINUED | OUTPATIENT
Start: 2025-03-13 | End: 2025-03-14 | Stop reason: HOSPADM

## 2025-03-13 RX ORDER — TALC
6 POWDER (GRAM) TOPICAL NIGHTLY PRN
Status: DISCONTINUED | OUTPATIENT
Start: 2025-03-13 | End: 2025-03-14 | Stop reason: HOSPADM

## 2025-03-13 RX ORDER — MUPIROCIN 20 MG/G
OINTMENT TOPICAL 2 TIMES DAILY
Status: DISCONTINUED | OUTPATIENT
Start: 2025-03-13 | End: 2025-03-14 | Stop reason: HOSPADM

## 2025-03-13 RX ORDER — PANTOPRAZOLE SODIUM 40 MG/1
40 TABLET, DELAYED RELEASE ORAL DAILY
Status: DISCONTINUED | OUTPATIENT
Start: 2025-03-13 | End: 2025-03-14 | Stop reason: HOSPADM

## 2025-03-13 RX ORDER — DULOXETIN HYDROCHLORIDE 30 MG/1
30 CAPSULE, DELAYED RELEASE ORAL DAILY
Status: DISCONTINUED | OUTPATIENT
Start: 2025-03-13 | End: 2025-03-14 | Stop reason: HOSPADM

## 2025-03-13 RX ORDER — LEVALBUTEROL INHALATION SOLUTION 1.25 MG/3ML
1.25 SOLUTION RESPIRATORY (INHALATION) EVERY 8 HOURS
Status: DISCONTINUED | OUTPATIENT
Start: 2025-03-13 | End: 2025-03-13

## 2025-03-13 RX ORDER — SODIUM CHLORIDE 0.9 % (FLUSH) 0.9 %
10 SYRINGE (ML) INJECTION
Status: DISCONTINUED | OUTPATIENT
Start: 2025-03-13 | End: 2025-03-14 | Stop reason: HOSPADM

## 2025-03-13 RX ORDER — FUROSEMIDE 10 MG/ML
40 INJECTION INTRAMUSCULAR; INTRAVENOUS
Status: COMPLETED | OUTPATIENT
Start: 2025-03-13 | End: 2025-03-13

## 2025-03-13 RX ORDER — FUROSEMIDE 10 MG/ML
40 INJECTION INTRAMUSCULAR; INTRAVENOUS EVERY 12 HOURS
Status: DISCONTINUED | OUTPATIENT
Start: 2025-03-13 | End: 2025-03-13

## 2025-03-13 RX ORDER — RIVASTIGMINE TARTRATE 1.5 MG/1
1.5 CAPSULE ORAL 2 TIMES DAILY
Status: DISCONTINUED | OUTPATIENT
Start: 2025-03-13 | End: 2025-03-14 | Stop reason: HOSPADM

## 2025-03-13 RX ORDER — GLUCOSAMINE/CHONDR SU A SOD 750-600 MG
1 TABLET ORAL 2 TIMES DAILY
Status: DISCONTINUED | OUTPATIENT
Start: 2025-03-13 | End: 2025-03-13

## 2025-03-13 RX ORDER — SERTRALINE HYDROCHLORIDE 25 MG/1
25 TABLET, FILM COATED ORAL DAILY
Status: DISCONTINUED | OUTPATIENT
Start: 2025-03-13 | End: 2025-03-14 | Stop reason: HOSPADM

## 2025-03-13 RX ORDER — LEVOTHYROXINE SODIUM 50 UG/1
50 TABLET ORAL
Status: DISCONTINUED | OUTPATIENT
Start: 2025-03-13 | End: 2025-03-14 | Stop reason: HOSPADM

## 2025-03-13 RX ORDER — GABAPENTIN 100 MG/1
100 CAPSULE ORAL DAILY
Status: DISCONTINUED | OUTPATIENT
Start: 2025-03-13 | End: 2025-03-14 | Stop reason: HOSPADM

## 2025-03-13 RX ORDER — TALC
3 POWDER (GRAM) TOPICAL NIGHTLY
Status: DISCONTINUED | OUTPATIENT
Start: 2025-03-13 | End: 2025-03-14 | Stop reason: HOSPADM

## 2025-03-13 RX ORDER — GABAPENTIN 300 MG/1
300 CAPSULE ORAL NIGHTLY
Status: DISCONTINUED | OUTPATIENT
Start: 2025-03-13 | End: 2025-03-14 | Stop reason: HOSPADM

## 2025-03-13 RX ORDER — IOPAMIDOL 755 MG/ML
100 INJECTION, SOLUTION INTRAVASCULAR
Status: COMPLETED | OUTPATIENT
Start: 2025-03-13 | End: 2025-03-13

## 2025-03-13 RX ORDER — POLYETHYLENE GLYCOL 3350 17 G/17G
17 POWDER, FOR SOLUTION ORAL DAILY
Status: DISCONTINUED | OUTPATIENT
Start: 2025-03-13 | End: 2025-03-14 | Stop reason: HOSPADM

## 2025-03-13 RX ORDER — IPRATROPIUM BROMIDE 0.5 MG/2.5ML
0.5 SOLUTION RESPIRATORY (INHALATION) EVERY 8 HOURS
Status: DISCONTINUED | OUTPATIENT
Start: 2025-03-13 | End: 2025-03-14 | Stop reason: HOSPADM

## 2025-03-13 RX ORDER — ALUMINUM HYDROXIDE, MAGNESIUM HYDROXIDE, AND SIMETHICONE 1200; 120; 1200 MG/30ML; MG/30ML; MG/30ML
15 SUSPENSION ORAL EVERY 6 HOURS PRN
Status: DISCONTINUED | OUTPATIENT
Start: 2025-03-13 | End: 2025-03-14 | Stop reason: HOSPADM

## 2025-03-13 RX ORDER — VALSARTAN 160 MG/1
320 TABLET ORAL DAILY
Status: DISCONTINUED | OUTPATIENT
Start: 2025-03-13 | End: 2025-03-13

## 2025-03-13 RX ORDER — ONDANSETRON 4 MG/1
4 TABLET, FILM COATED ORAL EVERY 8 HOURS PRN
Status: DISCONTINUED | OUTPATIENT
Start: 2025-03-13 | End: 2025-03-14 | Stop reason: CLARIF

## 2025-03-13 RX ADMIN — GLYCERIN 1 DROP: .002; .002; .01 SOLUTION/ DROPS OPHTHALMIC at 02:03

## 2025-03-13 RX ADMIN — RIVASTIGMINE TARTRATE 1.5 MG: 1.5 CAPSULE ORAL at 08:03

## 2025-03-13 RX ADMIN — SODIUM CHLORIDE 250 ML: 9 INJECTION, SOLUTION INTRAVENOUS at 01:03

## 2025-03-13 RX ADMIN — DULOXETINE HYDROCHLORIDE 30 MG: 30 CAPSULE, DELAYED RELEASE ORAL at 09:03

## 2025-03-13 RX ADMIN — SERTRALINE 25 MG: 25 TABLET, FILM COATED ORAL at 09:03

## 2025-03-13 RX ADMIN — TRAZODONE HYDROCHLORIDE 25 MG: 50 TABLET ORAL at 08:03

## 2025-03-13 RX ADMIN — IPRATROPIUM BROMIDE 0.5 MG: 0.5 SOLUTION RESPIRATORY (INHALATION) at 04:03

## 2025-03-13 RX ADMIN — CETIRIZINE HYDROCHLORIDE 10 MG: 10 TABLET, FILM COATED ORAL at 09:03

## 2025-03-13 RX ADMIN — MUPIROCIN: 20 OINTMENT TOPICAL at 08:03

## 2025-03-13 RX ADMIN — SODIUM CHLORIDE 250 ML: 9 INJECTION, SOLUTION INTRAVENOUS at 12:03

## 2025-03-13 RX ADMIN — PIPERACILLIN SODIUM AND TAZOBACTAM SODIUM 4.5 G: 4; .5 INJECTION, POWDER, LYOPHILIZED, FOR SOLUTION INTRAVENOUS at 05:03

## 2025-03-13 RX ADMIN — IOPAMIDOL 100 ML: 755 INJECTION, SOLUTION INTRAVENOUS at 02:03

## 2025-03-13 RX ADMIN — PANTOPRAZOLE SODIUM 40 MG: 40 TABLET, DELAYED RELEASE ORAL at 09:03

## 2025-03-13 RX ADMIN — VANCOMYCIN HYDROCHLORIDE 1250 MG: 1 INJECTION, POWDER, LYOPHILIZED, FOR SOLUTION INTRAVENOUS at 01:03

## 2025-03-13 RX ADMIN — LEVOTHYROXINE SODIUM 50 MCG: 0.05 TABLET ORAL at 09:03

## 2025-03-13 RX ADMIN — DILTIAZEM HYDROCHLORIDE 180 MG: 180 CAPSULE, COATED, EXTENDED RELEASE ORAL at 09:03

## 2025-03-13 RX ADMIN — VALSARTAN 320 MG: 160 TABLET ORAL at 09:03

## 2025-03-13 RX ADMIN — GABAPENTIN 100 MG: 100 CAPSULE ORAL at 06:03

## 2025-03-13 RX ADMIN — Medication 3 MG: at 08:03

## 2025-03-13 RX ADMIN — FUROSEMIDE 40 MG: 10 INJECTION, SOLUTION INTRAMUSCULAR; INTRAVENOUS at 12:03

## 2025-03-13 RX ADMIN — GABAPENTIN 300 MG: 300 CAPSULE ORAL at 08:03

## 2025-03-13 RX ADMIN — PIPERACILLIN SODIUM AND TAZOBACTAM SODIUM 4.5 G: 4; .5 INJECTION, POWDER, LYOPHILIZED, FOR SOLUTION INTRAVENOUS at 02:03

## 2025-03-13 RX ADMIN — PIPERACILLIN SODIUM AND TAZOBACTAM SODIUM 4.5 G: 4; .5 INJECTION, POWDER, LYOPHILIZED, FOR SOLUTION INTRAVENOUS at 09:03

## 2025-03-13 RX ADMIN — RIVASTIGMINE TARTRATE 1.5 MG: 1.5 CAPSULE ORAL at 09:03

## 2025-03-13 RX ADMIN — MUPIROCIN: 20 OINTMENT TOPICAL at 09:03

## 2025-03-13 RX ADMIN — IPRATROPIUM BROMIDE 0.5 MG: 0.5 SOLUTION RESPIRATORY (INHALATION) at 07:03

## 2025-03-13 NOTE — SUBJECTIVE & OBJECTIVE
Past Medical History:   Diagnosis Date    Allergic rhinitis     Anticoagulant long-term use     Anxiety disorder, unspecified     Arthritis     Chronic pain     COPD (chronic obstructive pulmonary disease)     Dementia     Dementia     Depression     GERD (gastroesophageal reflux disease)     hyperlipidemia     Hypertension     Hyponatremia     Hypoxemia     Insomnia     Mood disorder     Neuropathy     Osteoarthritis     Rhinitis     Thyroid disease     Type 2 diabetes mellitus     Unspecified atrial fibrillation     Urinary tract infection, site not specified        History reviewed. No pertinent surgical history.    Review of patient's allergies indicates:   Allergen Reactions    Sulfa (sulfonamide antibiotics)        No current facility-administered medications on file prior to encounter.     Current Outpatient Medications on File Prior to Encounter   Medication Sig    cetirizine (ZYRTEC) 10 MG tablet Take 10 mg by mouth once daily.    diltiaZEM (CARDIZEM CD) 180 MG 24 hr capsule Take 180 mg by mouth once daily.    DULoxetine (CYMBALTA) 30 MG capsule Take 30 mg by mouth once daily. 9am    fluticasone propionate (FLONASE) 50 mcg/actuation nasal spray 1 spray by Each Nostril route once daily. 9am    gabapentin (NEURONTIN) 100 MG capsule Take 100 mg by mouth Daily.    gabapentin (NEURONTIN) 300 MG capsule Take 300 mg by mouth every evening.    lanolin/mineral oil/petrolatum (ARTIFICIAL TEARS OPHT) Apply to eye.    levothyroxine (SYNTHROID) 50 MCG tablet Take 50 mcg by mouth before breakfast.    linaCLOtide (LINZESS) 145 mcg Cap capsule Take 145 mcg by mouth before breakfast.    lutein-zeaxanthin (OCUVITE LUTEIN 25) 25-5 mg Cap Take 1 tablet by mouth 2 (two) times a day.    melatonin 3 mg Cap Take by mouth.    metFORMIN (GLUCOPHAGE) 500 MG tablet Take 500 mg by mouth every evening.    olmesartan (BENICAR) 40 MG tablet Take 40 mg by mouth once daily.    pantoprazole (PROTONIX) 40 MG tablet Take 40 mg by mouth once  daily.    polyethylene glycol (GLYCOLAX) 17 gram PwPk Take 17 g by mouth once daily. Mix in 4 oz H20    rivastigmine tartrate (EXELON) 1.5 MG capsule Take 1.5 mg by mouth 2 (two) times daily.    sertraline (ZOLOFT) 25 MG tablet Take 25 mg by mouth once daily. morning    sod.chlorid-potassium chloride (THERMOTABS) 287-180-15 mg Tab Take by mouth 3 (three) times daily.    trazodone (DESYREL) 25 MG tablet Take by mouth every evening.    acetaminophen (TYLENOL) 650 MG Supp Place rectally.    alum-mag hydroxide-simeth 200-200-25 mg suspension Take by mouth every 6 (six) hours as needed for Indigestion.    dextran 70-hypromellose 0.1-0.3 % Drop Apply to eye.    diltiaZEM (CARDIZEM CD) 240 MG 24 hr capsule Take 1 capsule (240 mg total) by mouth once daily. (Patient taking differently: Take 180 mg by mouth once daily.)    loperamide (IMODIUM A-D) 2 mg Tab Take 2 mg by mouth 4 (four) times daily as needed.    magnesium hydroxide 400 mg/5 ml (MILK OF MAGNESIA) 400 mg/5 mL Susp Take 30 mLs by mouth daily as needed.    metFORMIN (FORTAMET) 500 mg 24hr tablet Take 1,000 mg by mouth daily with breakfast.    ondansetron (ZOFRAN) 4 MG tablet Take 4 mg by mouth every 8 (eight) hours as needed for Nausea.    PRESERVISION AREDS-2 250-90-40-1 mg Cap SMARTSI Tablet(s) By Mouth Morning-Night     Family History    Family history is unknown by patient.       Tobacco Use    Smoking status: Unknown    Smokeless tobacco: Not on file   Substance and Sexual Activity    Alcohol use: Not Currently     Comment: lethargic - unable to access    Drug use: Not Currently     Comment: lethargic - unable to access    Sexual activity: Not Currently     Comment: lethargic - unable to access     Review of Systems   Constitutional:  Negative for appetite change, chills and fever.   Respiratory:  Negative for cough, shortness of breath and wheezing.    Cardiovascular:  Negative for chest pain, palpitations and leg swelling.   Gastrointestinal:  Negative  for abdominal distention, diarrhea, nausea and vomiting.   Genitourinary:  Negative for dysuria.   Musculoskeletal:  Positive for arthralgias.   Skin:  Negative for rash.   Neurological:  Positive for weakness. Negative for dizziness, seizures and syncope.   Psychiatric/Behavioral:  Negative for agitation, behavioral problems and confusion.    All other systems reviewed and are negative.    Objective:     Vital Signs (Most Recent):  Temp: 98 °F (36.7 °C) (03/13/25 1220)  Pulse: (!) 123 (03/13/25 1220)  Resp: 14 (03/13/25 1220)  BP: (!) 75/47 (03/13/25 1220)  SpO2: 100 % (03/13/25 1220) Vital Signs (24h Range):  Temp:  [97.2 °F (36.2 °C)-100 °F (37.8 °C)] 98 °F (36.7 °C)  Pulse:  [100-126] 123  Resp:  [14-40] 14  SpO2:  [89 %-100 %] 100 %  BP: ()/(42-81) 75/47     Weight: 59.9 kg (132 lb)  Body mass index is 18.94 kg/m².     Physical Exam  Vitals reviewed.   Constitutional:       General: She is not in acute distress.     Appearance: Normal appearance.   HENT:      Head: Normocephalic and atraumatic.   Eyes:      General: No scleral icterus.     Extraocular Movements: Extraocular movements intact.      Conjunctiva/sclera: Conjunctivae normal.      Pupils: Pupils are equal, round, and reactive to light.   Cardiovascular:      Rate and Rhythm: Normal rate and regular rhythm.      Heart sounds: No murmur heard.     No friction rub. No gallop.   Pulmonary:      Effort: Pulmonary effort is normal. No respiratory distress.      Breath sounds: Normal breath sounds. No wheezing or rales.   Abdominal:      General: Abdomen is flat. Bowel sounds are normal. There is no distension.      Palpations: Abdomen is soft.      Tenderness: There is no abdominal tenderness. There is no guarding.   Musculoskeletal:         General: No swelling.      Right lower leg: No edema.      Left lower leg: No edema.   Skin:     General: Skin is warm and dry.      Coloration: Skin is not jaundiced.      Findings: No rash.   Neurological:       General: No focal deficit present.      Mental Status: She is alert and oriented to person, place, and time.      Sensory: No sensory deficit.      Motor: Weakness present.   Psychiatric:         Mood and Affect: Mood normal.         Thought Content: Thought content normal.         Judgment: Judgment normal.              CRANIAL NERVES     CN III, IV, VI   Pupils are equal, round, and reactive to light.       Significant Labs: All pertinent labs within the past 24 hours have been reviewed.  Recent Lab Results  (Last 5 results in the past 24 hours)        03/13/25  0108   03/13/25  0102   03/12/25  2328   03/12/25  2327   03/12/25  2300        RSV Ag by Molecular Method       Negative         Influenza A, Molecular     Negative           Influenza B, Molecular     Negative           Appearance, UA       Cloudy         Bacteria, UA       Loaded         Bilirubin (UA)       Negative         Color, UA       Yellow         SARS COV-2 MOLECULAR       Negative         Glucose, UA       Negative         Ketones, UA       Negative         Lactic Acid Level   2.5  Comment: A repeat order for Lactic Acid has been placed for collection in 2 hours.       2.7  Comment: A repeat order for Lactic Acid has been placed for collection in 2 hours.       Leukocyte Esterase, UA       Large         NITRITE UA       Positive         NT-proBNP         8,270       Blood, UA       Moderate         pH, UA       6.0         POC Base Excess 1.7               POC HCO3 25.8               POC PCO2 38               POC PH 7.44               POC PO2 113               POC SATURATED O2 99               Protein, UA       100         RBC, UA       10-15         Spec Grav UA       1.020         Squam Epithel, UA       Occasional         UROBILINOGEN UA       0.2         WBC, UA       Too Numerous To Count                                Significant Imaging: I have reviewed all pertinent imaging results/findings within the past 24 hours.

## 2025-03-13 NOTE — ASSESSMENT & PLAN NOTE
"This patient does have evidence of infective focus  My overall impression is sepsis.  Source: Urinary Tract  Antibiotics given-   Antibiotics (72h ago, onward)      Start     Stop Route Frequency Ordered    03/13/25 0900  mupirocin 2 % ointment         03/18/25 0859 Nasl 2 times daily 03/13/25 0339    03/13/25 0500  piperacillin-tazobactam (ZOSYN) 4.5 g in D5W 100 mL IVPB (MB+)         -- IV Every 8 hours (non-standard times) 03/13/25 0341    03/13/25 0435  vancomycin - pharmacy to dose  (vancomycin IVPB (PEDS and ADULTS))        Placed in "And" Linked Group    -- IV pharmacy to manage frequency 03/13/25 0337          Latest lactate reviewed-  Recent Labs   Lab 03/13/25  0102   LACTATE 2.5*     Organ dysfunction indicated by Acute respiratory failure    Fluid challenge Ideal Body Weight- The patient's ideal body weight is Ideal body weight: 68.5 kg (151 lb 0.2 oz) which will be used to calculate fluid bolus of 30 ml/kg for treatment of septic shock.      Post- resuscitation assessment Yes - I attest a sepsis perfusion exam was performed within 6 hours of sepsis, severe sepsis, or septic shock presentation, following fluid resuscitation.      Will Not start Pressors- Levophed for MAP of 65  Source control achieved by:   "

## 2025-03-13 NOTE — ASSESSMENT & PLAN NOTE
Hyponatremia is likely due to Medications: SSRIs. The patient's most recent sodium results are listed below.  Recent Labs     03/12/25  2259        Plan  - Correct the sodium by 4-6mEq in 24 hours.   - Obtain the following studies: AM cortisol.  - Will treat the hyponatremia with Fluid restriction of:  1 liter per day  - Monitor sodium Daily.   - Patient hyponatremia is stable  -

## 2025-03-13 NOTE — ED TRIAGE NOTES
Presents to ER from Nursing Home via ambulance with respiratory distress. Pt on CPAP on arrival, switched to 100% Non rebreather mask, sats 92%, pt with rapid respirations of 40 breaths per minute. Pt will respond to voice by shaking her head yes or no to questions.

## 2025-03-13 NOTE — PLAN OF CARE
Problem: Skin Injury Risk Increased  Goal: Skin Health and Integrity  3/13/2025 1818 by Chantelle Velasco RN  Outcome: Progressing  3/13/2025 1427 by Chantelle Velasco RN  Outcome: Progressing     Problem: Infection  Goal: Absence of Infection Signs and Symptoms  3/13/2025 1818 by Chantelle Velasco RN  Outcome: Progressing  3/13/2025 1427 by Chantelle Velasco RN  Outcome: Progressing     Problem: Adult Inpatient Plan of Care  Goal: Plan of Care Review  Outcome: Progressing

## 2025-03-13 NOTE — H&P
[facility] - Medical Surgical Unit  History & Physical    Subjective:      Chief Complaint/Reason for Admission: Pt admitted for respiratory distress, uti with sepsis.     Joyce Dow is a 89 y.o. female.    Past Medical History:   Diagnosis Date    Allergic rhinitis     Anticoagulant long-term use     Anxiety disorder, unspecified     Arthritis     Chronic pain     COPD (chronic obstructive pulmonary disease)     Dementia     Dementia     Depression     GERD (gastroesophageal reflux disease)     hyperlipidemia     Hypertension     Hyponatremia     Hypoxemia     Insomnia     Mood disorder     Neuropathy     Osteoarthritis     Rhinitis     Thyroid disease     Type 2 diabetes mellitus     Unspecified atrial fibrillation     Urinary tract infection, site not specified      History reviewed. No pertinent surgical history.  Social History[1]    PTA Medications   Medication Sig    cetirizine (ZYRTEC) 10 MG tablet Take 10 mg by mouth once daily.    diltiaZEM (CARDIZEM CD) 180 MG 24 hr capsule Take 180 mg by mouth once daily.    DULoxetine (CYMBALTA) 30 MG capsule Take 30 mg by mouth once daily. 9am    fluticasone propionate (FLONASE) 50 mcg/actuation nasal spray 1 spray by Each Nostril route once daily. 9am    gabapentin (NEURONTIN) 100 MG capsule Take 100 mg by mouth Daily.    gabapentin (NEURONTIN) 300 MG capsule Take 300 mg by mouth every evening.    lanolin/mineral oil/petrolatum (ARTIFICIAL TEARS OPHT) Apply to eye.    levothyroxine (SYNTHROID) 50 MCG tablet Take 50 mcg by mouth before breakfast.    linaCLOtide (LINZESS) 145 mcg Cap capsule Take 145 mcg by mouth before breakfast.    lutein-zeaxanthin (OCUVITE LUTEIN 25) 25-5 mg Cap Take 1 tablet by mouth 2 (two) times a day.    melatonin 3 mg Cap Take by mouth.    metFORMIN (GLUCOPHAGE) 500 MG tablet Take 500 mg by mouth every evening.    olmesartan (BENICAR) 40 MG tablet Take 40 mg by mouth once daily.    pantoprazole (PROTONIX) 40 MG tablet Take  40 mg by mouth once daily.    polyethylene glycol (GLYCOLAX) 17 gram PwPk Take 17 g by mouth once daily. Mix in 4 oz H20    rivastigmine tartrate (EXELON) 1.5 MG capsule Take 1.5 mg by mouth 2 (two) times daily.    sertraline (ZOLOFT) 25 MG tablet Take 25 mg by mouth once daily. morning    sod.chlorid-potassium chloride (THERMOTABS) 287-180-15 mg Tab Take by mouth 3 (three) times daily.    trazodone (DESYREL) 25 MG tablet Take by mouth every evening.    acetaminophen (TYLENOL) 650 MG Supp Place rectally.    alum-mag hydroxide-simeth 200-200-25 mg suspension Take by mouth every 6 (six) hours as needed for Indigestion.    dextran 70-hypromellose 0.1-0.3 % Drop Apply to eye.    diltiaZEM (CARDIZEM CD) 240 MG 24 hr capsule Take 1 capsule (240 mg total) by mouth once daily. (Patient taking differently: Take 180 mg by mouth once daily.)    loperamide (IMODIUM A-D) 2 mg Tab Take 2 mg by mouth 4 (four) times daily as needed.    magnesium hydroxide 400 mg/5 ml (MILK OF MAGNESIA) 400 mg/5 mL Susp Take 30 mLs by mouth daily as needed.    metFORMIN (FORTAMET) 500 mg 24hr tablet Take 1,000 mg by mouth daily with breakfast.    ondansetron (ZOFRAN) 4 MG tablet Take 4 mg by mouth every 8 (eight) hours as needed for Nausea.    PRESERVISION AREDS-2 250-90-40-1 mg Cap SMARTSI Tablet(s) By Mouth Morning-Night     Review of patient's allergies indicates:   Allergen Reactions    Sulfa (sulfonamide antibiotics)         Review of Systems   Constitutional:  Positive for fever.   Respiratory:  Positive for shortness of breath.    Neurological:  Positive for weakness.       Objective:      Vital Signs (Most Recent)  Temp: 97.2 °F (36.2 °C) (25)  Pulse: (!) 121 (25)  Resp: 18 (25)  BP: (!) 95/58 (25)  SpO2: 99 % (25)    Vital Signs Range (Last 24H):  Temp:  [97.2 °F (36.2 °C)-100 °F (37.8 °C)]   Pulse:  [120-126]   Resp:  [17-40]   BP: ()/(55-81)   SpO2:  [89 %-99 %]     Physical  "Exam  Vitals and nursing note reviewed.   Constitutional:       Appearance: She is ill-appearing.   HENT:      Head: Normocephalic and atraumatic.      Right Ear: Tympanic membrane normal.      Left Ear: Tympanic membrane normal.      Nose: Nose normal.      Mouth/Throat:      Mouth: Mucous membranes are moist.   Eyes:      Pupils: Pupils are equal, round, and reactive to light.   Cardiovascular:      Rate and Rhythm: Regular rhythm. Tachycardia present.   Pulmonary:      Effort: Pulmonary effort is normal.      Comments: Pt presented to er in respiratory distress, was placed on bipap for approx 2 hours and weaned off.  Now doing well on nc 3 liters and no distress.  Lungs are coarse but significantly improving  Abdominal:      General: Bowel sounds are normal.      Palpations: Abdomen is soft.   Musculoskeletal:         General: Normal range of motion.      Cervical back: Normal range of motion.   Skin:     General: Skin is warm.   Neurological:      General: No focal deficit present.      Mental Status: She is alert.         Data Review:  CBC:   Lab Results   Component Value Date    WBC 15.40 (H) 03/12/2025    RBC 4.26 03/12/2025    HGB 12.0 03/12/2025    HCT 39.3 03/12/2025     (H) 03/12/2025     BMP:   Lab Results   Component Value Date     (H) 03/12/2025     03/12/2025    K 3.8 03/12/2025     03/12/2025    CO2 20 (L) 03/12/2025    BUN 9 (L) 03/12/2025    CREATININE 0.77 03/12/2025    CALCIUM 9.2 03/12/2025     Coagulation: No results found for: "PT", "INR", "APTT"  Cardiac markers: No results found for: "CKMB", "TROPONINT", "MYOGLOBIN"  ABGs:   Lab Results   Component Value Date    PH 7.44 03/13/2025    PO2 113 (H) 03/13/2025    PCO2 38 03/13/2025      ECG: no change since previous ECG dated 2/23/25 .     Assessment:      Active Hospital Problems    Diagnosis  POA    *Sepsis secondary to UTI [A41.9, N39.0]  Yes    Respiratory distress [R06.03]  Yes      Resolved Hospital Problems   No " resolved problems to display.       Plan:    Pt discussed with Dr Archer.  Will admit for iv abx, lasix and nebs.  Continue with zosyn and vanc.  Hold metformin 48 hours r/t iv contrast ct.             [1]   Social History  Tobacco Use    Smoking status: Unknown   Substance Use Topics    Alcohol use: Not Currently     Comment: lethargic - unable to access    Drug use: Not Currently     Comment: lethargic - unable to access

## 2025-03-13 NOTE — ASSESSMENT & PLAN NOTE
Patient's blood pressure range in the last 24 hours was: BP  Min: 70/42  Max: 140/81.The patient's inpatient anti-hypertensive regimen is listed below:  Current Antihypertensives  , Daily, Oral  diltiaZEM 24 hr capsule 180 mg, Daily, Oral    Plan  - BP is uncontrolled, will adjust as follows: hold BP meds  -

## 2025-03-13 NOTE — CONSULTS
Pharmacokinetic Initial Assessment: IV Vancomycin    Assessment/Plan:    Patient received loading dose of Vancomycin 1250 mg IV x 1 in ED.   Initiate intravenous Vancomycin 1000 mg every 36 hours.  Desired empiric serum trough concentration is 10 to 15 mcg/mL  Draw vancomycin trough 30 minutes prior to 3/16 AM dose (third dose of Vancomycin).   Pharmacy will continue to follow and monitor vancomycin.        Thank you for the consult,   Jaswinder Winn       Patient brief summary:  Joyce Dow is a 89 y.o. female initiated on antimicrobial therapy with IV Vancomycin for treatment of suspected urinary tract infection    Drug Allergies:   Review of patient's allergies indicates:   Allergen Reactions    Sulfa (sulfonamide antibiotics)        Actual Body Weight:   54.4 kg    Renal Function:   Estimated Creatinine Clearance: 40.9 mL/min (based on SCr of 0.77 mg/dL).,

## 2025-03-13 NOTE — ED PROVIDER NOTES
Encounter Date: 3/12/2025       History     Chief Complaint   Patient presents with    Respiratory Distress     Presents to er by ambulance for resp distress.  Sat was 80s on mask and then moved to bipap and sat was 100 percent.  Pt was here 3 weeks ago for resp distress as well and was positive for influenza.      The history is provided by the patient.     Review of patient's allergies indicates:   Allergen Reactions    Sulfa (sulfonamide antibiotics)      Past Medical History:   Diagnosis Date    Allergic rhinitis     Anticoagulant long-term use     Anxiety disorder, unspecified     Arthritis     Chronic pain     COPD (chronic obstructive pulmonary disease)     Dementia     Dementia     Depression     GERD (gastroesophageal reflux disease)     hyperlipidemia     Hypertension     Hyponatremia     Hypoxemia     Insomnia     Mood disorder     Neuropathy     Osteoarthritis     Rhinitis     Thyroid disease     Type 2 diabetes mellitus     Unspecified atrial fibrillation     Urinary tract infection, site not specified      History reviewed. No pertinent surgical history.  Family History   Family history unknown: Yes     Social History[1]  Review of Systems   Respiratory:  Positive for shortness of breath.    All other systems reviewed and are negative.      Physical Exam     Initial Vitals [03/12/25 2244]   BP Pulse Resp Temp SpO2   (!) 140/81 (!) 122 (!) 40 100 °F (37.8 °C) (!) 92 %      MAP       --         Physical Exam    Constitutional: She appears well-developed and well-nourished.   HENT:   Head: Normocephalic.   Right Ear: External ear normal.   Left Ear: External ear normal. Mouth/Throat: Oropharynx is clear and moist.   Eyes: Pupils are equal, round, and reactive to light.   Neck:   Normal range of motion.  Cardiovascular:  Normal rate and regular rhythm.           Pulmonary/Chest: She is in respiratory distress. She has rales.   Abdominal: Abdomen is soft. Bowel sounds are normal.   Musculoskeletal:          General: Normal range of motion.      Cervical back: Normal range of motion.     Neurological: She is alert. GCS score is 15. GCS eye subscore is 4. GCS verbal subscore is 5. GCS motor subscore is 6.   Skin: Skin is warm.   Psychiatric: She has a normal mood and affect.         Medical Screening Exam   See Full Note    ED Course   Procedures  Labs Reviewed   COMPREHENSIVE METABOLIC PANEL - Abnormal       Result Value    Sodium 136      Potassium 3.8      Chloride 103      CO2 20 (*)     Anion Gap 17 (*)     Glucose 210 (*)     BUN 9 (*)     Creatinine 0.77      BUN/Creatinine Ratio 12      Calcium 9.2      Total Protein 6.6      Albumin 2.9 (*)     Globulin 3.7      A/G Ratio 0.8      Bilirubin, Total 0.4      Alk Phos 91      ALT <7      AST 17      eGFR 73     NT-PRO NATRIURETIC PEPTIDE - Abnormal    ProBNP 8,270 (*)    TROPONIN I - Abnormal    Troponin I High Sensitivity 25.4 (*)    CBC WITH DIFFERENTIAL - Abnormal    WBC 15.40 (*)     RBC 4.26      Hemoglobin 12.0      Hematocrit 39.3      MCV 92.3      MCH 28.2      MCHC 30.5 (*)     RDW 14.6 (*)     Platelet Count 446 (*)     MPV 9.6      Neutrophils % 71.7 (*)     Lymphocytes % 22.3 (*)     Neutrophils, Abs 11.05 (*)     Lymphocytes, Absolute 3.43      Diff Type Scan Smear      Monocytes % 4.6      Eosinophils % 1.2      Basophils % 0.2      Monocytes, Absolute 0.71      Eosinophils, Absolute 0.18      Basophils, Absolute 0.03     LACTIC ACID, PLASMA - Abnormal    Lactic Acid 2.7 (*)    URINALYSIS - Abnormal    Color, UA Yellow      Clarity, UA Cloudy      pH, UA 6.0      Leukocytes, UA Large (*)     Nitrites, UA Positive (*)     Protein,  (*)     Glucose, UA Negative      Ketones, UA Negative      Urobilinogen, UA 0.2      Bilirubin, UA Negative      Blood, UA Moderate (*)     Specific Gravity, UA 1.020     LACTIC ACID, PLASMA - Abnormal    Lactic Acid 2.5 (*)    URINALYSIS, MICROSCOPIC - Abnormal    WBC, UA Too Numerous To Count (*)     RBC, UA 10-15  (*)     Bacteria, UA Loaded (*)     Squamous Epithelial Cells, UA Occasional (*)    INFLUENZA A & B BY MOLECULAR - Normal    INFLUENZA A MOLECULAR Negative      INFLUENZA B MOLECULAR  Negative     SARS-COV-2 RNA AMPLIFICATION, QUAL - Normal    SARS COV-2 Molecular Negative      Narrative:     Negative SARS-CoV results should not be used as the sole basis for treatment or patient management decisions; negative results should be considered in the context of a patient's recent exposures, history and the presene of clinical signs and symptoms consistent with COVID-19.  Negative results should be treated as presumptive and confirmed by molecular assay, if necessary for patient management.   RSV, RAPID AG BY MOLECULAR METHOD - Normal    RSV, RAPID BY MOLECULAR METHOD Negative     CULTURE, BLOOD   CULTURE, BLOOD   CULTURE, URINE   CBC W/ AUTO DIFFERENTIAL    Narrative:     The following orders were created for panel order CBC auto differential.  Procedure                               Abnormality         Status                     ---------                               -----------         ------                     CBC with Differential[[id number]]       Abnormal            Final result                 Please view results for these tests on the individual orders.          Imaging Results              CT Chest Abdomen Pelvis With IV Contrast (XPD) Routine Oral Contrast (In process)  Result time 03/13/25 02:20:07   Procedure changed from CTA Chest Non-Coronary (PE Studies)                    X-Ray Chest 1 View (In process)                      Medications   albuterol-ipratropium 2.5 mg-0.5 mg/3 mL nebulizer solution 3 mL (3 mLs Nebulization Given 3/12/25 2258)   piperacillin-tazobactam (ZOSYN) 4.5 g in D5W 100 mL IVPB (MB+) (0 g Intravenous Stopped 3/13/25 0010)   furosemide injection 40 mg (40 mg Intravenous Given 3/13/25 0015)   sodium chloride 0.9% bolus 250 mL 250 mL (0 mLs Intravenous Stopped 3/13/25 0116)   vancomycin  (VANCOCIN) 1,250 mg in 0.9% NaCl 250 mL IVPB (1,250 mg Intravenous New Bag 3/13/25 0128)   iopamidoL (ISOVUE-370) injection 100 mL (100 mLs Intravenous Given 3/13/25 0210)     Medical Decision Making  Presents to er by ambulance for resp distress.  Sat was 80s on mask and then moved to bipap and sat was 100 percent.  Pt was here 3 weeks ago for resp distress as well and was positive for influenza.      Uti causing sepsis       Amount and/or Complexity of Data Reviewed  Labs: ordered.     Details: Urine has pyuria, nitrate positive, tntc bacteria and wbc  Lactic trending down  Radiology: ordered.     Details: Chf on cxr  Ct shows no central pulmonary embolism  Discussion of management or test interpretation with external provider(s): Telemed consult done with Dr Elliott, [facility] Agrees with treatment plan.  Discussed with Dr Archer and will admit with lasix, zosyn and vanc, xopenex and atrovent.  Ct chest pe study pending.    Risk  Prescription drug management.  Decision regarding hospitalization.                                      Clinical Impression:   Final diagnoses:  [R06.02] SOB (shortness of breath)  [J96.90] Respiratory failure, unspecified chronicity, unspecified whether with hypoxia or hypercapnia (Primary)  [A41.9, N39.0] Sepsis due to urinary tract infection        ED Disposition Condition    Admit Stable                  Tavon Cruz FNP  03/13/25 0148         [1]   Social History  Tobacco Use    Smoking status: Unknown   Substance Use Topics    Alcohol use: Not Currently     Comment: lethargic - unable to access    Drug use: Not Currently     Comment: lethargic - unable to access        Tavon Cruz FNP  03/13/25 0329

## 2025-03-13 NOTE — PLAN OF CARE
Problem: Skin Injury Risk Increased  Goal: Skin Health and Integrity  Outcome: Progressing     Problem: Infection  Goal: Absence of Infection Signs and Symptoms  Outcome: Progressing     Problem: Adult Inpatient Plan of Care  Goal: Optimal Comfort and Wellbeing  Outcome: Progressing     Problem: Fall Injury Risk  Goal: Absence of Fall and Fall-Related Injury  Outcome: Progressing     Problem: Breathing Pattern Ineffective  Goal: Effective Breathing Pattern  Outcome: Progressing

## 2025-03-13 NOTE — HPI
89yo WF from the NH with multiple medical issues and polypharmacy.  Sent over for respiratory distress.  She was on CPAP for a time but weaned down to NC.  She is awake and alert and talking this am.  No diarrhea.  BP fluctuates.  She doesn't appear in any distress.

## 2025-03-13 NOTE — H&P
[facility] Medicine  History & Physical    Patient Name: Joyce Dow  MRN: [id number]  Patient Class: IP- Inpatient  Admission Date: 3/12/2025  Attending Physician: Darinel Kwok DO   Primary Care Provider: Walker Watkins MD         Patient information was obtained from patient and ER records.     Subjective:     Principal Problem:Sepsis secondary to UTI    Chief Complaint:   Chief Complaint   Patient presents with    Respiratory Distress        HPI: 89yo WF from the NH with multiple medical issues and polypharmacy.  Sent over for respiratory distress.  She was on CPAP for a time but weaned down to NC.  She is awake and alert and talking this am.  No diarrhea.  BP fluctuates.  She doesn't appear in any distress.     Past Medical History:   Diagnosis Date    Allergic rhinitis     Anticoagulant long-term use     Anxiety disorder, unspecified     Arthritis     Chronic pain     COPD (chronic obstructive pulmonary disease)     Dementia     Dementia     Depression     GERD (gastroesophageal reflux disease)     hyperlipidemia     Hypertension     Hyponatremia     Hypoxemia     Insomnia     Mood disorder     Neuropathy     Osteoarthritis     Rhinitis     Thyroid disease     Type 2 diabetes mellitus     Unspecified atrial fibrillation     Urinary tract infection, site not specified        History reviewed. No pertinent surgical history.    Review of patient's allergies indicates:   Allergen Reactions    Sulfa (sulfonamide antibiotics)        No current facility-administered medications on file prior to encounter.     Current Outpatient Medications on File Prior to Encounter   Medication Sig    cetirizine (ZYRTEC) 10 MG tablet Take 10 mg by mouth once daily.    diltiaZEM (CARDIZEM CD) 180 MG 24 hr capsule Take 180 mg by mouth once daily.    DULoxetine (CYMBALTA) 30 MG capsule Take 30 mg by mouth once daily. 9am    fluticasone propionate (FLONASE) 50 mcg/actuation  nasal spray 1 spray by Each Nostril route once daily. 9am    gabapentin (NEURONTIN) 100 MG capsule Take 100 mg by mouth Daily.    gabapentin (NEURONTIN) 300 MG capsule Take 300 mg by mouth every evening.    lanolin/mineral oil/petrolatum (ARTIFICIAL TEARS OPHT) Apply to eye.    levothyroxine (SYNTHROID) 50 MCG tablet Take 50 mcg by mouth before breakfast.    linaCLOtide (LINZESS) 145 mcg Cap capsule Take 145 mcg by mouth before breakfast.    lutein-zeaxanthin (OCUVITE LUTEIN 25) 25-5 mg Cap Take 1 tablet by mouth 2 (two) times a day.    melatonin 3 mg Cap Take by mouth.    metFORMIN (GLUCOPHAGE) 500 MG tablet Take 500 mg by mouth every evening.    olmesartan (BENICAR) 40 MG tablet Take 40 mg by mouth once daily.    pantoprazole (PROTONIX) 40 MG tablet Take 40 mg by mouth once daily.    polyethylene glycol (GLYCOLAX) 17 gram PwPk Take 17 g by mouth once daily. Mix in 4 oz H20    rivastigmine tartrate (EXELON) 1.5 MG capsule Take 1.5 mg by mouth 2 (two) times daily.    sertraline (ZOLOFT) 25 MG tablet Take 25 mg by mouth once daily. morning    sod.chlorid-potassium chloride (THERMOTABS) 287-180-15 mg Tab Take by mouth 3 (three) times daily.    trazodone (DESYREL) 25 MG tablet Take by mouth every evening.    acetaminophen (TYLENOL) 650 MG Supp Place rectally.    alum-mag hydroxide-simeth 200-200-25 mg suspension Take by mouth every 6 (six) hours as needed for Indigestion.    dextran 70-hypromellose 0.1-0.3 % Drop Apply to eye.    diltiaZEM (CARDIZEM CD) 240 MG 24 hr capsule Take 1 capsule (240 mg total) by mouth once daily. (Patient taking differently: Take 180 mg by mouth once daily.)    loperamide (IMODIUM A-D) 2 mg Tab Take 2 mg by mouth 4 (four) times daily as needed.    magnesium hydroxide 400 mg/5 ml (MILK OF MAGNESIA) 400 mg/5 mL Susp Take 30 mLs by mouth daily as needed.    metFORMIN (FORTAMET) 500 mg 24hr tablet Take 1,000 mg by mouth daily with breakfast.    ondansetron (ZOFRAN) 4 MG tablet Take 4 mg by mouth  every 8 (eight) hours as needed for Nausea.    PRESERVISION AREDS-2 250-90-40-1 mg Cap SMARTSI Tablet(s) By Mouth Morning-Night     Family History    Family history is unknown by patient.       Tobacco Use    Smoking status: Unknown    Smokeless tobacco: Not on file   Substance and Sexual Activity    Alcohol use: Not Currently     Comment: lethargic - unable to access    Drug use: Not Currently     Comment: lethargic - unable to access    Sexual activity: Not Currently     Comment: lethargic - unable to access     Review of Systems   Constitutional:  Negative for appetite change, chills and fever.   Respiratory:  Negative for cough, shortness of breath and wheezing.    Cardiovascular:  Negative for chest pain, palpitations and leg swelling.   Gastrointestinal:  Negative for abdominal distention, diarrhea, nausea and vomiting.   Genitourinary:  Negative for dysuria.   Musculoskeletal:  Positive for arthralgias.   Skin:  Negative for rash.   Neurological:  Positive for weakness. Negative for dizziness, seizures and syncope.   Psychiatric/Behavioral:  Negative for agitation, behavioral problems and confusion.    All other systems reviewed and are negative.    Objective:     Vital Signs (Most Recent):  Temp: 98 °F (36.7 °C) (25 1220)  Pulse: (!) 123 (25 1220)  Resp: 14 (25 1220)  BP: (!) 75/47 (25 1220)  SpO2: 100 % (25 1220) Vital Signs (24h Range):  Temp:  [97.2 °F (36.2 °C)-100 °F (37.8 °C)] 98 °F (36.7 °C)  Pulse:  [100-126] 123  Resp:  [14-40] 14  SpO2:  [89 %-100 %] 100 %  BP: ()/(42-81) 75/47     Weight: 59.9 kg (132 lb)  Body mass index is 18.94 kg/m².     Physical Exam  Vitals reviewed.   Constitutional:       General: She is not in acute distress.     Appearance: Normal appearance.   HENT:      Head: Normocephalic and atraumatic.   Eyes:      General: No scleral icterus.     Extraocular Movements: Extraocular movements intact.      Conjunctiva/sclera: Conjunctivae normal.       Pupils: Pupils are equal, round, and reactive to light.   Cardiovascular:      Rate and Rhythm: Normal rate and regular rhythm.      Heart sounds: No murmur heard.     No friction rub. No gallop.   Pulmonary:      Effort: Pulmonary effort is normal. No respiratory distress.      Breath sounds: Normal breath sounds. No wheezing or rales.   Abdominal:      General: Abdomen is flat. Bowel sounds are normal. There is no distension.      Palpations: Abdomen is soft.      Tenderness: There is no abdominal tenderness. There is no guarding.   Musculoskeletal:         General: No swelling.      Right lower leg: No edema.      Left lower leg: No edema.   Skin:     General: Skin is warm and dry.      Coloration: Skin is not jaundiced.      Findings: No rash.   Neurological:      General: No focal deficit present.      Mental Status: She is alert and oriented to person, place, and time.      Sensory: No sensory deficit.      Motor: Weakness present.   Psychiatric:         Mood and Affect: Mood normal.         Thought Content: Thought content normal.         Judgment: Judgment normal.              CRANIAL NERVES     CN III, IV, VI   Pupils are equal, round, and reactive to light.       Significant Labs: All pertinent labs within the past 24 hours have been reviewed.  Recent Lab Results  (Last 5 results in the past 24 hours)        03/13/25  0108   03/13/25  0102   03/12/25  2328   03/12/25  2327   03/12/25  2300        RSV Ag by Molecular Method       Negative         Influenza A, Molecular     Negative           Influenza B, Molecular     Negative           Appearance, UA       Cloudy         Bacteria, UA       Loaded         Bilirubin (UA)       Negative         Color, UA       Yellow         SARS COV-2 MOLECULAR       Negative         Glucose, UA       Negative         Ketones, UA       Negative         Lactic Acid Level   2.5  Comment: A repeat order for Lactic Acid has been placed for collection in 2 hours.        "2.7  Comment: A repeat order for Lactic Acid has been placed for collection in 2 hours.       Leukocyte Esterase, UA       Large         NITRITE UA       Positive         NT-proBNP         8,270       Blood, UA       Moderate         pH, UA       6.0         POC Base Excess 1.7               POC HCO3 25.8               POC PCO2 38               POC PH 7.44               POC PO2 113               POC SATURATED O2 99               Protein, UA       100         RBC, UA       10-15         Spec Grav UA       1.020         Squam Epithel, UA       Occasional         UROBILINOGEN UA       0.2         WBC, UA       Too Numerous To Count                                Significant Imaging: I have reviewed all pertinent imaging results/findings within the past 24 hours.  Assessment/Plan:     * Sepsis secondary to UTI  This patient does have evidence of infective focus  My overall impression is sepsis.  Source: Urinary Tract  Antibiotics given-   Antibiotics (72h ago, onward)      Start     Stop Route Frequency Ordered    03/13/25 0900  mupirocin 2 % ointment         03/18/25 0859 Nasl 2 times daily 03/13/25 0339    03/13/25 0500  piperacillin-tazobactam (ZOSYN) 4.5 g in D5W 100 mL IVPB (MB+)         -- IV Every 8 hours (non-standard times) 03/13/25 0341    03/13/25 0435  vancomycin - pharmacy to dose  (vancomycin IVPB (PEDS and ADULTS))        Placed in "And" Linked Group    -- IV pharmacy to manage frequency 03/13/25 0337          Latest lactate reviewed-  Recent Labs   Lab 03/13/25  0102   LACTATE 2.5*     Organ dysfunction indicated by Acute respiratory failure    Fluid challenge Ideal Body Weight- The patient's ideal body weight is Ideal body weight: 68.5 kg (151 lb 0.2 oz) which will be used to calculate fluid bolus of 30 ml/kg for treatment of septic shock.      Post- resuscitation assessment Yes - I attest a sepsis perfusion exam was performed within 6 hours of sepsis, severe sepsis, or septic shock presentation, " following fluid resuscitation.      Will Not start Pressors- Levophed for MAP of 65  Source control achieved by:     Pulmonary disease  Not sure if on O2 at NH or not.       Hypothyroidism  Check TSH      Type 2 diabetes mellitus with hyperglycemia, without long-term current use of insulin  SSI      HTN (hypertension)  Patient's blood pressure range in the last 24 hours was: BP  Min: 70/42  Max: 140/81.The patient's inpatient anti-hypertensive regimen is listed below:  Current Antihypertensives  , Daily, Oral  diltiaZEM 24 hr capsule 180 mg, Daily, Oral    Plan  - BP is uncontrolled, will adjust as follows: hold BP meds  -     Hyponatremia  Hyponatremia is likely due to Medications: SSRIs. The patient's most recent sodium results are listed below.  Recent Labs     03/12/25  2259        Plan  - Correct the sodium by 4-6mEq in 24 hours.   - Obtain the following studies: AM cortisol.  - Will treat the hyponatremia with Fluid restriction of:  1 liter per day  - Monitor sodium Daily.   - Patient hyponatremia is stable  -       VTE Risk Mitigation (From admission, onward)           Ordered     IP VTE HIGH RISK PATIENT  Once         03/13/25 0337     Place sequential compression device  Until discontinued         03/13/25 0337                                    Darinel Kwok DO  Department of Hospital Medicine  [facility]

## 2025-03-13 NOTE — PLAN OF CARE
[facility]  Initial Discharge Assessment       Primary Care Provider: Walker Watkins MD    Admission Diagnosis: SOB (shortness of breath) [R06.02]  Sepsis due to urinary tract infection [A41.9, N39.0]  Respiratory failure, unspecified chronicity, unspecified whether with hypoxia or hypercapnia [J96.90]    Admission Date: 3/12/2025  Expected Discharge Date:          Payor: MEDICARE / Plan: MEDICARE PART A & B / Product Type: Government /     Extended Emergency Contact Information  Primary Emergency Contact: Ant Vasquez  Mobile Phone: [phone]  Relation: Son  Preferred language: English   needed? No    Discharge Plan A: Return to nursing home         [facility] [place] [place] - [address]  [address]  [place] [address]  Phone: [phone] Fax: [phone]      Initial Assessment (most recent)       Adult Discharge Assessment - 03/13/25 1104          Discharge Assessment    Assessment Type Discharge Planning Assessment     Confirmed/corrected address, phone number and insurance Yes     Source of Information health record     Communicated JUANPABLO with patient/caregiver Date not available/Unable to determine     Reason For Admission inpatient treatment     Facility Arrived From: [facility]     Do you expect to return to your current living situation? Yes     Do you have help at home or someone to help you manage your care at home? --   facility resident    Prior to hospitilization cognitive status: Unable to Assess     Current cognitive status: Alert/Oriented     Walking or Climbing Stairs Difficulty yes     Walking or Climbing Stairs transferring difficulty, dependent     Dressing/Bathing Difficulty yes     Dressing/Bathing bathing difficulty, assistance 1 person;dressing difficulty, assistance 1 person     Equipment Currently Used at Home wheelchair     Readmission within 30 days? Yes     Patient currently being followed by outpatient case  management? No     Do you currently have service(s) that help you manage your care at home? No     Do you take prescription medications? Yes     Do you have prescription coverage? Yes     Do you have any problems affording any of your prescribed medications? No     Is the patient taking medications as prescribed? yes     How do you get to doctors appointments? family or friend will provide     Are you on dialysis? No     Do you take coumadin? No     Discharge Plan A Return to nursing home     DME Needed Upon Discharge  none     Discharge Plan discussed with: --   facility SW       Physical Activity    On average, how many days per week do you engage in moderate to strenuous exercise (like a brisk walk)? 0 days     On average, how many minutes do you engage in exercise at this level? 0 min        Financial Resource Strain    How hard is it for you to pay for the very basics like food, housing, medical care, and heating? Not hard at all        Housing Stability    In the last 12 months, was there a time when you were not able to pay the mortgage or rent on time? No     At any time in the past 12 months, were you homeless or living in a shelter (including now)? No        Transportation Needs    Has the lack of transportation kept you from medical appointments, meetings, work or from getting things needed for daily living? No        Food Insecurity    Within the past 12 months, you worried that your food would run out before you got the money to buy more. Never true     Within the past 12 months, the food you bought just didn't last and you didn't have money to get more. Never true        Stress    Do you feel stress - tense, restless, nervous, or anxious, or unable to sleep at night because your mind is troubled all the time - these days? Not at all        Social Isolation    How often do you feel lonely or isolated from those around you?  Never        Alcohol Use    Q1: How often do you have a drink containing  alcohol? Never     Q2: How many drinks containing alcohol do you have on a typical day when you are drinking? Patient does not drink     Q3: How often do you have six or more drinks on one occasion? Never        Utilities    In the past 12 months has the electric, gas, oil, or water company threatened to shut off services in your home? No        Health Literacy    How often do you need to have someone help you when you read instructions, pamphlets, or other written material from your doctor or pharmacy? Always

## 2025-03-14 VITALS
OXYGEN SATURATION: 96 % | HEART RATE: 128 BPM | TEMPERATURE: 99 F | HEIGHT: 70 IN | SYSTOLIC BLOOD PRESSURE: 96 MMHG | BODY MASS INDEX: 18.9 KG/M2 | RESPIRATION RATE: 20 BRPM | DIASTOLIC BLOOD PRESSURE: 61 MMHG | WEIGHT: 132 LBS

## 2025-03-14 PROBLEM — N39.0 SEPSIS SECONDARY TO UTI: Status: RESOLVED | Noted: 2025-03-13 | Resolved: 2025-03-14

## 2025-03-14 PROBLEM — A41.9 SEPSIS SECONDARY TO UTI: Status: RESOLVED | Noted: 2025-03-13 | Resolved: 2025-03-14

## 2025-03-14 PROBLEM — R06.03 RESPIRATORY DISTRESS: Status: RESOLVED | Noted: 2025-03-13 | Resolved: 2025-03-14

## 2025-03-14 LAB
ANION GAP SERPL CALCULATED.3IONS-SCNC: 12 MMOL/L (ref 7–16)
BASOPHILS # BLD AUTO: 0.02 K/UL (ref 0–0.2)
BASOPHILS NFR BLD AUTO: 0.3 % (ref 0–1)
BUN SERPL-MCNC: 10 MG/DL (ref 10–20)
BUN/CREAT SERPL: 13 (ref 6–20)
CALCIUM SERPL-MCNC: 8.5 MG/DL (ref 8.4–10.2)
CHLORIDE SERPL-SCNC: 102 MMOL/L (ref 98–111)
CO2 SERPL-SCNC: 25 MMOL/L (ref 23–31)
CORTIS AM PEAK SERPL-MCNC: 9.2 ΜG/DL (ref 4.5–22.7)
CREAT SERPL-MCNC: 0.77 MG/DL (ref 0.55–1.02)
DIFFERENTIAL METHOD BLD: ABNORMAL
EGFR (NO RACE VARIABLE) (RUSH/TITUS): 73 ML/MIN/1.73M2
EOSINOPHIL # BLD AUTO: 0.16 K/UL (ref 0–0.5)
EOSINOPHIL NFR BLD AUTO: 2.8 % (ref 1–4)
ERYTHROCYTE [DISTWIDTH] IN BLOOD BY AUTOMATED COUNT: 14.5 % (ref 11.5–14.5)
GLUCOSE SERPL-MCNC: 109 MG/DL (ref 75–121)
HCT VFR BLD AUTO: 28.5 % (ref 38–47)
HGB BLD-MCNC: 8.8 G/DL (ref 12–16)
LYMPHOCYTES # BLD AUTO: 1.59 K/UL (ref 1–4.8)
LYMPHOCYTES NFR BLD AUTO: 27.6 % (ref 27–41)
MCH RBC QN AUTO: 28.3 PG (ref 27–31)
MCHC RBC AUTO-ENTMCNC: 30.9 G/DL (ref 32–36)
MCV RBC AUTO: 91.6 FL (ref 80–96)
MONOCYTES # BLD AUTO: 0.47 K/UL (ref 0–0.8)
MONOCYTES NFR BLD AUTO: 8.2 % (ref 2–6)
MPC BLD CALC-MCNC: 9.4 FL (ref 9.4–12.4)
NEUTROPHILS # BLD AUTO: 3.52 K/UL (ref 1.8–7.7)
NEUTROPHILS NFR BLD AUTO: 61.1 % (ref 53–65)
PLATELET # BLD AUTO: 334 K/UL (ref 150–400)
POTASSIUM SERPL-SCNC: 3.4 MMOL/L (ref 3.5–5.1)
RBC # BLD AUTO: 3.11 M/UL (ref 4.2–5.4)
SODIUM SERPL-SCNC: 136 MMOL/L (ref 136–145)
TSH SERPL DL<=0.005 MIU/L-ACNC: 1.86 UIU/ML (ref 0.35–4.94)
WBC # BLD AUTO: 5.76 K/UL (ref 4.5–11)

## 2025-03-14 PROCEDURE — 25000003 PHARM REV CODE 250: Performed by: NURSE PRACTITIONER

## 2025-03-14 PROCEDURE — 84443 ASSAY THYROID STIM HORMONE: CPT | Performed by: HOSPITALIST

## 2025-03-14 PROCEDURE — 94640 AIRWAY INHALATION TREATMENT: CPT

## 2025-03-14 PROCEDURE — 27000221 HC OXYGEN, UP TO 24 HOURS

## 2025-03-14 PROCEDURE — 27000987 HC MATTRESS, MATRIX LOW PROFILE

## 2025-03-14 PROCEDURE — 80048 BASIC METABOLIC PNL TOTAL CA: CPT | Performed by: HOSPITALIST

## 2025-03-14 PROCEDURE — 63600175 PHARM REV CODE 636 W HCPCS: Performed by: NURSE PRACTITIONER

## 2025-03-14 PROCEDURE — 99900035 HC TECH TIME PER 15 MIN (STAT)

## 2025-03-14 PROCEDURE — 36415 COLL VENOUS BLD VENIPUNCTURE: CPT | Performed by: HOSPITALIST

## 2025-03-14 PROCEDURE — 94799 UNLISTED PULMONARY SVC/PX: CPT

## 2025-03-14 PROCEDURE — 94761 N-INVAS EAR/PLS OXIMETRY MLT: CPT

## 2025-03-14 PROCEDURE — 85025 COMPLETE CBC W/AUTO DIFF WBC: CPT | Performed by: HOSPITALIST

## 2025-03-14 PROCEDURE — 27000958

## 2025-03-14 PROCEDURE — 99239 HOSP IP/OBS DSCHRG MGMT >30: CPT | Mod: ,,, | Performed by: HOSPITALIST

## 2025-03-14 PROCEDURE — 25000242 PHARM REV CODE 250 ALT 637 W/ HCPCS: Performed by: NURSE PRACTITIONER

## 2025-03-14 PROCEDURE — 82533 TOTAL CORTISOL: CPT | Performed by: HOSPITALIST

## 2025-03-14 RX ORDER — DILTIAZEM HYDROCHLORIDE 180 MG/1
180 CAPSULE, COATED, EXTENDED RELEASE ORAL DAILY
Qty: 30 CAPSULE | Refills: 11 | Status: ON HOLD | OUTPATIENT
Start: 2025-03-14 | End: 2025-03-21 | Stop reason: HOSPADM

## 2025-03-14 RX ORDER — CEFDINIR 300 MG/1
300 CAPSULE ORAL 2 TIMES DAILY
Qty: 20 CAPSULE | Refills: 0 | Status: ON HOLD | OUTPATIENT
Start: 2025-03-14 | End: 2025-03-18 | Stop reason: ALTCHOICE

## 2025-03-14 RX ORDER — ONDANSETRON 4 MG/1
4 TABLET, ORALLY DISINTEGRATING ORAL EVERY 8 HOURS PRN
Status: DISCONTINUED | OUTPATIENT
Start: 2025-03-14 | End: 2025-03-14 | Stop reason: HOSPADM

## 2025-03-14 RX ADMIN — DULOXETINE HYDROCHLORIDE 30 MG: 30 CAPSULE, DELAYED RELEASE ORAL at 08:03

## 2025-03-14 RX ADMIN — LEVOTHYROXINE SODIUM 50 MCG: 0.05 TABLET ORAL at 06:03

## 2025-03-14 RX ADMIN — PANTOPRAZOLE SODIUM 40 MG: 40 TABLET, DELAYED RELEASE ORAL at 08:03

## 2025-03-14 RX ADMIN — CETIRIZINE HYDROCHLORIDE 10 MG: 10 TABLET, FILM COATED ORAL at 08:03

## 2025-03-14 RX ADMIN — GLYCERIN 1 DROP: .002; .002; .01 SOLUTION/ DROPS OPHTHALMIC at 08:03

## 2025-03-14 RX ADMIN — MUPIROCIN: 20 OINTMENT TOPICAL at 08:03

## 2025-03-14 RX ADMIN — IPRATROPIUM BROMIDE 0.5 MG: 0.5 SOLUTION RESPIRATORY (INHALATION) at 08:03

## 2025-03-14 RX ADMIN — GLYCERIN 1 DROP: .002; .002; .01 SOLUTION/ DROPS OPHTHALMIC at 03:03

## 2025-03-14 RX ADMIN — DILTIAZEM HYDROCHLORIDE 180 MG: 180 CAPSULE, COATED, EXTENDED RELEASE ORAL at 08:03

## 2025-03-14 RX ADMIN — RIVASTIGMINE TARTRATE 1.5 MG: 1.5 CAPSULE ORAL at 08:03

## 2025-03-14 RX ADMIN — IPRATROPIUM BROMIDE 0.5 MG: 0.5 SOLUTION RESPIRATORY (INHALATION) at 12:03

## 2025-03-14 RX ADMIN — PIPERACILLIN SODIUM AND TAZOBACTAM SODIUM 4.5 G: 4; .5 INJECTION, POWDER, LYOPHILIZED, FOR SOLUTION INTRAVENOUS at 04:03

## 2025-03-14 RX ADMIN — SERTRALINE 25 MG: 25 TABLET, FILM COATED ORAL at 08:03

## 2025-03-14 NOTE — DISCHARGE SUMMARY
"[facility] Medicine  Discharge Summary      Patient Name: Joyce Dow  MRN: [id number]  GUY: [id number]  Patient Class: IP- Inpatient  Admission Date: 3/12/2025  Hospital Length of Stay: 1 days  Discharge Date and Time:  03/14/2025 12:23 PM  Attending Physician: Darinel Kwok DO   Discharging Provider: Darinel Kwok DO  Primary Care Provider: Walker Watkins MD    Primary Care Team: Networked reference to record PCT     HPI:   89yo WF from the NH with multiple medical issues and polypharmacy.  Sent over for respiratory distress.  She was on CPAP for a time but weaned down to NC.  She is awake and alert and talking this am.  No diarrhea.  BP fluctuates.  She doesn't appear in any distress.     * No surgery found *      Hospital Course:   Patient is better and stable.  Off O2 and sats are 96% on RA.  BP fluctuates and runs low, however she is awake and alert and talking with "low" BP.  Will DC back to NH today.  She needs a thorough med review for polypharmacy.  She came in with Resp distress that quickly corrected.  She is a DNR.  She is on Salt tabs for Hyponatremia and has low BP numbers.  I sent off for a cortisol level while here but no result as of DC.  Had a UTI on arrival.  Will DC with PO ABX back to NH.      Goals of Care Treatment Preferences:  Code Status: DNR         Consults:     Assessment & Plan  Hyponatremia  Hyponatremia is likely due to Medications: SSRIs. The patient's most recent sodium results are listed below.  Recent Labs     03/12/25  2259 03/14/25  0630    136     Plan  - Correct the sodium by 4-6mEq in 24 hours.   - Obtain the following studies: AM cortisol.  - Will treat the hyponatremia with Fluid restriction of:  1 liter per day  - Monitor sodium Daily.   - Patient hyponatremia is stable  -   HTN (hypertension)  Patient's blood pressure range in the last 24 hours was: BP  Min: 77/47  Max: 96/61.The patient's inpatient " anti-hypertensive regimen is listed below:  Current Antihypertensives  diltiaZEM 24 hr capsule 180 mg, Daily, Oral  diltiaZEM (CARDIZEM CD) 24 hr capsule, Daily, Oral    Plan  - BP is uncontrolled, will adjust as follows: hold BP meds  -   Type 2 diabetes mellitus with hyperglycemia, without long-term current use of insulin  SSI    Hypothyroidism  Check TSH    Pulmonary disease  Not sure if on O2 at NH or not.     Final Active Diagnoses:    Diagnosis Date Noted POA    Type 2 diabetes mellitus with hyperglycemia, without long-term current use of insulin [E11.65] 08/31/2024 Yes    Hypothyroidism [E03.9] 11/20/2023 Yes    HTN (hypertension) [I10] 05/10/2023 Yes    Hyponatremia [E87.1] 05/09/2023 Yes     Chronic    Pulmonary disease [J98.4] 10/25/2012 Yes     Chronic      Problems Resolved During this Admission:    Diagnosis Date Noted Date Resolved POA    PRINCIPAL PROBLEM:  Sepsis secondary to UTI [A41.9, N39.0] 03/13/2025 03/14/2025 Yes    Respiratory distress [R06.03] 03/13/2025 03/14/2025 Yes       Discharged Condition: good    Disposition: Skilled Nursing Facility    Follow Up:    Patient Instructions:   No discharge procedures on file.    Significant Diagnostic Studies: N/A    Pending Diagnostic Studies:       Procedure Component Value Units Date/Time    Cortisol, AM [[id number]] Collected: 03/14/25 0759    Order Status: Sent Lab Status: In process Updated: 03/14/25 0759    Specimen: Blood            Medications:  Reconciled Home Medications:      Medication List        START taking these medications      cefdinir 300 MG capsule  Commonly known as: OMNICEF  Take 1 capsule (300 mg total) by mouth 2 (two) times daily. for 10 days            CHANGE how you take these medications      diltiaZEM 180 MG 24 hr capsule  Commonly known as: CARDIZEM CD  Take 1 capsule (180 mg total) by mouth once daily.  What changed:   medication strength  how much to take  Another medication with the same name was removed. Continue  taking this medication, and follow the directions you see here.            CONTINUE taking these medications      cetirizine 10 MG tablet  Commonly known as: ZYRTEC  Take 10 mg by mouth once daily.     DULoxetine 30 MG capsule  Commonly known as: CYMBALTA  Take 30 mg by mouth once daily. 9am     fluticasone propionate 50 mcg/actuation nasal spray  Commonly known as: FLONASE  1 spray by Each Nostril route once daily. 9am     * gabapentin 300 MG capsule  Commonly known as: NEURONTIN  Take 300 mg by mouth every evening.     * gabapentin 100 MG capsule  Commonly known as: NEURONTIN  Take 100 mg by mouth Daily.     levothyroxine 50 MCG tablet  Commonly known as: SYNTHROID  Take 50 mcg by mouth before breakfast.     * metFORMIN 500 mg 24hr tablet  Commonly known as: FORTAMET  Take 1,000 mg by mouth daily with breakfast.     * metFORMIN 500 MG tablet  Commonly known as: GLUCOPHAGE  Take 500 mg by mouth every evening.     OCUVITE LUTEIN 25 25-5 mg Cap  Generic drug: lutein-zeaxanthin  Take 1 tablet by mouth 2 (two) times a day.     pantoprazole 40 MG tablet  Commonly known as: PROTONIX  Take 40 mg by mouth once daily.     PRESERVISION AREDS-2 250-90-40-1 mg Cap  Generic drug: vit C,U-Gd-ugwan-lutein-zeaxan  SMARTSI Tablet(s) By Mouth Morning-Night     rivastigmine tartrate 1.5 MG capsule  Commonly known as: EXELON  Take 1.5 mg by mouth 2 (two) times daily.     sertraline 25 MG tablet  Commonly known as: ZOLOFT  Take 25 mg by mouth once daily. morning     THERMOTABS 287-180-15 mg Tab  Generic drug: sod.chlorid-potassium chloride  Take by mouth 3 (three) times daily.     trazodone 25 MG tablet  Commonly known as: DESYREL  Take by mouth every evening.           * This list has 4 medication(s) that are the same as other medications prescribed for you. Read the directions carefully, and ask your doctor or other care provider to review them with you.                STOP taking these medications      acetaminophen 650 MG  Supp  Commonly known as: TYLENOL     alum-mag hydroxide-simeth 200-200-25 mg suspension     ARTIFICIAL TEARS OPHT     dextran 70-hypromellose 0.1-0.3 % Drop     linaCLOtide 145 mcg Cap capsule  Commonly known as: LINZESS     loperamide 2 mg Tab  Commonly known as: IMODIUM A-D     magnesium hydroxide 400 mg/5 ml 400 mg/5 mL Susp  Commonly known as: MILK OF MAGNESIA     melatonin 3 mg Cap     olmesartan 40 MG tablet  Commonly known as: BENICAR     ondansetron 4 MG tablet  Commonly known as: ZOFRAN     polyethylene glycol 17 gram Pwpk  Commonly known as: GLYCOLAX              Indwelling Lines/Drains at time of discharge:   Lines/Drains/Airways       Drain  Duration                  Urethral Catheter 03/12/25 Silicone 16 Fr. 2 days                    Time spent on the discharge of patient: 32 minutes         Darinel Kwok DO  Department of Hospital Medicine  [facility]

## 2025-03-14 NOTE — PLAN OF CARE
[facility]  Discharge Final Note    Primary Care Provider: Walker Watkins MD    Expected Discharge Date: 3/14/2025    Final Discharge Note (most recent)       Final Note - 03/14/25 1254          Final Note    Assessment Type Final Discharge Note     Anticipated Discharge Disposition --   dc'd to [facility]    What phone number can be called within the next 1-3 days to see how you are doing after discharge? [phone]        Post-Acute Status    Post-Acute Authorization Placement     Post-Acute Placement Status Set-up Complete/Auth obtained     Patient choice form signed by patient/caregiver List with quality metrics by geographic area provided;List from CMS Compare     Discharge Delays None known at this time                     Important Message from Medicare  Important Message from Medicare regarding Discharge Appeal Rights: Given to patient/caregiver, Explained to patient/caregiver, Signed/date by patient/caregiver     Date IMM was signed: 03/13/25  Time IMM was signed: 0713    Ms. Dow will be discharged 3/14/25 back to [facility] where she is a long term resident. She is [facility] [place] [facility]. Discharge orders/summary faxed.

## 2025-03-14 NOTE — HOSPITAL COURSE
"Patient is better and stable.  Off O2 and sats are 96% on RA.  BP fluctuates and runs low, however she is awake and alert and talking with "low" BP.  Will DC back to NH today.  She needs a thorough med review for polypharmacy.  She came in with Resp distress that quickly corrected.  She is a DNR.  She is on Salt tabs for Hyponatremia and has low BP numbers.  I sent off for a cortisol level while here but no result as of DC.  Had a UTI on arrival.  Will DC with PO ABX back to NH.   "

## 2025-03-14 NOTE — ASSESSMENT & PLAN NOTE
Hyponatremia is likely due to Medications: SSRIs. The patient's most recent sodium results are listed below.  Recent Labs     03/12/25  2259 03/14/25  0630    136     Plan  - Correct the sodium by 4-6mEq in 24 hours.   - Obtain the following studies: AM cortisol.  - Will treat the hyponatremia with Fluid restriction of:  1 liter per day  - Monitor sodium Daily.   - Patient hyponatremia is stable  -

## 2025-03-14 NOTE — PLAN OF CARE
Problem: Infection  Goal: Absence of Infection Signs and Symptoms  Outcome: Progressing     Problem: Adult Inpatient Plan of Care  Goal: Optimal Comfort and Wellbeing  Outcome: Progressing     Problem: Fall Injury Risk  Goal: Absence of Fall and Fall-Related Injury  Outcome: Progressing     Problem: Breathing Pattern Ineffective  Goal: Effective Breathing Pattern  Outcome: Progressing

## 2025-03-14 NOTE — PROGRESS NOTES
[facility]  Adult Nutrition  Progress Note         Reason for Assessment  Reason For Assessment: identified at risk by screening criteria        Assessment and Plan  Patient admitted on 3/12 with a dx of sepsis 2/2 UTI. PMH of DM, HTN, and pulmonary disease noted. Patient is ordered a dysphagia MCS diet. Tolerating solids and liquids well per secure chat with RN. Po intakes on average 50% since admission.     Patient is 59.9 kg with a BMI of 18.94 which is underweight per geriatric standards. MST score of 3 due to unsure of weight loss and decreased oral intakes. Weight loss noted over the past 6 months, but not considered significant. NFPE will be completed on follow up as appropriate to determine if physical signs of wasting present.     Recommend to continue diet as tolerated. Encourage po intakes. Add Boost Plus BID to provide additional caloric intakes to promote weight gain/maintenance. RD Following.         Last Bowel Movement: 03/13/25    Learning Needs/Social Determinants of Health    Learning Assessment       03/13/2025 0449 [facility] (3/12/2025 - Present)   Created by Avis Phillip RN - RN (Nurse) Status: Complete                 PRIMARY LEARNER     Primary Learner Name:  Joyce Dow/No learner available  - 03/13/2025 0449    Relationship:  Patient  - 03/13/2025 0449    Does the primary learner have any barriers to learning?:  Other  - 03/13/2025 0449    What is the preferred language of the primary learner?:  English [facility] 03/13/2025 0449    Is an  required?:  No [facility] 03/13/2025 0449    How does the primary learner prefer to learn new concepts?:  Other  - 03/13/2025 0449    How often do you need to have someone help you read instructions, pamphlets, or written material from your doctor or pharmacy?:  Always [facility] 03/13/2025 0449        CO-LEARNER #1     No question answered        CO-LEARNER #2     No  question answered        SPECIAL TOPICS     No question answered        ANSWERED BY:     No question answered        Comments         Edit History       Avis Phillip, RN - RN (Nurse)   03/13/2025 4520                             Social Drivers of Health     Tobacco Use: Low Risk  (12/5/2023)    Received from [facility]    Patient History     Smoking Tobacco Use: Never     Smokeless Tobacco Use: Never     Passive Exposure: Never   Alcohol Use: Not At Risk (3/13/2025)    AUDIT-C     Frequency of Alcohol Consumption: Never     Average Number of Drinks: Patient does not drink     Frequency of Binge Drinking: Never   Financial Resource Strain: Low Risk  (3/13/2025)    Overall Financial Resource Strain (CARDIA)     Difficulty of Paying Living Expenses: Not hard at all   Food Insecurity: No Food Insecurity (3/13/2025)    Hunger Vital Sign     Worried About Running Out of Food in the Last Year: Never true     Ran Out of Food in the Last Year: Never true   Transportation Needs: No Transportation Needs (9/5/2024)    TRANSPORTATION NEEDS     Transportation : No   Physical Activity: Inactive (3/13/2025)    Exercise Vital Sign     Days of Exercise per Week: 0 days     Minutes of Exercise per Session: 0 min   Stress: No Stress Concern Present (3/13/2025)    [employer] - Occupational Stress Questionnaire     Feeling of Stress : Not at all   Housing Stability: Low Risk  (3/13/2025)    Housing Stability Vital Sign     Unable to Pay for Housing in the Last Year: No     Number of Times Moved in the Last Year: Not on file     Homeless in the Last Year: No   Depression: Not on file   Utilities: Not At Risk (3/13/2025)    [facility] Utilities     Threatened with loss of utilities: No   Health Literacy: Inadequate Health Literacy (3/13/2025)     Health Literacy     Frequency of need for help with medical instructions: Always   Social Isolation: Socially Integrated (3/13/2025)     Social Isolation     Social Isolation: 1          Malnutrition  Unable to determine; mst 3. No significant weight loss noted per chart review; NFPE will be completed as appropriate    Nutrition Diagnosis  Underweight related to Inadequate Caloric intake as evidenced by BMI 18.94  Comments: add Boost Plus BID    Recent Labs   Lab 03/14/25  0630        Comments on Glucose: wnl    Nutrition Prescription / Recommendations  Recommendation/Intervention: Recommend to continue diet as tolerated. Consider SLP evaluation as appropriate  Goals: po intakes 50-75% during admission  Nutrition Goal Status: new  Current Diet Order: dysphagia [facility]  Nutrition Order Comments: 25-50%  Chewing or Swallowing Difficulty?: Poor Dentition  Recommended Diet: Mechanical Soft with chopped meats  Recommended Oral Supplement: Boost Plus [360kcals, 14g Protein, 45g Carbs] 2 times a day  Is Nutrition Support Recommended: no  Is Nutrition Education Recommended: No    Monitor and Evaluation  % current Intake: P.O. intake of 50 - 75 %  % intake to meet estimated needs: P.O. + Supplements  Monitor and Evaluation: Energy intake, Food and beverage intake, Diet order, Weight, Electrolyte and renal panel, Gastrointestinal profile, Glucose/endocrine profile, Inflammatory profile, Lipid profile  Energy intake, Food and beverage intake, Diet order, Weight, Electrolyte and renal panel, Gastrointestinal profile, Glucose/endocrine profile, Inflammatory profile, Lipid profile    Current Medical Diagnosis and Past Medical History     Past Medical History:   Diagnosis Date    Allergic rhinitis     Anticoagulant long-term use     Anxiety disorder, unspecified     Arthritis     Chronic pain     COPD (chronic obstructive pulmonary disease)     Dementia     Dementia     Depression     GERD (gastroesophageal reflux disease)     hyperlipidemia     Hypertension     Hyponatremia     Hypoxemia     Insomnia     Mood disorder     Neuropathy     Osteoarthritis      Rhinitis     Thyroid disease     Type 2 diabetes mellitus     Unspecified atrial fibrillation     Urinary tract infection, site not specified        Nutrition/Diet History  Factors Affecting Nutritional Intake: difficulty/impaired swallowing, decreased appetite    Lab/Procedures/Meds  Recent Labs   Lab 03/12/25  2259 03/14/25  0630    136   K 3.8 3.4*   BUN 9* 10   CREATININE 0.77 0.77   CALCIUM 9.2 8.5   ALBUMIN 2.9*  --     102   ALT <7  --    AST 17  --      Last A1c:   Lab Results   Component Value Date    HGBA1C 5.7 02/19/2025    HGBA1C 5.8 11/21/2023     Lab Results   Component Value Date    RBC 3.11 (L) 03/14/2025    HGB 8.8 (L) 03/14/2025    HCT 28.5 (L) 03/14/2025    MCV 91.6 03/14/2025    MCH 28.3 03/14/2025    MCHC 30.9 (L) 03/14/2025     Pertinent Labs Reviewed: reviewed  Pertinent Medications Reviewed: reviewed  Scheduled Meds:   cetirizine  10 mg Oral Daily    diltiaZEM  180 mg Oral Daily    DULoxetine  30 mg Oral Daily    gabapentin  100 mg Oral Daily    gabapentin  300 mg Oral QHS    ipratropium  0.5 mg Nebulization Q8H    levothyroxine  50 mcg Oral Before breakfast    melatonin  3 mg Oral Nightly    mupirocin   Nasal BID    pantoprazole  40 mg Oral Daily    peg 400-hypromellose-glycerin  1 drop Ophthalmic TID WAKE    piperacillin-tazobactam (Zosyn) IV (PEDS and ADULTS) (extended infusion is not appropriate)  4.5 g Intravenous Q8H    polyethylene glycol  17 g Oral Daily    rivastigmine tartrate  1.5 mg Oral BID    sertraline  25 mg Oral Daily    trazodone  25 mg Oral QHS     Continuous Infusions:  PRN Meds:.  Current Facility-Administered Medications:     acetaminophen, 325 mg, Rectal, Q6H PRN    aluminum-magnesium hydroxide-simethicone, 15 mL, Oral, Q6H PRN    melatonin, 6 mg, Oral, Nightly PRN    ondansetron, 4 mg, Oral, Q8H PRN    sodium chloride 0.9%, 10 mL, Intravenous, PRN    [CANCELED] Pharmacy to dose Vancomycin consult, , , Once **AND** vancomycin - pharmacy to dose, ,  "Intravenous, pharmacy to manage frequency    Anthropometrics  Height: 5' 10" (177.8 cm)  Height (inches): 70 in  Height Method: Stated (per paperwork)  Weight: 59.9 kg (132 lb)  Weight (lb): 132 lb  Weight Method: Bed Scale  Ideal Body Weight (IBW), Female: 150 lb  % Ideal Body Weight, Female (lb): 88 %  BMI (Calculated): 18.9  BMI Grade: less than 23 (older than 65 years) - underweight  Weight Loss: unintentional  Usual Body Weight (UBW), k kg  % Usual Body Weight: 93.75       Estimated/Assessed Needs  RMR (Ford-St. Jeor Equation): 1094     Temp: 98.5 °F (36.9 °C)Axillary  Weight Used For Calorie Calculations: 59.9 kg (132 lb 0.9 oz)   Energy Need Method: Kcal/kg Energy Calorie Requirements (kcal): 7092-4284  Weight Used For Protein Calculations: 59.9 kg (132 lb 0.9 oz)  Protein Requirements: 60-72  Estimated Fluid Requirement Method: RDA Method    RDA Method (mL): 1498       Nutrition by Nursing     Intake (%): 75%  Diet/Feeding Assistance: tray set-up                   Nutrition Follow-Up  RD Follow-up?: No                      Available via Secure Chat  "

## 2025-03-14 NOTE — PLAN OF CARE
Problem: Skin Injury Risk Increased  Goal: Skin Health and Integrity  Outcome: Adequate for Care Transition     Problem: Infection  Goal: Absence of Infection Signs and Symptoms  Outcome: Adequate for Care Transition     Problem: Adult Inpatient Plan of Care  Goal: Plan of Care Review  Outcome: Adequate for Care Transition  Goal: Patient-Specific Goal (Individualized)  Outcome: Adequate for Care Transition

## 2025-03-14 NOTE — ASSESSMENT & PLAN NOTE
Patient's blood pressure range in the last 24 hours was: BP  Min: 77/47  Max: 96/61.The patient's inpatient anti-hypertensive regimen is listed below:  Current Antihypertensives  diltiaZEM 24 hr capsule 180 mg, Daily, Oral  diltiaZEM (CARDIZEM CD) 24 hr capsule, Daily, Oral    Plan  - BP is uncontrolled, will adjust as follows: hold BP meds  -

## 2025-03-14 NOTE — NURSING
1540  Discharged to Nursing home with staff.  Alert orientated x 4.  No SOB noted, no complaints of pain or discomfort.  Paperwork given to Nursing Home staff.  Report called to Braulio YORK, Charge Nurse, [facility].  No acute distress.

## 2025-03-15 LAB — UA COMPLETE W REFLEX CULTURE PNL UR: ABNORMAL

## 2025-03-18 ENCOUNTER — HOSPITAL ENCOUNTER (INPATIENT)
Facility: HOSPITAL | Age: OVER 89
LOS: 3 days | Discharge: NURSING, REHAB OR LONG-TERM CARE | DRG: 189 | End: 2025-03-21
Attending: HOSPITALIST | Admitting: HOSPITALIST
Payer: MEDICARE

## 2025-03-18 DIAGNOSIS — I50.9 CONGESTIVE HEART FAILURE, UNSPECIFIED HF CHRONICITY, UNSPECIFIED HEART FAILURE TYPE: Primary | ICD-10-CM

## 2025-03-18 DIAGNOSIS — R06.02 SOB (SHORTNESS OF BREATH): ICD-10-CM

## 2025-03-18 DIAGNOSIS — I47.20 VENTRICULAR TACHYCARDIA: ICD-10-CM

## 2025-03-18 LAB
ALBUMIN SERPL BCP-MCNC: 2.7 G/DL (ref 3.4–4.8)
ALBUMIN SERPL BCP-MCNC: 3 G/DL (ref 3.4–4.8)
ALBUMIN/GLOB SERPL: 0.8 {RATIO}
ALBUMIN/GLOB SERPL: 0.8 {RATIO}
ALP SERPL-CCNC: 74 U/L (ref 40–150)
ALP SERPL-CCNC: 92 U/L (ref 40–150)
ALT SERPL W P-5'-P-CCNC: <7 U/L
ALT SERPL W P-5'-P-CCNC: <7 U/L
ANION GAP SERPL CALCULATED.3IONS-SCNC: 15 MMOL/L (ref 7–16)
ANION GAP SERPL CALCULATED.3IONS-SCNC: 15 MMOL/L (ref 7–16)
ANION GAP SERPL CALCULATED.3IONS-SCNC: 19 MMOL/L (ref 7–16)
AST SERPL W P-5'-P-CCNC: 17 U/L (ref 11–45)
AST SERPL W P-5'-P-CCNC: 19 U/L (ref 11–45)
BACTERIA #/AREA URNS HPF: ABNORMAL /HPF
BASOPHILS # BLD AUTO: 0.03 K/UL (ref 0–0.2)
BASOPHILS # BLD AUTO: 0.04 K/UL (ref 0–0.2)
BASOPHILS NFR BLD AUTO: 0.3 % (ref 0–1)
BASOPHILS NFR BLD AUTO: 0.3 % (ref 0–1)
BILIRUB SERPL-MCNC: 0.5 MG/DL
BILIRUB SERPL-MCNC: 0.5 MG/DL
BILIRUB UR QL STRIP: NEGATIVE
BUN SERPL-MCNC: 10 MG/DL (ref 10–20)
BUN SERPL-MCNC: 11 MG/DL (ref 10–20)
BUN SERPL-MCNC: 11 MG/DL (ref 10–20)
BUN/CREAT SERPL: 11 (ref 6–20)
BUN/CREAT SERPL: 14 (ref 6–20)
BUN/CREAT SERPL: 14 (ref 6–20)
CALCIUM SERPL-MCNC: 8.8 MG/DL (ref 8.4–10.2)
CALCIUM SERPL-MCNC: 8.9 MG/DL (ref 8.4–10.2)
CALCIUM SERPL-MCNC: 9.5 MG/DL (ref 8.4–10.2)
CHLORIDE SERPL-SCNC: 106 MMOL/L (ref 98–111)
CHLORIDE SERPL-SCNC: 107 MMOL/L (ref 98–111)
CHLORIDE SERPL-SCNC: 107 MMOL/L (ref 98–111)
CLARITY UR: CLEAR
CO2 SERPL-SCNC: 21 MMOL/L (ref 23–31)
CO2 SERPL-SCNC: 24 MMOL/L (ref 23–31)
CO2 SERPL-SCNC: 24 MMOL/L (ref 23–31)
COLOR UR: YELLOW
CREAT SERPL-MCNC: 0.78 MG/DL (ref 0.55–1.02)
CREAT SERPL-MCNC: 0.79 MG/DL (ref 0.55–1.02)
CREAT SERPL-MCNC: 0.87 MG/DL (ref 0.55–1.02)
DIFFERENTIAL METHOD BLD: ABNORMAL
EGFR (NO RACE VARIABLE) (RUSH/TITUS): 63 ML/MIN/1.73M2
EGFR (NO RACE VARIABLE) (RUSH/TITUS): 71 ML/MIN/1.73M2
EGFR (NO RACE VARIABLE) (RUSH/TITUS): 72 ML/MIN/1.73M2
EOSINOPHIL # BLD AUTO: 0.04 K/UL (ref 0–0.5)
EOSINOPHIL # BLD AUTO: 0.21 K/UL (ref 0–0.5)
EOSINOPHIL NFR BLD AUTO: 0.4 % (ref 1–4)
EOSINOPHIL NFR BLD AUTO: 1.6 % (ref 1–4)
ERYTHROCYTE [DISTWIDTH] IN BLOOD BY AUTOMATED COUNT: 15 % (ref 11.5–14.5)
ERYTHROCYTE [DISTWIDTH] IN BLOOD BY AUTOMATED COUNT: 15.1 % (ref 11.5–14.5)
GLOBULIN SER-MCNC: 3.2 G/DL (ref 2–4)
GLOBULIN SER-MCNC: 3.7 G/DL (ref 2–4)
GLUCOSE SERPL-MCNC: 102 MG/DL (ref 70–105)
GLUCOSE SERPL-MCNC: 113 MG/DL (ref 75–121)
GLUCOSE SERPL-MCNC: 121 MG/DL (ref 75–121)
GLUCOSE SERPL-MCNC: 260 MG/DL (ref 75–121)
GLUCOSE UR STRIP-MCNC: NEGATIVE MG/DL
HCO3 UR-SCNC: 28.5 MMOL/L (ref 21–28)
HCT VFR BLD AUTO: 33.9 % (ref 38–47)
HCT VFR BLD AUTO: 39 % (ref 38–47)
HGB BLD-MCNC: 10.3 G/DL (ref 12–16)
HGB BLD-MCNC: 11.9 G/DL (ref 12–16)
INFLUENZA A MOLECULAR (OHS): NEGATIVE
INFLUENZA B MOLECULAR (OHS): NEGATIVE
KETONES UR STRIP-SCNC: NEGATIVE MG/DL
LACTATE SERPL-SCNC: 2 MMOL/L (ref 0.5–2.2)
LEUKOCYTE ESTERASE UR QL STRIP: NEGATIVE
LYMPHOCYTES # BLD AUTO: 1.72 K/UL (ref 1–4.8)
LYMPHOCYTES # BLD AUTO: 2.89 K/UL (ref 1–4.8)
LYMPHOCYTES NFR BLD AUTO: 18.1 % (ref 27–41)
LYMPHOCYTES NFR BLD AUTO: 22.4 % (ref 27–41)
MAGNESIUM SERPL-MCNC: 1.6 MG/DL (ref 1.6–2.6)
MCH RBC QN AUTO: 27.8 PG (ref 27–31)
MCH RBC QN AUTO: 28.1 PG (ref 27–31)
MCHC RBC AUTO-ENTMCNC: 30.4 G/DL (ref 32–36)
MCHC RBC AUTO-ENTMCNC: 30.5 G/DL (ref 32–36)
MCV RBC AUTO: 91.6 FL (ref 80–96)
MCV RBC AUTO: 92.2 FL (ref 80–96)
MONOCYTES # BLD AUTO: 0.64 K/UL (ref 0–0.8)
MONOCYTES # BLD AUTO: 0.69 K/UL (ref 0–0.8)
MONOCYTES NFR BLD AUTO: 5.4 % (ref 2–6)
MONOCYTES NFR BLD AUTO: 6.7 % (ref 2–6)
MPC BLD CALC-MCNC: 9.1 FL (ref 9.4–12.4)
MPC BLD CALC-MCNC: 9.5 FL (ref 9.4–12.4)
NEUTROPHILS # BLD AUTO: 7.08 K/UL (ref 1.8–7.7)
NEUTROPHILS # BLD AUTO: 9.05 K/UL (ref 1.8–7.7)
NEUTROPHILS NFR BLD AUTO: 70.3 % (ref 53–65)
NEUTROPHILS NFR BLD AUTO: 74.5 % (ref 53–65)
NITRITE UR QL STRIP: NEGATIVE
NT-PROBNP SERPL-MCNC: ABNORMAL PG/ML (ref 1–450)
PCO2 BLDA: 47 MMHG (ref 35–48)
PH SMN: 7.39 [PH] (ref 7.35–7.45)
PH UR STRIP: 6.5 PH UNITS
PHOSPHATE SERPL-MCNC: 3.8 MG/DL (ref 2.3–4.7)
PLATELET # BLD AUTO: 398 K/UL (ref 150–400)
PLATELET # BLD AUTO: 517 K/UL (ref 150–400)
PO2 BLDA: 131 MMHG (ref 83–108)
POC BASE EXCESS: 2.8 MMOL/L (ref -2–3)
POC SATURATED O2: 99 %
POTASSIUM SERPL-SCNC: 3.8 MMOL/L (ref 3.5–5.1)
POTASSIUM SERPL-SCNC: 4 MMOL/L (ref 3.5–5.1)
POTASSIUM SERPL-SCNC: 4.1 MMOL/L (ref 3.5–5.1)
PROT SERPL-MCNC: 5.9 G/DL (ref 5.8–7.6)
PROT SERPL-MCNC: 6.7 G/DL (ref 5.8–7.6)
PROT UR QL STRIP: 100
RBC # BLD AUTO: 3.7 M/UL (ref 4.2–5.4)
RBC # BLD AUTO: 4.23 M/UL (ref 4.2–5.4)
RBC # UR STRIP: NEGATIVE /UL
RBC #/AREA URNS HPF: ABNORMAL /HPF
RSV AG SPEC QL IA: NEGATIVE
SARS-COV-2 RDRP RESP QL NAA+PROBE: NEGATIVE
SODIUM SERPL-SCNC: 142 MMOL/L (ref 136–145)
SP GR UR STRIP: 1.01
SQUAMOUS #/AREA URNS LPF: ABNORMAL /LPF
TROPONIN I SERPL HS-MCNC: 22.8 NG/L
TROPONIN I SERPL HS-MCNC: 40.7 NG/L
TROPONIN I SERPL HS-MCNC: 52.4 NG/L
UROBILINOGEN UR STRIP-ACNC: 0.2 MG/DL
WBC # BLD AUTO: 12.88 K/UL (ref 4.5–11)
WBC # BLD AUTO: 9.51 K/UL (ref 4.5–11)
WBC #/AREA URNS HPF: ABNORMAL /HPF
YEAST #/AREA URNS HPF: ABNORMAL /HPF

## 2025-03-18 PROCEDURE — 63600175 PHARM REV CODE 636 W HCPCS: Performed by: NURSE PRACTITIONER

## 2025-03-18 PROCEDURE — 25000003 PHARM REV CODE 250: Performed by: NURSE PRACTITIONER

## 2025-03-18 PROCEDURE — 94660 CPAP INITIATION&MGMT: CPT

## 2025-03-18 PROCEDURE — 36600 WITHDRAWAL OF ARTERIAL BLOOD: CPT

## 2025-03-18 PROCEDURE — 99285 EMERGENCY DEPT VISIT HI MDM: CPT | Mod: 25

## 2025-03-18 PROCEDURE — 82962 GLUCOSE BLOOD TEST: CPT

## 2025-03-18 PROCEDURE — 81003 URINALYSIS AUTO W/O SCOPE: CPT | Performed by: NURSE PRACTITIONER

## 2025-03-18 PROCEDURE — 87635 SARS-COV-2 COVID-19 AMP PRB: CPT | Performed by: NURSE PRACTITIONER

## 2025-03-18 PROCEDURE — 99285 EMERGENCY DEPT VISIT HI MDM: CPT | Mod: GF,EDII,, | Performed by: NURSE PRACTITIONER

## 2025-03-18 PROCEDURE — 83735 ASSAY OF MAGNESIUM: CPT | Performed by: NURSE PRACTITIONER

## 2025-03-18 PROCEDURE — 85025 COMPLETE CBC W/AUTO DIFF WBC: CPT | Performed by: NURSE PRACTITIONER

## 2025-03-18 PROCEDURE — 99900035 HC TECH TIME PER 15 MIN (STAT)

## 2025-03-18 PROCEDURE — 11000001 HC ACUTE MED/SURG PRIVATE ROOM

## 2025-03-18 PROCEDURE — 82803 BLOOD GASES ANY COMBINATION: CPT

## 2025-03-18 PROCEDURE — 84100 ASSAY OF PHOSPHORUS: CPT | Performed by: NURSE PRACTITIONER

## 2025-03-18 PROCEDURE — 94640 AIRWAY INHALATION TREATMENT: CPT

## 2025-03-18 PROCEDURE — 96374 THER/PROPH/DIAG INJ IV PUSH: CPT

## 2025-03-18 PROCEDURE — 25000242 PHARM REV CODE 250 ALT 637 W/ HCPCS: Performed by: NURSE PRACTITIONER

## 2025-03-18 PROCEDURE — 80053 COMPREHEN METABOLIC PANEL: CPT | Performed by: NURSE PRACTITIONER

## 2025-03-18 PROCEDURE — 84484 ASSAY OF TROPONIN QUANT: CPT | Performed by: NURSE PRACTITIONER

## 2025-03-18 PROCEDURE — 36415 COLL VENOUS BLD VENIPUNCTURE: CPT | Performed by: NURSE PRACTITIONER

## 2025-03-18 PROCEDURE — 87634 RSV DNA/RNA AMP PROBE: CPT | Performed by: NURSE PRACTITIONER

## 2025-03-18 PROCEDURE — 93005 ELECTROCARDIOGRAM TRACING: CPT

## 2025-03-18 PROCEDURE — 5A09357 ASSISTANCE WITH RESPIRATORY VENTILATION, LESS THAN 24 CONSECUTIVE HOURS, CONTINUOUS POSITIVE AIRWAY PRESSURE: ICD-10-PCS | Performed by: HOSPITALIST

## 2025-03-18 PROCEDURE — 83880 ASSAY OF NATRIURETIC PEPTIDE: CPT | Performed by: NURSE PRACTITIONER

## 2025-03-18 PROCEDURE — 25000003 PHARM REV CODE 250: Performed by: HOSPITALIST

## 2025-03-18 PROCEDURE — 87502 INFLUENZA DNA AMP PROBE: CPT | Performed by: NURSE PRACTITIONER

## 2025-03-18 PROCEDURE — 27000221 HC OXYGEN, UP TO 24 HOURS

## 2025-03-18 PROCEDURE — 96361 HYDRATE IV INFUSION ADD-ON: CPT

## 2025-03-18 PROCEDURE — 27000190 HC CPAP FULL FACE MASK W/VALVE

## 2025-03-18 PROCEDURE — 83605 ASSAY OF LACTIC ACID: CPT | Performed by: NURSE PRACTITIONER

## 2025-03-18 RX ORDER — LEVALBUTEROL INHALATION SOLUTION 1.25 MG/3ML
1.25 SOLUTION RESPIRATORY (INHALATION)
Status: DISCONTINUED | OUTPATIENT
Start: 2025-03-18 | End: 2025-03-18

## 2025-03-18 RX ORDER — SODIUM CHLORIDE 0.9 % (FLUSH) 0.9 %
10 SYRINGE (ML) INJECTION
Status: DISCONTINUED | OUTPATIENT
Start: 2025-03-18 | End: 2025-03-21 | Stop reason: HOSPADM

## 2025-03-18 RX ORDER — METFORMIN HYDROCHLORIDE 500 MG/1
1000 TABLET ORAL
Status: DISCONTINUED | OUTPATIENT
Start: 2025-03-19 | End: 2025-03-19

## 2025-03-18 RX ORDER — METFORMIN HYDROCHLORIDE 500 MG/1
500 TABLET ORAL NIGHTLY
Status: DISCONTINUED | OUTPATIENT
Start: 2025-03-18 | End: 2025-03-19

## 2025-03-18 RX ORDER — PANTOPRAZOLE SODIUM 40 MG/1
40 TABLET, DELAYED RELEASE ORAL DAILY
Status: DISCONTINUED | OUTPATIENT
Start: 2025-03-19 | End: 2025-03-19

## 2025-03-18 RX ORDER — DILTIAZEM HYDROCHLORIDE 180 MG/1
180 CAPSULE, COATED, EXTENDED RELEASE ORAL DAILY
Status: DISCONTINUED | OUTPATIENT
Start: 2025-03-19 | End: 2025-03-19

## 2025-03-18 RX ORDER — LEVOTHYROXINE SODIUM 50 UG/1
50 TABLET ORAL
Status: DISCONTINUED | OUTPATIENT
Start: 2025-03-19 | End: 2025-03-21 | Stop reason: HOSPADM

## 2025-03-18 RX ORDER — FUROSEMIDE 10 MG/ML
20 INJECTION INTRAMUSCULAR; INTRAVENOUS EVERY 6 HOURS
Status: DISCONTINUED | OUTPATIENT
Start: 2025-03-19 | End: 2025-03-19

## 2025-03-18 RX ORDER — MUPIROCIN 20 MG/G
OINTMENT TOPICAL 2 TIMES DAILY
Status: DISCONTINUED | OUTPATIENT
Start: 2025-03-18 | End: 2025-03-21 | Stop reason: HOSPADM

## 2025-03-18 RX ORDER — FUROSEMIDE 10 MG/ML
40 INJECTION INTRAMUSCULAR; INTRAVENOUS
Status: COMPLETED | OUTPATIENT
Start: 2025-03-18 | End: 2025-03-18

## 2025-03-18 RX ORDER — IPRATROPIUM BROMIDE AND ALBUTEROL SULFATE 2.5; .5 MG/3ML; MG/3ML
3 SOLUTION RESPIRATORY (INHALATION)
Status: COMPLETED | OUTPATIENT
Start: 2025-03-18 | End: 2025-03-18

## 2025-03-18 RX ORDER — GLUCOSAMINE/CHONDR SU A SOD 750-600 MG
1 TABLET ORAL 2 TIMES DAILY
Status: DISCONTINUED | OUTPATIENT
Start: 2025-03-19 | End: 2025-03-19

## 2025-03-18 RX ADMIN — MUPIROCIN: 20 OINTMENT TOPICAL at 09:03

## 2025-03-18 RX ADMIN — SODIUM CHLORIDE 250 ML: 9 INJECTION, SOLUTION INTRAVENOUS at 09:03

## 2025-03-18 RX ADMIN — IPRATROPIUM BROMIDE AND ALBUTEROL SULFATE 3 ML: .5; 3 SOLUTION RESPIRATORY (INHALATION) at 08:03

## 2025-03-18 RX ADMIN — METFORMIN HYDROCHLORIDE 500 MG: 500 TABLET ORAL at 08:03

## 2025-03-18 RX ADMIN — FUROSEMIDE 40 MG: 10 INJECTION, SOLUTION INTRAMUSCULAR; INTRAVENOUS at 08:03

## 2025-03-18 NOTE — ED PROVIDER NOTES
Encounter Date: 3/18/2025       History     Chief Complaint   Patient presents with    Shortness of Breath     Presents to er by ambulance for resp distress.  Sat was 80s on mask and then moved to bipap and sat was 100 percent.  Pt was here last week with same complaint and improved over night.  Hr is elevated 128, spoke with nursing home nurse and pt hr stays around 120 for the past year.      The history is provided by the patient, the EMS personnel, a caregiver and a relative.     Review of patient's allergies indicates:   Allergen Reactions    Sulfa (sulfonamide antibiotics)      Past Medical History:   Diagnosis Date    Allergic rhinitis     Anticoagulant long-term use     Anxiety disorder, unspecified     Arthritis     Chronic pain     COPD (chronic obstructive pulmonary disease)     Dementia     Dementia     Depression     GERD (gastroesophageal reflux disease)     hyperlipidemia     Hypertension     Hyponatremia     Hypoxemia     Insomnia     Mood disorder     Neuropathy     Osteoarthritis     Rhinitis     Thyroid disease     Type 2 diabetes mellitus     Unspecified atrial fibrillation     Urinary tract infection, site not specified      History reviewed. No pertinent surgical history.  Family History   Family history unknown: Yes     Social History[1]  Review of Systems   Respiratory:  Positive for shortness of breath.    All other systems reviewed and are negative.      Physical Exam     Initial Vitals [03/18/25 0726]   BP Pulse Resp Temp SpO2   112/66 (!) 144 (!) 36 96.2 °F (35.7 °C) (!) 93 %      MAP       --         Physical Exam    Constitutional: She appears well-developed and well-nourished.   HENT:   Head: Normocephalic.   Right Ear: External ear normal.   Left Ear: External ear normal.   Nose: Nose normal. Mouth/Throat: Oropharynx is clear and moist.   Eyes: Pupils are equal, round, and reactive to light.   Neck:   Normal range of motion.  Cardiovascular:  Intact distal pulses.           Afib with  rate 128   Pulmonary/Chest: She is in respiratory distress. She has rales.   Abdominal: Abdomen is soft. Bowel sounds are normal.   Musculoskeletal:      Cervical back: Normal range of motion.     Neurological: She is alert. She has normal strength.   Skin: Skin is warm.   Psychiatric: She has a normal mood and affect.         Medical Screening Exam   See Full Note    ED Course   Procedures  Labs Reviewed   COMPREHENSIVE METABOLIC PANEL - Abnormal       Result Value    Sodium 142      Potassium 3.8      Chloride 106      CO2 21 (*)     Anion Gap 19 (*)     Glucose 260 (*)     BUN 10      Creatinine 0.87      BUN/Creatinine Ratio 11      Calcium 9.5      Total Protein 6.7      Albumin 3.0 (*)     Globulin 3.7      A/G Ratio 0.8      Bilirubin, Total 0.5      Alk Phos 92      ALT <7      AST 19      eGFR 63     NT-PRO NATRIURETIC PEPTIDE - Abnormal    ProBNP 13,177 (*)    TROPONIN I - Abnormal    Troponin I High Sensitivity 22.8 (*)    URINALYSIS, REFLEX TO URINE CULTURE - Abnormal    Color, UA Yellow      Clarity, UA Clear      pH, UA 6.5      Leukocytes, UA Negative      Nitrites, UA Negative      Protein,  (*)     Glucose, UA Negative      Ketones, UA Negative      Urobilinogen, UA 0.2      Bilirubin, UA Negative      Blood, UA Negative      Specific Gravity, UA 1.015     CBC WITH DIFFERENTIAL - Abnormal    WBC 12.88 (*)     RBC 4.23      Hemoglobin 11.9 (*)     Hematocrit 39.0      MCV 92.2      MCH 28.1      MCHC 30.5 (*)     RDW 15.0 (*)     Platelet Count 517 (*)     MPV 9.5      Neutrophils % 70.3 (*)     Lymphocytes % 22.4 (*)     Neutrophils, Abs 9.05 (*)     Lymphocytes, Absolute 2.89      Diff Type Auto      Monocytes % 5.4      Eosinophils % 1.6      Basophils % 0.3      Monocytes, Absolute 0.69      Eosinophils, Absolute 0.21      Basophils, Absolute 0.04     URINALYSIS, MICROSCOPIC - Abnormal    WBC, UA 0-5      RBC, UA None Seen      Bacteria, UA Rare      Yeast, UA None Seen      Squamous  Epithelial Cells, UA Occasional (*)    TROPONIN I - Abnormal    Troponin I High Sensitivity 40.7 (*)    TROPONIN I - Abnormal    Troponin I High Sensitivity 52.4 (*)    INFLUENZA A & B BY MOLECULAR - Normal    INFLUENZA A MOLECULAR Negative      INFLUENZA B MOLECULAR  Negative     SARS-COV-2 RNA AMPLIFICATION, QUAL - Normal    SARS COV-2 Molecular Negative      Narrative:     Negative SARS-CoV results should not be used as the sole basis for treatment or patient management decisions; negative results should be considered in the context of a patient's recent exposures, history and the presene of clinical signs and symptoms consistent with COVID-19.  Negative results should be treated as presumptive and confirmed by molecular assay, if necessary for patient management.   RSV, RAPID AG BY MOLECULAR METHOD - Normal    RSV, RAPID BY MOLECULAR METHOD Negative     LACTIC ACID, PLASMA - Normal    Lactic Acid 2.0     BASIC METABOLIC PANEL - Normal    Sodium 142      Potassium 4.1      Chloride 107      CO2 24      Anion Gap 15      Glucose 121      BUN 11      Creatinine 0.78      BUN/Creatinine Ratio 14      Calcium 8.9      eGFR 72     CBC W/ AUTO DIFFERENTIAL    Narrative:     The following orders were created for panel order CBC auto differential.  Procedure                               Abnormality         Status                     ---------                               -----------         ------                     CBC with Differential[[id number]]       Abnormal            Final result               Manual Differential[[id number]]                                                          Please view results for these tests on the individual orders.          Imaging Results              X-Ray Chest 1 View (Final result)  Result time 03/18/25 09:41:28      Final result by Chaparro Fletcher MD (03/18/25 09:41:28)                   Impression:      Patchy opacities both lungs may relate to edema versus infectious or  noninfectious inflammatory etiology.  Suspected at least small bilateral pleural effusions.      Electronically signed by: Chaparro Fletcher  Date:    03/18/2025  Time:    09:41               Narrative:    EXAMINATION:  XR CHEST 1 VIEW    CLINICAL HISTORY:  Shortness of breath    TECHNIQUE:  Single frontal view of the chest was performed.    COMPARISON:  Chest radiograph performed 03/12/2025    FINDINGS:  Monitoring leads overlie the chest.    Cardiac contours grossly unchanged.  Prominence of central vasculature noted    Patchy opacities are noted throughout both lungs.  Suspect at least small bilateral pleural effusions.  No definite pneumothorax.    No acute findings in the visualized abdomen.    Osseous and soft tissue structures appear without definite acute change.                                       Medications   furosemide injection 40 mg (40 mg Intravenous Given 3/18/25 0803)   albuterol-ipratropium 2.5 mg-0.5 mg/3 mL nebulizer solution 3 mL (3 mLs Nebulization Given 3/18/25 0834)   sodium chloride 0.9% bolus 250 mL 250 mL (0 mLs Intravenous Stopped 3/18/25 1032)     Medical Decision Making  Presents to er by ambulance for resp distress.  Sat was 80s on mask and then moved to bipap and sat was 100 percent.  Pt was here last week with same complaint and improved over night.  Hr is elevated 128, spoke with nursing home nurse and pt hr stays around 120 for the past year.   I spoke with Ant Terry, pt son, I explain to him positive troponin and pt may be having MI.  He states he and pt doesn't want to see cardiology and no invasive procedures done.         Amount and/or Complexity of Data Reviewed  Labs: ordered.  Radiology: ordered.  Discussion of management or test interpretation with external provider(s): Discussed pt with Dr Kwok, will hold all sedating medications and admit for resp distress r/t chf.      Risk  Prescription drug management.                                      Clinical Impression:    Final diagnoses:  [R06.02] SOB (shortness of breath)  [I50.9] Congestive heart failure, unspecified HF chronicity, unspecified heart failure type (Primary)        ED Disposition Condition    Admit Stable                  [1]   Social History  Tobacco Use    Smoking status: Unknown   Substance Use Topics    Alcohol use: Not Currently     Comment: lethargic - unable to access    Drug use: Not Currently     Comment: lethargic - unable to access        Tavon Cruz, [facility]  03/18/25 1527

## 2025-03-18 NOTE — ED NOTES
Pt to ED via EMS for resp. Distress. EMS reports pt comes from [facility] for low O2 sat and resp distress. EMS reports pt's O2 sat was 75% on 4L simple mask.

## 2025-03-19 PROBLEM — Z79.899 POLYPHARMACY: Status: ACTIVE | Noted: 2025-03-19

## 2025-03-19 PROBLEM — J96.21 ACUTE ON CHRONIC HYPOXIC RESPIRATORY FAILURE: Status: ACTIVE | Noted: 2025-03-19

## 2025-03-19 LAB
ANION GAP SERPL CALCULATED.3IONS-SCNC: 15 MMOL/L (ref 7–16)
BACTERIA BLD CULT: NORMAL
BACTERIA BLD CULT: NORMAL
BUN SERPL-MCNC: 16 MG/DL (ref 10–20)
BUN/CREAT SERPL: 20 (ref 6–20)
CALCIUM SERPL-MCNC: 9 MG/DL (ref 8.4–10.2)
CHLORIDE SERPL-SCNC: 103 MMOL/L (ref 98–111)
CO2 SERPL-SCNC: 25 MMOL/L (ref 23–31)
CREAT SERPL-MCNC: 0.82 MG/DL (ref 0.55–1.02)
EGFR (NO RACE VARIABLE) (RUSH/TITUS): 68 ML/MIN/1.73M2
GLUCOSE SERPL-MCNC: 104 MG/DL (ref 75–121)
POTASSIUM SERPL-SCNC: 3.8 MMOL/L (ref 3.5–5.1)
SODIUM SERPL-SCNC: 139 MMOL/L (ref 136–145)

## 2025-03-19 PROCEDURE — 11000001 HC ACUTE MED/SURG PRIVATE ROOM

## 2025-03-19 PROCEDURE — 94761 N-INVAS EAR/PLS OXIMETRY MLT: CPT

## 2025-03-19 PROCEDURE — 63600175 PHARM REV CODE 636 W HCPCS: Performed by: NURSE PRACTITIONER

## 2025-03-19 PROCEDURE — 99223 1ST HOSP IP/OBS HIGH 75: CPT | Mod: AI,,, | Performed by: HOSPITALIST

## 2025-03-19 PROCEDURE — 80048 BASIC METABOLIC PNL TOTAL CA: CPT | Performed by: NURSE PRACTITIONER

## 2025-03-19 PROCEDURE — 99900035 HC TECH TIME PER 15 MIN (STAT)

## 2025-03-19 PROCEDURE — 25000003 PHARM REV CODE 250: Performed by: NURSE PRACTITIONER

## 2025-03-19 PROCEDURE — 36415 COLL VENOUS BLD VENIPUNCTURE: CPT | Performed by: NURSE PRACTITIONER

## 2025-03-19 PROCEDURE — 25000003 PHARM REV CODE 250: Performed by: HOSPITALIST

## 2025-03-19 PROCEDURE — 27000221 HC OXYGEN, UP TO 24 HOURS

## 2025-03-19 RX ORDER — FAMOTIDINE 20 MG/1
20 TABLET, FILM COATED ORAL DAILY
Status: DISCONTINUED | OUTPATIENT
Start: 2025-03-19 | End: 2025-03-21 | Stop reason: HOSPADM

## 2025-03-19 RX ORDER — DILTIAZEM HYDROCHLORIDE 120 MG/1
240 CAPSULE, COATED, EXTENDED RELEASE ORAL DAILY
Status: DISCONTINUED | OUTPATIENT
Start: 2025-03-19 | End: 2025-03-20

## 2025-03-19 RX ADMIN — DILTIAZEM HYDROCHLORIDE 240 MG: 120 CAPSULE, EXTENDED RELEASE ORAL at 10:03

## 2025-03-19 RX ADMIN — LEVOTHYROXINE SODIUM 50 MCG: 0.05 TABLET ORAL at 05:03

## 2025-03-19 RX ADMIN — MUPIROCIN: 20 OINTMENT TOPICAL at 08:03

## 2025-03-19 RX ADMIN — FUROSEMIDE 20 MG: 10 INJECTION, SOLUTION INTRAVENOUS at 12:03

## 2025-03-19 RX ADMIN — METFORMIN HYDROCHLORIDE 1000 MG: 500 TABLET ORAL at 07:03

## 2025-03-19 RX ADMIN — MUPIROCIN: 20 OINTMENT TOPICAL at 10:03

## 2025-03-19 RX ADMIN — FAMOTIDINE 20 MG: 20 TABLET, FILM COATED ORAL at 10:03

## 2025-03-19 NOTE — H&P
[facility] - Medical Surgical Unit  History & Physical    Subjective:      Chief Complaint/Reason for Admission: chf, hypoxia    Joyce Dow is a 89 y.o. female.    Past Medical History:   Diagnosis Date    Allergic rhinitis     Anticoagulant long-term use     Anxiety disorder, unspecified     Arthritis     Chronic pain     COPD (chronic obstructive pulmonary disease)     Dementia     Dementia     Depression     GERD (gastroesophageal reflux disease)     hyperlipidemia     Hypertension     Hyponatremia     Hypoxemia     Insomnia     Mood disorder     Neuropathy     Osteoarthritis     Rhinitis     Thyroid disease     Type 2 diabetes mellitus     Unspecified atrial fibrillation     Urinary tract infection, site not specified      History reviewed. No pertinent surgical history.  Social History[1]    PTA Medications   Medication Sig    diltiaZEM (CARDIZEM CD) 180 MG 24 hr capsule Take 1 capsule (180 mg total) by mouth once daily.    levothyroxine (SYNTHROID) 50 MCG tablet Take 50 mcg by mouth before breakfast.    lutein-zeaxanthin (OCUVITE LUTEIN 25) 25-5 mg Cap Take 1 tablet by mouth 2 (two) times a day.    metFORMIN (FORTAMET) 500 mg 24hr tablet Take 1,000 mg by mouth daily with breakfast.    metFORMIN (GLUCOPHAGE) 500 MG tablet Take 500 mg by mouth every evening.    pantoprazole (PROTONIX) 40 MG tablet Take 40 mg by mouth once daily.    sod.chlorid-potassium chloride (THERMOTABS) 287-180-15 mg Tab Take by mouth 3 (three) times daily.    trazodone (DESYREL) 25 MG tablet Take by mouth every evening.     Review of patient's allergies indicates:   Allergen Reactions    Sulfa (sulfonamide antibiotics)         Review of Systems   Respiratory:  Positive for shortness of breath.    All other systems reviewed and are negative.      Objective:      Vital Signs (Most Recent)  Temp: 97.6 °F (36.4 °C) (03/18/25 1908)  Pulse: (!) 130 (03/18/25 1908)  Resp: 18 (03/18/25 1908)  BP: (!) 82/53 (03/18/25  "1908)  SpO2: (!) 94 % (03/18/25 1908)    Vital Signs Range (Last 24H):  Temp:  [96.2 °F (35.7 °C)-97.7 °F (36.5 °C)]   Pulse:  [125-144]   Resp:  [14-36]   BP: ()/(52-70)   SpO2:  [93 %-98 %]     Physical Exam  Vitals reviewed.   Constitutional:       Appearance: She is normal weight. She is ill-appearing.   HENT:      Head: Normocephalic.      Right Ear: Tympanic membrane normal.      Left Ear: Tympanic membrane normal.      Nose: Nose normal.      Mouth/Throat:      Mouth: Mucous membranes are moist.   Eyes:      Pupils: Pupils are equal, round, and reactive to light.   Cardiovascular:      Rate and Rhythm: Tachycardia present. Rhythm irregular.   Pulmonary:      Effort: Respiratory distress present.      Comments: Resolved with bipap and lasix  Abdominal:      General: Bowel sounds are normal.      Palpations: Abdomen is soft.   Musculoskeletal:         General: Normal range of motion.      Cervical back: Normal range of motion.   Skin:     General: Skin is warm.      Capillary Refill: Capillary refill takes less than 2 seconds.   Neurological:      General: No focal deficit present.      Mental Status: She is alert.   Psychiatric:         Mood and Affect: Mood normal.         Data Review:  CBC:   Lab Results   Component Value Date    WBC 9.51 03/18/2025    RBC 3.70 (L) 03/18/2025    HGB 10.3 (L) 03/18/2025    HCT 33.9 (L) 03/18/2025     03/18/2025     BMP:   Lab Results   Component Value Date     03/18/2025     03/18/2025    K 4.0 03/18/2025     03/18/2025    CO2 24 03/18/2025    BUN 11 03/18/2025    CREATININE 0.79 03/18/2025    CALCIUM 8.8 03/18/2025     Coagulation: No results found for: "PT", "INR", "APTT"  Cardiac markers: No results found for: "CKMB", "TROPONINT", "MYOGLOBIN"  ABGs:   Lab Results   Component Value Date    PH 7.39 03/18/2025    PO2 131 (H) 03/18/2025    PCO2 47 03/18/2025      ECG: no change since previous ECG dated 3/12/25 .     Assessment:      Active " Hospital Problems    Diagnosis  POA    Congestive heart failure [I50.9]  Unknown    Type 2 diabetes mellitus with hyperglycemia, without long-term current use of insulin [E11.65]  Yes    Paroxysmal atrial fibrillation [I48.0]  Yes      Resolved Hospital Problems   No resolved problems to display.       Plan:    Will admit, and hold sedating medications including Neurontin, Cymbalta and trazadone.  Spoke with son and he states she doesn't want anything invasive done.  Pt remains tachy and per son and nurse at nursing home she stays in the 120s.  May consider adding coreg low dose.  Will continue lasix iv.            [1]   Social History  Tobacco Use    Smoking status: Unknown   Substance Use Topics    Alcohol use: Not Currently     Comment: lethargic - unable to access    Drug use: Not Currently     Comment: lethargic - unable to access

## 2025-03-19 NOTE — ASSESSMENT & PLAN NOTE
Patient's blood pressure range in the last 24 hours was: BP  Min: 82/53  Max: 141/55.The patient's inpatient anti-hypertensive regimen is listed below:  Current Antihypertensives  diltiaZEM 24 hr capsule 240 mg, Daily, Oral    Plan  - BP is uncontrolled, will adjust as follows: monitor and treat prn  -

## 2025-03-19 NOTE — HPI
89yo WF well known to us from the NH. She has a hx of Tachycardia, hypothyroidism, GERD.  She has episodes of hypoxia in past and has presented with acute SOB on bipap and tachycardia.  She was quickly weaned down off Bipap to NC and now is off NC on RA with normal sats.  HR is elevated but is chronically elevated. She is awake alert at baseline and talking.

## 2025-03-19 NOTE — PLAN OF CARE
Problem: Fall Injury Risk  Goal: Absence of Fall and Fall-Related Injury  Intervention: Identify and Manage Contributors  Flowsheets (Taken 3/19/2025 1800)  Self-Care Promotion: independence encouraged  Medication Review/Management: medications reviewed

## 2025-03-19 NOTE — ASSESSMENT & PLAN NOTE
Patient has paroxysmal (<7 days) atrial fibrillation. Patient is currently in sinus rhythm. [id number] Score: 4. The patients heart rate in the last 24 hours is as follows:  Pulse  Min: 124  Max: 131     Antiarrhythmics  diltiaZEM 24 hr capsule 240 mg, Daily, Oral    Anticoagulants       Plan  - Replete lytes with a goal of K>4, Mg >2  - Patient is not anticoagulated due to ?  - Patient's afib is currently uncontrolled. Will adjust treatment as follows: increase Dilt and monitor.   -

## 2025-03-19 NOTE — ASSESSMENT & PLAN NOTE
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 The patient's most recent sodium results are listed below.  Recent Labs     03/18/25  1220 03/18/25  1547 03/19/25  0618    142 139     Plan  - Correct the sodium by 4-6mEq in 24 hours.   - Obtain the following studies: .  - Will treat the hyponatremia with Fluid restriction of:  1.5 liter per day  - Monitor sodium Daily.   - Patient hyponatremia is stable  -

## 2025-03-19 NOTE — ASSESSMENT & PLAN NOTE
Will review the amount of meds she is taking.  Likely many QRS interfering agents.  Stop depression meds.

## 2025-03-19 NOTE — ASSESSMENT & PLAN NOTE
Patient with Hypoxic Respiratory failure which is Acute on chronic.  she is on home oxygen at 2 LPM. Supplemental oxygen was provided and noted- Oxygen Concentration (%):  [24-50] 24    .   Signs/symptoms of respiratory failure include- tachypnea. Contributing diagnoses includes - Interstitial lung disease Labs and images were reviewed. Patient Has not had a recent ABG. Will treat underlying causes and adjust management of respiratory failure as follows- monitor, now off O2

## 2025-03-19 NOTE — CONSULTS
Consult for diet education received and appreciated. Patient is not appropriate for diet education due to advanced age and is a nursing home resident. RD available as needed.

## 2025-03-19 NOTE — SUBJECTIVE & OBJECTIVE
Past Medical History:   Diagnosis Date    Allergic rhinitis     Anticoagulant long-term use     Anxiety disorder, unspecified     Arthritis     Chronic pain     COPD (chronic obstructive pulmonary disease)     Dementia     Dementia     Depression     GERD (gastroesophageal reflux disease)     hyperlipidemia     Hypertension     Hyponatremia     Hypoxemia     Insomnia     Mood disorder     Neuropathy     Osteoarthritis     Rhinitis     Thyroid disease     Type 2 diabetes mellitus     Unspecified atrial fibrillation     Urinary tract infection, site not specified        History reviewed. No pertinent surgical history.    Review of patient's allergies indicates:   Allergen Reactions    Sulfa (sulfonamide antibiotics)        No current facility-administered medications on file prior to encounter.     Current Outpatient Medications on File Prior to Encounter   Medication Sig    diltiaZEM (CARDIZEM CD) 180 MG 24 hr capsule Take 1 capsule (180 mg total) by mouth once daily.    levothyroxine (SYNTHROID) 50 MCG tablet Take 50 mcg by mouth before breakfast.    lutein-zeaxanthin (OCUVITE LUTEIN 25) 25-5 mg Cap Take 1 tablet by mouth 2 (two) times a day.    metFORMIN (FORTAMET) 500 mg 24hr tablet Take 1,000 mg by mouth daily with breakfast.    metFORMIN (GLUCOPHAGE) 500 MG tablet Take 500 mg by mouth every evening.    pantoprazole (PROTONIX) 40 MG tablet Take 40 mg by mouth once daily.    sod.chlorid-potassium chloride (THERMOTABS) 287-180-15 mg Tab Take by mouth 3 (three) times daily.    trazodone (DESYREL) 25 MG tablet Take by mouth every evening.     Family History    Family history is unknown by patient.       Tobacco Use    Smoking status: Unknown    Smokeless tobacco: Not on file   Substance and Sexual Activity    Alcohol use: Not Currently     Comment: lethargic - unable to access    Drug use: Not Currently     Comment: lethargic - unable to access    Sexual activity: Not Currently     Comment: lethargic - unable to  access     Review of Systems   Constitutional:  Negative for appetite change, chills and fever.   Respiratory:  Negative for cough, shortness of breath and wheezing.    Cardiovascular:  Positive for palpitations. Negative for chest pain and leg swelling.   Gastrointestinal:  Negative for abdominal distention, abdominal pain, diarrhea, nausea and vomiting.   Genitourinary:  Negative for dysuria.   Skin:  Negative for rash.   Neurological:  Positive for weakness. Negative for dizziness, seizures and syncope.   Psychiatric/Behavioral:  Negative for agitation, behavioral problems and confusion.    All other systems reviewed and are negative.    Objective:     Vital Signs (Most Recent):  Temp: 97.7 °F (36.5 °C) (03/19/25 0739)  Pulse: (!) 128 (Dr. Kwok aware cardizem increased) (03/19/25 0800)  Resp: 18 (03/19/25 0739)  BP: (!) 141/55 (03/19/25 0739)  SpO2: 96 % (03/19/25 0739) Vital Signs (24h Range):  Temp:  [97.6 °F (36.4 °C)-98.5 °F (36.9 °C)] 97.7 °F (36.5 °C)  Pulse:  [124-131] 128  Resp:  [14-25] 18  SpO2:  [94 %-97 %] 96 %  BP: ()/(52-77) 141/55     Weight: 56.2 kg (124 lb)  Body mass index is 17.79 kg/m².     Physical Exam  Vitals reviewed.   Constitutional:       General: She is not in acute distress.     Appearance: Normal appearance.   HENT:      Head: Normocephalic and atraumatic.   Eyes:      General: No scleral icterus.     Extraocular Movements: Extraocular movements intact.      Conjunctiva/sclera: Conjunctivae normal.      Pupils: Pupils are equal, round, and reactive to light.   Cardiovascular:      Rate and Rhythm: Regular rhythm. Tachycardia present.      Heart sounds: No murmur heard.     No friction rub. No gallop.   Pulmonary:      Effort: Pulmonary effort is normal. No respiratory distress.      Breath sounds: Normal breath sounds. No wheezing or rales.   Abdominal:      General: Abdomen is flat. Bowel sounds are normal. There is no distension.      Palpations: Abdomen is soft.       Tenderness: There is no abdominal tenderness. There is no guarding.   Musculoskeletal:         General: No swelling.      Right lower leg: No edema.      Left lower leg: No edema.   Skin:     General: Skin is warm and dry.      Coloration: Skin is not jaundiced.      Findings: No rash.   Neurological:      General: No focal deficit present.      Mental Status: She is alert and oriented to person, place, and time.      Sensory: No sensory deficit.      Motor: Weakness present.   Psychiatric:         Mood and Affect: Mood normal.         Thought Content: Thought content normal.         Judgment: Judgment normal.              CRANIAL NERVES     CN III, IV, VI   Pupils are equal, round, and reactive to light.       Significant Labs: All pertinent labs within the past 24 hours have been reviewed.  Recent Lab Results  (Last 5 results in the past 24 hours)        03/19/25  0618   03/18/25  1807   03/18/25  1547   03/18/25  1220   03/18/25  1000        Albumin/Globulin Ratio     0.8           Albumin     2.7           ALP     74           ALT     <7           Anion Gap 15     15   15         AST     17           Baso #     0.03           Basophil %     0.3           BILIRUBIN TOTAL     0.5           BUN 16     11   11         BUN/CREAT RATIO 20     14   14         Calcium 9.0     8.8   8.9         Chloride 103     107   107         CO2 25     24   24         Creatinine 0.82     0.79   0.78         eGFR 68  Comment: Estimated GFR calculated using the CKD-EPI creatinine (2021) equation.     71  Comment: Estimated GFR calculated using the CKD-EPI creatinine (2021) equation.   72  Comment: Estimated GFR calculated using the CKD-EPI creatinine (2021) equation.         Eos #     0.04           Eos %     0.4           Globulin, Total     3.2           Glucose 104     113   121         Hematocrit     33.9           Hemoglobin     10.3           Lactic Acid Level         2.0       Lymph #     1.72           Lymph %     18.1            Magnesium      1.6           MCH     27.8           MCHC     30.4           MCV     91.6           Mono #     0.64           Mono %     6.7           MPV     9.1           Neutrophils, Abs     7.08           Neutrophils Relative     74.5           Phosphorus Level     3.8           Platelet Count     398           POC Glucose   102             Potassium 3.8     4.0   4.1         PROTEIN TOTAL     5.9           RBC     3.70           RDW     15.1           Sodium 139     142   142         Troponin I High Sensitivity       52.4  Comment: Critical Result called and verified by verbal readback to: > on 03/18/2025 at 12:53. Reported by [id number].   40.7  Comment: Critical Result called and verified by verbal readback to: alcon on 03/18/2025 at 10:44. Reported by [id number].       WBC     9.51                                  Significant Imaging: I have reviewed all pertinent imaging results/findings within the past 24 hours.

## 2025-03-19 NOTE — H&P
[facility] Medicine  History & Physical    Patient Name: Joyce Dow  MRN: [id number]  Patient Class: IP- Inpatient  Admission Date: 3/18/2025  Attending Physician: Darinel Kwok DO   Primary Care Provider: Walker Watkins MD         Patient information was obtained from patient, past medical records, and ER records.     Subjective:     Principal Problem:<principal problem not specified>    Chief Complaint:   Chief Complaint   Patient presents with    Shortness of Breath        HPI: 89yo WF well known to us from the NH. She has a hx of Tachycardia, hypothyroidism, GERD.  She has episodes of hypoxia in past and has presented with acute SOB on bipap and tachycardia.  She was quickly weaned down off Bipap to NC and now is off NC on RA with normal sats.  HR is elevated but is chronically elevated. She is awake alert at baseline and talking.     Past Medical History:   Diagnosis Date    Allergic rhinitis     Anticoagulant long-term use     Anxiety disorder, unspecified     Arthritis     Chronic pain     COPD (chronic obstructive pulmonary disease)     Dementia     Dementia     Depression     GERD (gastroesophageal reflux disease)     hyperlipidemia     Hypertension     Hyponatremia     Hypoxemia     Insomnia     Mood disorder     Neuropathy     Osteoarthritis     Rhinitis     Thyroid disease     Type 2 diabetes mellitus     Unspecified atrial fibrillation     Urinary tract infection, site not specified        History reviewed. No pertinent surgical history.    Review of patient's allergies indicates:   Allergen Reactions    Sulfa (sulfonamide antibiotics)        No current facility-administered medications on file prior to encounter.     Current Outpatient Medications on File Prior to Encounter   Medication Sig    diltiaZEM (CARDIZEM CD) 180 MG 24 hr capsule Take 1 capsule (180 mg total) by mouth once daily.    levothyroxine (SYNTHROID)  50 MCG tablet Take 50 mcg by mouth before breakfast.    lutein-zeaxanthin (OCUVITE LUTEIN 25) 25-5 mg Cap Take 1 tablet by mouth 2 (two) times a day.    metFORMIN (FORTAMET) 500 mg 24hr tablet Take 1,000 mg by mouth daily with breakfast.    metFORMIN (GLUCOPHAGE) 500 MG tablet Take 500 mg by mouth every evening.    pantoprazole (PROTONIX) 40 MG tablet Take 40 mg by mouth once daily.    sod.chlorid-potassium chloride (THERMOTABS) 287-180-15 mg Tab Take by mouth 3 (three) times daily.    trazodone (DESYREL) 25 MG tablet Take by mouth every evening.     Family History    Family history is unknown by patient.       Tobacco Use    Smoking status: Unknown    Smokeless tobacco: Not on file   Substance and Sexual Activity    Alcohol use: Not Currently     Comment: lethargic - unable to access    Drug use: Not Currently     Comment: lethargic - unable to access    Sexual activity: Not Currently     Comment: lethargic - unable to access     Review of Systems   Constitutional:  Negative for appetite change, chills and fever.   Respiratory:  Negative for cough, shortness of breath and wheezing.    Cardiovascular:  Positive for palpitations. Negative for chest pain and leg swelling.   Gastrointestinal:  Negative for abdominal distention, abdominal pain, diarrhea, nausea and vomiting.   Genitourinary:  Negative for dysuria.   Skin:  Negative for rash.   Neurological:  Positive for weakness. Negative for dizziness, seizures and syncope.   Psychiatric/Behavioral:  Negative for agitation, behavioral problems and confusion.    All other systems reviewed and are negative.    Objective:     Vital Signs (Most Recent):  Temp: 97.7 °F (36.5 °C) (03/19/25 0739)  Pulse: (!) 128 (Dr. Kwok aware cardizem increased) (03/19/25 0800)  Resp: 18 (03/19/25 0739)  BP: (!) 141/55 (03/19/25 0739)  SpO2: 96 % (03/19/25 0739) Vital Signs (24h Range):  Temp:  [97.6 °F (36.4 °C)-98.5 °F (36.9 °C)] 97.7 °F (36.5 °C)  Pulse:  [124-131]  128  Resp:  [14-25] 18  SpO2:  [94 %-97 %] 96 %  BP: ()/(52-77) 141/55     Weight: 56.2 kg (124 lb)  Body mass index is 17.79 kg/m².     Physical Exam  Vitals reviewed.   Constitutional:       General: She is not in acute distress.     Appearance: Normal appearance.   HENT:      Head: Normocephalic and atraumatic.   Eyes:      General: No scleral icterus.     Extraocular Movements: Extraocular movements intact.      Conjunctiva/sclera: Conjunctivae normal.      Pupils: Pupils are equal, round, and reactive to light.   Cardiovascular:      Rate and Rhythm: Regular rhythm. Tachycardia present.      Heart sounds: No murmur heard.     No friction rub. No gallop.   Pulmonary:      Effort: Pulmonary effort is normal. No respiratory distress.      Breath sounds: Normal breath sounds. No wheezing or rales.   Abdominal:      General: Abdomen is flat. Bowel sounds are normal. There is no distension.      Palpations: Abdomen is soft.      Tenderness: There is no abdominal tenderness. There is no guarding.   Musculoskeletal:         General: No swelling.      Right lower leg: No edema.      Left lower leg: No edema.   Skin:     General: Skin is warm and dry.      Coloration: Skin is not jaundiced.      Findings: No rash.   Neurological:      General: No focal deficit present.      Mental Status: She is alert and oriented to person, place, and time.      Sensory: No sensory deficit.      Motor: Weakness present.   Psychiatric:         Mood and Affect: Mood normal.         Thought Content: Thought content normal.         Judgment: Judgment normal.              CRANIAL NERVES     CN III, IV, VI   Pupils are equal, round, and reactive to light.       Significant Labs: All pertinent labs within the past 24 hours have been reviewed.  Recent Lab Results  (Last 5 results in the past 24 hours)        03/19/25  0618   03/18/25  1807   03/18/25  1547   03/18/25  1220   03/18/25  1000        Albumin/Globulin Ratio     0.8            Albumin     2.7           ALP     74           ALT     <7           Anion Gap 15     15   15         AST     17           Baso #     0.03           Basophil %     0.3           BILIRUBIN TOTAL     0.5           BUN 16     11   11         BUN/CREAT RATIO 20     14   14         Calcium 9.0     8.8   8.9         Chloride 103     107   107         CO2 25     24   24         Creatinine 0.82     0.79   0.78         eGFR 68  Comment: Estimated GFR calculated using the CKD-EPI creatinine (2021) equation.     71  Comment: Estimated GFR calculated using the CKD-EPI creatinine (2021) equation.   72  Comment: Estimated GFR calculated using the CKD-EPI creatinine (2021) equation.         Eos #     0.04           Eos %     0.4           Globulin, Total     3.2           Glucose 104     113   121         Hematocrit     33.9           Hemoglobin     10.3           Lactic Acid Level         2.0       Lymph #     1.72           Lymph %     18.1           Magnesium      1.6           MCH     27.8           MCHC     30.4           MCV     91.6           Mono #     0.64           Mono %     6.7           MPV     9.1           Neutrophils, Abs     7.08           Neutrophils Relative     74.5           Phosphorus Level     3.8           Platelet Count     398           POC Glucose   102             Potassium 3.8     4.0   4.1         PROTEIN TOTAL     5.9           RBC     3.70           RDW     15.1           Sodium 139     142   142         Troponin I High Sensitivity       52.4  Comment: Critical Result called and verified by verbal readback to: > on 03/18/2025 at 12:53. Reported by [id number].   40.7  Comment: Critical Result called and verified by verbal readback to: alcon on 03/18/2025 at 10:44. Reported by [id number].       WBC     9.51                                  Significant Imaging: I have reviewed all pertinent imaging results/findings within the past 24 hours.  Assessment/Plan:     Polypharmacy  Will review the amount of meds  she is taking.  Likely many QRS interfering agents.  Stop depression meds.        Paroxysmal atrial fibrillation  Patient has paroxysmal (<7 days) atrial fibrillation. Patient is currently in sinus rhythm. [id number] Score: 4. The patients heart rate in the last 24 hours is as follows:  Pulse  Min: 124  Max: 131     Antiarrhythmics  diltiaZEM 24 hr capsule 240 mg, Daily, Oral    Anticoagulants       Plan  - Replete lytes with a goal of K>4, Mg >2  - Patient is not anticoagulated due to ?  - Patient's afib is currently uncontrolled. Will adjust treatment as follows: increase Dilt and monitor.   -         Hypothyroidism  Resume meds       Type 2 diabetes mellitus with hyperglycemia, without long-term current use of insulin  SSI and monitor.       Persistent depressive disorder  She is awake and joking and in good spirits.  No sign of depression.         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 The patient's most recent sodium results are listed below.  Recent Labs     03/18/25  1220 03/18/25  1547 03/19/25  0618    142 139     Plan  - Correct the sodium by 4-6mEq in 24 hours.   - Obtain the following studies: .  - Will treat the hyponatremia with Fluid restriction of:  1.5 liter per day  - Monitor sodium Daily.   - Patient hyponatremia is stable  -       VTE Risk Mitigation (From admission, onward)           Ordered     IP VTE HIGH RISK PATIENT  Once         03/18/25 1531                                    Darinel Kwok DO  Department of Hospital Medicine  [facility]

## 2025-03-19 NOTE — PLAN OF CARE
[facility]  Initial Discharge Assessment       Primary Care Provider: Walker Watkins MD    Admission Diagnosis: SOB (shortness of breath) [R06.02]  Congestive heart failure, unspecified HF chronicity, unspecified heart failure type [I50.9]    Admission Date: 3/18/2025  Expected Discharge Date:          Payor: MEDICARE / Plan: MEDICARE PART A & B / Product Type: Government /     Extended Emergency Contact Information  Primary Emergency Contact: Ant Vasquez  Mobile Phone: [phone]  Relation: Son  Preferred language: English   needed? No    Discharge Plan A: Return to nursing home         [facility] [place], [place] - [address]  [address]  [place] [address]  Phone: [phone] Fax: [phone]      Initial Assessment (most recent)       Adult Discharge Assessment - 03/19/25 0820          Discharge Assessment    Assessment Type Discharge Planning Assessment     Confirmed/corrected address, phone number and insurance Yes     Source of Information health record     Communicated JUANPABLO with patient/caregiver Yes     People in Home facility resident     Do you have help at home or someone to help you manage your care at home? Yes     Who are your caregiver(s) and their phone number(s)? [facility]     Prior to hospitilization cognitive status: Unable to Assess     Current cognitive status: Alert/Oriented     Walking or Climbing Stairs Difficulty yes     Walking or Climbing Stairs transferring difficulty, assistance 1 person;transferring difficulty, requires equipment     Dressing/Bathing Difficulty yes     Dressing/Bathing bathing difficulty, assistance 1 person;dressing difficulty, assistance 1 person     Equipment Currently Used at Home other (see comments)     Readmission within 30 days? Yes     Patient currently being followed by outpatient case management? No     Do you currently have service(s) that help you manage your care at home? Yes      Name and Contact number of agency [facility]     Is the pt/caregiver preference to resume services with current agency Yes     Do you take prescription medications? Yes     Do you have prescription coverage? Yes     Do you have any problems affording any of your prescribed medications? No     Is the patient taking medications as prescribed? yes     Who is going to help you get home at discharge? [facility]     Are you on dialysis? No     Do you take coumadin? No     Discharge Plan A Return to nursing home     DME Needed Upon Discharge  none     Discharge Plan discussed with: Adult children        Physical Activity    On average, how many days per week do you engage in moderate to strenuous exercise (like a brisk walk)? 0 days     On average, how many minutes do you engage in exercise at this level? 0 min        Financial Resource Strain    How hard is it for you to pay for the very basics like food, housing, medical care, and heating? Not hard at all        Housing Stability    In the last 12 months, was there a time when you were not able to pay the mortgage or rent on time? No     At any time in the past 12 months, were you homeless or living in a shelter (including now)? No        Transportation Needs    Has the lack of transportation kept you from medical appointments, meetings, work or from getting things needed for daily living? No        Food Insecurity    Within the past 12 months, you worried that your food would run out before you got the money to buy more. Never true     Within the past 12 months, the food you bought just didn't last and you didn't have money to get more. Never true        Stress    Do you feel stress - tense, restless, nervous, or anxious, or unable to sleep at night because your mind is troubled all the time - these days? Not at all        Social Isolation    How often do you feel lonely or isolated from those around you?  Never        Alcohol Use    Q1: How often do you have a drink  containing alcohol? Never     Q2: How many drinks containing alcohol do you have on a typical day when you are drinking? Patient does not drink     Q3: How often do you have six or more drinks on one occasion? Never        Utilities    In the past 12 months has the electric, gas, oil, or water company threatened to shut off services in your home? No        Health Literacy    How often do you need to have someone help you when you read instructions, pamphlets, or other written material from your doctor or pharmacy? Never                      Ms. Dow is admitted to [facility] for inpatient treatment of hypoxia. She is a resident of [facility] and plans to return back to NH when able. CM will follow for dc needs.

## 2025-03-19 NOTE — NURSING
1630  Red area on sacrum, intact, no drainage.  Slight scratch noted on right lower leg above ankle. No drainage, no bleeding.

## 2025-03-20 PROBLEM — I47.20 VENTRICULAR TACHYCARDIA: Status: ACTIVE | Noted: 2025-03-20

## 2025-03-20 LAB
ANION GAP SERPL CALCULATED.3IONS-SCNC: 18 MMOL/L (ref 7–16)
BUN SERPL-MCNC: 12 MG/DL (ref 10–20)
BUN/CREAT SERPL: 15 (ref 6–20)
CALCIUM SERPL-MCNC: 9.3 MG/DL (ref 8.4–10.2)
CHLORIDE SERPL-SCNC: 102 MMOL/L (ref 98–111)
CO2 SERPL-SCNC: 23 MMOL/L (ref 23–31)
CREAT SERPL-MCNC: 0.78 MG/DL (ref 0.55–1.02)
EGFR (NO RACE VARIABLE) (RUSH/TITUS): 72 ML/MIN/1.73M2
GLUCOSE SERPL-MCNC: 125 MG/DL (ref 75–121)
POTASSIUM SERPL-SCNC: 3.7 MMOL/L (ref 3.5–5.1)
SODIUM SERPL-SCNC: 139 MMOL/L (ref 136–145)

## 2025-03-20 PROCEDURE — 11000001 HC ACUTE MED/SURG PRIVATE ROOM

## 2025-03-20 PROCEDURE — 25000003 PHARM REV CODE 250: Performed by: HOSPITALIST

## 2025-03-20 PROCEDURE — 25000003 PHARM REV CODE 250: Performed by: NURSE PRACTITIONER

## 2025-03-20 PROCEDURE — 99232 SBSQ HOSP IP/OBS MODERATE 35: CPT | Mod: ,,, | Performed by: HOSPITALIST

## 2025-03-20 PROCEDURE — 36415 COLL VENOUS BLD VENIPUNCTURE: CPT | Performed by: NURSE PRACTITIONER

## 2025-03-20 PROCEDURE — 94761 N-INVAS EAR/PLS OXIMETRY MLT: CPT

## 2025-03-20 PROCEDURE — 80048 BASIC METABOLIC PNL TOTAL CA: CPT | Performed by: NURSE PRACTITIONER

## 2025-03-20 RX ORDER — DILTIAZEM HYDROCHLORIDE 30 MG/1
30 TABLET, FILM COATED ORAL EVERY 6 HOURS
Status: DISCONTINUED | OUTPATIENT
Start: 2025-03-20 | End: 2025-03-20

## 2025-03-20 RX ORDER — DILTIAZEM HYDROCHLORIDE 30 MG/1
30 TABLET, FILM COATED ORAL
Status: COMPLETED | OUTPATIENT
Start: 2025-03-20 | End: 2025-03-20

## 2025-03-20 RX ADMIN — LEVOTHYROXINE SODIUM 50 MCG: 0.05 TABLET ORAL at 05:03

## 2025-03-20 RX ADMIN — FAMOTIDINE 20 MG: 20 TABLET, FILM COATED ORAL at 09:03

## 2025-03-20 RX ADMIN — DILTIAZEM HYDROCHLORIDE 240 MG: 120 CAPSULE, EXTENDED RELEASE ORAL at 09:03

## 2025-03-20 RX ADMIN — MUPIROCIN: 20 OINTMENT TOPICAL at 09:03

## 2025-03-20 RX ADMIN — MUPIROCIN: 20 OINTMENT TOPICAL at 08:03

## 2025-03-20 RX ADMIN — DILTIAZEM HYDROCHLORIDE 30 MG: 30 TABLET, FILM COATED ORAL at 02:03

## 2025-03-20 RX ADMIN — DILTIAZEM HYDROCHLORIDE 30 MG: 30 TABLET, FILM COATED ORAL at 08:03

## 2025-03-20 NOTE — ASSESSMENT & PLAN NOTE
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 The patient's most recent sodium results are listed below.  Recent Labs     03/18/25  1547 03/19/25  0618 03/20/25  0520    139 139     Plan  - Correct the sodium by 4-6mEq in 24 hours.   - Obtain the following studies: .  - Will treat the hyponatremia with Fluid restriction of:  1.5 liter per day  - Monitor sodium Daily.   - Patient hyponatremia is stable  -

## 2025-03-20 NOTE — SUBJECTIVE & OBJECTIVE
Interval History: doing well, good mood, awake talking still with Tachycardia.     Review of Systems   Respiratory:  Negative for shortness of breath.    Cardiovascular:  Negative for chest pain.   Gastrointestinal:  Negative for abdominal pain, nausea and vomiting.   Neurological:  Positive for weakness.   Psychiatric/Behavioral:  Negative for agitation and confusion.    All other systems reviewed and are negative.    Objective:     Vital Signs (Most Recent):  Temp: 98.2 °F (36.8 °C) (03/20/25 0730)  Pulse: (!) 127 (Simultaneous filing. User may not have seen previous data.) (03/20/25 0730)  Resp: 18 (03/20/25 0730)  BP: 102/69 (03/20/25 0903)  SpO2: 95 % (03/20/25 0730) Vital Signs (24h Range):  Temp:  [97.5 °F (36.4 °C)-98.8 °F (37.1 °C)] 98.2 °F (36.8 °C)  Pulse:  [108-131] 127  Resp:  [18-21] 18  SpO2:  [94 %-97 %] 95 %  BP: ()/(59-81) 102/69     Weight: 54.2 kg (119 lb 8 oz)  Body mass index is 17.15 kg/m².    Intake/Output Summary (Last 24 hours) at 3/20/2025 1113  Last data filed at 3/20/2025 0730  Gross per 24 hour   Intake 964 ml   Output 2700 ml   Net -1736 ml         Physical Exam  Vitals reviewed.   Constitutional:       General: She is not in acute distress.     Appearance: Normal appearance.   HENT:      Head: Normocephalic and atraumatic.   Eyes:      General: No scleral icterus.     Extraocular Movements: Extraocular movements intact.      Conjunctiva/sclera: Conjunctivae normal.      Pupils: Pupils are equal, round, and reactive to light.   Cardiovascular:      Rate and Rhythm: Regular rhythm. Tachycardia present.      Heart sounds: No murmur heard.     No friction rub. No gallop.   Pulmonary:      Effort: Pulmonary effort is normal. No respiratory distress.      Breath sounds: Normal breath sounds. No wheezing or rales.   Abdominal:      General: Abdomen is flat. Bowel sounds are normal. There is no distension.      Palpations: Abdomen is soft.      Tenderness: There is no abdominal  tenderness. There is no guarding.   Musculoskeletal:         General: No swelling.      Right lower leg: No edema.      Left lower leg: No edema.   Skin:     General: Skin is warm and dry.      Coloration: Skin is not jaundiced.      Findings: No rash.   Neurological:      General: No focal deficit present.      Mental Status: She is alert and oriented to person, place, and time.      Sensory: No sensory deficit.      Motor: Weakness present.   Psychiatric:         Mood and Affect: Mood normal.         Thought Content: Thought content normal.         Judgment: Judgment normal.               Significant Labs: All pertinent labs within the past 24 hours have been reviewed.  Recent Lab Results         03/20/25  0520        Anion Gap 18       BUN 12       BUN/CREAT RATIO 15       Calcium 9.3       Chloride 102       CO2 23       Creatinine 0.78       eGFR 72  Comment: Estimated GFR calculated using the CKD-EPI creatinine (2021) equation.       Glucose 125       Potassium 3.7       Sodium 139               Significant Imaging: I have reviewed all pertinent imaging results/findings within the past 24 hours.

## 2025-03-20 NOTE — HOSPITAL COURSE
3.20 Doing great, In good mood, talking alert.  No issues with PO.  HR still up.  Titrating PO Cardizem.  Watching BP numbers.     3.21 Seems fine.  HR still tachy, increased CCB to 300mg.  Would continue that.  Also decreased or stopped a lot of her medications. She needs fluid restriction of [id number] a day and can decreased Salt tabs to daily if not off.  I wonder if these are contributing to her sudden respiratory distress issues.  She presented with yet again an episode of SOB that resolved within hours and is off O2.  She is a DNR.  Family doesn't want extensive work up.  Feel like there are steps that can be done at the NH to avoid multiple ER trips and hospital stays.  DC back to NH. Today.

## 2025-03-20 NOTE — ASSESSMENT & PLAN NOTE
"Patient has Diastolic (HFpEF) heart failure that is Chronic. On presentation their CHF was well compensated. Most recent BNP and echo results are listed below.  No results for input(s): "BNP" in the last 72 hours.  Latest ECHO  No results found for this or any previous visit.    Current Heart Failure Medications       Plan  - Monitor strict I&Os and daily weights.    - Place on telemetry  - Low sodium diet  - Place on fluid restriction of 1.5 L.   - Cardiology has not been consulted  - The patient's volume status is stable but not at their baseline as indicated by shortness of breath  -       "

## 2025-03-20 NOTE — ASSESSMENT & PLAN NOTE
Patient's blood pressure range in the last 24 hours was: BP  Min: 96/59  Max: 120/81.The patient's inpatient anti-hypertensive regimen is listed below:  Current Antihypertensives  diltiaZEM 24 hr capsule 300 mg, Daily, Oral  diltiaZEM tablet 30 mg, Every 6 hours, Oral    Plan  - BP is uncontrolled, will adjust as follows: monitor and treat prn  -

## 2025-03-20 NOTE — ASSESSMENT & PLAN NOTE
On RA and stable     Patient with Hypoxic Respiratory failure which is Acute on chronic.  she is on home oxygen at 2 LPM. Supplemental oxygen was provided and noted- Oxygen Concentration (%):  [21] 21    .   Signs/symptoms of respiratory failure include- tachypnea. Contributing diagnoses includes - Interstitial lung disease Labs and images were reviewed. Patient Has not had a recent ABG. Will treat underlying causes and adjust management of respiratory failure as follows- monitor, now off O2

## 2025-03-20 NOTE — PLAN OF CARE
Problem: Fall Injury Risk  Goal: Absence of Fall and Fall-Related Injury  Intervention: Promote Injury-Free Environment  Flowsheets (Taken 3/20/2025 4613)  Safety Promotion/Fall Prevention: bed alarm set

## 2025-03-20 NOTE — PROGRESS NOTES
[facility] Medicine  Progress Note    Patient Name: Joyce Dow  MRN: [id number]  Patient Class: IP- Inpatient   Admission Date: 3/18/2025  Length of Stay: 2 days  Attending Physician: Darinel Kwok DO  Primary Care Provider: Walker Watkins MD        Subjective     Principal Problem:<principal problem not specified>        HPI:  89yo WF well known to us from the NH. She has a hx of Tachycardia, hypothyroidism, GERD.  She has episodes of hypoxia in past and has presented with acute SOB on bipap and tachycardia.  She was quickly weaned down off Bipap to NC and now is off NC on RA with normal sats.  HR is elevated but is chronically elevated. She is awake alert at baseline and talking.     Overview/Hospital Course:  3.20 Doing great, In good mood, talking alert.  No issues with PO.  HR still up.  Titrating PO Cardizem.  Watching BP numbers.     Interval History: doing well, good mood, awake talking still with Tachycardia.     Review of Systems   Respiratory:  Negative for shortness of breath.    Cardiovascular:  Negative for chest pain.   Gastrointestinal:  Negative for abdominal pain, nausea and vomiting.   Neurological:  Positive for weakness.   Psychiatric/Behavioral:  Negative for agitation and confusion.    All other systems reviewed and are negative.    Objective:     Vital Signs (Most Recent):  Temp: 98.2 °F (36.8 °C) (03/20/25 0730)  Pulse: (!) 127 (Simultaneous filing. User may not have seen previous data.) (03/20/25 0730)  Resp: 18 (03/20/25 0730)  BP: 102/69 (03/20/25 0903)  SpO2: 95 % (03/20/25 0730) Vital Signs (24h Range):  Temp:  [97.5 °F (36.4 °C)-98.8 °F (37.1 °C)] 98.2 °F (36.8 °C)  Pulse:  [108-131] 127  Resp:  [18-21] 18  SpO2:  [94 %-97 %] 95 %  BP: ()/(59-81) 102/69     Weight: 54.2 kg (119 lb 8 oz)  Body mass index is 17.15 kg/m².    Intake/Output Summary (Last 24 hours) at 3/20/2025 1113  Last data filed at 3/20/2025 0765  Gross per  24 hour   Intake 964 ml   Output 2700 ml   Net -1736 ml         Physical Exam  Vitals reviewed.   Constitutional:       General: She is not in acute distress.     Appearance: Normal appearance.   HENT:      Head: Normocephalic and atraumatic.   Eyes:      General: No scleral icterus.     Extraocular Movements: Extraocular movements intact.      Conjunctiva/sclera: Conjunctivae normal.      Pupils: Pupils are equal, round, and reactive to light.   Cardiovascular:      Rate and Rhythm: Regular rhythm. Tachycardia present.      Heart sounds: No murmur heard.     No friction rub. No gallop.   Pulmonary:      Effort: Pulmonary effort is normal. No respiratory distress.      Breath sounds: Normal breath sounds. No wheezing or rales.   Abdominal:      General: Abdomen is flat. Bowel sounds are normal. There is no distension.      Palpations: Abdomen is soft.      Tenderness: There is no abdominal tenderness. There is no guarding.   Musculoskeletal:         General: No swelling.      Right lower leg: No edema.      Left lower leg: No edema.   Skin:     General: Skin is warm and dry.      Coloration: Skin is not jaundiced.      Findings: No rash.   Neurological:      General: No focal deficit present.      Mental Status: She is alert and oriented to person, place, and time.      Sensory: No sensory deficit.      Motor: Weakness present.   Psychiatric:         Mood and Affect: Mood normal.         Thought Content: Thought content normal.         Judgment: Judgment normal.               Significant Labs: All pertinent labs within the past 24 hours have been reviewed.  Recent Lab Results         03/20/25  0520        Anion Gap 18       BUN 12       BUN/CREAT RATIO 15       Calcium 9.3       Chloride 102       CO2 23       Creatinine 0.78       eGFR 72  Comment: Estimated GFR calculated using the CKD-EPI creatinine (2021) equation.       Glucose 125       Potassium 3.7       Sodium 139               Significant Imaging: I have  "reviewed all pertinent imaging results/findings within the past 24 hours.      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 The patient's most recent sodium results are listed below.  Recent Labs     03/18/25  1547 03/19/25  0618 03/20/25  0520    139 139     Plan  - Correct the sodium by 4-6mEq in 24 hours.   - Obtain the following studies: .  - Will treat the hyponatremia with Fluid restriction of:  1.5 liter per day  - Monitor sodium Daily.   - Patient hyponatremia is stable  -   HTN (hypertension)  Patient's blood pressure range in the last 24 hours was: BP  Min: 96/59  Max: 120/81.The patient's inpatient anti-hypertensive regimen is listed below:  Current Antihypertensives  diltiaZEM 24 hr capsule 300 mg, Daily, Oral  diltiaZEM tablet 30 mg, Every 6 hours, Oral    Plan  - BP is uncontrolled, will adjust as follows: monitor and treat prn  -   Persistent depressive disorder  She is awake and joking and in good spirits.  No sign of depression.       Type 2 diabetes mellitus with hyperglycemia, without long-term current use of insulin  SSI and monitor.     Hypothyroidism  Resume meds     Paroxysmal atrial fibrillation  Patient has paroxysmal (<7 days) atrial fibrillation. Patient is currently in sinus rhythm. [id number] Score: 4. The patients heart rate in the last 24 hours is as follows:  Pulse  Min: 108  Max: 131     Antiarrhythmics  diltiaZEM 24 hr capsule 300 mg, Daily, Oral  diltiaZEM tablet 30 mg, Every 6 hours, Oral    Anticoagulants       Plan  - Replete lytes with a goal of K>4, Mg >2  - Patient is not anticoagulated due to ?  - Patient's afib is currently uncontrolled. Will adjust treatment as follows: increase Dilt and monitor.   -       Congestive heart failure  Patient has Diastolic (HFpEF) heart failure that is Chronic. On presentation their CHF was well compensated. Most recent BNP and echo results are listed below.  No results for input(s): "BNP" in " the last 72 hours.  Latest ECHO  No results found for this or any previous visit.    Current Heart Failure Medications       Plan  - Monitor strict I&Os and daily weights.    - Place on telemetry  - Low sodium diet  - Place on fluid restriction of 1.5 L.   - Cardiology has not been consulted  - The patient's volume status is stable but not at their baseline as indicated by shortness of breath  -       Polypharmacy  Will review the amount of meds she is taking.  Likely many QRS interfering agents.  Stop depression meds.    Have held numerous meds.  Slowly adjusting Cardizem.  She is awake, alert and in great mood.     Acute on chronic hypoxic respiratory failure  On RA and stable     Patient with Hypoxic Respiratory failure which is Acute on chronic.  she is on home oxygen at 2 LPM. Supplemental oxygen was provided and noted- Oxygen Concentration (%):  [21] 21    .   Signs/symptoms of respiratory failure include- tachypnea. Contributing diagnoses includes - Interstitial lung disease Labs and images were reviewed. Patient Has not had a recent ABG. Will treat underlying causes and adjust management of respiratory failure as follows- monitor, now off O2  Ventricular tachycardia  Slowly adjusting CCB.  Monitor HR and BP.   VTE Risk Mitigation (From admission, onward)           Ordered     IP VTE HIGH RISK PATIENT  Once         03/18/25 1531                    Discharge Planning   JUANPABLO: 3/20/2025     Code Status: DNR   Medical Readiness for Discharge Date:   Discharge Plan A: Return to nursing home                        Darinel Kwok DO  Department of Hospital Medicine   [facility]

## 2025-03-20 NOTE — PLAN OF CARE
Problem: Skin Injury Risk Increased  Goal: Skin Health and Integrity  Outcome: Progressing     Problem: Adult Inpatient Plan of Care  Goal: Plan of Care Review  Outcome: Progressing  Goal: Patient-Specific Goal (Individualized)  Outcome: Progressing  Goal: Absence of Hospital-Acquired Illness or Injury  Outcome: Progressing  Goal: Optimal Comfort and Wellbeing  Outcome: Progressing  Goal: Readiness for Transition of Care  Outcome: Progressing     Problem: Diabetes Comorbidity  Goal: Blood Glucose Level Within Targeted Range  Outcome: Progressing     Problem: Wound  Goal: Optimal Coping  Outcome: Progressing  Goal: Optimal Functional Ability  Outcome: Progressing  Goal: Absence of Infection Signs and Symptoms  Outcome: Progressing  Goal: Improved Oral Intake  Outcome: Progressing  Goal: Optimal Pain Control and Function  Outcome: Progressing  Goal: Skin Health and Integrity  Outcome: Progressing  Goal: Optimal Wound Healing  Outcome: Progressing     Problem: Fall Injury Risk  Goal: Absence of Fall and Fall-Related Injury  Outcome: Progressing     Problem: Infection  Goal: Absence of Infection Signs and Symptoms  Outcome: Progressing

## 2025-03-20 NOTE — ASSESSMENT & PLAN NOTE
Will review the amount of meds she is taking.  Likely many QRS interfering agents.  Stop depression meds.    Have held numerous meds.  Slowly adjusting Cardizem.  She is awake, alert and in great mood.

## 2025-03-21 VITALS
BODY MASS INDEX: 16.75 KG/M2 | RESPIRATION RATE: 18 BRPM | OXYGEN SATURATION: 97 % | HEIGHT: 70 IN | HEART RATE: 122 BPM | SYSTOLIC BLOOD PRESSURE: 114 MMHG | WEIGHT: 117 LBS | DIASTOLIC BLOOD PRESSURE: 71 MMHG | TEMPERATURE: 97 F

## 2025-03-21 PROBLEM — E87.1 HYPONATREMIA: Chronic | Status: RESOLVED | Noted: 2023-05-09 | Resolved: 2025-03-21

## 2025-03-21 PROBLEM — F34.1 PERSISTENT DEPRESSIVE DISORDER: Status: RESOLVED | Noted: 2023-05-10 | Resolved: 2025-03-21

## 2025-03-21 PROBLEM — J96.21 ACUTE ON CHRONIC HYPOXIC RESPIRATORY FAILURE: Status: RESOLVED | Noted: 2025-03-19 | Resolved: 2025-03-21

## 2025-03-21 LAB
ANION GAP SERPL CALCULATED.3IONS-SCNC: 16 MMOL/L (ref 7–16)
BUN SERPL-MCNC: 10 MG/DL (ref 10–20)
BUN/CREAT SERPL: 13 (ref 6–20)
CALCIUM SERPL-MCNC: 9.1 MG/DL (ref 8.4–10.2)
CHLORIDE SERPL-SCNC: 104 MMOL/L (ref 98–111)
CO2 SERPL-SCNC: 22 MMOL/L (ref 23–31)
CREAT SERPL-MCNC: 0.75 MG/DL (ref 0.55–1.02)
EGFR (NO RACE VARIABLE) (RUSH/TITUS): 75 ML/MIN/1.73M2
GLUCOSE SERPL-MCNC: 230 MG/DL (ref 75–121)
OHS QRS DURATION: 118 MS
OHS QTC CALCULATION: 506 MS
POTASSIUM SERPL-SCNC: 3.5 MMOL/L (ref 3.5–5.1)
SODIUM SERPL-SCNC: 138 MMOL/L (ref 136–145)

## 2025-03-21 PROCEDURE — 94761 N-INVAS EAR/PLS OXIMETRY MLT: CPT

## 2025-03-21 PROCEDURE — 36415 COLL VENOUS BLD VENIPUNCTURE: CPT | Performed by: NURSE PRACTITIONER

## 2025-03-21 PROCEDURE — 99900035 HC TECH TIME PER 15 MIN (STAT)

## 2025-03-21 PROCEDURE — 80048 BASIC METABOLIC PNL TOTAL CA: CPT | Performed by: NURSE PRACTITIONER

## 2025-03-21 PROCEDURE — 25000003 PHARM REV CODE 250: Performed by: HOSPITALIST

## 2025-03-21 PROCEDURE — 25000003 PHARM REV CODE 250: Performed by: NURSE PRACTITIONER

## 2025-03-21 PROCEDURE — 99239 HOSP IP/OBS DSCHRG MGMT >30: CPT | Mod: ,,, | Performed by: HOSPITALIST

## 2025-03-21 RX ORDER — SOD.CHLORID/POTASSIUM CHLORIDE 287-180-15
1 TABLET ORAL DAILY
Start: 2025-03-21

## 2025-03-21 RX ORDER — DILTIAZEM HYDROCHLORIDE 300 MG/1
300 CAPSULE, COATED, EXTENDED RELEASE ORAL DAILY
Qty: 30 CAPSULE | Refills: 11 | Status: SHIPPED | OUTPATIENT
Start: 2025-03-22 | End: 2026-03-22

## 2025-03-21 RX ADMIN — LEVOTHYROXINE SODIUM 50 MCG: 0.05 TABLET ORAL at 05:03

## 2025-03-21 RX ADMIN — FAMOTIDINE 20 MG: 20 TABLET, FILM COATED ORAL at 08:03

## 2025-03-21 RX ADMIN — DILTIAZEM HYDROCHLORIDE 300 MG: 180 CAPSULE, COATED, EXTENDED RELEASE ORAL at 08:03

## 2025-03-21 RX ADMIN — MUPIROCIN: 20 OINTMENT TOPICAL at 08:03

## 2025-03-21 NOTE — ASSESSMENT & PLAN NOTE
Patient's blood pressure range in the last 24 hours was: BP  Min: 94/61  Max: 125/69.The patient's inpatient anti-hypertensive regimen is listed below:  Current Antihypertensives  diltiaZEM 24 hr capsule 300 mg, Daily, Oral  diltiaZEM (CARDIZEM CD) 24 hr capsule, Daily, Oral    Plan  - BP is uncontrolled, will adjust as follows: monitor and treat prn  -

## 2025-03-21 NOTE — PLAN OF CARE
[facility]  Discharge Final Note    Primary Care Provider: Walker Watkins MD    Expected Discharge Date: 3/21/2025    Final Discharge Note (most recent)       Final Note - 03/21/25 0920          Final Note    Assessment Type Final Discharge Note     Anticipated Discharge Disposition Discharged to Nursing Facility Certified under Medicaid but not Medicare     What phone number can be called within the next 1-3 days to see how you are doing after discharge? [phone]        Post-Acute Status    Post-Acute Authorization Placement     Post-Acute Placement Status Set-up Complete/Auth obtained     Patient choice form signed by patient/caregiver List with quality metrics by geographic area provided;List from CMS Compare     Discharge Delays None known at this time                     Important Message from Medicare  Important Message from Medicare regarding Discharge Appeal Rights: Given to patient/caregiver, Explained to patient/caregiver, Signed/date by patient/caregiver     Date IMM was signed: 03/19/25  Time IMM was signed: 0827    Ms. Dow to dc back to nursing home 3/21.

## 2025-03-21 NOTE — PLAN OF CARE
Problem: Skin Injury Risk Increased  Goal: Skin Health and Integrity  Outcome: Progressing     Problem: Adult Inpatient Plan of Care  Goal: Optimal Comfort and Wellbeing  Outcome: Progressing     Problem: Fall Injury Risk  Goal: Absence of Fall and Fall-Related Injury  Outcome: Progressing      S         Patient presented to office alert and responsive . Ambulated to treatment room for blood pressure check ordered by MD  O        Vitals taken and recorded  A .      No c /o dizziness headache or blurred vision . Patient stated she is taking medication as directed. Will start drinking more water , she is  monitoring her Na intake will make changes to her diet MD made aware new medication ordered

## 2025-03-21 NOTE — DISCHARGE SUMMARY
[facility]  Hospital Medicine  Discharge Summary      Patient Name: Joyce Dow  MRN: [id number]  GUY: [id number]  Patient Class: IP- Inpatient  Admission Date: 3/18/2025  Hospital Length of Stay: 3 days  Discharge Date and Time:  03/21/2025 8:46 AM  Attending Physician: Darinel Kwok DO   Discharging Provider: Darinel Kwok DO  Primary Care Provider: Walker Watkins MD    Primary Care Team: Networked reference to record PCT     HPI:   89yo WF well known to us from the NH. She has a hx of Tachycardia, hypothyroidism, GERD.  She has episodes of hypoxia in past and has presented with acute SOB on bipap and tachycardia.  She was quickly weaned down off Bipap to NC and now is off NC on RA with normal sats.  HR is elevated but is chronically elevated. She is awake alert at baseline and talking.     * No surgery found *      Hospital Course:   3.20 Doing great, In good mood, talking alert.  No issues with PO.  HR still up.  Titrating PO Cardizem.  Watching BP numbers.     3.21 Seems fine.  HR still tachy, increased CCB to 300mg.  Would continue that.  Also decreased or stopped a lot of her medications. She needs fluid restriction of 6245-2014 a day and can decreased Salt tabs to daily if not off.  I wonder if these are contributing to her sudden respiratory distress issues.  She presented with yet again an episode of SOB that resolved within hours and is off O2.  She is a DNR.  Family doesn't want extensive work up.  Feel like there are steps that can be done at the NH to avoid multiple ER trips and hospital stays.  DC back to NH. Today.      Goals of Care Treatment Preferences:  Code Status: DNR      SDOH Screening:  The patient was screened for utility difficulties, food insecurity, transport difficulties, housing insecurity, and interpersonal safety and there were no concerns identified this admission.     Consults:   Consults (From admission, onward)          Status  "Ordering Provider     Inpatient consult to Registered Dietitian/Nutritionist  Once        Provider:  (Not yet assigned)    Completed SHARMAINE DELAROSA            Assessment & Plan  HTN (hypertension)  Patient's blood pressure range in the last 24 hours was: BP  Min: 94/61  Max: 125/69.The patient's inpatient anti-hypertensive regimen is listed below:  Current Antihypertensives  diltiaZEM 24 hr capsule 300 mg, Daily, Oral  diltiaZEM (CARDIZEM CD) 24 hr capsule, Daily, Oral    Plan  - BP is uncontrolled, will adjust as follows: monitor and treat prn  -   Type 2 diabetes mellitus with hyperglycemia, without long-term current use of insulin  SSI and monitor.     Hypothyroidism  Resume meds     Paroxysmal atrial fibrillation  Patient has paroxysmal (<7 days) atrial fibrillation. Patient is currently in sinus rhythm. [id number] Score: 4. The patients heart rate in the last 24 hours is as follows:  Pulse  Min: 112  Max: 127     Antiarrhythmics  diltiaZEM 24 hr capsule 300 mg, Daily, Oral  diltiaZEM (CARDIZEM CD) 24 hr capsule, Daily, Oral    Anticoagulants       Plan  - Replete lytes with a goal of K>4, Mg >2  - Patient is not anticoagulated due to ?  - Patient's afib is currently uncontrolled. Will adjust treatment as follows: increase Dilt and monitor.   -       Congestive heart failure  Patient has Diastolic (HFpEF) heart failure that is Chronic. On presentation their CHF was well compensated. Most recent BNP and echo results are listed below.  No results for input(s): "BNP" in the last 72 hours.  Latest ECHO  No results found for this or any previous visit.    Current Heart Failure Medications       Plan  - Monitor strict I&Os and daily weights.    - Place on telemetry  - Low sodium diet  - Place on fluid restriction of 1.5 L.   - Cardiology has not been consulted  - The patient's volume status is stable but not at their baseline as indicated by shortness of breath  -       Polypharmacy  Will review the amount of meds " she is taking.  Likely many QRS interfering agents.  Stop depression meds.    Have held numerous meds.  Slowly adjusting Cardizem.  She is awake, alert and in great mood.     Ventricular tachycardia  Slowly adjusting CCB.  Monitor HR and BP.   Final Active Diagnoses:    Diagnosis Date Noted POA    Ventricular tachycardia [I47.20] 03/20/2025 Yes    Polypharmacy [Z79.899] 03/19/2025 Not Applicable    Congestive heart failure [I50.9] 03/18/2025 Yes    Type 2 diabetes mellitus with hyperglycemia, without long-term current use of insulin [E11.65] 08/31/2024 Yes    Paroxysmal atrial fibrillation [I48.0] 11/20/2023 Yes    Hypothyroidism [E03.9] 11/20/2023 Yes    HTN (hypertension) [I10] 05/10/2023 Yes      Problems Resolved During this Admission:    Diagnosis Date Noted Date Resolved POA    PRINCIPAL PROBLEM:  Acute on chronic hypoxic respiratory failure [J96.21] 03/19/2025 03/21/2025 Yes    Persistent depressive disorder [F34.1] 05/10/2023 03/21/2025 Yes    Hyponatremia [E87.1] 05/09/2023 03/21/2025 Yes     Chronic       Discharged Condition: good    Disposition: Skilled Nursing Facility    Follow Up:    Patient Instructions:      ACCEPT - Ambulatory referral/consult to Cardiology   Standing Status: Future   Referral Priority: Routine Referral Type: Consultation   Referral Reason: Specialty Services Required   Requested Specialty: Cardiology   Number of Visits Requested: 1       Significant Diagnostic Studies: Labs: BMP:   Recent Labs   Lab 03/20/25  0520 03/21/25  0530   * 230*    138   K 3.7 3.5    104   CO2 23 22*   BUN 12 10   CREATININE 0.78 0.75   CALCIUM 9.3 9.1       Pending Diagnostic Studies:       None           Medications:  Reconciled Home Medications:      Medication List        CHANGE how you take these medications      diltiaZEM 300 MG 24 hr capsule  Commonly known as: CARDIZEM CD  Take 1 capsule (300 mg total) by mouth once daily.  Start taking on: March 22, 2025  What changed:    medication strength  how much to take     THERMOTABS 287-180-15 mg Tab  Generic drug: sod.chlorid-potassium chloride  Take 1 tablet by mouth once daily.  What changed:   how much to take  when to take this            CONTINUE taking these medications      levothyroxine 50 MCG tablet  Commonly known as: SYNTHROID  Take 50 mcg by mouth before breakfast.     * metFORMIN 500 mg 24hr tablet  Commonly known as: FORTAMET  Take 1,000 mg by mouth daily with breakfast.     * metFORMIN 500 MG tablet  Commonly known as: GLUCOPHAGE  Take 500 mg by mouth every evening.           * This list has 2 medication(s) that are the same as other medications prescribed for you. Read the directions carefully, and ask your doctor or other care provider to review them with you.                STOP taking these medications      OCUVITE LUTEIN 25 25-5 mg Cap  Generic drug: lutein-zeaxanthin     pantoprazole 40 MG tablet  Commonly known as: PROTONIX     trazodone 25 MG tablet  Commonly known as: DESYREL              Indwelling Lines/Drains at time of discharge:   Lines/Drains/Airways       None                   Time spent on the discharge of patient: 32 minutes         Darinel Kwok DO  Department of Hospital Medicine  [facility]

## 2025-03-21 NOTE — ASSESSMENT & PLAN NOTE
Patient has paroxysmal (<7 days) atrial fibrillation. Patient is currently in sinus rhythm. [id number] Score: 4. The patients heart rate in the last 24 hours is as follows:  Pulse  Min: 112  Max: 127     Antiarrhythmics  diltiaZEM 24 hr capsule 300 mg, Daily, Oral  diltiaZEM (CARDIZEM CD) 24 hr capsule, Daily, Oral    Anticoagulants       Plan  - Replete lytes with a goal of K>4, Mg >2  - Patient is not anticoagulated due to ?  - Patient's afib is currently uncontrolled. Will adjust treatment as follows: increase Dilt and monitor.   -

## 2025-05-20 ENCOUNTER — LAB REQUISITION (OUTPATIENT)
Dept: LAB | Facility: HOSPITAL | Age: OVER 89
End: 2025-05-20
Attending: INTERNAL MEDICINE
Payer: MEDICARE

## 2025-05-20 DIAGNOSIS — E11.8 TYPE 2 DIABETES MELLITUS WITH UNSPECIFIED COMPLICATIONS: ICD-10-CM

## 2025-05-20 DIAGNOSIS — E03.9 HYPOTHYROIDISM, UNSPECIFIED: ICD-10-CM

## 2025-05-20 DIAGNOSIS — I11.9 HYPERTENSIVE HEART DISEASE WITHOUT HEART FAILURE: ICD-10-CM

## 2025-05-21 LAB
ALBUMIN SERPL BCP-MCNC: 3.2 G/DL (ref 3.4–4.8)
ALBUMIN/GLOB SERPL: 1.1 {RATIO}
ALP SERPL-CCNC: 66 U/L (ref 40–150)
ALT SERPL W P-5'-P-CCNC: <7 U/L
ANION GAP SERPL CALCULATED.3IONS-SCNC: 16 MMOL/L (ref 7–16)
AST SERPL W P-5'-P-CCNC: 18 U/L (ref 11–45)
BASOPHILS # BLD AUTO: 0.03 K/UL (ref 0–0.2)
BASOPHILS NFR BLD AUTO: 0.6 % (ref 0–1)
BILIRUB SERPL-MCNC: 0.4 MG/DL
BUN SERPL-MCNC: 15 MG/DL (ref 10–20)
BUN/CREAT SERPL: 19 (ref 6–20)
CALCIUM SERPL-MCNC: 9.3 MG/DL (ref 8.4–10.2)
CHLORIDE SERPL-SCNC: 103 MMOL/L (ref 98–111)
CO2 SERPL-SCNC: 24 MMOL/L (ref 23–31)
CREAT SERPL-MCNC: 0.77 MG/DL (ref 0.55–1.02)
DIFFERENTIAL METHOD BLD: ABNORMAL
EGFR (NO RACE VARIABLE) (RUSH/TITUS): 73 ML/MIN/1.73M2
EOSINOPHIL # BLD AUTO: 0.23 K/UL (ref 0–0.5)
EOSINOPHIL NFR BLD AUTO: 4.8 % (ref 1–4)
ERYTHROCYTE [DISTWIDTH] IN BLOOD BY AUTOMATED COUNT: 14.5 % (ref 11.5–14.5)
EST. AVERAGE GLUCOSE BLD GHB EST-MCNC: 126 MG/DL
GLOBULIN SER-MCNC: 2.8 G/DL (ref 2–4)
GLUCOSE SERPL-MCNC: 91 MG/DL (ref 75–121)
HBA1C MFR BLD HPLC: 6 %
HCT VFR BLD AUTO: 32.3 % (ref 38–47)
HGB BLD-MCNC: 10 G/DL (ref 12–16)
LYMPHOCYTES # BLD AUTO: 1.7 K/UL (ref 1–4.8)
LYMPHOCYTES NFR BLD AUTO: 35.3 % (ref 27–41)
MCH RBC QN AUTO: 28.6 PG (ref 27–31)
MCHC RBC AUTO-ENTMCNC: 31 G/DL (ref 32–36)
MCV RBC AUTO: 92.3 FL (ref 80–96)
MONOCYTES # BLD AUTO: 0.56 K/UL (ref 0–0.8)
MONOCYTES NFR BLD AUTO: 11.6 % (ref 2–6)
MPC BLD CALC-MCNC: 11 FL (ref 9.4–12.4)
NEUTROPHILS # BLD AUTO: 2.3 K/UL (ref 1.8–7.7)
NEUTROPHILS NFR BLD AUTO: 47.7 % (ref 53–65)
PLATELET # BLD AUTO: 226 K/UL (ref 150–400)
POTASSIUM SERPL-SCNC: 3.9 MMOL/L (ref 3.5–5.1)
PROT SERPL-MCNC: 6 G/DL (ref 5.8–7.6)
RBC # BLD AUTO: 3.5 M/UL (ref 4.2–5.4)
SODIUM SERPL-SCNC: 139 MMOL/L (ref 136–145)
TSH SERPL DL<=0.005 MIU/L-ACNC: 0.83 UIU/ML (ref 0.35–4.94)
WBC # BLD AUTO: 4.82 K/UL (ref 4.5–11)

## 2025-05-21 PROCEDURE — 85025 COMPLETE CBC W/AUTO DIFF WBC: CPT | Performed by: INTERNAL MEDICINE

## 2025-05-21 PROCEDURE — 83036 HEMOGLOBIN GLYCOSYLATED A1C: CPT | Performed by: INTERNAL MEDICINE

## 2025-05-21 PROCEDURE — 80053 COMPREHEN METABOLIC PANEL: CPT | Performed by: INTERNAL MEDICINE

## 2025-05-21 PROCEDURE — 84443 ASSAY THYROID STIM HORMONE: CPT | Performed by: INTERNAL MEDICINE

## 2025-06-08 ENCOUNTER — LAB REQUISITION (OUTPATIENT)
Dept: LAB | Facility: HOSPITAL | Age: OVER 89
End: 2025-06-08
Attending: NURSE PRACTITIONER
Payer: MEDICARE

## 2025-06-08 DIAGNOSIS — R82.90 UNSPECIFIED ABNORMAL FINDINGS IN URINE: ICD-10-CM

## 2025-06-08 DIAGNOSIS — R35.0 FREQUENCY OF MICTURITION: ICD-10-CM

## 2025-06-08 LAB
BACTERIA #/AREA URNS HPF: ABNORMAL /HPF
BILIRUB UR QL STRIP: NEGATIVE
CLARITY UR: CLEAR
COLOR UR: YELLOW
GLUCOSE UR STRIP-MCNC: NEGATIVE MG/DL
KETONES UR STRIP-SCNC: NEGATIVE MG/DL
LEUKOCYTE ESTERASE UR QL STRIP: ABNORMAL
NITRITE UR QL STRIP: POSITIVE
PH UR STRIP: 6.5 PH UNITS
PROT UR QL STRIP: NEGATIVE
RBC # UR STRIP: ABNORMAL /UL
RBC #/AREA URNS HPF: ABNORMAL /HPF
SP GR UR STRIP: 1.01
SQUAMOUS #/AREA URNS LPF: ABNORMAL /LPF
UROBILINOGEN UR STRIP-ACNC: 0.2 MG/DL
WBC #/AREA URNS HPF: ABNORMAL /HPF

## 2025-06-08 PROCEDURE — 81003 URINALYSIS AUTO W/O SCOPE: CPT | Performed by: NURSE PRACTITIONER

## 2025-06-08 PROCEDURE — 87086 URINE CULTURE/COLONY COUNT: CPT | Performed by: NURSE PRACTITIONER

## 2025-06-10 LAB — UA COMPLETE W REFLEX CULTURE PNL UR: ABNORMAL

## 2025-07-07 ENCOUNTER — HOSPITAL ENCOUNTER (INPATIENT)
Facility: HOSPITAL | Age: OVER 89
LOS: 3 days | Discharge: NURSING, REHAB OR LONG-TERM CARE | DRG: 689 | End: 2025-07-11
Attending: STUDENT IN AN ORGANIZED HEALTH CARE EDUCATION/TRAINING PROGRAM | Admitting: STUDENT IN AN ORGANIZED HEALTH CARE EDUCATION/TRAINING PROGRAM
Payer: MEDICARE

## 2025-07-07 DIAGNOSIS — N30.01 ACUTE CYSTITIS WITH HEMATURIA: Primary | ICD-10-CM

## 2025-07-07 DIAGNOSIS — J44.1 COPD EXACERBATION: ICD-10-CM

## 2025-07-07 DIAGNOSIS — R06.02 SOB (SHORTNESS OF BREATH): ICD-10-CM

## 2025-07-07 DIAGNOSIS — J96.21 ACUTE ON CHRONIC HYPOXIC RESPIRATORY FAILURE: ICD-10-CM

## 2025-07-07 LAB
ALBUMIN SERPL BCP-MCNC: 3.4 G/DL (ref 3.4–4.8)
ALBUMIN/GLOB SERPL: 0.9 {RATIO}
ALP SERPL-CCNC: 81 U/L (ref 40–150)
ALT SERPL W P-5'-P-CCNC: 7 U/L
ANION GAP SERPL CALCULATED.3IONS-SCNC: 19 MMOL/L (ref 7–16)
AST SERPL W P-5'-P-CCNC: 32 U/L (ref 11–45)
BACTERIA #/AREA URNS HPF: ABNORMAL /HPF
BASOPHILS # BLD AUTO: 0.04 K/UL (ref 0–0.2)
BASOPHILS NFR BLD AUTO: 0.3 % (ref 0–1)
BILIRUB SERPL-MCNC: 0.5 MG/DL
BILIRUB UR QL STRIP: NEGATIVE
BUN SERPL-MCNC: 15 MG/DL (ref 10–20)
BUN/CREAT SERPL: 17 (ref 6–20)
CALCIUM SERPL-MCNC: 9.2 MG/DL (ref 8.4–10.2)
CHLORIDE SERPL-SCNC: 101 MMOL/L (ref 98–111)
CLARITY UR: ABNORMAL
CO2 SERPL-SCNC: 20 MMOL/L (ref 23–31)
COLOR UR: YELLOW
CREAT SERPL-MCNC: 0.88 MG/DL (ref 0.55–1.02)
DIFFERENTIAL METHOD BLD: ABNORMAL
EGFR (NO RACE VARIABLE) (RUSH/TITUS): 62 ML/MIN/1.73M2
EOSINOPHIL # BLD AUTO: 0.29 K/UL (ref 0–0.5)
EOSINOPHIL NFR BLD AUTO: 2.3 % (ref 1–4)
ERYTHROCYTE [DISTWIDTH] IN BLOOD BY AUTOMATED COUNT: 13.5 % (ref 11.5–14.5)
GLOBULIN SER-MCNC: 3.6 G/DL (ref 2–4)
GLUCOSE SERPL-MCNC: 324 MG/DL (ref 75–121)
GLUCOSE UR STRIP-MCNC: NEGATIVE MG/DL
HCT VFR BLD AUTO: 35.8 % (ref 38–47)
HGB BLD-MCNC: 10.9 G/DL (ref 12–16)
INFLUENZA A MOLECULAR (OHS): NEGATIVE
INFLUENZA B MOLECULAR (OHS): NEGATIVE
KETONES UR STRIP-SCNC: NEGATIVE MG/DL
LACTATE SERPL-SCNC: 2.7 MMOL/L (ref 0.5–2.2)
LEUKOCYTE ESTERASE UR QL STRIP: ABNORMAL
LYMPHOCYTES # BLD AUTO: 5.46 K/UL (ref 1–4.8)
LYMPHOCYTES NFR BLD AUTO: 42.8 % (ref 27–41)
MAGNESIUM SERPL-MCNC: 1.6 MG/DL (ref 1.6–2.6)
MCH RBC QN AUTO: 27.8 PG (ref 27–31)
MCHC RBC AUTO-ENTMCNC: 30.4 G/DL (ref 32–36)
MCV RBC AUTO: 91.3 FL (ref 80–96)
MONOCYTES # BLD AUTO: 0.79 K/UL (ref 0–0.8)
MONOCYTES NFR BLD AUTO: 6.2 % (ref 2–6)
MPC BLD CALC-MCNC: 10.2 FL (ref 9.4–12.4)
NEUTROPHILS # BLD AUTO: 6.18 K/UL (ref 1.8–7.7)
NEUTROPHILS NFR BLD AUTO: 48.4 % (ref 53–65)
NITRITE UR QL STRIP: NEGATIVE
PH UR STRIP: 5.5 PH UNITS
PLATELET # BLD AUTO: 352 K/UL (ref 150–400)
POTASSIUM SERPL-SCNC: 3.7 MMOL/L (ref 3.5–5.1)
PROT SERPL-MCNC: 7 G/DL (ref 5.8–7.6)
PROT UR QL STRIP: 30
RBC # BLD AUTO: 3.92 M/UL (ref 4.2–5.4)
RBC # UR STRIP: ABNORMAL /UL
RBC #/AREA URNS HPF: ABNORMAL /HPF
SARS-COV-2 RDRP RESP QL NAA+PROBE: NEGATIVE
SODIUM SERPL-SCNC: 136 MMOL/L (ref 136–145)
SP GR UR STRIP: 1.01
SQUAMOUS #/AREA URNS LPF: ABNORMAL /LPF
TROPONIN I SERPL HS-MCNC: 21.9 NG/L
TROPONIN I SERPL HS-MCNC: 32.5 NG/L
UROBILINOGEN UR STRIP-ACNC: 0.2 MG/DL
WBC # BLD AUTO: 12.76 K/UL (ref 4.5–11)
WBC #/AREA URNS HPF: ABNORMAL /HPF

## 2025-07-07 PROCEDURE — 96375 TX/PRO/DX INJ NEW DRUG ADDON: CPT

## 2025-07-07 PROCEDURE — 27000221 HC OXYGEN, UP TO 24 HOURS

## 2025-07-07 PROCEDURE — 80053 COMPREHEN METABOLIC PANEL: CPT | Performed by: NURSE PRACTITIONER

## 2025-07-07 PROCEDURE — 87635 SARS-COV-2 COVID-19 AMP PRB: CPT | Performed by: NURSE PRACTITIONER

## 2025-07-07 PROCEDURE — 85025 COMPLETE CBC W/AUTO DIFF WBC: CPT | Performed by: NURSE PRACTITIONER

## 2025-07-07 PROCEDURE — 81001 URINALYSIS AUTO W/SCOPE: CPT | Performed by: NURSE PRACTITIONER

## 2025-07-07 PROCEDURE — 93010 ELECTROCARDIOGRAM REPORT: CPT | Mod: ,,, | Performed by: INTERNAL MEDICINE

## 2025-07-07 PROCEDURE — 87502 INFLUENZA DNA AMP PROBE: CPT | Performed by: NURSE PRACTITIONER

## 2025-07-07 PROCEDURE — 25000003 PHARM REV CODE 250: Performed by: NURSE PRACTITIONER

## 2025-07-07 PROCEDURE — 99284 EMERGENCY DEPT VISIT MOD MDM: CPT | Mod: GF,EDII,, | Performed by: NURSE PRACTITIONER

## 2025-07-07 PROCEDURE — 99285 EMERGENCY DEPT VISIT HI MDM: CPT | Mod: 25

## 2025-07-07 PROCEDURE — 83605 ASSAY OF LACTIC ACID: CPT | Performed by: NURSE PRACTITIONER

## 2025-07-07 PROCEDURE — 94761 N-INVAS EAR/PLS OXIMETRY MLT: CPT

## 2025-07-07 PROCEDURE — 25000242 PHARM REV CODE 250 ALT 637 W/ HCPCS: Performed by: NURSE PRACTITIONER

## 2025-07-07 PROCEDURE — 63600175 PHARM REV CODE 636 W HCPCS: Mod: JZ,TB | Performed by: NURSE PRACTITIONER

## 2025-07-07 PROCEDURE — 93005 ELECTROCARDIOGRAM TRACING: CPT

## 2025-07-07 PROCEDURE — 87077 CULTURE AEROBIC IDENTIFY: CPT | Performed by: NURSE PRACTITIONER

## 2025-07-07 PROCEDURE — 94760 N-INVAS EAR/PLS OXIMETRY 1: CPT

## 2025-07-07 PROCEDURE — 83735 ASSAY OF MAGNESIUM: CPT | Performed by: NURSE PRACTITIONER

## 2025-07-07 PROCEDURE — 96365 THER/PROPH/DIAG IV INF INIT: CPT

## 2025-07-07 PROCEDURE — 84484 ASSAY OF TROPONIN QUANT: CPT | Performed by: NURSE PRACTITIONER

## 2025-07-07 PROCEDURE — 94640 AIRWAY INHALATION TREATMENT: CPT

## 2025-07-07 RX ORDER — CEFTRIAXONE 1 G/1
1 INJECTION, POWDER, FOR SOLUTION INTRAMUSCULAR; INTRAVENOUS
Status: COMPLETED | OUTPATIENT
Start: 2025-07-07 | End: 2025-07-07

## 2025-07-07 RX ORDER — METHYLPREDNISOLONE SOD SUCC 125 MG
125 VIAL (EA) INJECTION
Status: COMPLETED | OUTPATIENT
Start: 2025-07-07 | End: 2025-07-07

## 2025-07-07 RX ORDER — ALBUTEROL SULFATE 0.83 MG/ML
2.5 SOLUTION RESPIRATORY (INHALATION)
Status: COMPLETED | OUTPATIENT
Start: 2025-07-07 | End: 2025-07-07

## 2025-07-07 RX ADMIN — ALBUTEROL SULFATE 2.5 MG: 2.5 SOLUTION RESPIRATORY (INHALATION) at 09:07

## 2025-07-07 RX ADMIN — SODIUM CHLORIDE 250 ML: 9 INJECTION, SOLUTION INTRAVENOUS at 10:07

## 2025-07-07 RX ADMIN — CEFTRIAXONE SODIUM 1 G: 1 INJECTION, POWDER, FOR SOLUTION INTRAMUSCULAR; INTRAVENOUS at 10:07

## 2025-07-07 RX ADMIN — METHYLPREDNISOLONE SODIUM SUCCINATE 125 MG: 125 INJECTION, POWDER, FOR SOLUTION INTRAMUSCULAR; INTRAVENOUS at 09:07

## 2025-07-08 LAB
GLUCOSE SERPL-MCNC: 168 MG/DL (ref 70–105)
GLUCOSE SERPL-MCNC: 177 MG/DL (ref 70–105)
GLUCOSE SERPL-MCNC: 271 MG/DL (ref 70–105)
GLUCOSE SERPL-MCNC: 305 MG/DL (ref 70–105)
GLUCOSE SERPL-MCNC: 333 MG/DL (ref 70–105)
GLUCOSE SERPL-MCNC: 343 MG/DL (ref 70–105)

## 2025-07-08 PROCEDURE — 94761 N-INVAS EAR/PLS OXIMETRY MLT: CPT

## 2025-07-08 PROCEDURE — 94640 AIRWAY INHALATION TREATMENT: CPT

## 2025-07-08 PROCEDURE — 27000207 HC ISOLATION

## 2025-07-08 PROCEDURE — 63600175 PHARM REV CODE 636 W HCPCS: Performed by: NURSE PRACTITIONER

## 2025-07-08 PROCEDURE — 25000242 PHARM REV CODE 250 ALT 637 W/ HCPCS: Performed by: NURSE PRACTITIONER

## 2025-07-08 PROCEDURE — 27000958

## 2025-07-08 PROCEDURE — 25000003 PHARM REV CODE 250: Performed by: HOSPITALIST

## 2025-07-08 PROCEDURE — 99223 1ST HOSP IP/OBS HIGH 75: CPT | Mod: ,,, | Performed by: HOSPITALIST

## 2025-07-08 PROCEDURE — 99900035 HC TECH TIME PER 15 MIN (STAT)

## 2025-07-08 PROCEDURE — 82962 GLUCOSE BLOOD TEST: CPT | Mod: 91

## 2025-07-08 PROCEDURE — A4216 STERILE WATER/SALINE, 10 ML: HCPCS | Performed by: NURSE PRACTITIONER

## 2025-07-08 PROCEDURE — 27000987 HC MATTRESS, MATRIX LOW PROFILE

## 2025-07-08 PROCEDURE — 25000003 PHARM REV CODE 250: Performed by: NURSE PRACTITIONER

## 2025-07-08 PROCEDURE — 27000221 HC OXYGEN, UP TO 24 HOURS

## 2025-07-08 PROCEDURE — 94799 UNLISTED PULMONARY SVC/PX: CPT

## 2025-07-08 PROCEDURE — 11000001 HC ACUTE MED/SURG PRIVATE ROOM

## 2025-07-08 RX ORDER — GUAIFENESIN AND DEXTROMETHORPHAN HYDROBROMIDE 10; 100 MG/5ML; MG/5ML
10 SYRUP ORAL EVERY 6 HOURS PRN
Status: DISCONTINUED | OUTPATIENT
Start: 2025-07-08 | End: 2025-07-11 | Stop reason: HOSPADM

## 2025-07-08 RX ORDER — IBUPROFEN 200 MG
16 TABLET ORAL
Status: DISCONTINUED | OUTPATIENT
Start: 2025-07-08 | End: 2025-07-11 | Stop reason: HOSPADM

## 2025-07-08 RX ORDER — GABAPENTIN 300 MG/1
300 CAPSULE ORAL NIGHTLY
Status: DISCONTINUED | OUTPATIENT
Start: 2025-07-08 | End: 2025-07-11 | Stop reason: HOSPADM

## 2025-07-08 RX ORDER — POLYETHYLENE GLYCOL 3350, SODIUM SULFATE ANHYDROUS, SODIUM BICARBONATE, SODIUM CHLORIDE, POTASSIUM CHLORIDE 236; 22.74; 6.74; 5.86; 2.97 G/4L; G/4L; G/4L; G/4L; G/4L
4000 POWDER, FOR SOLUTION ORAL DAILY
Status: DISCONTINUED | OUTPATIENT
Start: 2025-07-08 | End: 2025-07-08

## 2025-07-08 RX ORDER — IBUPROFEN 200 MG
24 TABLET ORAL
Status: DISCONTINUED | OUTPATIENT
Start: 2025-07-08 | End: 2025-07-11 | Stop reason: HOSPADM

## 2025-07-08 RX ORDER — FAMOTIDINE 10 MG/ML
20 INJECTION, SOLUTION INTRAVENOUS DAILY
Status: DISCONTINUED | OUTPATIENT
Start: 2025-07-08 | End: 2025-07-10

## 2025-07-08 RX ORDER — ACETAMINOPHEN 325 MG/1
650 TABLET ORAL EVERY 8 HOURS PRN
Status: DISCONTINUED | OUTPATIENT
Start: 2025-07-08 | End: 2025-07-11 | Stop reason: HOSPADM

## 2025-07-08 RX ORDER — IPRATROPIUM BROMIDE AND ALBUTEROL SULFATE 2.5; .5 MG/3ML; MG/3ML
3 SOLUTION RESPIRATORY (INHALATION)
Status: DISCONTINUED | OUTPATIENT
Start: 2025-07-08 | End: 2025-07-11 | Stop reason: HOSPADM

## 2025-07-08 RX ORDER — IPRATROPIUM BROMIDE AND ALBUTEROL SULFATE 2.5; .5 MG/3ML; MG/3ML
3 SOLUTION RESPIRATORY (INHALATION) EVERY 4 HOURS PRN
Status: DISCONTINUED | OUTPATIENT
Start: 2025-07-09 | End: 2025-07-11 | Stop reason: HOSPADM

## 2025-07-08 RX ORDER — LEVOTHYROXINE SODIUM 50 UG/1
50 TABLET ORAL
Status: DISCONTINUED | OUTPATIENT
Start: 2025-07-08 | End: 2025-07-11 | Stop reason: HOSPADM

## 2025-07-08 RX ORDER — METHYLPREDNISOLONE SOD SUCC 125 MG
125 VIAL (EA) INJECTION
Status: DISCONTINUED | OUTPATIENT
Start: 2025-07-08 | End: 2025-07-08

## 2025-07-08 RX ORDER — SODIUM CHLORIDE 0.9 % (FLUSH) 0.9 %
10 SYRINGE (ML) INJECTION
Status: DISCONTINUED | OUTPATIENT
Start: 2025-07-08 | End: 2025-07-11 | Stop reason: HOSPADM

## 2025-07-08 RX ORDER — DULOXETIN HYDROCHLORIDE 30 MG/1
30 CAPSULE, DELAYED RELEASE ORAL DAILY
Status: DISCONTINUED | OUTPATIENT
Start: 2025-07-08 | End: 2025-07-11 | Stop reason: HOSPADM

## 2025-07-08 RX ORDER — CETIRIZINE HYDROCHLORIDE 10 MG/1
10 TABLET ORAL DAILY
Status: DISCONTINUED | OUTPATIENT
Start: 2025-07-08 | End: 2025-07-08

## 2025-07-08 RX ORDER — RIVASTIGMINE TARTRATE 1.5 MG/1
1.5 CAPSULE ORAL 2 TIMES DAILY
Status: DISCONTINUED | OUTPATIENT
Start: 2025-07-08 | End: 2025-07-11 | Stop reason: HOSPADM

## 2025-07-08 RX ORDER — FLUTICASONE PROPIONATE 50 MCG
2 SPRAY, SUSPENSION (ML) NASAL DAILY
Status: DISCONTINUED | OUTPATIENT
Start: 2025-07-08 | End: 2025-07-11 | Stop reason: HOSPADM

## 2025-07-08 RX ORDER — ONDANSETRON HYDROCHLORIDE 2 MG/ML
4 INJECTION, SOLUTION INTRAVENOUS EVERY 8 HOURS PRN
Status: DISCONTINUED | OUTPATIENT
Start: 2025-07-08 | End: 2025-07-11 | Stop reason: HOSPADM

## 2025-07-08 RX ORDER — POTASSIUM CHLORIDE 750 MG/1
10 CAPSULE, EXTENDED RELEASE ORAL DAILY
Status: DISCONTINUED | OUTPATIENT
Start: 2025-07-08 | End: 2025-07-11 | Stop reason: HOSPADM

## 2025-07-08 RX ORDER — POLYETHYLENE GLYCOL 3350 17 G/17G
17 POWDER, FOR SOLUTION ORAL DAILY
Status: DISCONTINUED | OUTPATIENT
Start: 2025-07-09 | End: 2025-07-11 | Stop reason: HOSPADM

## 2025-07-08 RX ORDER — GLUCAGON 1 MG
1 KIT INJECTION
Status: DISCONTINUED | OUTPATIENT
Start: 2025-07-08 | End: 2025-07-11 | Stop reason: HOSPADM

## 2025-07-08 RX ORDER — ENOXAPARIN SODIUM 100 MG/ML
30 INJECTION SUBCUTANEOUS EVERY 24 HOURS
Status: DISCONTINUED | OUTPATIENT
Start: 2025-07-08 | End: 2025-07-11 | Stop reason: HOSPADM

## 2025-07-08 RX ORDER — CEFTRIAXONE 1 G/1
1 INJECTION, POWDER, FOR SOLUTION INTRAMUSCULAR; INTRAVENOUS
Status: COMPLETED | OUTPATIENT
Start: 2025-07-08 | End: 2025-07-09

## 2025-07-08 RX ORDER — INSULIN ASPART 100 [IU]/ML
0-5 INJECTION, SOLUTION INTRAVENOUS; SUBCUTANEOUS
Status: DISCONTINUED | OUTPATIENT
Start: 2025-07-08 | End: 2025-07-11 | Stop reason: HOSPADM

## 2025-07-08 RX ADMIN — IPRATROPIUM BROMIDE AND ALBUTEROL SULFATE 3 ML: .5; 2.5 SOLUTION RESPIRATORY (INHALATION) at 11:07

## 2025-07-08 RX ADMIN — METHYLPREDNISOLONE SODIUM SUCCINATE 125 MG: 125 INJECTION, POWDER, FOR SOLUTION INTRAMUSCULAR; INTRAVENOUS at 08:07

## 2025-07-08 RX ADMIN — CEFTRIAXONE 1 G: 1 INJECTION, POWDER, FOR SOLUTION INTRAMUSCULAR; INTRAVENOUS at 08:07

## 2025-07-08 RX ADMIN — POTASSIUM CHLORIDE 10 MEQ: 750 CAPSULE, EXTENDED RELEASE ORAL at 08:07

## 2025-07-08 RX ADMIN — DULOXETINE 30 MG: 30 CAPSULE, DELAYED RELEASE ORAL at 08:07

## 2025-07-08 RX ADMIN — GABAPENTIN 300 MG: 300 CAPSULE ORAL at 08:07

## 2025-07-08 RX ADMIN — FAMOTIDINE 20 MG: 10 INJECTION, SOLUTION INTRAVENOUS at 08:07

## 2025-07-08 RX ADMIN — RIVASTIGMINE TARTRATE 1.5 MG: 1.5 CAPSULE ORAL at 01:07

## 2025-07-08 RX ADMIN — SODIUM CHLORIDE, PRESERVATIVE FREE 10 ML: 5 INJECTION INTRAVENOUS at 08:07

## 2025-07-08 RX ADMIN — FLUTICASONE PROPIONATE 100 MCG: 50 SPRAY, METERED NASAL at 08:07

## 2025-07-08 RX ADMIN — GUAIFENESIN AND DEXTROMETHORPHAN 10 ML: 100; 10 SYRUP ORAL at 11:07

## 2025-07-08 RX ADMIN — IPRATROPIUM BROMIDE AND ALBUTEROL SULFATE 3 ML: .5; 3 SOLUTION RESPIRATORY (INHALATION) at 07:07

## 2025-07-08 RX ADMIN — LEVOTHYROXINE SODIUM 50 MCG: 0.05 TABLET ORAL at 05:07

## 2025-07-08 RX ADMIN — INSULIN ASPART 4 UNITS: 100 INJECTION, SOLUTION INTRAVENOUS; SUBCUTANEOUS at 10:07

## 2025-07-08 RX ADMIN — GUAIFENESIN AND DEXTROMETHORPHAN 10 ML: 100; 10 SYRUP ORAL at 10:07

## 2025-07-08 RX ADMIN — IPRATROPIUM BROMIDE AND ALBUTEROL SULFATE 3 ML: .5; 3 SOLUTION RESPIRATORY (INHALATION) at 01:07

## 2025-07-08 RX ADMIN — RIVASTIGMINE TARTRATE 1.5 MG: 1.5 CAPSULE ORAL at 08:07

## 2025-07-08 RX ADMIN — IPRATROPIUM BROMIDE AND ALBUTEROL SULFATE 3 ML: .5; 3 SOLUTION RESPIRATORY (INHALATION) at 08:07

## 2025-07-08 RX ADMIN — INSULIN ASPART 4 UNITS: 100 INJECTION, SOLUTION INTRAVENOUS; SUBCUTANEOUS at 04:07

## 2025-07-08 RX ADMIN — ZINC OXIDE TOPICAL OINT.: at 08:07

## 2025-07-08 RX ADMIN — ACETAMINOPHEN 650 MG: 325 TABLET ORAL at 10:07

## 2025-07-08 RX ADMIN — ZINC OXIDE TOPICAL OINT.: at 01:07

## 2025-07-08 RX ADMIN — CETIRIZINE HYDROCHLORIDE 10 MG: 10 TABLET, FILM COATED ORAL at 08:07

## 2025-07-08 RX ADMIN — GLYCERIN 1 DROP: .002; .002; .01 SOLUTION/ DROPS OPHTHALMIC at 08:07

## 2025-07-08 RX ADMIN — INSULIN ASPART 1 UNITS: 100 INJECTION, SOLUTION INTRAVENOUS; SUBCUTANEOUS at 08:07

## 2025-07-08 RX ADMIN — ENOXAPARIN SODIUM 30 MG: 30 INJECTION SUBCUTANEOUS at 04:07

## 2025-07-08 NOTE — NURSING
"Alerted by CNA pt had removed arm board and kerlix and half of IV was pulled out. IV able to be floated back in. Flushed w/o difficulty. Wrapped arm again in kerlix. Informed pt to not pull IV out again. Pt stated, "I'm 89 y/o and I want to go back home."  "

## 2025-07-08 NOTE — ASSESSMENT & PLAN NOTE
"Patient's FSGs are controlled on current medication regimen.  Last A1c reviewed-   Lab Results   Component Value Date    HGBA1C 6.0 05/21/2025     Most recent fingerstick glucose reviewed- No results for input(s): "POCTGLUCOSE" in the last 24 hours.  Current correctional scale  Low  Maintain anti-hyperglycemic dose as follows-   Antihyperglycemics (From admission, onward)      Start     Stop Route Frequency Ordered    07/08/25 0112  insulin aspart U-100 injection 0-5 Units         -- SubQ Before meals & nightly PRN 07/08/25 0032          Hold Oral hypoglycemics while patient is in the hospital.  "

## 2025-07-08 NOTE — ASSESSMENT & PLAN NOTE
Patient has long standing persistent (>12 months) atrial fibrillation. Patient is currently in atrial fibrillation. [id number] Score: 4. The patients heart rate in the last 24 hours is as follows:  Pulse  Min: 115  Max: 117     Antiarrhythmics       Anticoagulants

## 2025-07-08 NOTE — H&P
[facility] Medicine  History & Physical    Patient Name: Joyce Dow  MRN: [id number]  Patient Class: IP- Inpatient  Admission Date: 7/7/2025  Attending Physician: Philip Almanza DO   Primary Care Provider: Walker Watkins MD         Patient information was obtained from patient, past medical records, and ER records.     Subjective:     Principal Problem:Acute on chronic hypoxic respiratory failure    Chief Complaint:   Chief Complaint   Patient presents with    Shortness of Breath     Pt sent from NH with SOB and low sat 86% ora and 93% on 6L BNC.  Pt here via EMS [facility] [address], EMT-P Sharyn,  Aubidble rales noted.          HPI: 89 yo female admitted from ED for COPD exacerbation, oxygen requirement, UTI. RA sats with EMS low 80's, on 6L with sat of 92. History of COPD, dementia, HLD, HTN, DM, Afib, thyroid disease. She does not recall events.     DNR/DNI    She has done this multiple times before with brief episodes of syncope.  She recovers back to baseline and demands to go to NH.      Past Medical History:   Diagnosis Date    Allergic rhinitis     Anticoagulant long-term use     Anxiety disorder, unspecified     Arthritis     Chronic pain     COPD (chronic obstructive pulmonary disease)     Dementia     Dementia     Depression     GERD (gastroesophageal reflux disease)     hyperlipidemia     Hypertension     Hyponatremia     Hypoxemia     Insomnia     Mood disorder     Neuropathy     Osteoarthritis     Rhinitis     Thyroid disease     Type 2 diabetes mellitus     Unspecified atrial fibrillation     Urinary tract infection, site not specified        History reviewed. No pertinent surgical history.    Review of patient's allergies indicates:   Allergen Reactions    Sulfa (sulfonamide antibiotics)        No current facility-administered medications on file prior to encounter.     Current Outpatient Medications on File Prior to Encounter    Medication Sig    diltiaZEM (CARDIZEM CD) 300 MG 24 hr capsule Take 1 capsule (300 mg total) by mouth once daily.    levothyroxine (SYNTHROID) 50 MCG tablet Take 50 mcg by mouth before breakfast.    metFORMIN (FORTAMET) 500 mg 24hr tablet Take 1,000 mg by mouth daily with breakfast.    metFORMIN (GLUCOPHAGE) 500 MG tablet Take 500 mg by mouth every evening.    sod.chlorid-potassium chloride (THERMOTABS) 287-180-15 mg Tab Take 1 tablet by mouth once daily.     Family History    Family history is unknown by patient.       Tobacco Use    Smoking status: Unknown    Smokeless tobacco: Not on file   Substance and Sexual Activity    Alcohol use: Not Currently     Comment: lethargic - unable to access    Drug use: Not Currently     Comment: lethargic - unable to access    Sexual activity: Not Currently     Comment: lethargic - unable to access     Review of Systems   Constitutional:  Negative for appetite change, chills and fever.   Respiratory:  Positive for shortness of breath. Negative for cough and wheezing.    Cardiovascular:  Negative for chest pain, palpitations and leg swelling.   Gastrointestinal:  Negative for abdominal distention, diarrhea, nausea and vomiting.   Genitourinary:  Negative for dysuria.   Skin:  Negative for rash.   Neurological:  Positive for tremors and weakness. Negative for dizziness, seizures and syncope.   Psychiatric/Behavioral:  Negative for agitation, behavioral problems and confusion.    All other systems reviewed and are negative.    Objective:     Vital Signs (Most Recent):  Temp: 97.5 °F (36.4 °C) (07/08/25 0723)  Pulse: (!) 118 (07/08/25 0736)  Resp: 20 (07/08/25 0736)  BP: (!) 90/59 (07/08/25 0723)  SpO2: 95 % (07/08/25 0736) Vital Signs (24h Range):  Temp:  [97.5 °F (36.4 °C)-98.4 °F (36.9 °C)] 97.5 °F (36.4 °C)  Pulse:  [115-120] 118  Resp:  [20-33] 20  SpO2:  [93 %-99 %] 95 %  BP: ()/(59-93) 90/59     Weight: 56.3 kg (124 lb 1.9 oz)  Body mass index is 25.07  kg/m².     Physical Exam  Vitals reviewed.   Constitutional:       General: She is not in acute distress.     Appearance: Normal appearance. She is ill-appearing and toxic-appearing.   HENT:      Head: Normocephalic and atraumatic.   Eyes:      General: No scleral icterus.     Extraocular Movements: Extraocular movements intact.      Conjunctiva/sclera: Conjunctivae normal.      Pupils: Pupils are equal, round, and reactive to light.   Cardiovascular:      Rate and Rhythm: Tachycardia present.      Heart sounds: No murmur heard.     No friction rub. No gallop.   Pulmonary:      Effort: Pulmonary effort is normal. No respiratory distress.      Breath sounds: Normal breath sounds. No wheezing or rales.   Abdominal:      General: Abdomen is flat. Bowel sounds are normal. There is no distension.      Palpations: Abdomen is soft.      Tenderness: There is no abdominal tenderness. There is no guarding.   Musculoskeletal:         General: No swelling.   Skin:     General: Skin is warm and dry.      Coloration: Skin is not jaundiced.      Findings: No rash.   Neurological:      General: No focal deficit present.      Mental Status: She is alert. Mental status is at baseline.      Sensory: No sensory deficit.      Motor: Weakness present.   Psychiatric:         Mood and Affect: Mood normal.         Thought Content: Thought content normal.         Judgment: Judgment normal.              CRANIAL NERVES     CN III, IV, VI   Pupils are equal, round, and reactive to light.       Significant Labs: All pertinent labs within the past 24 hours have been reviewed.  Recent Lab Results  (Last 5 results in the past 24 hours)        07/08/25  0532   07/08/25  0009   07/07/25  2315   07/07/25  2245   07/07/25  2231        Influenza A, Molecular         Negative       Influenza B, Molecular         Negative       Appearance, UA       Cloudy         Bacteria, UA       Loaded         Bilirubin (UA)       Negative         Color, UA        "Yellow         SARS COV-2 MOLECULAR         Negative       Glucose, UA       Negative         Ketones, UA       Negative         Leukocyte Esterase, UA       Moderate         NITRITE UA       Negative         Blood, UA       Trace-Intact         pH, UA       5.5         POC Glucose 177   168             Protein, UA       30         RBC, UA       3-5         Spec Grav UA       1.010         Squam Epithel, UA       Few         Troponin I High Sensitivity     32.5           UROBILINOGEN UA       0.2         WBC, UA       Too Numerous To Count                                Significant Imaging: I have reviewed all pertinent imaging results/findings within the past 24 hours.  Assessment/Plan:     Assessment & Plan  Acute cystitis with hematuria    Rocephin Q24  Waiting on culture   HTN (hypertension)  Patient's blood pressure range in the last 24 hours was: BP  Min: 90/59  Max: 148/93.The patient's inpatient anti-hypertensive regimen is listed below:  Current Antihypertensives  diltiaZEM 24 hr capsule 300 mg, Daily, Oral    Plan  - BP is controlled, no changes needed to their regimen    At high risk for injury related to fall  Fall risk precautions  Diabetes mellitus  Patient's FSGs are controlled on current medication regimen.  Last A1c reviewed-   Lab Results   Component Value Date    HGBA1C 6.0 05/21/2025     Most recent fingerstick glucose reviewed- No results for input(s): "POCTGLUCOSE" in the last 24 hours.  Current correctional scale  Low  Maintain anti-hyperglycemic dose as follows-   Antihyperglycemics (From admission, onward)      Start     Stop Route Frequency Ordered    07/08/25 0112  insulin aspart U-100 injection 0-5 Units         -- SubQ Before meals & nightly PRN 07/08/25 0032          Hold Oral hypoglycemics while patient is in the hospital.  Hypothyroidism  Continue Synthroid    Paroxysmal atrial fibrillation  Patient has long standing persistent (>12 months) atrial fibrillation. Patient is currently in " atrial fibrillation. [id number] Score: 4. The patients heart rate in the last 24 hours is as follows:  Pulse  Min: 115  Max: 120     Antiarrhythmics  diltiaZEM 24 hr capsule 300 mg, Daily, Oral    Anticoagulants  enoxaparin injection 30 mg, Every 24 hours, Subcutaneous          Pulmonary disease  Nebs   Steroids  Oxygen      Acute on chronic hypoxic respiratory failure  She is back to baseline mentally.  She does this and has done previously with no known cause.  She wears O2 at NH.  Will wean down as much as possible here.     VTE Risk Mitigation (From admission, onward)           Ordered     enoxaparin injection 30 mg  Daily         07/08/25 0032     IP VTE HIGH RISK PATIENT  Once         07/08/25 0032     Place MEHREEN hose  Until discontinued         07/08/25 0032                                          Darinel Kwok DO  Department of Hospital Medicine  [facility]

## 2025-07-08 NOTE — H&P
[facility]  [facility] Medicine  Progress Note    Patient Name: Joyce Dow  MRN: [id number]  Patient Class: IP- Inpatient   Admission Date: 7/7/2025  Length of Stay: 0 days  Attending Physician: Philip Almanza DO  Primary Care Provider: Walker Watkins MD        Subjective     Principal Problem:COPD exacerbation        HPI:  89 yo female admitted from ED for COPD exacerbation, oxygen requirement, UTI. RA sats with EMS low 80's, on 6L with sat of 92. History of COPD, dementia, HLD, HTN, DM, Afib, thyroid disease.     DNR/DNI    Overview/Hospital Course:  No notes on file        Review of Systems   Unable to perform ROS: Dementia     Objective:     Vital Signs (Most Recent):  Temp: 98.4 °F (36.9 °C) (07/07/25 2207)  Pulse: (!) 116 (07/08/25 0012)  Resp: (!) 24 (07/08/25 0012)  BP: 128/82 (07/08/25 0012)  SpO2: 99 % (07/08/25 0012) Vital Signs (24h Range):  Temp:  [98.4 °F (36.9 °C)] 98.4 °F (36.9 °C)  Pulse:  [115-117] 116  Resp:  [24-33] 24  SpO2:  [93 %-99 %] 99 %  BP: (128-148)/(82-93) 128/82     Weight: 50.3 kg (111 lb)  Body mass index is 22.42 kg/m².    Intake/Output Summary (Last 24 hours) at 7/8/2025 0016  Last data filed at 7/7/2025 2358  Gross per 24 hour   Intake 234.06 ml   Output --   Net 234.06 ml         Physical Exam     Nursing note and vitals reviewed.  Constitutional: She appears cachectic. She is cooperative. She appears ill.   HENT:   Head: Normocephalic.   Right Ear: External ear normal.   Left Ear: External ear normal.   Nose: Nose normal.   Eyes: EOM are normal. Pupils are equal, round, and reactive to light.   Neck: Neck supple.   Normal range of motion.  Cardiovascular:  An irregular rhythm present.   Tachycardia present.         Pulmonary/Chest: Tachypnea noted. She has rales.   Abdominal: Abdomen is soft. She exhibits no distension. There is no abdominal tenderness.   Musculoskeletal:         General: Normal range of motion.      Cervical back:  Normal range of motion and neck supple.      Neurological: She is alert.   Skin: Skin is warm. Capillary refill takes 2 to 3 seconds.           Significant Labs: All pertinent labs within the past 24 hours have been reviewed.  Recent Lab Results         07/07/25  2315   07/07/25  2245   07/07/25  2231   07/07/25  2142        Influenza A, Molecular     Negative         Influenza B, Molecular     Negative         Albumin/Globulin Ratio       0.9       Albumin       3.4       ALP       81       ALT       7       Anion Gap       19       Appearance, UA   Cloudy           AST       32       Bacteria, UA   Loaded           Baso #       0.04       Basophil %       0.3       Bilirubin (UA)   Negative           BILIRUBIN TOTAL       0.5       BUN       15       BUN/CREAT RATIO       17       Calcium       9.2       Chloride       101       CO2       20       Color, UA   Yellow           SARS COV-2 MOLECULAR     Negative         Creatinine       0.88       Differential Method       Manual       eGFR       62  Comment: Estimated GFR calculated using the CKD-EPI creatinine (2021) equation.       Eos #       0.29       Eos %       2.3       Globulin, Total       3.6       Glucose       324       Glucose, UA   Negative           Hematocrit       35.8       Hemoglobin       10.9       Ketones, UA   Negative           Lactic Acid Level       2.7  Comment:   A repeat order for Lactic Acid has been placed for collection in 3 hours.       Leukocyte Esterase, UA   Moderate           Lymph #       5.46       Lymph %       42.8       Magnesium        1.6       MCH       27.8       MCHC       30.4       MCV       91.3       Mono #       0.79       Mono %       6.2       MPV       10.2       Neutrophils, Abs       6.18       Neutrophils Relative       48.4       NITRITE UA   Negative           Blood, UA   Trace-Intact           pH, UA   5.5           Platelet Count       352       Potassium       3.7       PROTEIN TOTAL       7.0        "Protein, UA   30           RBC       3.92       RBC, UA   3-5           RDW       13.5       Sodium       136       Spec Grav UA   1.010           Squam Epithel, UA   Few           Troponin I High Sensitivity 32.5       21.9       UROBILINOGEN UA   0.2           WBC, UA   Too Numerous To Count           WBC       12.76               Significant Imaging: I have reviewed all pertinent imaging results/findings within the past 24 hours.      Assessment & Plan  Acute cystitis with hematuria    Rocephin Q24  Waiting on culture   HTN (hypertension)  Patient's blood pressure range in the last 24 hours was: BP  Min: 128/82  Max: 148/93.The patient's inpatient anti-hypertensive regimen is listed below:  Current Antihypertensives       Plan  - BP is controlled, no changes needed to their regimen    At high risk for injury related to fall  Fall risk precautions  Diabetes mellitus  Patient's FSGs are controlled on current medication regimen.  Last A1c reviewed-   Lab Results   Component Value Date    HGBA1C 6.0 05/21/2025     Most recent fingerstick glucose reviewed- No results for input(s): "POCTGLUCOSE" in the last 24 hours.  Current correctional scale  Low  Maintain anti-hyperglycemic dose as follows-   Antihyperglycemics (From admission, onward)      None          Hold Oral hypoglycemics while patient is in the hospital.  Hypothyroidism  Continue Synthroid    Paroxysmal atrial fibrillation  Patient has long standing persistent (>12 months) atrial fibrillation. Patient is currently in atrial fibrillation. [id number] Score: 4. The patients heart rate in the last 24 hours is as follows:  Pulse  Min: 115  Max: 117     Antiarrhythmics       Anticoagulants             Pulmonary disease  Nebs   Steroids  Oxygen      VTE Risk Mitigation (From admission, onward)      None            Discharge Planning   JUANPABLO:      Code Status: Prior   Medical Readiness for Discharge Date:                                  Bertha Cunningham, " FNP  Department of Hospital Medicine   [facility]

## 2025-07-08 NOTE — NURSING
Bed alarm going off. When writer RN walked into room pt was sitting at edge of bed about to stand up. CNA assisted pt back into bed and educated pt on calling for assistance and to not get out of bed without calling for help d/t risk of falls. Pt has dementia and is talking outta her head.   Room door left open, bed alarm activated, call light in reach.   Will get pt up in chair and bring to nurses station if she tries to get up again.

## 2025-07-08 NOTE — PLAN OF CARE
Met with patient to review discharge recommendation of long term care and is agreeable to plan    Patient/family provided list of facilities in-network with patient's payor plan. Providers that are owned, operated, or affiliated with [facility] are included on the list.     Notified that referral sent to below listed facilities from in-network list based on proximity to home/family support:   1.[facility] in [place]  2.  3.  4.  5. (can send more than 5)    Patient/family instructed to identify preference.    Preferred Facility: (if more than 1, listed in order of descending preference)  1.[facility] [place]  [place]  Patient has declined to select a preferred provider and elects placement with the first accepting in network provider that is available to provide services as ordered by the physician       If an additional preferred facility not listed above is identified, additional referral to be sent. If above facilities unable to accept, will send additional referrals to in-network providers.

## 2025-07-08 NOTE — ASSESSMENT & PLAN NOTE
Patient has long standing persistent (>12 months) atrial fibrillation. Patient is currently in atrial fibrillation. [id number] Score: 4. The patients heart rate in the last 24 hours is as follows:  Pulse  Min: 115  Max: 120     Antiarrhythmics  diltiaZEM 24 hr capsule 300 mg, Daily, Oral    Anticoagulants  enoxaparin injection 30 mg, Every 24 hours, Subcutaneous

## 2025-07-08 NOTE — SUBJECTIVE & OBJECTIVE
Review of Systems   Unable to perform ROS: Dementia     Objective:     Vital Signs (Most Recent):  Temp: 98.4 °F (36.9 °C) (07/07/25 2207)  Pulse: (!) 116 (07/08/25 0012)  Resp: (!) 24 (07/08/25 0012)  BP: 128/82 (07/08/25 0012)  SpO2: 99 % (07/08/25 0012) Vital Signs (24h Range):  Temp:  [98.4 °F (36.9 °C)] 98.4 °F (36.9 °C)  Pulse:  [115-117] 116  Resp:  [24-33] 24  SpO2:  [93 %-99 %] 99 %  BP: (128-148)/(82-93) 128/82     Weight: 50.3 kg (111 lb)  Body mass index is 22.42 kg/m².    Intake/Output Summary (Last 24 hours) at 7/8/2025 0016  Last data filed at 7/7/2025 2358  Gross per 24 hour   Intake 234.06 ml   Output --   Net 234.06 ml         Physical Exam     Nursing note and vitals reviewed.  Constitutional: She appears cachectic. She is cooperative. She appears ill.   HENT:   Head: Normocephalic.   Right Ear: External ear normal.   Left Ear: External ear normal.   Nose: Nose normal.   Eyes: EOM are normal. Pupils are equal, round, and reactive to light.   Neck: Neck supple.   Normal range of motion.  Cardiovascular:  An irregular rhythm present.   Tachycardia present.         Pulmonary/Chest: Tachypnea noted. She has rales.   Abdominal: Abdomen is soft. She exhibits no distension. There is no abdominal tenderness.   Musculoskeletal:         General: Normal range of motion.      Cervical back: Normal range of motion and neck supple.      Neurological: She is alert.   Skin: Skin is warm. Capillary refill takes 2 to 3 seconds.           Significant Labs: All pertinent labs within the past 24 hours have been reviewed.  Recent Lab Results         07/07/25  2315   07/07/25  2245   07/07/25  2231   07/07/25  2142        Influenza A, Molecular     Negative         Influenza B, Molecular     Negative         Albumin/Globulin Ratio       0.9       Albumin       3.4       ALP       81       ALT       7       Anion Gap       19       Appearance, UA   Cloudy           AST       32       Bacteria, UA   Loaded            Baso #       0.04       Basophil %       0.3       Bilirubin (UA)   Negative           BILIRUBIN TOTAL       0.5       BUN       15       BUN/CREAT RATIO       17       Calcium       9.2       Chloride       101       CO2       20       Color, UA   Yellow           SARS COV-2 MOLECULAR     Negative         Creatinine       0.88       Differential Method       Manual       eGFR       62  Comment: Estimated GFR calculated using the CKD-EPI creatinine (2021) equation.       Eos #       0.29       Eos %       2.3       Globulin, Total       3.6       Glucose       324       Glucose, UA   Negative           Hematocrit       35.8       Hemoglobin       10.9       Ketones, UA   Negative           Lactic Acid Level       2.7  Comment:   A repeat order for Lactic Acid has been placed for collection in 3 hours.       Leukocyte Esterase, UA   Moderate           Lymph #       5.46       Lymph %       42.8       Magnesium        1.6       MCH       27.8       MCHC       30.4       MCV       91.3       Mono #       0.79       Mono %       6.2       MPV       10.2       Neutrophils, Abs       6.18       Neutrophils Relative       48.4       NITRITE UA   Negative           Blood, UA   Trace-Intact           pH, UA   5.5           Platelet Count       352       Potassium       3.7       PROTEIN TOTAL       7.0       Protein, UA   30           RBC       3.92       RBC, UA   3-5           RDW       13.5       Sodium       136       Spec Grav UA   1.010           Squam Epithel, UA   Few           Troponin I High Sensitivity 32.5       21.9       UROBILINOGEN UA   0.2           WBC, UA   Too Numerous To Count           WBC       12.76               Significant Imaging: I have reviewed all pertinent imaging results/findings within the past 24 hours.

## 2025-07-08 NOTE — ASSESSMENT & PLAN NOTE
Patient's blood pressure range in the last 24 hours was: BP  Min: 90/59  Max: 148/93.The patient's inpatient anti-hypertensive regimen is listed below:  Current Antihypertensives  diltiaZEM 24 hr capsule 300 mg, Daily, Oral    Plan  - BP is controlled, no changes needed to their regimen

## 2025-07-08 NOTE — PLAN OF CARE
Problem: Adult Inpatient Plan of Care  Goal: Plan of Care Review  Outcome: Progressing  Goal: Patient-Specific Goal (Individualized)  Outcome: Progressing  Goal: Absence of Hospital-Acquired Illness or Injury  Outcome: Progressing  Goal: Optimal Comfort and Wellbeing  Outcome: Progressing  Goal: Readiness for Transition of Care  Outcome: Progressing     Problem: Diabetes Comorbidity  Goal: Blood Glucose Level Within Targeted Range  Outcome: Progressing     Problem: Fall Injury Risk  Goal: Absence of Fall and Fall-Related Injury  Outcome: Progressing     Problem: Skin Injury Risk Increased  Goal: Skin Health and Integrity  Outcome: Progressing     Problem: UTI (Urinary Tract Infection)  Goal: Improved Infection Symptoms  Outcome: Progressing

## 2025-07-08 NOTE — ASSESSMENT & PLAN NOTE
She is back to baseline mentally.  She does this and has done previously with no known cause.  She wears O2 at NH.  Will wean down as much as possible here.

## 2025-07-08 NOTE — SUBJECTIVE & OBJECTIVE
Past Medical History:   Diagnosis Date    Allergic rhinitis     Anticoagulant long-term use     Anxiety disorder, unspecified     Arthritis     Chronic pain     COPD (chronic obstructive pulmonary disease)     Dementia     Dementia     Depression     GERD (gastroesophageal reflux disease)     hyperlipidemia     Hypertension     Hyponatremia     Hypoxemia     Insomnia     Mood disorder     Neuropathy     Osteoarthritis     Rhinitis     Thyroid disease     Type 2 diabetes mellitus     Unspecified atrial fibrillation     Urinary tract infection, site not specified        History reviewed. No pertinent surgical history.    Review of patient's allergies indicates:   Allergen Reactions    Sulfa (sulfonamide antibiotics)        No current facility-administered medications on file prior to encounter.     Current Outpatient Medications on File Prior to Encounter   Medication Sig    diltiaZEM (CARDIZEM CD) 300 MG 24 hr capsule Take 1 capsule (300 mg total) by mouth once daily.    levothyroxine (SYNTHROID) 50 MCG tablet Take 50 mcg by mouth before breakfast.    metFORMIN (FORTAMET) 500 mg 24hr tablet Take 1,000 mg by mouth daily with breakfast.    metFORMIN (GLUCOPHAGE) 500 MG tablet Take 500 mg by mouth every evening.    sod.chlorid-potassium chloride (THERMOTABS) 287-180-15 mg Tab Take 1 tablet by mouth once daily.     Family History    Family history is unknown by patient.       Tobacco Use    Smoking status: Unknown    Smokeless tobacco: Not on file   Substance and Sexual Activity    Alcohol use: Not Currently     Comment: lethargic - unable to access    Drug use: Not Currently     Comment: lethargic - unable to access    Sexual activity: Not Currently     Comment: lethargic - unable to access     Review of Systems   Constitutional:  Negative for appetite change, chills and fever.   Respiratory:  Positive for shortness of breath. Negative for cough and wheezing.    Cardiovascular:  Negative for chest pain, palpitations and  leg swelling.   Gastrointestinal:  Negative for abdominal distention, diarrhea, nausea and vomiting.   Genitourinary:  Negative for dysuria.   Skin:  Negative for rash.   Neurological:  Positive for tremors and weakness. Negative for dizziness, seizures and syncope.   Psychiatric/Behavioral:  Negative for agitation, behavioral problems and confusion.    All other systems reviewed and are negative.    Objective:     Vital Signs (Most Recent):  Temp: 97.5 °F (36.4 °C) (07/08/25 0723)  Pulse: (!) 118 (07/08/25 0736)  Resp: 20 (07/08/25 0736)  BP: (!) 90/59 (07/08/25 0723)  SpO2: 95 % (07/08/25 0736) Vital Signs (24h Range):  Temp:  [97.5 °F (36.4 °C)-98.4 °F (36.9 °C)] 97.5 °F (36.4 °C)  Pulse:  [115-120] 118  Resp:  [20-33] 20  SpO2:  [93 %-99 %] 95 %  BP: ()/(59-93) 90/59     Weight: 56.3 kg (124 lb 1.9 oz)  Body mass index is 25.07 kg/m².     Physical Exam  Vitals reviewed.   Constitutional:       General: She is not in acute distress.     Appearance: Normal appearance. She is ill-appearing and toxic-appearing.   HENT:      Head: Normocephalic and atraumatic.   Eyes:      General: No scleral icterus.     Extraocular Movements: Extraocular movements intact.      Conjunctiva/sclera: Conjunctivae normal.      Pupils: Pupils are equal, round, and reactive to light.   Cardiovascular:      Rate and Rhythm: Tachycardia present.      Heart sounds: No murmur heard.     No friction rub. No gallop.   Pulmonary:      Effort: Pulmonary effort is normal. No respiratory distress.      Breath sounds: Normal breath sounds. No wheezing or rales.   Abdominal:      General: Abdomen is flat. Bowel sounds are normal. There is no distension.      Palpations: Abdomen is soft.      Tenderness: There is no abdominal tenderness. There is no guarding.   Musculoskeletal:         General: No swelling.   Skin:     General: Skin is warm and dry.      Coloration: Skin is not jaundiced.      Findings: No rash.   Neurological:      General: No  focal deficit present.      Mental Status: She is alert. Mental status is at baseline.      Sensory: No sensory deficit.      Motor: Weakness present.   Psychiatric:         Mood and Affect: Mood normal.         Thought Content: Thought content normal.         Judgment: Judgment normal.              CRANIAL NERVES     CN III, IV, VI   Pupils are equal, round, and reactive to light.       Significant Labs: All pertinent labs within the past 24 hours have been reviewed.  Recent Lab Results  (Last 5 results in the past 24 hours)        07/08/25  0532   07/08/25  0009   07/07/25  2315   07/07/25  2245   07/07/25  2231        Influenza A, Molecular         Negative       Influenza B, Molecular         Negative       Appearance, UA       Cloudy         Bacteria, UA       Loaded         Bilirubin (UA)       Negative         Color, UA       Yellow         SARS COV-2 MOLECULAR         Negative       Glucose, UA       Negative         Ketones, UA       Negative         Leukocyte Esterase, UA       Moderate         NITRITE UA       Negative         Blood, UA       Trace-Intact         pH, UA       5.5         POC Glucose 177   168             Protein, UA       30         RBC, UA       3-5         Spec Grav UA       1.010         Squam Epithel, UA       Few         Troponin I High Sensitivity     32.5           UROBILINOGEN UA       0.2         WBC, UA       Too Numerous To Count                                Significant Imaging: I have reviewed all pertinent imaging results/findings within the past 24 hours.

## 2025-07-08 NOTE — ED PROVIDER NOTES
Encounter Date: 7/7/2025       History     Chief Complaint   Patient presents with    Shortness of Breath     Pt sent from NH with SOB and low sat 86% ora and 93% on 6L BNC.  Pt here via EMS [id number], EMT-P Hattie Coles noted.       89 yo female to ED via EMS from [facility] for SOB. Patient had called NH staff saying she felt like she was smothering. RA sats low 80's, on 6L with EMS with sat of 92. History of COPD, dementia, HLD, HTN, DM, Afib, thyroid disease.     The history is provided by the EMS personnel and the retirement.     Review of patient's allergies indicates:   Allergen Reactions    Sulfa (sulfonamide antibiotics)      Past Medical History:   Diagnosis Date    Allergic rhinitis     Anticoagulant long-term use     Anxiety disorder, unspecified     Arthritis     Chronic pain     COPD (chronic obstructive pulmonary disease)     Dementia     Dementia     Depression     GERD (gastroesophageal reflux disease)     hyperlipidemia     Hypertension     Hyponatremia     Hypoxemia     Insomnia     Mood disorder     Neuropathy     Osteoarthritis     Rhinitis     Thyroid disease     Type 2 diabetes mellitus     Unspecified atrial fibrillation     Urinary tract infection, site not specified      History reviewed. No pertinent surgical history.  Family History   Family history unknown: Yes     Social History[1]  Review of Systems   Unable to perform ROS: Dementia       Physical Exam     Initial Vitals   BP Pulse Resp Temp SpO2   07/07/25 2201 07/07/25 2158 07/07/25 2158 07/07/25 2207 07/07/25 2158   (!) 148/93 (!) 115 (!) 33 98.4 °F (36.9 °C) (!) 93 %      MAP       --                Physical Exam    Nursing note and vitals reviewed.  Constitutional: She appears cachectic. She is cooperative. She appears ill.   HENT:   Head: Normocephalic.   Right Ear: External ear normal.   Left Ear: External ear normal.   Nose: Nose normal.   Eyes: EOM are normal. Pupils are equal, round, and reactive to light.    Neck: Neck supple.   Normal range of motion.  Cardiovascular:  An irregular rhythm present.   Tachycardia present.         Pulmonary/Chest: Tachypnea noted. She has rales.   Abdominal: Abdomen is soft. She exhibits no distension. There is no abdominal tenderness.   Musculoskeletal:         General: Normal range of motion.      Cervical back: Normal range of motion and neck supple.     Neurological: She is alert.   Skin: Skin is warm. Capillary refill takes 2 to 3 seconds.         Medical Screening Exam   See Full Note    ED Course   Procedures  Labs Reviewed   COMPREHENSIVE METABOLIC PANEL - Abnormal       Result Value    Sodium 136      Potassium 3.7      Chloride 101      CO2 20 (*)     Anion Gap 19 (*)     Glucose 324 (*)     BUN 15      Creatinine 0.88      BUN/Creatinine Ratio 17      Calcium 9.2      Total Protein 7.0      Albumin 3.4      Globulin 3.6      A/G Ratio 0.9      Bilirubin, Total 0.5      Alk Phos 81      ALT 7      AST 32      eGFR 62     TROPONIN I - Abnormal    Troponin I High Sensitivity 21.9 (*)    LACTIC ACID, PLASMA - Abnormal    Lactic Acid 2.7 (*)    CBC WITH DIFFERENTIAL - Abnormal    WBC 12.76 (*)     RBC 3.92 (*)     Hemoglobin 10.9 (*)     Hematocrit 35.8 (*)     MCV 91.3      MCH 27.8      MCHC 30.4 (*)     RDW 13.5      Platelet Count 352      MPV 10.2      Neutrophils % 48.4 (*)     Lymphocytes % 42.8 (*)     Neutrophils, Abs 6.18      Lymphocytes, Absolute 5.46 (*)     Diff Type Manual      Monocytes % 6.2 (*)     Eosinophils % 2.3      Basophils % 0.3      Monocytes, Absolute 0.79      Eosinophils, Absolute 0.29      Basophils, Absolute 0.04     URINALYSIS, REFLEX TO URINE CULTURE - Abnormal    Color, UA Yellow      Clarity, UA Cloudy      pH, UA 5.5      Leukocytes, UA Moderate (*)     Nitrites, UA Negative      Protein, UA 30 (*)     Glucose, UA Negative      Ketones, UA Negative      Urobilinogen, UA 0.2      Bilirubin, UA Negative      Blood, UA Trace-Intact (*)      Specific Nada, UA 1.010     TROPONIN I - Abnormal    Troponin I High Sensitivity 32.5 (*)    URINALYSIS, MICROSCOPIC - Abnormal    WBC, UA Too Numerous To Count (*)     RBC, UA 3-5 (*)     Bacteria, UA Loaded (*)     Squamous Epithelial Cells, UA Few (*)    INFLUENZA A & B BY MOLECULAR - Normal    INFLUENZA A MOLECULAR Negative      INFLUENZA B MOLECULAR  Negative     MAGNESIUM - Normal    Magnesium 1.6     SARS-COV-2 RNA AMPLIFICATION, QUAL - Normal    SARS COV-2 Molecular Negative      Narrative:     Negative SARS-CoV results should not be used as the sole basis for treatment or patient management decisions; negative results should be considered in the context of a patient's recent exposures, history and the presene of clinical signs and symptoms consistent with COVID-19.  Negative results should be treated as presumptive and confirmed by molecular assay, if necessary for patient management.   CULTURE, URINE   CBC W/ AUTO DIFFERENTIAL    Narrative:     The following orders were created for panel order CBC auto differential.  Procedure                               Abnormality         Status                     ---------                               -----------         ------                     CBC with Differential[[id number]]       Abnormal            Final result               Manual Differential[[id number]]                                                          Please view results for these tests on the individual orders.   POCT GLUCOSE MONITORING CONTINUOUS          Imaging Results              X-Ray Chest AP Portable (In process)                      Medications   methylPREDNISolone sodium succinate injection 125 mg (125 mg Intravenous Given 7/7/25 2149)   albuterol nebulizer solution 2.5 mg (2.5 mg Nebulization Given 7/7/25 2158)   sodium chloride 0.9% bolus 250 mL 250 mL (250 mLs Intravenous New Bag 7/7/25 2258)   cefTRIAXone injection 1 g (1 g Intravenous Given 7/7/25 2259)     Medical Decision  Making  89 yo female to ED via EMS from [facility] for SOB. Patient had called NH staff saying she felt like she was smothering. RA sats low 80's, on 6L with EMS with sat of 92. History of COPD, dementia, HLD, HTN, DM, Afib, thyroid disease.     The history is provided by the EMS personnel and the nursing home.     Vitals reviewed:   Allergies reviewd  EKG Afib, rate 115  Labs reviewed: Lactic Acid 2.7, Troponin 21.9, Delta   CXR: Interstitial prominence in the lungs could be chronic or represent pulmonary vascular congestion. Gaseous distention of the colon and elevated right hemidiaphragm.       Amount and/or Complexity of Data Reviewed  Labs: ordered. Decision-making details documented in ED Course.  Radiology: ordered. Decision-making details documented in ED Course.  ECG/medicine tests: ordered and independent interpretation performed. Decision-making details documented in ED Course.  Discussion of management or test interpretation with external provider(s): Discussed patient with Dr. Archer, [facility] Hospitalist, agrees with admission for IV abx for UTI, await culture. Respiratory interventions.     Risk  Prescription drug management.               ED Course as of 07/07/25 2359   Mon Jul 07, 2025   2205 Lactic Acid Level(!): 2.7 [MJ]   2217 Troponin I High Sensitivity(!!): 21.9 [MJ]   2254 WBC, UA(!): Too Numerous To Count [MJ]   2254 Bacteria, UA(!): Loaded [MJ]   2254 Leukocyte Esterase, UA(!): Moderate [MJ]   2313 RBC, UA(!): 3-5 [MJ]   2347 Troponin I High Sensitivity(!!): 32.5 [MJ]      ED Course User Index  [MJ] Bertha Cunningham, ROSEMARY                           Clinical Impression:   Final diagnoses:  [R06.02] SOB (shortness of breath)  [N30.01] Acute cystitis with hematuria (Primary)  [J44.1] COPD exacerbation                 [1]   Social History  Tobacco Use    Smoking status: Unknown   Substance Use Topics    Alcohol use: Not Currently     Comment: lethargic - unable to access    Drug use: Not  Currently     Comment: lethargic - unable to access        Bertha Cunningham, P  07/07/25 1603

## 2025-07-08 NOTE — NURSING
Walked into pt room and noticed INT on the ground. Pt had pulled IV out completely. Cath inact.     New INT started in R AC. Placed an arm board and wrapped in kerlrix to prevent pt from being able to pull IV out again.

## 2025-07-08 NOTE — HPI
89 yo female admitted from ED for COPD exacerbation, oxygen requirement, UTI. RA sats with EMS low 80's, on 6L with sat of 92. History of COPD, dementia, HLD, HTN, DM, Afib, thyroid disease. She does not recall events.     DNR/DNI    She has done this multiple times before with brief episodes of syncope.  She recovers back to baseline and demands to go to NH.

## 2025-07-08 NOTE — ASSESSMENT & PLAN NOTE
Patient's blood pressure range in the last 24 hours was: BP  Min: 128/82  Max: 148/93.The patient's inpatient anti-hypertensive regimen is listed below:  Current Antihypertensives       Plan  - BP is controlled, no changes needed to their regimen

## 2025-07-08 NOTE — NURSING
Pt received via stretcher to med/surg floor for acute illness, SOB, UTI, and generalized weakness. Pt unable to ambulate and is dependant of all ADL's. O2 restarted after pt transferred to bed. Will continue to monitor.

## 2025-07-08 NOTE — PLAN OF CARE
[facility]  Initial Discharge Assessment       Primary Care Provider: Walker Watkins MD    Admission Diagnosis: SOB (shortness of breath) [R06.02]  COPD exacerbation [J44.1]  Acute cystitis with hematuria [N30.01]    Admission Date: 7/7/2025  Expected Discharge Date:     Transition of Care Barriers: None    Payor: MEDICARE / Plan: MEDICARE PART A & B / Product Type: Government /     Extended Emergency Contact Information  Primary Emergency Contact: Ant Vasquez  Mobile Phone: [phone]  Relation: Son  Preferred language: English   needed? No    Discharge Plan A: Return to nursing home         [facility] [place] - [address]  [address]  [place] [address]  Phone: [phone] Fax: [phone]      Initial Assessment (most recent)       Adult Discharge Assessment - 07/08/25 0857          Discharge Assessment    Assessment Type Discharge Planning Assessment     Confirmed/corrected address, phone number and insurance Yes     Source of Information health record     Communicated JUANPABLO with patient/caregiver Yes     People in Home facility resident     Facility Arrived From: [facility]     Do you expect to return to your current living situation? Yes     Prior to hospitilization cognitive status: Not Oriented to Place;Not Oriented to Time     Current cognitive status: Alert/Oriented     Walking or Climbing Stairs Difficulty yes     Walking or Climbing Stairs transferring difficulty, dependent     Dressing/Bathing Difficulty yes     Dressing/Bathing dressing difficulty, dependent;bathing difficulty, dependent     Readmission within 30 days? No     Patient currently being followed by outpatient case management? No     Do you currently have service(s) that help you manage your care at home? No     Do you take prescription medications? Yes     Do you have prescription coverage? Yes     Do you have any problems affording any of your prescribed  medications? No     Is the patient taking medications as prescribed? yes     Who is going to help you get home at discharge? Facility staff     Are you on dialysis? No     Do you take coumadin? No     Discharge Plan A Return to nursing home     DME Needed Upon Discharge  none     Transition of Care Barriers None        Physical Activity    On average, how many days per week do you engage in moderate to strenuous exercise (like a brisk walk)? 0 days     On average, how many minutes do you engage in exercise at this level? 0 min        Financial Resource Strain    How hard is it for you to pay for the very basics like food, housing, medical care, and heating? Not hard at all        Housing Stability    In the last 12 months, was there a time when you were not able to pay the mortgage or rent on time? No     At any time in the past 12 months, were you homeless or living in a shelter (including now)? No        Transportation Needs    In the past 12 months, has lack of transportation kept you from medical appointments or from getting medications? No     In the past 12 months, has lack of transportation kept you from meetings, work, or from getting things needed for daily living? No        Food Insecurity    Within the past 12 months, you worried that your food would run out before you got the money to buy more. Never true     Within the past 12 months, the food you bought just didn't last and you didn't have money to get more. Never true        Stress    Do you feel stress - tense, restless, nervous, or anxious, or unable to sleep at night because your mind is troubled all the time - these days? Not at all        Social Isolation    How often do you feel lonely or isolated from those around you?  Never        Alcohol Use    Q1: How often do you have a drink containing alcohol? Never     Q2: How many drinks containing alcohol do you have on a typical day when you are drinking? Patient does not drink     Q3: How often do  you have six or more drinks on one occasion? Never        Utilities    In the past 12 months has the electric, gas, oil, or water company threatened to shut off services in your home? No        Health Literacy    How often do you need to have someone help you when you read instructions, pamphlets, or other written material from your doctor or pharmacy? Always                      Ms. Dow is admitted to [facility] for medical management. She is a current resident at [facility] in [place] and plans to return to facility when discharged. [facility] updated on plan of care. CM will continue to follow for dc needs.

## 2025-07-08 NOTE — ASSESSMENT & PLAN NOTE
"Patient's FSGs are controlled on current medication regimen.  Last A1c reviewed-   Lab Results   Component Value Date    HGBA1C 6.0 05/21/2025     Most recent fingerstick glucose reviewed- No results for input(s): "POCTGLUCOSE" in the last 24 hours.  Current correctional scale  Low  Maintain anti-hyperglycemic dose as follows-   Antihyperglycemics (From admission, onward)      None          Hold Oral hypoglycemics while patient is in the hospital.  "

## 2025-07-09 LAB
GLUCOSE SERPL-MCNC: 149 MG/DL (ref 70–105)
GLUCOSE SERPL-MCNC: 163 MG/DL (ref 70–105)
GLUCOSE SERPL-MCNC: 216 MG/DL (ref 70–105)
GLUCOSE SERPL-MCNC: 264 MG/DL (ref 70–105)

## 2025-07-09 PROCEDURE — 27000958

## 2025-07-09 PROCEDURE — 63600175 PHARM REV CODE 636 W HCPCS: Performed by: NURSE PRACTITIONER

## 2025-07-09 PROCEDURE — 25000242 PHARM REV CODE 250 ALT 637 W/ HCPCS: Performed by: NURSE PRACTITIONER

## 2025-07-09 PROCEDURE — 82962 GLUCOSE BLOOD TEST: CPT

## 2025-07-09 PROCEDURE — 99232 SBSQ HOSP IP/OBS MODERATE 35: CPT | Mod: ,,, | Performed by: HOSPITALIST

## 2025-07-09 PROCEDURE — 25000003 PHARM REV CODE 250: Performed by: NURSE PRACTITIONER

## 2025-07-09 PROCEDURE — 27000987 HC MATTRESS, MATRIX LOW PROFILE

## 2025-07-09 PROCEDURE — 99900035 HC TECH TIME PER 15 MIN (STAT)

## 2025-07-09 PROCEDURE — 94640 AIRWAY INHALATION TREATMENT: CPT

## 2025-07-09 PROCEDURE — 27000190 HC CPAP FULL FACE MASK W/VALVE

## 2025-07-09 PROCEDURE — 25000003 PHARM REV CODE 250: Performed by: HOSPITALIST

## 2025-07-09 PROCEDURE — 5A09357 ASSISTANCE WITH RESPIRATORY VENTILATION, LESS THAN 24 CONSECUTIVE HOURS, CONTINUOUS POSITIVE AIRWAY PRESSURE: ICD-10-PCS | Performed by: HOSPITALIST

## 2025-07-09 PROCEDURE — 94660 CPAP INITIATION&MGMT: CPT

## 2025-07-09 PROCEDURE — 94761 N-INVAS EAR/PLS OXIMETRY MLT: CPT

## 2025-07-09 PROCEDURE — 27000221 HC OXYGEN, UP TO 24 HOURS

## 2025-07-09 PROCEDURE — 11000001 HC ACUTE MED/SURG PRIVATE ROOM

## 2025-07-09 RX ORDER — FUROSEMIDE 10 MG/ML
30 INJECTION INTRAMUSCULAR; INTRAVENOUS ONCE
Status: COMPLETED | OUTPATIENT
Start: 2025-07-09 | End: 2025-07-09

## 2025-07-09 RX ORDER — FUROSEMIDE 10 MG/ML
30 INJECTION INTRAMUSCULAR; INTRAVENOUS
Status: DISCONTINUED | OUTPATIENT
Start: 2025-07-09 | End: 2025-07-09

## 2025-07-09 RX ADMIN — IPRATROPIUM BROMIDE AND ALBUTEROL SULFATE 3 ML: .5; 3 SOLUTION RESPIRATORY (INHALATION) at 01:07

## 2025-07-09 RX ADMIN — ZINC OXIDE TOPICAL OINT.: at 08:07

## 2025-07-09 RX ADMIN — CEFTRIAXONE 1 G: 1 INJECTION, POWDER, FOR SOLUTION INTRAMUSCULAR; INTRAVENOUS at 08:07

## 2025-07-09 RX ADMIN — GLYCERIN 1 DROP: .002; .002; .01 SOLUTION/ DROPS OPHTHALMIC at 08:07

## 2025-07-09 RX ADMIN — DILTIAZEM HYDROCHLORIDE 300 MG: 180 CAPSULE, COATED, EXTENDED RELEASE ORAL at 08:07

## 2025-07-09 RX ADMIN — IPRATROPIUM BROMIDE AND ALBUTEROL SULFATE 3 ML: .5; 3 SOLUTION RESPIRATORY (INHALATION) at 07:07

## 2025-07-09 RX ADMIN — GABAPENTIN 300 MG: 300 CAPSULE ORAL at 08:07

## 2025-07-09 RX ADMIN — RIVASTIGMINE TARTRATE 1.5 MG: 1.5 CAPSULE ORAL at 08:07

## 2025-07-09 RX ADMIN — FLUTICASONE PROPIONATE 100 MCG: 50 SPRAY, METERED NASAL at 08:07

## 2025-07-09 RX ADMIN — ENOXAPARIN SODIUM 30 MG: 30 INJECTION SUBCUTANEOUS at 04:07

## 2025-07-09 RX ADMIN — FUROSEMIDE 30 MG: 10 INJECTION, SOLUTION INTRAMUSCULAR; INTRAVENOUS at 12:07

## 2025-07-09 RX ADMIN — POTASSIUM CHLORIDE 10 MEQ: 750 CAPSULE, EXTENDED RELEASE ORAL at 08:07

## 2025-07-09 RX ADMIN — POLYETHYLENE GLYCOL 3350 17 G: 17 POWDER, FOR SOLUTION ORAL at 08:07

## 2025-07-09 RX ADMIN — LEVOTHYROXINE SODIUM 50 MCG: 0.05 TABLET ORAL at 05:07

## 2025-07-09 RX ADMIN — FAMOTIDINE 20 MG: 10 INJECTION, SOLUTION INTRAVENOUS at 08:07

## 2025-07-09 RX ADMIN — INSULIN ASPART 2 UNITS: 100 INJECTION, SOLUTION INTRAVENOUS; SUBCUTANEOUS at 05:07

## 2025-07-09 RX ADMIN — INSULIN ASPART 3 UNITS: 100 INJECTION, SOLUTION INTRAVENOUS; SUBCUTANEOUS at 05:07

## 2025-07-09 RX ADMIN — DULOXETINE 30 MG: 30 CAPSULE, DELAYED RELEASE ORAL at 08:07

## 2025-07-09 NOTE — PROGRESS NOTES
Patient is more alert. Patient was taken off bipap and placed on NC 3 LPM to take morning medications. O2 sat 99%  RR 20. Patient doesn't seem to be in any respiratory distress at this time. Patient remains on NC 3 LPM. I will continue to monitor patient. Patient's nurse and charge nurse was informed.

## 2025-07-09 NOTE — PLAN OF CARE
Problem: Fall Injury Risk  Goal: Absence of Fall and Fall-Related Injury  Outcome: Progressing  Intervention: Promote Injury-Free Environment  Flowsheets (Taken 7/9/2025 1673)  Safety Promotion/Fall Prevention: assistive device/personal item within reach

## 2025-07-09 NOTE — NURSING
Pt began c/o pain and not being able to breathe; RT called and asked to assess pt status, upon assessment pt found to be diaphoretic, and lung sounds wet in all arzola. ONEAL Cruz NP notified and asked for PRN duo-nebs and 1x Lasix order. Pt given breathing treatment per RT and 30mg Lasix, IV per RN. Pt tolerated both well. Pt continues to deteriorate; ONEAL Cruz NP asked to come assess pt, after his assessment bi-pap was ordered to help with pt SOB and breathing. Will continue to monitor.   Addendum: d/t pt's diaphoretic state, a CBG was drawn to assure pt was not hypoglycemic.

## 2025-07-10 PROBLEM — B96.29 UTI DUE TO EXTENDED-SPECTRUM BETA LACTAMASE (ESBL) PRODUCING ESCHERICHIA COLI: Status: ACTIVE | Noted: 2023-05-09

## 2025-07-10 PROBLEM — N39.0 UTI DUE TO EXTENDED-SPECTRUM BETA LACTAMASE (ESBL) PRODUCING ESCHERICHIA COLI: Status: ACTIVE | Noted: 2023-05-09

## 2025-07-10 PROBLEM — Z16.12 UTI DUE TO EXTENDED-SPECTRUM BETA LACTAMASE (ESBL) PRODUCING ESCHERICHIA COLI: Status: ACTIVE | Noted: 2023-05-09

## 2025-07-10 LAB
GLUCOSE SERPL-MCNC: 130 MG/DL (ref 70–105)
GLUCOSE SERPL-MCNC: 131 MG/DL (ref 70–105)
GLUCOSE SERPL-MCNC: 196 MG/DL (ref 70–105)
GLUCOSE SERPL-MCNC: 216 MG/DL (ref 70–105)
OHS QRS DURATION: 108 MS
OHS QTC CALCULATION: 390 MS
UA COMPLETE W REFLEX CULTURE PNL UR: ABNORMAL

## 2025-07-10 PROCEDURE — 94761 N-INVAS EAR/PLS OXIMETRY MLT: CPT

## 2025-07-10 PROCEDURE — 94640 AIRWAY INHALATION TREATMENT: CPT

## 2025-07-10 PROCEDURE — 25000003 PHARM REV CODE 250: Performed by: HOSPITALIST

## 2025-07-10 PROCEDURE — 27000958

## 2025-07-10 PROCEDURE — 25000242 PHARM REV CODE 250 ALT 637 W/ HCPCS: Performed by: NURSE PRACTITIONER

## 2025-07-10 PROCEDURE — 63600175 PHARM REV CODE 636 W HCPCS: Performed by: HOSPITALIST

## 2025-07-10 PROCEDURE — 27000987 HC MATTRESS, MATRIX LOW PROFILE

## 2025-07-10 PROCEDURE — 27000221 HC OXYGEN, UP TO 24 HOURS

## 2025-07-10 PROCEDURE — 25000003 PHARM REV CODE 250: Performed by: NURSE PRACTITIONER

## 2025-07-10 PROCEDURE — 63600175 PHARM REV CODE 636 W HCPCS: Performed by: NURSE PRACTITIONER

## 2025-07-10 PROCEDURE — 11000001 HC ACUTE MED/SURG PRIVATE ROOM

## 2025-07-10 PROCEDURE — 99900035 HC TECH TIME PER 15 MIN (STAT)

## 2025-07-10 PROCEDURE — 27000207 HC ISOLATION

## 2025-07-10 PROCEDURE — 82962 GLUCOSE BLOOD TEST: CPT | Mod: 91

## 2025-07-10 PROCEDURE — 99232 SBSQ HOSP IP/OBS MODERATE 35: CPT | Mod: ,,, | Performed by: HOSPITALIST

## 2025-07-10 RX ORDER — MEROPENEM 1 G/1
1 INJECTION, POWDER, FOR SOLUTION INTRAVENOUS
Status: DISCONTINUED | OUTPATIENT
Start: 2025-07-10 | End: 2025-07-11 | Stop reason: HOSPADM

## 2025-07-10 RX ORDER — FAMOTIDINE 20 MG/1
20 TABLET, FILM COATED ORAL DAILY
Status: DISCONTINUED | OUTPATIENT
Start: 2025-07-11 | End: 2025-07-11 | Stop reason: HOSPADM

## 2025-07-10 RX ADMIN — IPRATROPIUM BROMIDE AND ALBUTEROL SULFATE 3 ML: .5; 3 SOLUTION RESPIRATORY (INHALATION) at 07:07

## 2025-07-10 RX ADMIN — ZINC OXIDE TOPICAL OINT.: at 08:07

## 2025-07-10 RX ADMIN — RIVASTIGMINE TARTRATE 1.5 MG: 1.5 CAPSULE ORAL at 08:07

## 2025-07-10 RX ADMIN — POLYETHYLENE GLYCOL 3350 17 G: 17 POWDER, FOR SOLUTION ORAL at 08:07

## 2025-07-10 RX ADMIN — GABAPENTIN 300 MG: 300 CAPSULE ORAL at 08:07

## 2025-07-10 RX ADMIN — IPRATROPIUM BROMIDE AND ALBUTEROL SULFATE 3 ML: .5; 3 SOLUTION RESPIRATORY (INHALATION) at 01:07

## 2025-07-10 RX ADMIN — GLYCERIN 1 DROP: .002; .002; .01 SOLUTION/ DROPS OPHTHALMIC at 08:07

## 2025-07-10 RX ADMIN — FLUTICASONE PROPIONATE 100 MCG: 50 SPRAY, METERED NASAL at 08:07

## 2025-07-10 RX ADMIN — ENOXAPARIN SODIUM 30 MG: 30 INJECTION SUBCUTANEOUS at 04:07

## 2025-07-10 RX ADMIN — DULOXETINE 30 MG: 30 CAPSULE, DELAYED RELEASE ORAL at 08:07

## 2025-07-10 RX ADMIN — LEVOTHYROXINE SODIUM 50 MCG: 0.05 TABLET ORAL at 05:07

## 2025-07-10 RX ADMIN — INSULIN ASPART 2 UNITS: 100 INJECTION, SOLUTION INTRAVENOUS; SUBCUTANEOUS at 05:07

## 2025-07-10 RX ADMIN — POTASSIUM CHLORIDE 10 MEQ: 750 CAPSULE, EXTENDED RELEASE ORAL at 08:07

## 2025-07-10 RX ADMIN — FAMOTIDINE 20 MG: 10 INJECTION, SOLUTION INTRAVENOUS at 08:07

## 2025-07-10 RX ADMIN — MEROPENEM 1 G: 1 INJECTION, POWDER, FOR SOLUTION INTRAVENOUS at 01:07

## 2025-07-10 RX ADMIN — DILTIAZEM HYDROCHLORIDE 300 MG: 180 CAPSULE, COATED, EXTENDED RELEASE ORAL at 08:07

## 2025-07-10 NOTE — PROGRESS NOTES
[facility] Medicine  Progress Note    Patient Name: Joyce Dow  MRN: [id number]  Patient Class: IP- Inpatient   Admission Date: 7/7/2025  Length of Stay: 2 days  Attending Physician: Philip Almanza DO  Primary Care Provider: Walker Watkins MD        Subjective     Principal Problem:UTI due to extended-spectrum beta lactamase (ESBL) producing Escherichia coli        HPI:  89 yo female admitted from ED for COPD exacerbation, oxygen requirement, UTI. RA sats with EMS low 80's, on 6L with sat of 92. History of COPD, dementia, HLD, HTN, DM, Afib, thyroid disease. She does not recall events.     DNR/DNI    She has done this multiple times before with brief episodes of syncope.  She recovers back to baseline and demands to go to NH.      Overview/Hospital Course:  7.9 No major issues, urine culture pending, weaning O2    7.10 urine culture ESBL E. Coli will change ABX    Interval History: doing well, urine culture back, changing ABX    Review of Systems   Respiratory:  Negative for shortness of breath.    Cardiovascular:  Negative for chest pain.   Gastrointestinal:  Negative for abdominal pain, nausea and vomiting.   Neurological:  Positive for weakness.   Psychiatric/Behavioral:  Negative for agitation and confusion.    All other systems reviewed and are negative.    Objective:     Vital Signs (Most Recent):  Temp: 97.7 °F (36.5 °C) (07/10/25 0745)  Pulse: (!) 137 (07/10/25 0745)  Resp: (!) 24 (07/10/25 0745)  BP: (!) 131/94 (07/10/25 0745)  SpO2: 99 % (07/10/25 0745) Vital Signs (24h Range):  Temp:  [97.4 °F (36.3 °C)-98.8 °F (37.1 °C)] 97.7 °F (36.5 °C)  Pulse:  [] 137  Resp:  [18-24] 24  SpO2:  [94 %-100 %] 99 %  BP: ()/(57-94) 131/94     Weight: 56.2 kg (123 lb 14.4 oz)  Body mass index is 25.02 kg/m².    Intake/Output Summary (Last 24 hours) at 7/10/2025 1220  Last data filed at 7/10/2025 0035  Gross per 24 hour   Intake 637 ml   Output --   Net 633  ml         Physical Exam  Vitals reviewed.   Constitutional:       General: She is not in acute distress.     Appearance: Normal appearance.   HENT:      Head: Normocephalic and atraumatic.   Eyes:      General: No scleral icterus.     Extraocular Movements: Extraocular movements intact.      Conjunctiva/sclera: Conjunctivae normal.      Pupils: Pupils are equal, round, and reactive to light.   Cardiovascular:      Rate and Rhythm: Tachycardia present.      Heart sounds: No murmur heard.     No friction rub. No gallop.   Pulmonary:      Effort: Pulmonary effort is normal. No respiratory distress.      Breath sounds: Normal breath sounds. No wheezing or rales.   Abdominal:      General: Abdomen is flat. Bowel sounds are normal. There is no distension.      Palpations: Abdomen is soft.      Tenderness: There is no abdominal tenderness. There is no guarding.   Musculoskeletal:         General: No swelling.   Skin:     General: Skin is warm and dry.      Coloration: Skin is not jaundiced.      Findings: No rash.   Neurological:      General: No focal deficit present.      Mental Status: She is alert. Mental status is at baseline.      Sensory: No sensory deficit.      Motor: Weakness present.   Psychiatric:         Mood and Affect: Mood normal.         Behavior: Behavior normal.               Significant Labs: All pertinent labs within the past 24 hours have been reviewed.  Recent Lab Results         07/10/25  1047   07/10/25  0554   07/09/25 2010 07/09/25  1635        POC Glucose 196   130   149   216               Significant Imaging: I have reviewed all pertinent imaging results/findings within the past 24 hours.      Assessment & Plan  UTI due to extended-spectrum beta lactamase (ESBL) producing Escherichia coli  Change to Mer[place], see if NH can handle IV invanz for DC planning   Rocephin Q24  Waiting on culture   HTN (hypertension)  Patient's blood pressure range in the last 24 hours was: BP  Min: 84/57  Max:  "131/94.The patient's inpatient anti-hypertensive regimen is listed below:  Current Antihypertensives  diltiaZEM 24 hr capsule 300 mg, Daily, Oral    Plan  - BP is controlled, no changes needed to their regimen    At high risk for injury related to fall  Fall risk precautions  Diabetes mellitus  Patient's FSGs are controlled on current medication regimen.  Last A1c reviewed-   Lab Results   Component Value Date    HGBA1C 6.0 05/21/2025     Most recent fingerstick glucose reviewed- No results for input(s): "POCTGLUCOSE" in the last 24 hours.  Current correctional scale  Low  Maintain anti-hyperglycemic dose as follows-   Antihyperglycemics (From admission, onward)      Start     Stop Route Frequency Ordered    07/08/25 0112  insulin aspart U-100 injection 0-5 Units         -- SubQ Before meals & nightly PRN 07/08/25 0032          Hold Oral hypoglycemics while patient is in the hospital.  Hypothyroidism  Continue Synthroid    Paroxysmal atrial fibrillation  Patient has long standing persistent (>12 months) atrial fibrillation. Patient is currently in atrial fibrillation. [id number] Score: 4. The patients heart rate in the last 24 hours is as follows:  Pulse  Min: 98  Max: 137     Antiarrhythmics  diltiaZEM 24 hr capsule 300 mg, Daily, Oral    Anticoagulants  enoxaparin injection 30 mg, Every 24 hours, Subcutaneous          Pulmonary disease  Nebs   Steroids  Oxygen      Acute on chronic hypoxic respiratory failure  She is back to baseline mentally.  She does this and has done previously with no known cause.  She wears O2 at NH.  Will wean down as much as possible here.   VTE Risk Mitigation (From admission, onward)           Ordered     enoxaparin injection 30 mg  Daily         07/08/25 0032     IP VTE HIGH RISK PATIENT  Once         07/08/25 0032     Place MEHREEN hose  Until discontinued         07/08/25 0032                    Discharge Planning   JUANPABLO: 7/11/2025     Code Status: DNR   Medical Readiness for Discharge " Date:   Discharge Plan A: Return to nursing home                        Darinel Kwok DO  Department of Hospital Medicine   [facility]

## 2025-07-10 NOTE — SUBJECTIVE & OBJECTIVE
Interval History: doing well, weaning O2    Review of Systems   Respiratory:  Negative for shortness of breath.    Cardiovascular:  Negative for chest pain.   Gastrointestinal:  Negative for abdominal pain, nausea and vomiting.   Neurological:  Positive for weakness.   Psychiatric/Behavioral:  Negative for agitation and confusion.    All other systems reviewed and are negative.    Objective:     Vital Signs (Most Recent):  Temp: 97.7 °F (36.5 °C) (07/10/25 0745)  Pulse: (!) 137 (07/10/25 0745)  Resp: (!) 24 (07/10/25 0745)  BP: (!) 131/94 (07/10/25 0745)  SpO2: 99 % (07/10/25 0745) Vital Signs (24h Range):  Temp:  [97.4 °F (36.3 °C)-98.8 °F (37.1 °C)] 97.7 °F (36.5 °C)  Pulse:  [] 137  Resp:  [18-24] 24  SpO2:  [94 %-100 %] 99 %  BP: ()/(57-94) 131/94     Weight: 56.2 kg (123 lb 14.4 oz)  Body mass index is 25.02 kg/m².    Intake/Output Summary (Last 24 hours) at 7/10/2025 1218  Last data filed at 7/10/2025 0035  Gross per 24 hour   Intake 637 ml   Output --   Net 637 ml         Physical Exam  Vitals reviewed.   Constitutional:       General: She is not in acute distress.     Appearance: Normal appearance.   HENT:      Head: Normocephalic and atraumatic.   Eyes:      General: No scleral icterus.     Extraocular Movements: Extraocular movements intact.      Conjunctiva/sclera: Conjunctivae normal.      Pupils: Pupils are equal, round, and reactive to light.   Cardiovascular:      Rate and Rhythm: Tachycardia present.      Heart sounds: No murmur heard.     No friction rub. No gallop.   Pulmonary:      Effort: Pulmonary effort is normal. No respiratory distress.      Breath sounds: Normal breath sounds. No wheezing or rales.   Abdominal:      General: Abdomen is flat. Bowel sounds are normal. There is no distension.      Palpations: Abdomen is soft.      Tenderness: There is no abdominal tenderness. There is no guarding.   Musculoskeletal:         General: No swelling.   Skin:     General: Skin is warm and  dry.      Coloration: Skin is not jaundiced.      Findings: No rash.   Neurological:      General: No focal deficit present.      Mental Status: She is alert. Mental status is at baseline.      Sensory: No sensory deficit.      Motor: Weakness present.   Psychiatric:         Mood and Affect: Mood normal.         Thought Content: Thought content normal.         Judgment: Judgment normal.               Significant Labs: All pertinent labs within the past 24 hours have been reviewed.  Recent Lab Results         07/10/25  1047   07/10/25  0554   07/09/25 2010 07/09/25  1635        POC Glucose 196   130   149   216               Significant Imaging: I have reviewed all pertinent imaging results/findings within the past 24 hours.

## 2025-07-10 NOTE — SUBJECTIVE & OBJECTIVE
Interval History: doing well, urine culture back, changing ABX    Review of Systems   Respiratory:  Negative for shortness of breath.    Cardiovascular:  Negative for chest pain.   Gastrointestinal:  Negative for abdominal pain, nausea and vomiting.   Neurological:  Positive for weakness.   Psychiatric/Behavioral:  Negative for agitation and confusion.    All other systems reviewed and are negative.    Objective:     Vital Signs (Most Recent):  Temp: 97.7 °F (36.5 °C) (07/10/25 0745)  Pulse: (!) 137 (07/10/25 0745)  Resp: (!) 24 (07/10/25 0745)  BP: (!) 131/94 (07/10/25 0745)  SpO2: 99 % (07/10/25 0745) Vital Signs (24h Range):  Temp:  [97.4 °F (36.3 °C)-98.8 °F (37.1 °C)] 97.7 °F (36.5 °C)  Pulse:  [] 137  Resp:  [18-24] 24  SpO2:  [94 %-100 %] 99 %  BP: ()/(57-94) 131/94     Weight: 56.2 kg (123 lb 14.4 oz)  Body mass index is 25.02 kg/m².    Intake/Output Summary (Last 24 hours) at 7/10/2025 1220  Last data filed at 7/10/2025 0035  Gross per 24 hour   Intake 637 ml   Output --   Net 637 ml         Physical Exam  Vitals reviewed.   Constitutional:       General: She is not in acute distress.     Appearance: Normal appearance.   HENT:      Head: Normocephalic and atraumatic.   Eyes:      General: No scleral icterus.     Extraocular Movements: Extraocular movements intact.      Conjunctiva/sclera: Conjunctivae normal.      Pupils: Pupils are equal, round, and reactive to light.   Cardiovascular:      Rate and Rhythm: Tachycardia present.      Heart sounds: No murmur heard.     No friction rub. No gallop.   Pulmonary:      Effort: Pulmonary effort is normal. No respiratory distress.      Breath sounds: Normal breath sounds. No wheezing or rales.   Abdominal:      General: Abdomen is flat. Bowel sounds are normal. There is no distension.      Palpations: Abdomen is soft.      Tenderness: There is no abdominal tenderness. There is no guarding.   Musculoskeletal:         General: No swelling.   Skin:      General: Skin is warm and dry.      Coloration: Skin is not jaundiced.      Findings: No rash.   Neurological:      General: No focal deficit present.      Mental Status: She is alert. Mental status is at baseline.      Sensory: No sensory deficit.      Motor: Weakness present.   Psychiatric:         Mood and Affect: Mood normal.         Behavior: Behavior normal.               Significant Labs: All pertinent labs within the past 24 hours have been reviewed.  Recent Lab Results         07/10/25  1047   07/10/25  0554   07/09/25 2010 07/09/25  1635        POC Glucose 196   130   149   216               Significant Imaging: I have reviewed all pertinent imaging results/findings within the past 24 hours.

## 2025-07-10 NOTE — PLAN OF CARE
Problem: Adult Inpatient Plan of Care  Goal: Patient-Specific Goal (Individualized)  Outcome: Ongoing     Problem: UTI (Urinary Tract Infection)  Goal: Improved Infection Symptoms  Outcome: Ongoing

## 2025-07-10 NOTE — PROGRESS NOTES
[facility] Medicine  Progress Note    Patient Name: Joyce Dow  MRN: [id number]  Patient Class: IP- Inpatient   Admission Date: 7/7/2025  Length of Stay: 2 days  Attending Physician: Philip Almanza DO  Primary Care Provider: Walker Watkins MD        Subjective     Principal Problem:Acute on chronic hypoxic respiratory failure        HPI:  89 yo female admitted from ED for COPD exacerbation, oxygen requirement, UTI. RA sats with EMS low 80's, on 6L with sat of 92. History of COPD, dementia, HLD, HTN, DM, Afib, thyroid disease. She does not recall events.     DNR/DNI    She has done this multiple times before with brief episodes of syncope.  She recovers back to baseline and demands to go to NH.      Overview/Hospital Course:  7.9 No major issues, urine culture pending, weaning O2    Interval History: doing well, weaning O2    Review of Systems   Respiratory:  Negative for shortness of breath.    Cardiovascular:  Negative for chest pain.   Gastrointestinal:  Negative for abdominal pain, nausea and vomiting.   Neurological:  Positive for weakness.   Psychiatric/Behavioral:  Negative for agitation and confusion.    All other systems reviewed and are negative.    Objective:     Vital Signs (Most Recent):  Temp: 97.7 °F (36.5 °C) (07/10/25 0745)  Pulse: (!) 137 (07/10/25 0745)  Resp: (!) 24 (07/10/25 0745)  BP: (!) 131/94 (07/10/25 0745)  SpO2: 99 % (07/10/25 0745) Vital Signs (24h Range):  Temp:  [97.4 °F (36.3 °C)-98.8 °F (37.1 °C)] 97.7 °F (36.5 °C)  Pulse:  [] 137  Resp:  [18-24] 24  SpO2:  [94 %-100 %] 99 %  BP: ()/(57-94) 131/94     Weight: 56.2 kg (123 lb 14.4 oz)  Body mass index is 25.02 kg/m².    Intake/Output Summary (Last 24 hours) at 7/10/2025 1218  Last data filed at 7/10/2025 0035  Gross per 24 hour   Intake 637 ml   Output --   Net 637 ml         Physical Exam  Vitals reviewed.   Constitutional:       General: She is not in acute  distress.     Appearance: Normal appearance.   HENT:      Head: Normocephalic and atraumatic.   Eyes:      General: No scleral icterus.     Extraocular Movements: Extraocular movements intact.      Conjunctiva/sclera: Conjunctivae normal.      Pupils: Pupils are equal, round, and reactive to light.   Cardiovascular:      Rate and Rhythm: Tachycardia present.      Heart sounds: No murmur heard.     No friction rub. No gallop.   Pulmonary:      Effort: Pulmonary effort is normal. No respiratory distress.      Breath sounds: Normal breath sounds. No wheezing or rales.   Abdominal:      General: Abdomen is flat. Bowel sounds are normal. There is no distension.      Palpations: Abdomen is soft.      Tenderness: There is no abdominal tenderness. There is no guarding.   Musculoskeletal:         General: No swelling.   Skin:     General: Skin is warm and dry.      Coloration: Skin is not jaundiced.      Findings: No rash.   Neurological:      General: No focal deficit present.      Mental Status: She is alert. Mental status is at baseline.      Sensory: No sensory deficit.      Motor: Weakness present.   Psychiatric:         Mood and Affect: Mood normal.         Thought Content: Thought content normal.         Judgment: Judgment normal.               Significant Labs: All pertinent labs within the past 24 hours have been reviewed.  Recent Lab Results         07/10/25  1047   07/10/25  0554   07/09/25 2010 07/09/25  1635        POC Glucose 196   130   149   216               Significant Imaging: I have reviewed all pertinent imaging results/findings within the past 24 hours.      Assessment & Plan  Acute cystitis with hematuria    Rocephin Q24  Waiting on culture   HTN (hypertension)  Patient's blood pressure range in the last 24 hours was: BP  Min: 84/57  Max: 131/94.The patient's inpatient anti-hypertensive regimen is listed below:  Current Antihypertensives  diltiaZEM 24 hr capsule 300 mg, Daily, Oral    Plan  - BP is  "controlled, no changes needed to their regimen    At high risk for injury related to fall  Fall risk precautions  Diabetes mellitus  Patient's FSGs are controlled on current medication regimen.  Last A1c reviewed-   Lab Results   Component Value Date    HGBA1C 6.0 05/21/2025     Most recent fingerstick glucose reviewed- No results for input(s): "POCTGLUCOSE" in the last 24 hours.  Current correctional scale  Low  Maintain anti-hyperglycemic dose as follows-   Antihyperglycemics (From admission, onward)      Start     Stop Route Frequency Ordered    07/08/25 0112  insulin aspart U-100 injection 0-5 Units         -- SubQ Before meals & nightly PRN 07/08/25 0032          Hold Oral hypoglycemics while patient is in the hospital.  Hypothyroidism  Continue Synthroid    Paroxysmal atrial fibrillation  Patient has long standing persistent (>12 months) atrial fibrillation. Patient is currently in atrial fibrillation. [id number] Score: 4. The patients heart rate in the last 24 hours is as follows:  Pulse  Min: 98  Max: 137     Antiarrhythmics  diltiaZEM 24 hr capsule 300 mg, Daily, Oral    Anticoagulants  enoxaparin injection 30 mg, Every 24 hours, Subcutaneous          Pulmonary disease  Nebs   Steroids  Oxygen      Acute on chronic hypoxic respiratory failure  She is back to baseline mentally.  She does this and has done previously with no known cause.  She wears O2 at NH.  Will wean down as much as possible here.   VTE Risk Mitigation (From admission, onward)           Ordered     enoxaparin injection 30 mg  Daily         07/08/25 0032     IP VTE HIGH RISK PATIENT  Once         07/08/25 0032     Place MEHREEN hose  Until discontinued         07/08/25 0032                    Discharge Planning   JUANPABLO: 7/11/2025     Code Status: DNR   Medical Readiness for Discharge Date:   Discharge Plan A: Return to nursing home                        Darinel Kwok DO  Department of Hospital Medicine   [facility]

## 2025-07-10 NOTE — ASSESSMENT & PLAN NOTE
Patient has long standing persistent (>12 months) atrial fibrillation. Patient is currently in atrial fibrillation. [id number] Score: 4. The patients heart rate in the last 24 hours is as follows:  Pulse  Min: 98  Max: 137     Antiarrhythmics  diltiaZEM 24 hr capsule 300 mg, Daily, Oral    Anticoagulants  enoxaparin injection 30 mg, Every 24 hours, Subcutaneous

## 2025-07-10 NOTE — ASSESSMENT & PLAN NOTE
Change to Lela, see if NH can handle IV invanz for DC planning   Rocephin Q24  Waiting on culture

## 2025-07-10 NOTE — ASSESSMENT & PLAN NOTE
Patient's blood pressure range in the last 24 hours was: BP  Min: 84/57  Max: 131/94.The patient's inpatient anti-hypertensive regimen is listed below:  Current Antihypertensives  diltiaZEM 24 hr capsule 300 mg, Daily, Oral    Plan  - BP is controlled, no changes needed to their regimen

## 2025-07-10 NOTE — HOSPITAL COURSE
7.9 No major issues, urine culture pending, weaning O2    7.10 urine culture ESBL E. Coli will change ABX    7.11 DC back to NH to continue a 7 day total course of IV ABX with Invanz based on culture of ESBL.

## 2025-07-10 NOTE — ASSESSMENT & PLAN NOTE
Patient has long standing persistent (>12 months) atrial fibrillation. Patient is currently in atrial fibrillation. [id number] Score: 4. The patients heart rate in the last 24 hours is as follows:  Pulse  Min: 98  Max: 137     Antiarrhythmics  diltiaZEM 24 hr capsule 300 mg, Daily, Oral    Anticoagulants  enoxaparin injection 30 mg, Every 24 hours, Subcutaneous

## 2025-07-11 VITALS
TEMPERATURE: 97 F | SYSTOLIC BLOOD PRESSURE: 109 MMHG | HEART RATE: 100 BPM | RESPIRATION RATE: 18 BRPM | HEIGHT: 59 IN | DIASTOLIC BLOOD PRESSURE: 63 MMHG | WEIGHT: 123.88 LBS | OXYGEN SATURATION: 97 % | BODY MASS INDEX: 24.97 KG/M2

## 2025-07-11 PROBLEM — J96.21 ACUTE ON CHRONIC HYPOXIC RESPIRATORY FAILURE: Status: RESOLVED | Noted: 2025-03-19 | Resolved: 2025-07-11

## 2025-07-11 LAB
GLUCOSE SERPL-MCNC: 136 MG/DL (ref 70–105)
GLUCOSE SERPL-MCNC: 184 MG/DL (ref 70–105)

## 2025-07-11 PROCEDURE — 25000242 PHARM REV CODE 250 ALT 637 W/ HCPCS: Performed by: NURSE PRACTITIONER

## 2025-07-11 PROCEDURE — 63600175 PHARM REV CODE 636 W HCPCS: Performed by: HOSPITALIST

## 2025-07-11 PROCEDURE — 27000958

## 2025-07-11 PROCEDURE — 99239 HOSP IP/OBS DSCHRG MGMT >30: CPT | Mod: ,,, | Performed by: HOSPITALIST

## 2025-07-11 PROCEDURE — 94640 AIRWAY INHALATION TREATMENT: CPT

## 2025-07-11 PROCEDURE — 25000003 PHARM REV CODE 250: Performed by: HOSPITALIST

## 2025-07-11 PROCEDURE — 27000221 HC OXYGEN, UP TO 24 HOURS

## 2025-07-11 PROCEDURE — 27000987 HC MATTRESS, MATRIX LOW PROFILE

## 2025-07-11 PROCEDURE — 94761 N-INVAS EAR/PLS OXIMETRY MLT: CPT

## 2025-07-11 PROCEDURE — 99900035 HC TECH TIME PER 15 MIN (STAT)

## 2025-07-11 PROCEDURE — 82962 GLUCOSE BLOOD TEST: CPT

## 2025-07-11 PROCEDURE — 25000003 PHARM REV CODE 250: Performed by: NURSE PRACTITIONER

## 2025-07-11 RX ADMIN — DILTIAZEM HYDROCHLORIDE 300 MG: 180 CAPSULE, COATED, EXTENDED RELEASE ORAL at 08:07

## 2025-07-11 RX ADMIN — IPRATROPIUM BROMIDE AND ALBUTEROL SULFATE 3 ML: .5; 3 SOLUTION RESPIRATORY (INHALATION) at 01:07

## 2025-07-11 RX ADMIN — IPRATROPIUM BROMIDE AND ALBUTEROL SULFATE 3 ML: .5; 3 SOLUTION RESPIRATORY (INHALATION) at 08:07

## 2025-07-11 RX ADMIN — MEROPENEM 1 G: 1 INJECTION, POWDER, FOR SOLUTION INTRAVENOUS at 01:07

## 2025-07-11 RX ADMIN — LEVOTHYROXINE SODIUM 50 MCG: 0.05 TABLET ORAL at 05:07

## 2025-07-11 RX ADMIN — FAMOTIDINE 20 MG: 20 TABLET, FILM COATED ORAL at 08:07

## 2025-07-11 RX ADMIN — RIVASTIGMINE TARTRATE 1.5 MG: 1.5 CAPSULE ORAL at 08:07

## 2025-07-11 RX ADMIN — GLYCERIN 1 DROP: .002; .002; .01 SOLUTION/ DROPS OPHTHALMIC at 08:07

## 2025-07-11 RX ADMIN — ZINC OXIDE TOPICAL OINT.: at 08:07

## 2025-07-11 RX ADMIN — DULOXETINE 30 MG: 30 CAPSULE, DELAYED RELEASE ORAL at 08:07

## 2025-07-11 RX ADMIN — POLYETHYLENE GLYCOL 3350 17 G: 17 POWDER, FOR SOLUTION ORAL at 08:07

## 2025-07-11 RX ADMIN — POTASSIUM CHLORIDE 10 MEQ: 750 CAPSULE, EXTENDED RELEASE ORAL at 08:07

## 2025-07-11 RX ADMIN — FLUTICASONE PROPIONATE 100 MCG: 50 SPRAY, METERED NASAL at 08:07

## 2025-07-11 NOTE — ASSESSMENT & PLAN NOTE
Patient's blood pressure range in the last 24 hours was: BP  Min: 106/72  Max: 110/73.The patient's inpatient anti-hypertensive regimen is listed below:  Current Antihypertensives  diltiaZEM 24 hr capsule 300 mg, Daily, Oral    Plan  - BP is controlled, no changes needed to their regimen

## 2025-07-11 NOTE — PLAN OF CARE
[facility]  Discharge Final Note    Primary Care Provider: Walker Watkins MD    Expected Discharge Date: 7/11/2025    Final Discharge Note (most recent)       Final Note - 07/11/25 1337          Final Note    What phone number can be called within the next 1-3 days to see how you are doing after discharge? [phone]                     Important Message from Medicare  Important Message from Medicare regarding Discharge Appeal Rights: Given to patient/caregiver, Explained to patient/caregiver, Signed/date by patient/caregiver     Date IMM was signed: 07/10/25  Time IMM was signed: [id number]

## 2025-07-11 NOTE — PLAN OF CARE
Problem: Adult Inpatient Plan of Care  Goal: Optimal Comfort and Wellbeing  Outcome: Progressing     Problem: Diabetes Comorbidity  Goal: Blood Glucose Level Within Targeted Range  Outcome: Progressing     Problem: Fall Injury Risk  Goal: Absence of Fall and Fall-Related Injury  Outcome: Progressing     Problem: Skin Injury Risk Increased  Goal: Skin Health and Integrity  Outcome: Progressing     Problem: UTI (Urinary Tract Infection)  Goal: Improved Infection Symptoms  Outcome: Progressing     Problem: Infection  Goal: Absence of Infection Signs and Symptoms  Outcome: Progressing

## 2025-07-11 NOTE — DISCHARGE SUMMARY
[facility] Medicine  Discharge Summary      Patient Name: Joyce Dow  MRN: [id number]  GUY: [id number]  Patient Class: IP- Inpatient  Admission Date: 7/7/2025  Hospital Length of Stay: 3 days  Discharge Date and Time: 07/11/2025 12:53 PM  Attending Physician: Philip Almanza DO   Discharging Provider: Darinel Kwok DO  Primary Care Provider: Walker Watkins MD    Primary Care Team: Networked reference to record PCT     HPI:   89 yo female admitted from ED for COPD exacerbation, oxygen requirement, UTI. RA sats with EMS low 80's, on 6L with sat of 92. History of COPD, dementia, HLD, HTN, DM, Afib, thyroid disease. She does not recall events.     DNR/DNI    She has done this multiple times before with brief episodes of syncope.  She recovers back to baseline and demands to go to NH.      * No surgery found *      Hospital Course:   7.9 No major issues, urine culture pending, weaning O2    7.10 urine culture ESBL E. Coli will change ABX    7.11 DC back to NH to continue a 7 day total course of IV ABX with Invanz based on culture of ESBL.     Goals of Care Treatment Preferences:  Code Status: DNR         Consults:     Assessment & Plan  UTI due to extended-spectrum beta lactamase (ESBL) producing Escherichia coli  Change to Merrem, see if NH can handle IV invanz for DC planning   Rocephin Q24  Waiting on culture   HTN (hypertension)  Patient's blood pressure range in the last 24 hours was: BP  Min: 106/72  Max: 110/73.The patient's inpatient anti-hypertensive regimen is listed below:  Current Antihypertensives  diltiaZEM 24 hr capsule 300 mg, Daily, Oral    Plan  - BP is controlled, no changes needed to their regimen    At high risk for injury related to fall  Fall risk precautions  Diabetes mellitus  Patient's FSGs are controlled on current medication regimen.  Last A1c reviewed-   Lab Results   Component Value Date    HGBA1C 6.0 05/21/2025     Most recent  "fingerstick glucose reviewed- No results for input(s): "POCTGLUCOSE" in the last 24 hours.  Current correctional scale  Low  Maintain anti-hyperglycemic dose as follows-   Antihyperglycemics (From admission, onward)      Start     Stop Route Frequency Ordered    07/08/25 0112  insulin aspart U-100 injection 0-5 Units         -- SubQ Before meals & nightly PRN 07/08/25 0032          Hold Oral hypoglycemics while patient is in the hospital.  Hypothyroidism  Continue Synthroid    Paroxysmal atrial fibrillation  Patient has long standing persistent (>12 months) atrial fibrillation. Patient is currently in atrial fibrillation. [id number] Score: 4. The patients heart rate in the last 24 hours is as follows:  Pulse  Min: 106  Max: 131     Antiarrhythmics  diltiaZEM 24 hr capsule 300 mg, Daily, Oral    Anticoagulants  enoxaparin injection 30 mg, Every 24 hours, Subcutaneous          Pulmonary disease  Nebs   Steroids  Oxygen      Final Active Diagnoses:    Diagnosis Date Noted POA    PRINCIPAL PROBLEM:  UTI due to extended-spectrum beta lactamase (ESBL) producing Escherichia coli [N39.0, B96.29, Z16.12] 05/09/2023 Yes    At high risk for injury related to fall [Z91.81] 08/31/2024 Yes     Chronic    Hypothyroidism [E03.9] 11/20/2023 Yes    Paroxysmal atrial fibrillation [I48.0] 11/20/2023 Yes    HTN (hypertension) [I10] 05/10/2023 Yes    Diabetes mellitus [E11.9] 10/25/2012 Yes     Chronic    Pulmonary disease [J98.4] 10/25/2012 Yes     Chronic      Problems Resolved During this Admission:    Diagnosis Date Noted Date Resolved POA    Acute on chronic hypoxic respiratory failure [J96.21] 03/19/2025 07/11/2025 Yes       Discharged Condition: good    Disposition: Skilled Nursing Facility    Follow Up:    Patient Instructions:   No discharge procedures on file.    Significant Diagnostic Studies: Microbiology: Urine Culture    Lab Results   Component Value Date    LABURIN >100,000 Escherichia coli ESBL (A) 07/07/2025 "       Pending Diagnostic Studies:       None           Medications:  Reconciled Home Medications:      Medication List        START taking these medications      0.9% NaCl PgBk 100 mL with ertapenem 1 gram SolR 1 g  Inject 1 g into the vein once daily. for 7 doses            CONTINUE taking these medications      diltiaZEM 300 MG 24 hr capsule  Commonly known as: CARDIZEM CD  Take 1 capsule (300 mg total) by mouth once daily.     levothyroxine 50 MCG tablet  Commonly known as: SYNTHROID  Take 50 mcg by mouth before breakfast.            STOP taking these medications      THERMOTABS 287-180-15 mg Tab  Generic drug: sod.chlorid-potassium chloride            ASK your doctor about these medications      * metFORMIN 500 mg 24hr tablet  Commonly known as: FORTAMET  Take 1,000 mg by mouth daily with breakfast.     * metFORMIN 500 MG tablet  Commonly known as: GLUCOPHAGE  Take 500 mg by mouth every evening.           * This list has 2 medication(s) that are the same as other medications prescribed for you. Read the directions carefully, and ask your doctor or other care provider to review them with you.                  Indwelling Lines/Drains at time of discharge:   Lines/Drains/Airways       None                       Time spent on the discharge of patient: 32 minutes         Darinel Kwok DO  Department of Hospital Medicine  [facility]

## 2025-07-11 NOTE — PLAN OF CARE
Fidelia at [facility] updated on patient's plan of care. Plan to dc back to NH today. Will send dc orders when available.

## 2025-07-11 NOTE — PLAN OF CARE
Problem: Adult Inpatient Plan of Care  Goal: Plan of Care Review  Outcome: Met  Goal: Patient-Specific Goal (Individualized)  Outcome: Met  Goal: Absence of Hospital-Acquired Illness or Injury  Outcome: Met  Goal: Optimal Comfort and Wellbeing  Outcome: Met  Goal: Readiness for Transition of Care  Outcome: Met     Problem: Diabetes Comorbidity  Goal: Blood Glucose Level Within Targeted Range  Outcome: Met     Problem: Fall Injury Risk  Goal: Absence of Fall and Fall-Related Injury  Outcome: Met     Problem: Skin Injury Risk Increased  Goal: Skin Health and Integrity  Outcome: Met     Problem: UTI (Urinary Tract Infection)  Goal: Improved Infection Symptoms  Outcome: Met     Problem: Wound  Goal: Optimal Coping  Outcome: Met  Goal: Optimal Functional Ability  Outcome: Met  Goal: Absence of Infection Signs and Symptoms  Outcome: Met  Goal: Improved Oral Intake  Outcome: Met  Goal: Optimal Pain Control and Function  Outcome: Met  Goal: Skin Health and Integrity  Outcome: Met  Goal: Optimal Wound Healing  Outcome: Met     Problem: Infection  Goal: Absence of Infection Signs and Symptoms  Outcome: Met

## 2025-07-11 NOTE — ASSESSMENT & PLAN NOTE
Patient has long standing persistent (>12 months) atrial fibrillation. Patient is currently in atrial fibrillation. [id number] Score: 4. The patients heart rate in the last 24 hours is as follows:  Pulse  Min: 106  Max: 131     Antiarrhythmics  diltiaZEM 24 hr capsule 300 mg, Daily, Oral    Anticoagulants  enoxaparin injection 30 mg, Every 24 hours, Subcutaneous

## 2025-07-27 ENCOUNTER — HOSPITAL ENCOUNTER (EMERGENCY)
Facility: HOSPITAL | Age: OVER 89
Discharge: HOME | End: 2025-07-28
Payer: MEDICARE

## 2025-07-27 DIAGNOSIS — R07.9 CHEST PAIN: ICD-10-CM

## 2025-07-27 DIAGNOSIS — J44.1 COPD EXACERBATION: Primary | ICD-10-CM

## 2025-07-27 DIAGNOSIS — R06.02 SHORTNESS OF BREATH: ICD-10-CM

## 2025-07-27 LAB
ALBUMIN SERPL BCP-MCNC: 3.2 G/DL (ref 3.4–4.8)
ALBUMIN/GLOB SERPL: 1 {RATIO}
ALP SERPL-CCNC: 75 U/L (ref 40–150)
ALT SERPL W P-5'-P-CCNC: 7 U/L
ANION GAP SERPL CALCULATED.3IONS-SCNC: 15 MMOL/L (ref 7–16)
AST SERPL W P-5'-P-CCNC: 21 U/L (ref 11–45)
BASOPHILS # BLD AUTO: 0.04 K/UL (ref 0–0.2)
BASOPHILS NFR BLD AUTO: 0.4 % (ref 0–1)
BILIRUB SERPL-MCNC: 0.4 MG/DL
BUN SERPL-MCNC: 11 MG/DL (ref 10–20)
BUN/CREAT SERPL: 13 (ref 6–20)
CALCIUM SERPL-MCNC: 8.9 MG/DL (ref 8.4–10.2)
CHLORIDE SERPL-SCNC: 105 MMOL/L (ref 98–111)
CO2 SERPL-SCNC: 22 MMOL/L (ref 23–31)
CREAT SERPL-MCNC: 0.88 MG/DL (ref 0.55–1.02)
DIFFERENTIAL METHOD BLD: ABNORMAL
EGFR (NO RACE VARIABLE) (RUSH/TITUS): 62 ML/MIN/1.73M2
EOSINOPHIL # BLD AUTO: 0.07 K/UL (ref 0–0.5)
EOSINOPHIL NFR BLD AUTO: 0.8 % (ref 1–4)
ERYTHROCYTE [DISTWIDTH] IN BLOOD BY AUTOMATED COUNT: 14.1 % (ref 11.5–14.5)
GLOBULIN SER-MCNC: 3.1 G/DL (ref 2–4)
GLUCOSE SERPL-MCNC: 258 MG/DL (ref 75–121)
HCO3 UR-SCNC: 25.4 MMOL/L (ref 21–28)
HCT VFR BLD AUTO: 33.8 % (ref 38–47)
HGB BLD-MCNC: 10.3 G/DL (ref 12–16)
LYMPHOCYTES # BLD AUTO: 1.55 K/UL (ref 1–4.8)
LYMPHOCYTES NFR BLD AUTO: 16.6 % (ref 27–41)
MCH RBC QN AUTO: 27.8 PG (ref 27–31)
MCHC RBC AUTO-ENTMCNC: 30.5 G/DL (ref 32–36)
MCV RBC AUTO: 91.1 FL (ref 80–96)
MONOCYTES # BLD AUTO: 0.38 K/UL (ref 0–0.8)
MONOCYTES NFR BLD AUTO: 4.1 % (ref 2–6)
MPC BLD CALC-MCNC: 10.1 FL (ref 9.4–12.4)
NEUTROPHILS # BLD AUTO: 7.27 K/UL (ref 1.8–7.7)
NEUTROPHILS NFR BLD AUTO: 78.1 % (ref 53–65)
NT-PROBNP SERPL-MCNC: 4494 PG/ML (ref 1–450)
PCO2 BLDA: 54 MMHG (ref 35–48)
PH SMN: 7.28 [PH] (ref 7.35–7.45)
PLATELET # BLD AUTO: 304 K/UL (ref 150–400)
PO2 BLDA: 37 MMHG (ref 83–108)
POC BASE EXCESS: -2 MMOL/L (ref -2–3)
POC SATURATED O2: 62 %
POTASSIUM SERPL-SCNC: 3.7 MMOL/L (ref 3.5–5.1)
PROT SERPL-MCNC: 6.3 G/DL (ref 5.8–7.6)
RBC # BLD AUTO: 3.71 M/UL (ref 4.2–5.4)
SODIUM SERPL-SCNC: 138 MMOL/L (ref 136–145)
TROPONIN I SERPL HS-MCNC: 29.3 NG/L
WBC # BLD AUTO: 9.31 K/UL (ref 4.5–11)

## 2025-07-27 PROCEDURE — 93005 ELECTROCARDIOGRAM TRACING: CPT

## 2025-07-27 PROCEDURE — 63600175 PHARM REV CODE 636 W HCPCS: Mod: JZ,TB | Performed by: NURSE PRACTITIONER

## 2025-07-27 PROCEDURE — 84484 ASSAY OF TROPONIN QUANT: CPT | Performed by: NURSE PRACTITIONER

## 2025-07-27 PROCEDURE — 94640 AIRWAY INHALATION TREATMENT: CPT

## 2025-07-27 PROCEDURE — 83880 ASSAY OF NATRIURETIC PEPTIDE: CPT | Performed by: NURSE PRACTITIONER

## 2025-07-27 PROCEDURE — 99285 EMERGENCY DEPT VISIT HI MDM: CPT | Mod: 25

## 2025-07-27 PROCEDURE — 25000242 PHARM REV CODE 250 ALT 637 W/ HCPCS: Performed by: NURSE PRACTITIONER

## 2025-07-27 PROCEDURE — 27000221 HC OXYGEN, UP TO 24 HOURS

## 2025-07-27 PROCEDURE — 36600 WITHDRAWAL OF ARTERIAL BLOOD: CPT

## 2025-07-27 PROCEDURE — 94761 N-INVAS EAR/PLS OXIMETRY MLT: CPT

## 2025-07-27 PROCEDURE — 85025 COMPLETE CBC W/AUTO DIFF WBC: CPT | Performed by: NURSE PRACTITIONER

## 2025-07-27 PROCEDURE — 99284 EMERGENCY DEPT VISIT MOD MDM: CPT | Mod: EDII,,, | Performed by: NURSE PRACTITIONER

## 2025-07-27 PROCEDURE — 96374 THER/PROPH/DIAG INJ IV PUSH: CPT

## 2025-07-27 PROCEDURE — 82803 BLOOD GASES ANY COMBINATION: CPT

## 2025-07-27 PROCEDURE — 93010 ELECTROCARDIOGRAM REPORT: CPT | Mod: ,,, | Performed by: INTERNAL MEDICINE

## 2025-07-27 PROCEDURE — 80053 COMPREHEN METABOLIC PANEL: CPT | Performed by: NURSE PRACTITIONER

## 2025-07-27 RX ORDER — GABAPENTIN 300 MG/1
300 CAPSULE ORAL NIGHTLY
COMMUNITY
Start: 2025-07-08

## 2025-07-27 RX ORDER — GABAPENTIN 100 MG/1
100 CAPSULE ORAL EVERY MORNING
COMMUNITY

## 2025-07-27 RX ORDER — PREDNISONE 10 MG/1
10 TABLET ORAL DAILY
Qty: 21 TABLET | Refills: 0 | Status: SHIPPED | OUTPATIENT
Start: 2025-07-27

## 2025-07-27 RX ORDER — MELATONIN 3 MG
CAPSULE ORAL
COMMUNITY

## 2025-07-27 RX ORDER — METHYLPREDNISOLONE SOD SUCC 125 MG
125 VIAL (EA) INJECTION
Status: COMPLETED | OUTPATIENT
Start: 2025-07-27 | End: 2025-07-27

## 2025-07-27 RX ORDER — SERTRALINE HYDROCHLORIDE 25 MG/1
25 TABLET, FILM COATED ORAL DAILY
COMMUNITY
Start: 2025-07-05

## 2025-07-27 RX ORDER — IPRATROPIUM BROMIDE AND ALBUTEROL SULFATE 2.5; .5 MG/3ML; MG/3ML
3 SOLUTION RESPIRATORY (INHALATION)
Status: COMPLETED | OUTPATIENT
Start: 2025-07-27 | End: 2025-07-27

## 2025-07-27 RX ORDER — RIVASTIGMINE TARTRATE 1.5 MG/1
1.5 CAPSULE ORAL 2 TIMES DAILY
COMMUNITY
Start: 2025-06-28

## 2025-07-27 RX ORDER — CETIRIZINE HYDROCHLORIDE 10 MG/1
10 TABLET ORAL DAILY
COMMUNITY

## 2025-07-27 RX ORDER — DULOXETIN HYDROCHLORIDE 30 MG/1
30 CAPSULE, DELAYED RELEASE ORAL DAILY
COMMUNITY
Start: 2025-07-03

## 2025-07-27 RX ORDER — ADHESIVE BANDAGE
60 BANDAGE TOPICAL DAILY PRN
COMMUNITY

## 2025-07-27 RX ORDER — FLUTICASONE PROPIONATE 50 MCG
1 SPRAY, SUSPENSION (ML) NASAL EVERY MORNING
COMMUNITY
Start: 2025-06-16

## 2025-07-27 RX ORDER — LINACLOTIDE 145 UG/1
145 CAPSULE, GELATIN COATED ORAL EVERY MORNING
COMMUNITY
Start: 2025-07-03

## 2025-07-27 RX ADMIN — IPRATROPIUM BROMIDE AND ALBUTEROL SULFATE 3 ML: .5; 3 SOLUTION RESPIRATORY (INHALATION) at 09:07

## 2025-07-27 RX ADMIN — METHYLPREDNISOLONE SODIUM SUCCINATE 125 MG: 125 INJECTION, POWDER, FOR SOLUTION INTRAMUSCULAR; INTRAVENOUS at 09:07

## 2025-07-27 NOTE — Clinical Note
"Joyce Guillory" Paloma was seen and treated in our emergency department on 7/27/2025.  She may return to work on 07/29/2025.       If you have any questions or concerns, please don't hesitate to call.      Krystyna RILEY RN    "

## 2025-07-27 NOTE — Clinical Note
Yamilex Dawkins accompanied their mother to the emergency department on 7/27/2025. They may return to work on 07/29/2025.      If you have any questions or concerns, please don't hesitate to call.       NP

## 2025-07-28 VITALS
DIASTOLIC BLOOD PRESSURE: 62 MMHG | SYSTOLIC BLOOD PRESSURE: 102 MMHG | OXYGEN SATURATION: 94 % | BODY MASS INDEX: 17.48 KG/M2 | HEIGHT: 69 IN | TEMPERATURE: 98 F | HEART RATE: 103 BPM | WEIGHT: 118 LBS | RESPIRATION RATE: 16 BRPM

## 2025-07-28 LAB
OHS QRS DURATION: 100 MS
OHS QTC CALCULATION: 420 MS
TROPONIN I SERPL HS-MCNC: 44.9 NG/L

## 2025-07-28 PROCEDURE — 84484 ASSAY OF TROPONIN QUANT: CPT | Performed by: NURSE PRACTITIONER

## 2025-07-28 NOTE — ED NOTES
Spoke with Mao at [facility] requested transportation for patient back to [facility]. Voiced information received.

## 2025-07-28 NOTE — DISCHARGE INSTRUCTIONS
Follow up with your primary care provider in 2-3 days   Take prednisone as directed   Return to the emergency department for any worsening condition or any concerns

## 2025-07-28 NOTE — ED NOTES
Troponin level reported by Beatriz in lab to be 44.9, NATALIE Greenwood notified of results. Voiced understanding.

## 2025-07-28 NOTE — ED PROVIDER NOTES
Encounter Date: 7/27/2025       History     Chief Complaint   Patient presents with    Shortness of Breath     Patient presents today via EMS from nursing home with complaint of shortness of breath.  They report she was wheezing around 7:00 p.m. this evening and gave her a albuterol nebulizer and then called EMS.  When EMS arrived they noticed there sats were about 85-88% and gave her a DuoNeb.  They report great improvement and transported her to the emergency department on 3 L of nasal cannula.  On arrival patient states she feels like she is breathing better.  She has had no complaints of chest pain, fever.          Review of patient's allergies indicates:   Allergen Reactions    Sulfa (sulfonamide antibiotics)      Past Medical History:   Diagnosis Date    Allergic rhinitis     Anticoagulant long-term use     Anxiety disorder, unspecified     Arthritis     Chronic pain     COPD (chronic obstructive pulmonary disease)     Dementia     Dementia     Depression     GERD (gastroesophageal reflux disease)     hyperlipidemia     Hypertension     Hyponatremia     Hypoxemia     Insomnia     Mood disorder     Neuropathy     Osteoarthritis     Rhinitis     Thyroid disease     Type 2 diabetes mellitus     Unspecified atrial fibrillation     Urinary tract infection, site not specified      History reviewed. No pertinent surgical history.  Family History   Family history unknown: Yes     Social History[1]  Review of Systems   Constitutional:  Negative for chills and fever.   Respiratory:  Positive for shortness of breath and wheezing.    Cardiovascular:  Negative for chest pain and leg swelling.   All other systems reviewed and are negative.      Physical Exam     Initial Vitals   BP Pulse Resp Temp SpO2   07/28/25 0114 07/27/25 2126 07/27/25 2126 07/27/25 2126 07/27/25 2126   108/66 93 16 97.7 °F (36.5 °C) (!) 92 %      MAP       --                Physical Exam    Nursing note and vitals reviewed.  Constitutional: She  appears well-developed and well-nourished.   HENT:   Head: Normocephalic.   Right Ear: External ear normal.   Left Ear: External ear normal.   Nose: Nose normal. Mouth/Throat: Oropharynx is clear and moist. No oropharyngeal exudate.   Eyes: EOM are normal.   Neck: Neck supple.   Cardiovascular:  Normal rate, regular rhythm and normal heart sounds.           No murmur heard.  Pulmonary/Chest: She has wheezes.   Abdominal: Abdomen is soft. Bowel sounds are normal. She exhibits no distension and no mass. There is no abdominal tenderness.   Musculoskeletal:         General: No tenderness or edema. Normal range of motion.      Cervical back: Neck supple.     Neurological: She is alert and oriented to person, place, and time. She has normal strength. No cranial nerve deficit.   Skin: Skin is warm and dry.   Psychiatric: She has a normal mood and affect.         Medical Screening Exam   See Full Note    ED Course   Procedures  Labs Reviewed   COMPREHENSIVE METABOLIC PANEL - Abnormal       Result Value    Sodium 138      Potassium 3.7      Chloride 105      CO2 22 (*)     Anion Gap 15      Glucose 258 (*)     BUN 11      Creatinine 0.88      BUN/Creatinine Ratio 13      Calcium 8.9      Total Protein 6.3      Albumin 3.2 (*)     Globulin 3.1      A/G Ratio 1.0      Bilirubin, Total 0.4      Alk Phos 75      ALT 7      AST 21      eGFR 62     CBC WITH DIFFERENTIAL - Abnormal    WBC 9.31      RBC 3.71 (*)     Hemoglobin 10.3 (*)     Hematocrit 33.8 (*)     MCV 91.1      MCH 27.8      MCHC 30.5 (*)     RDW 14.1      Platelet Count 304      MPV 10.1      Neutrophils % 78.1 (*)     Lymphocytes % 16.6 (*)     Neutrophils, Abs 7.27      Lymphocytes, Absolute 1.55      Diff Type Auto      Monocytes % 4.1      Eosinophils % 0.8 (*)     Basophils % 0.4      Monocytes, Absolute 0.38      Eosinophils, Absolute 0.07      Basophils, Absolute 0.04     TROPONIN I HIGH SENSITIVITY - Abnormal    Troponin I High Sensitivity 29.3 (*)     TROPONIN I HIGH SENSITIVITY - Abnormal    Troponin I High Sensitivity 44.9 (*)    NT-PRO NATRIURETIC PEPTIDE - Abnormal    ProBNP 4,494 (*)    CBC W/ AUTO DIFFERENTIAL    Narrative:     The following orders were created for panel order CBC Auto Differential.  Procedure                               Abnormality         Status                     ---------                               -----------         ------                     CBC with Differential[[id number]]       Abnormal            Final result                 Please view results for these tests on the individual orders.   TROPONIN I    Narrative:     The following orders were created for panel order Troponin I.  Procedure                               Abnormality         Status                     ---------                               -----------         ------                     Troponin I High Sensiti...[[id number]]  Abnormal            Final result                 Please view results for these tests on the individual orders.   TROPONIN I    Narrative:     The following orders were created for panel order Troponin I.  Procedure                               Abnormality         Status                     ---------                               -----------         ------                     Troponin I High Sensiti...[[id number]]  Abnormal            Final result                 Please view results for these tests on the individual orders.          Imaging Results              X-Ray Chest AP Portable (In process)                      Medications   methylPREDNISolone sodium succinate injection 125 mg (125 mg Intravenous Given 7/27/25 2149)   albuterol-ipratropium 2.5 mg-0.5 mg/3 mL nebulizer solution 3 mL (3 mLs Nebulization Given 7/27/25 2157)     Medical Decision Making  Patient presents today via EMS from nursing home with complaint of shortness of breath.  They report she was wheezing around 7:00 p.m. this evening and gave her a albuterol nebulizer  and then called EMS.  When EMS arrived they noticed there sats were about 85-88% and gave her a DuoNeb.  They report great improvement and transported her to the emergency department on 3 L of nasal cannula.  On arrival patient states she feels like she is breathing better.  She has had no complaints of chest pain, fever.      Pt with chronic elevation of troponin secondary to CHF. Has no c/o chest pain. Troponin is stable. Dyspnea resolved with Duonebs and IV Solu-Medrol. Will D/C back to Nursing home on Prednisone. VSS. Has no c/o at this time. No respiratory distress. Lungs are clear to auscultation.     Amount and/or Complexity of Data Reviewed  Labs: ordered. Decision-making details documented in ED Course.  Radiology: ordered.    Risk  Prescription drug management.               ED Course as of 07/28/25 0155   Sun Jul 27, 2025   2134 EKG:  AFib with a ventricular rate of 99 beats per minute [BC]   2144 Pt is resting comfortably in no respiratory distress. Minimal expiratory wheezes on the right. Will give 125 Solu-Medrol and DuoNeb.  [BC]   2214 Chest x-ray:  No acute intrathoracic pathology [BC]   2224 Troponin I High Sensitivity(!!): 29.3  Troponin is elevated at 29.3.  We will get a BNP along with a repeat troponin in 3 hours.  Patient does not having any complaints of chest pain.  Her shortness of breath has resolved. [BC]   2226 A review of patient's prior troponins shows a chronic elevation of troponin consistent with the 29.3 we got today. [BC]   Mon Jul 28, 2025   0147 NT-proBNP(!): 4,494 [BC]   0154 Troponin I High Sensitivity(!!): 44.9 [BC]      ED Course User Index  [BC] Arnold Ramsay, NP                           Clinical Impression:   Final diagnoses:  [J44.1] COPD exacerbation (Primary)        ED Disposition Condition    Discharge Stable          ED Prescriptions       Medication Sig Dispense Start Date End Date Auth. Provider    predniSONE (DELTASONE) 10 MG tablet Take 1 tablet (10 mg total)  by mouth once daily. Take 4 tabs x 3 days, then take 2 tabs x 3 days, then take 1 tab x 3 days. 21 tablet 7/27/2025 -- Arnold Ramsay NP          Follow-up Information       Follow up With Specialties Details Why Contact Info    Walker Watkins MD Internal Medicine Schedule an appointment as soon as possible for a visit in 2 days Follow-up of today's visit [address]  [place] [address]  [phone]                   [1]   Social History  Tobacco Use    Smoking status: Unknown   Vaping Use    Vaping status: Never Used   Substance Use Topics    Alcohol use: Not Currently     Comment: lethargic - unable to access    Drug use: Not Currently     Comment: lethargic - unable to access        Arnold Ramsay NP  07/28/25 0155

## 2025-07-28 NOTE — ED NOTES
Spoke with Elsi Walton LPN at [facility], report given with voiced understanding. Was instructed by Elsi Walton that the patient would have to come by ambulance service back to the facility due to no one to drive the transport van in [facility][note 3] and patients inability to sit up for transportation.